# Patient Record
Sex: FEMALE | Race: WHITE | URBAN - METROPOLITAN AREA
[De-identification: names, ages, dates, MRNs, and addresses within clinical notes are randomized per-mention and may not be internally consistent; named-entity substitution may affect disease eponyms.]

---

## 2019-10-07 ENCOUNTER — EMERGENCY (EMERGENCY)
Facility: HOSPITAL | Age: 74
LOS: 0 days | Discharge: HOME | End: 2019-10-07
Attending: EMERGENCY MEDICINE | Admitting: EMERGENCY MEDICINE
Payer: MEDICARE

## 2019-10-07 VITALS
TEMPERATURE: 99 F | HEART RATE: 101 BPM | DIASTOLIC BLOOD PRESSURE: 61 MMHG | RESPIRATION RATE: 18 BRPM | SYSTOLIC BLOOD PRESSURE: 117 MMHG | OXYGEN SATURATION: 100 %

## 2019-10-07 DIAGNOSIS — R25.1 TREMOR, UNSPECIFIED: ICD-10-CM

## 2019-10-07 DIAGNOSIS — R00.2 PALPITATIONS: ICD-10-CM

## 2019-10-07 DIAGNOSIS — H53.8 OTHER VISUAL DISTURBANCES: ICD-10-CM

## 2019-10-07 PROCEDURE — 93010 ELECTROCARDIOGRAM REPORT: CPT

## 2019-10-07 PROCEDURE — 99284 EMERGENCY DEPT VISIT MOD MDM: CPT

## 2019-10-07 NOTE — ED PROVIDER NOTE - NS ED ROS FT
General: No fever, chills, or weakness.  Eyes:  Blurry vision. No diplopia, eye pain or discharge.  ENMT:  No hearing changes, pain, no sore throat or runny nose, no difficulty swallowing  Cardiac:  No chest pain, SOB or edema. No chest pain with exertion.  Respiratory:  No cough or respiratory distress. No hemoptysis. No history of asthma or RAD.  GI:  No nausea, vomiting, diarrhea or abdominal pain.  :  No dysuria, frequency or burning.  MS:  No myalgia, muscle weakness, joint pain or back pain.  Neuro:  No headache. No LOC. No change in ambulation. No dizziness.  Skin:  No skin rash.   Endocrine: No history of thyroid disease or diabetes.

## 2019-10-07 NOTE — ED PROVIDER NOTE - PATIENT PORTAL LINK FT
You can access the FollowMyHealth Patient Portal offered by [facility] by registering at the following website: [email or web address]. By joining [employer]’s FollowMyHealth portal, you will also be able to view your health information using other applications (apps) compatible with our system.

## 2019-10-07 NOTE — ED PROVIDER NOTE - NSFOLLOWUPINSTRUCTIONS_ED_ALL_ED_FT
Tremors    WHAT YOU NEED TO KNOW:    A tremor is a movement you cannot control that occurs in a rhythm. Tremors most commonly occur in the hands. Other common places include the head or face, trunk, or legs. Your voice can also have a tremor and sound shaky when you speak. A tremor may be caused by a nerve problem, too much thyroid hormone, or by certain medicines, caffeine, or alcohol. Tremors may be temporary or permanent. The tremor may go away and return, or worsen with stress. Tremors can happen at any age, but they are more common in later years.    DISCHARGE INSTRUCTIONS:    Contact your healthcare provider if:     You have a new or worsening tremor.      You have tremors that make it difficult to do your regular activities.      You have questions or concerns about your condition or care.    Medicines:     Medicines may be used to help control some kinds of tremors.      Take your medicine as directed. Contact your healthcare provider if you think your medicine is not helping or if you have side effects. Tell him or her if you are allergic to any medicine. Keep a list of the medicines, vitamins, and herbs you take. Include the amounts, and when and why you take them. Bring the list or the pill bottles to follow-up visits. Carry your medicine list with you in case of an emergency.    Manage your symptoms:     Do not have caffeine or other chemicals that affect your nerves. Limit or do not have caffeine. Caffeine can make tremors worse. Talk to your healthcare provider about herbal medicines, teas, and supplements. They may also increase tremors. Do not use illegal drugs.      Go to physical and occupational therapy as directed. A physical therapist can teach you exercises to help reduce the tremor and improve muscle control. You may be shown how to hold the body part during a tremor to help control the movement. The therapist can also help you build strength and balance. An occupational therapist can show you how to use adaptive devices to help you move more easily.       Use objects that will help you control movements. You may have more hand control if you add a watch or bracelet to your wrist. It may be easier for you to drink from a straw, or to fill your cup only half full. Cups with lids, such as travel mugs, can also help you drink with more control. Heavy eating utensils can help you eat more easily. A button fastener can help you button clothing if tremors in your hands make this difficult.      Set a regular sleep schedule. Lack of sleep can make tremors worse. Try to go to bed at the same time each night and wake up at the same time each morning.    Follow up with your healthcare provider as directed: Write down your questions so you remember to ask them during your visits.

## 2019-10-07 NOTE — ED ADULT NURSE NOTE - NS ED NURSE DISCH DISPOSITION
July 22, 2019    Marnaomykarlo DduleyGr  [address] [facility] [address]    Dear MsPrinceSimón,  This letter is regarding your recent Pap smear (cervical cancer screening) and Human Papillomavirus (HPV) test.  We are happy to inform you that your Pap smear result is normal. Cervical cancer is closely linked with certain types of HPV. Your results showed no evidence of high-risk HPV.  We recommend you have your next PAP smear and HPV test in 3 years.  You will still need to return to the clinic every year for an annual exam and other preventive tests.  If you have additional questions regarding this result, please call our registered nurse, Machelle at [phone].  Sincerely,    Warner Quiñones CNM/juni    Discharged

## 2019-10-07 NOTE — ED PROVIDER NOTE - NSFOLLOWUPCLINICS_GEN_ALL_ED_FT
[facility]  OP [facility]  [address] [place] [id number]  Phone: [phone]  Fax:   Follow Up Time: 1-3 Days

## 2019-10-07 NOTE — ED ADULT NURSE NOTE - OBJECTIVE STATEMENT
patient states she recently started taking Duloxetine and thinks she is having a bad reaction to it. patient states she has not been sleeping and reports "shaking" of her hands and arms. no tremors noted on exam. patient denies any si/hi.

## 2019-10-07 NOTE — ED PROVIDER NOTE - OBJECTIVE STATEMENT
74F h/o anxiety, CAD, CABG, HTN p/w palpitations, blurry vision, tremors. Pt reporting worsening insomnia over the past month and stated sxs for the past week. Pt started on bupropion ~1-2 weeks ago and duloxetine 1 month ago. pt never seen by psychiatrist. Denies CP, SOB, fever/chills, difficulty ambulating, slurred speech.

## 2019-10-07 NOTE — ED PROVIDER NOTE - PHYSICAL EXAMINATION
Constitutional: Well developed, well nourished. NAD. Good general hygiene  Head: Atraumatic.  Eyes: PERRLA. EOMI without discomfort.   ENT: No nasal discharge. Mucous membranes moist.  Neck: Supple. Painless ROM.  Cardiovascular: Regular rhythm. Regular rate. Normal S1 and S2. No murmurs. 2+ pulses in all extremities.   Pulmonary: Normal respiratory rate and effort. Lungs clear to auscultation bilaterally. No wheezing, rales, or rhonchi. Bilateral, equal lung expansion.   Abdominal: Soft. Nondistended. Nontender. No rebound or guarding.   Extremities. Pelvis stable. No lower extremity edema. Symmetric calves.  Skin: No rashes.   Neuro: AAOx3. CN 2-12, 5/5 strength in all extremities, sensation equal and intact in all extremities, no dysmetria, no pronator drift. Ambulating in ED w/o difficulty.  Psych: Normal mood. Normal affect.

## 2019-10-07 NOTE — ED PROVIDER NOTE - CARE PROVIDER_API CALL
Sadiq Hernández (MD)  Internal Medicine  [address] [place] [address]  Phone: [phone]  Fax: [phone]  Follow Up Time: 1-3 Days

## 2019-10-07 NOTE — ED ADULT TRIAGE NOTE - CHIEF COMPLAINT QUOTE
" I think I am having a reaction to my medication". patient recently prescribed Duloxetine 60mg once a day. patient reports having "shakes, decreased sleep". patient denies any SI/HI at this time.

## 2019-10-07 NOTE — ED PROVIDER NOTE - ATTENDING CONTRIBUTION TO CARE
75 y/o female h/o CAD, back pain, depression, anxiety, CABG, recently started by her PMD on duloxetine and buproprion, now presents with anxiety, insomnia, tremor, blurred vision for the past week. Sx are persistent, denies modifying factors, denies chest pain, fever, nausea, vomiting, diaphoresis, hemoptysis, orthopnea, PND, recent trauma, paresthesias, focal weakness, fevers, chills, neck stiffness, visual disturbances or pain, facial swelling, dental pain or other associated complaints at present.     No old chart available for review.  I have reviewed and agree with the nursing note, except as documented in my note.    VSS, awake, alert, non-toxic appearing, sitting comfortably, in NAD, no scalp tenderness or pulsatile vasculature, VA WNL, PERRL / EOMI, no conjunctival injection, ears clear, oropharynx clear and w/o signs dental space infection, no JVD or bruit, no skin rash or lesions, chest CTAB, no w/r/r, +S1/S2, RRR, no m/r/g, abdomen soft, NT, ND, +BS, no peripheral edema, AO x 3, clear speech.    IMP: Sx likely secondary to new medication use which pt is unable to tolerate, rec d/c buproprion, she was also referred to out mental health at her request, does not want to continue with her current PMD. The patient was given detailed return precautions and advised to return to the emergency department if any new symptoms developed, symptoms worsened or for any concerns. The patient was offered the opportunity to ask questions and verbalized that they understand the diagnosis and discharge instructions.

## 2019-10-07 NOTE — ED PROVIDER NOTE - PROGRESS NOTE DETAILS
F/u [facility] OP MH. Will establish care w/ new PMD. Dc bupropion. Return for worsening s/s, SI/HI, CP, SOB, syncope, lightheadedness.

## 2020-03-25 PROBLEM — Z00.00 ENCOUNTER FOR PREVENTIVE HEALTH EXAMINATION: Status: ACTIVE | Noted: 2020-03-25

## 2020-09-22 ENCOUNTER — APPOINTMENT (OUTPATIENT)
Dept: GASTROENTEROLOGY | Facility: CLINIC | Age: 75
End: 2020-09-22

## 2020-10-05 NOTE — ED ADULT NURSE NOTE - CHIEF COMPLAINT
Pt's paperwork was faxed back to [facility].   The patient is a 74y Female complaining of medical evaluation.

## 2021-10-24 ENCOUNTER — INPATIENT (INPATIENT)
Facility: HOSPITAL | Age: 76
LOS: 1 days | Discharge: HOME | End: 2021-10-26
Attending: INTERNAL MEDICINE | Admitting: INTERNAL MEDICINE
Payer: MEDICARE

## 2021-10-24 VITALS
HEART RATE: 141 BPM | DIASTOLIC BLOOD PRESSURE: 61 MMHG | WEIGHT: 150.13 LBS | RESPIRATION RATE: 18 BRPM | TEMPERATURE: 98 F | SYSTOLIC BLOOD PRESSURE: 114 MMHG | OXYGEN SATURATION: 98 %

## 2021-10-24 DIAGNOSIS — Z95.1 PRESENCE OF AORTOCORONARY BYPASS GRAFT: Chronic | ICD-10-CM

## 2021-10-24 LAB
ALBUMIN SERPL ELPH-MCNC: 4.9 G/DL — SIGNIFICANT CHANGE UP (ref 3.5–5.2)
ALP SERPL-CCNC: 96 U/L — SIGNIFICANT CHANGE UP (ref 30–115)
ALT FLD-CCNC: 26 U/L — SIGNIFICANT CHANGE UP (ref 0–41)
ANION GAP SERPL CALC-SCNC: 14 MMOL/L — SIGNIFICANT CHANGE UP (ref 7–14)
APPEARANCE UR: CLEAR — SIGNIFICANT CHANGE UP
APTT BLD: 31.1 SEC — SIGNIFICANT CHANGE UP (ref 27–39.2)
AST SERPL-CCNC: 37 U/L — SIGNIFICANT CHANGE UP (ref 0–41)
BACTERIA # UR AUTO: SIGNIFICANT CHANGE UP /HPF
BASOPHILS # BLD AUTO: 0.04 K/UL — SIGNIFICANT CHANGE UP (ref 0–0.2)
BASOPHILS NFR BLD AUTO: 0.2 % — SIGNIFICANT CHANGE UP (ref 0–1)
BILIRUB SERPL-MCNC: 0.3 MG/DL — SIGNIFICANT CHANGE UP (ref 0.2–1.2)
BILIRUB UR-MCNC: NEGATIVE — SIGNIFICANT CHANGE UP
BUN SERPL-MCNC: 35 MG/DL — HIGH (ref 10–20)
CALCIUM SERPL-MCNC: 10.5 MG/DL — HIGH (ref 8.5–10.1)
CHLORIDE SERPL-SCNC: 99 MMOL/L — SIGNIFICANT CHANGE UP (ref 98–110)
CO2 SERPL-SCNC: 23 MMOL/L — SIGNIFICANT CHANGE UP (ref 17–32)
COLOR SPEC: YELLOW — SIGNIFICANT CHANGE UP
CREAT SERPL-MCNC: 1.2 MG/DL — SIGNIFICANT CHANGE UP (ref 0.7–1.5)
D DIMER BLD IA.RAPID-MCNC: 670 NG/ML DDU — HIGH (ref 0–230)
DIFF PNL FLD: ABNORMAL
EOSINOPHIL # BLD AUTO: 0.19 K/UL — SIGNIFICANT CHANGE UP (ref 0–0.7)
EOSINOPHIL NFR BLD AUTO: 1 % — SIGNIFICANT CHANGE UP (ref 0–8)
EPI CELLS # UR: ABNORMAL /HPF
GLUCOSE SERPL-MCNC: 110 MG/DL — HIGH (ref 70–99)
GLUCOSE UR QL: NEGATIVE MG/DL — SIGNIFICANT CHANGE UP
HCT VFR BLD CALC: 39.8 % — SIGNIFICANT CHANGE UP (ref 37–47)
HGB BLD-MCNC: 13.2 G/DL — SIGNIFICANT CHANGE UP (ref 12–16)
IMM GRANULOCYTES NFR BLD AUTO: 0.5 % — HIGH (ref 0.1–0.3)
INR BLD: 0.98 RATIO — SIGNIFICANT CHANGE UP (ref 0.65–1.3)
KETONES UR-MCNC: NEGATIVE — SIGNIFICANT CHANGE UP
LEUKOCYTE ESTERASE UR-ACNC: NEGATIVE — SIGNIFICANT CHANGE UP
LYMPHOCYTES # BLD AUTO: 1.87 K/UL — SIGNIFICANT CHANGE UP (ref 1.2–3.4)
LYMPHOCYTES # BLD AUTO: 9.9 % — LOW (ref 20.5–51.1)
MCHC RBC-ENTMCNC: 32.1 PG — HIGH (ref 27–31)
MCHC RBC-ENTMCNC: 33.2 G/DL — SIGNIFICANT CHANGE UP (ref 32–37)
MCV RBC AUTO: 96.8 FL — SIGNIFICANT CHANGE UP (ref 81–99)
MONOCYTES # BLD AUTO: 0.85 K/UL — HIGH (ref 0.1–0.6)
MONOCYTES NFR BLD AUTO: 4.5 % — SIGNIFICANT CHANGE UP (ref 1.7–9.3)
NEUTROPHILS # BLD AUTO: 15.81 K/UL — HIGH (ref 1.4–6.5)
NEUTROPHILS NFR BLD AUTO: 83.9 % — HIGH (ref 42.2–75.2)
NITRITE UR-MCNC: NEGATIVE — SIGNIFICANT CHANGE UP
NRBC # BLD: 0 /100 WBCS — SIGNIFICANT CHANGE UP (ref 0–0)
NT-PROBNP SERPL-SCNC: 2960 PG/ML — HIGH (ref 0–300)
PH UR: 6 — SIGNIFICANT CHANGE UP (ref 5–8)
PLATELET # BLD AUTO: 236 K/UL — SIGNIFICANT CHANGE UP (ref 130–400)
POTASSIUM SERPL-MCNC: 4.3 MMOL/L — SIGNIFICANT CHANGE UP (ref 3.5–5)
POTASSIUM SERPL-SCNC: 4.3 MMOL/L — SIGNIFICANT CHANGE UP (ref 3.5–5)
PROT SERPL-MCNC: 8.3 G/DL — HIGH (ref 6–8)
PROT UR-MCNC: 30 MG/DL
PROTHROM AB SERPL-ACNC: 11.3 SEC — SIGNIFICANT CHANGE UP (ref 9.95–12.87)
RBC # BLD: 4.11 M/UL — LOW (ref 4.2–5.4)
RBC # FLD: 14.3 % — SIGNIFICANT CHANGE UP (ref 11.5–14.5)
RBC CASTS # UR COMP ASSIST: ABNORMAL /HPF
SARS-COV-2 RNA SPEC QL NAA+PROBE: SIGNIFICANT CHANGE UP
SODIUM SERPL-SCNC: 136 MMOL/L — SIGNIFICANT CHANGE UP (ref 135–146)
SP GR SPEC: 1.01 — SIGNIFICANT CHANGE UP (ref 1.01–1.03)
TROPONIN T SERPL-MCNC: <0.01 NG/ML — SIGNIFICANT CHANGE UP
UROBILINOGEN FLD QL: 0.2 MG/DL — SIGNIFICANT CHANGE UP
WBC # BLD: 18.86 K/UL — HIGH (ref 4.8–10.8)
WBC # FLD AUTO: 18.86 K/UL — HIGH (ref 4.8–10.8)

## 2021-10-24 PROCEDURE — 99285 EMERGENCY DEPT VISIT HI MDM: CPT

## 2021-10-24 PROCEDURE — 71275 CT ANGIOGRAPHY CHEST: CPT | Mod: 26,MA

## 2021-10-24 PROCEDURE — ZZZZZ: CPT

## 2021-10-24 PROCEDURE — 93010 ELECTROCARDIOGRAM REPORT: CPT

## 2021-10-24 PROCEDURE — 71045 X-RAY EXAM CHEST 1 VIEW: CPT | Mod: 26

## 2021-10-24 RX ORDER — DILTIAZEM HCL 120 MG
5 CAPSULE, EXT RELEASE 24 HR ORAL
Qty: 125 | Refills: 0 | Status: DISCONTINUED | OUTPATIENT
Start: 2021-10-24 | End: 2021-10-26

## 2021-10-24 RX ORDER — PANTOPRAZOLE SODIUM 20 MG/1
40 TABLET, DELAYED RELEASE ORAL
Refills: 0 | Status: DISCONTINUED | OUTPATIENT
Start: 2021-10-24 | End: 2021-10-26

## 2021-10-24 RX ORDER — SIMVASTATIN 20 MG/1
40 TABLET, FILM COATED ORAL AT BEDTIME
Refills: 0 | Status: DISCONTINUED | OUTPATIENT
Start: 2021-10-24 | End: 2021-10-26

## 2021-10-24 RX ORDER — SODIUM CHLORIDE 9 MG/ML
1000 INJECTION INTRAMUSCULAR; INTRAVENOUS; SUBCUTANEOUS ONCE
Refills: 0 | Status: COMPLETED | OUTPATIENT
Start: 2021-10-24 | End: 2021-10-24

## 2021-10-24 RX ORDER — DILTIAZEM HCL 120 MG
10 CAPSULE, EXT RELEASE 24 HR ORAL ONCE
Refills: 0 | Status: COMPLETED | OUTPATIENT
Start: 2021-10-24 | End: 2021-10-24

## 2021-10-24 RX ORDER — DULOXETINE HYDROCHLORIDE 30 MG/1
60 CAPSULE, DELAYED RELEASE ORAL DAILY
Refills: 0 | Status: DISCONTINUED | OUTPATIENT
Start: 2021-10-24 | End: 2021-10-26

## 2021-10-24 RX ORDER — ENOXAPARIN SODIUM 100 MG/ML
70 INJECTION SUBCUTANEOUS ONCE
Refills: 0 | Status: COMPLETED | OUTPATIENT
Start: 2021-10-24 | End: 2021-10-24

## 2021-10-24 RX ORDER — DILTIAZEM HCL 120 MG
10 CAPSULE, EXT RELEASE 24 HR ORAL ONCE
Refills: 0 | Status: DISCONTINUED | OUTPATIENT
Start: 2021-10-24 | End: 2021-10-24

## 2021-10-24 RX ORDER — NICOTINE POLACRILEX 2 MG
1 GUM BUCCAL DAILY
Refills: 0 | Status: DISCONTINUED | OUTPATIENT
Start: 2021-10-24 | End: 2021-10-26

## 2021-10-24 RX ORDER — CHLORHEXIDINE GLUCONATE 213 G/1000ML
1 SOLUTION TOPICAL DAILY
Refills: 0 | Status: DISCONTINUED | OUTPATIENT
Start: 2021-10-24 | End: 2021-10-26

## 2021-10-24 RX ADMIN — Medication 5 MG/HR: at 20:18

## 2021-10-24 RX ADMIN — Medication 10 MILLIGRAM(S): at 20:17

## 2021-10-24 RX ADMIN — SODIUM CHLORIDE 1000 MILLILITER(S): 9 INJECTION INTRAMUSCULAR; INTRAVENOUS; SUBCUTANEOUS at 18:56

## 2021-10-24 RX ADMIN — SODIUM CHLORIDE 1000 MILLILITER(S): 9 INJECTION INTRAMUSCULAR; INTRAVENOUS; SUBCUTANEOUS at 14:18

## 2021-10-24 RX ADMIN — ENOXAPARIN SODIUM 70 MILLIGRAM(S): 100 INJECTION SUBCUTANEOUS at 23:28

## 2021-10-24 RX ADMIN — Medication 10 MILLIGRAM(S): at 18:56

## 2021-10-24 NOTE — ED PROVIDER NOTE - IV ALTEPLASE EXCL ABS HIDDEN
Moira Morejon   MRN: [id number]    Department:  [facility] Emergency Department   Date of Visit:  12/19/2018           Disclosure     Insurance plans vary and the physician(s) referred by the ER may not be covered by your plan.  Please contact tell this physician (or your personal doctor if your instructions are to return to your personal doctor) about any new or lasting problems. The primary care or specialist physician will see patients referred from the [facility] Emergency Department.  [place] show

## 2021-10-24 NOTE — ED PROVIDER NOTE - PHYSICAL EXAMINATION
Vital Signs: I have reviewed the initial vital signs.  Constitutional: well-nourished, no acute distress, normocephalic  Eyes: PERRLA, EOMI, , clear conjunctiva  ENT: MMM,  Cardiovascular: tachycardic irregular rhythm no murmur appreciated  Respiratory: unlabored respiratory effort, clear to auscultation bilaterally  Gastrointestinal: soft, non-tender, non-distended  abdomen, no pulsatile mass  Musculoskeletal: supple neck, no lower extremity edema or calf tenderness, no bony tenderness  Integumentary: warm, dry, no rash  Neurologic: awake, alert, cranial nerves II-XII grossly intact, extremities’ motor and sensory functions grossly intact, no focal deficits  Psychiatric: appropriate mood, appropriate affect

## 2021-10-24 NOTE — ED PROVIDER NOTE - PROGRESS NOTE DETAILS
patient with persistant tachycardia despite IV hydration patient given iv push cardizem 10 mg x 2 without any improvement of sympotms. patient started on IV cardizem drip . patient with icu evaluation dr. cuevas approves for telemetry

## 2021-10-24 NOTE — ED PROVIDER NOTE - CLINICAL SUMMARY MEDICAL DECISION MAKING FREE TEXT BOX
77yo F history of HTN CAD CABG presenting with dizziness nausea, hot while shopping this AM. Per pt, thought [employer] was too hot, stepped outside, and felt improved. Currently improved, no acute complaints. no chest pain, shortness of breath, LE pain/swelling. Well appearing, NAD, non toxic. NCAT PERRLA EOMI neck supple non tender normal wob cta bl regular tachycardic abdomen s nt nd no rebound no guarding WWPx4 neuro non focal. labs ekg imaging reviewed. attempted fluids to bring down HR, no improvement. CTPE eng. +rapid aflutter, HR consistently 140s-150s. given cardizem IVP with improvement. started on gtt. dw icu. will admit

## 2021-10-24 NOTE — ED ADULT TRIAGE NOTE - CHIEF COMPLAINT QUOTE
BIBA via [facility], Patient was shopping at [employer] and began to feel hot and nauseous. Denies syncope, Denies chest pain or Denies fall. Went outside and felt a little better with air. Employee suggested patient to visit ER just in case. State symptom have resolved.

## 2021-10-24 NOTE — ED PROVIDER NOTE - OBJECTIVE STATEMENT
75 y/o female smoker with hx of CABG , on asa presents to the Ed with episode in mall with diaphoresis and nausea which resolved with exposure to outside air . patient denies any cp, sob, palpitations. no dizziness, visual changes or syncope. no neck or back pain. patient denies any headache or weakness to extremities. no abdominal pain. no recent change to any medications, change in diet or weight loss.

## 2021-10-24 NOTE — ED ADULT NURSE NOTE - CHIEF COMPLAINT QUOTE
BIBA via [facility], Patient was shopping at [employer] and began to feel hot and nauseous. Denies syncope, Denies chest pain or Denies fall. Went outside and felt a little better with air. Employee suggested patient to visit ER just in case. State symptom have resolved.

## 2021-10-25 LAB
ALBUMIN SERPL ELPH-MCNC: 4.3 G/DL — SIGNIFICANT CHANGE UP (ref 3.5–5.2)
ALP SERPL-CCNC: 86 U/L — SIGNIFICANT CHANGE UP (ref 30–115)
ALT FLD-CCNC: 19 U/L — SIGNIFICANT CHANGE UP (ref 0–41)
ANION GAP SERPL CALC-SCNC: 15 MMOL/L — HIGH (ref 7–14)
AST SERPL-CCNC: 24 U/L — SIGNIFICANT CHANGE UP (ref 0–41)
BASOPHILS # BLD AUTO: 0.05 K/UL — SIGNIFICANT CHANGE UP (ref 0–0.2)
BASOPHILS NFR BLD AUTO: 0.4 % — SIGNIFICANT CHANGE UP (ref 0–1)
BILIRUB SERPL-MCNC: 0.4 MG/DL — SIGNIFICANT CHANGE UP (ref 0.2–1.2)
BUN SERPL-MCNC: 22 MG/DL — HIGH (ref 10–20)
CALCIUM SERPL-MCNC: 9.7 MG/DL — SIGNIFICANT CHANGE UP (ref 8.5–10.1)
CHLORIDE SERPL-SCNC: 105 MMOL/L — SIGNIFICANT CHANGE UP (ref 98–110)
CO2 SERPL-SCNC: 20 MMOL/L — SIGNIFICANT CHANGE UP (ref 17–32)
COVID-19 SPIKE DOMAIN AB INTERP: POSITIVE
COVID-19 SPIKE DOMAIN ANTIBODY RESULT: >250 U/ML — HIGH
CREAT SERPL-MCNC: 1 MG/DL — SIGNIFICANT CHANGE UP (ref 0.7–1.5)
CULTURE RESULTS: SIGNIFICANT CHANGE UP
EOSINOPHIL # BLD AUTO: 0.22 K/UL — SIGNIFICANT CHANGE UP (ref 0–0.7)
EOSINOPHIL NFR BLD AUTO: 1.7 % — SIGNIFICANT CHANGE UP (ref 0–8)
GLUCOSE SERPL-MCNC: 103 MG/DL — HIGH (ref 70–99)
HCT VFR BLD CALC: 38 % — SIGNIFICANT CHANGE UP (ref 37–47)
HGB BLD-MCNC: 12.6 G/DL — SIGNIFICANT CHANGE UP (ref 12–16)
IMM GRANULOCYTES NFR BLD AUTO: 0.2 % — SIGNIFICANT CHANGE UP (ref 0.1–0.3)
LYMPHOCYTES # BLD AUTO: 32.1 % — SIGNIFICANT CHANGE UP (ref 20.5–51.1)
LYMPHOCYTES # BLD AUTO: 4.07 K/UL — HIGH (ref 1.2–3.4)
MAGNESIUM SERPL-MCNC: 1.9 MG/DL — SIGNIFICANT CHANGE UP (ref 1.8–2.4)
MCHC RBC-ENTMCNC: 31.6 PG — HIGH (ref 27–31)
MCHC RBC-ENTMCNC: 33.2 G/DL — SIGNIFICANT CHANGE UP (ref 32–37)
MCV RBC AUTO: 95.2 FL — SIGNIFICANT CHANGE UP (ref 81–99)
MONOCYTES # BLD AUTO: 0.82 K/UL — HIGH (ref 0.1–0.6)
MONOCYTES NFR BLD AUTO: 6.5 % — SIGNIFICANT CHANGE UP (ref 1.7–9.3)
NEUTROPHILS # BLD AUTO: 7.51 K/UL — HIGH (ref 1.4–6.5)
NEUTROPHILS NFR BLD AUTO: 59.1 % — SIGNIFICANT CHANGE UP (ref 42.2–75.2)
NRBC # BLD: 0 /100 WBCS — SIGNIFICANT CHANGE UP (ref 0–0)
PHOSPHATE SERPL-MCNC: 3 MG/DL — SIGNIFICANT CHANGE UP (ref 2.1–4.9)
PLATELET # BLD AUTO: 279 K/UL — SIGNIFICANT CHANGE UP (ref 130–400)
POTASSIUM SERPL-MCNC: 4 MMOL/L — SIGNIFICANT CHANGE UP (ref 3.5–5)
POTASSIUM SERPL-SCNC: 4 MMOL/L — SIGNIFICANT CHANGE UP (ref 3.5–5)
PROT SERPL-MCNC: 7.4 G/DL — SIGNIFICANT CHANGE UP (ref 6–8)
RBC # BLD: 3.99 M/UL — LOW (ref 4.2–5.4)
RBC # FLD: 14.4 % — SIGNIFICANT CHANGE UP (ref 11.5–14.5)
SARS-COV-2 IGG+IGM SERPL QL IA: >250 U/ML — HIGH
SARS-COV-2 IGG+IGM SERPL QL IA: POSITIVE
SODIUM SERPL-SCNC: 140 MMOL/L — SIGNIFICANT CHANGE UP (ref 135–146)
SPECIMEN SOURCE: SIGNIFICANT CHANGE UP
TROPONIN T SERPL-MCNC: 0.01 NG/ML — SIGNIFICANT CHANGE UP
TROPONIN T SERPL-MCNC: 0.01 NG/ML — SIGNIFICANT CHANGE UP
WBC # BLD: 12.69 K/UL — HIGH (ref 4.8–10.8)
WBC # FLD AUTO: 12.69 K/UL — HIGH (ref 4.8–10.8)

## 2021-10-25 PROCEDURE — 99222 1ST HOSP IP/OBS MODERATE 55: CPT

## 2021-10-25 PROCEDURE — 93306 TTE W/DOPPLER COMPLETE: CPT | Mod: 26

## 2021-10-25 PROCEDURE — 93010 ELECTROCARDIOGRAM REPORT: CPT

## 2021-10-25 PROCEDURE — ZZZZZ: CPT

## 2021-10-25 PROCEDURE — 99223 1ST HOSP IP/OBS HIGH 75: CPT

## 2021-10-25 PROCEDURE — 99233 SBSQ HOSP IP/OBS HIGH 50: CPT

## 2021-10-25 RX ORDER — AMIODARONE HYDROCHLORIDE 400 MG/1
150 TABLET ORAL ONCE
Refills: 0 | Status: COMPLETED | OUTPATIENT
Start: 2021-10-25 | End: 2021-10-25

## 2021-10-25 RX ORDER — AMIODARONE HYDROCHLORIDE 400 MG/1
0.5 TABLET ORAL
Qty: 900 | Refills: 0 | Status: DISCONTINUED | OUTPATIENT
Start: 2021-10-25 | End: 2021-10-26

## 2021-10-25 RX ORDER — ENOXAPARIN SODIUM 100 MG/ML
70 INJECTION SUBCUTANEOUS EVERY 12 HOURS
Refills: 0 | Status: DISCONTINUED | OUTPATIENT
Start: 2021-10-25 | End: 2021-10-25

## 2021-10-25 RX ORDER — OXYCODONE AND ACETAMINOPHEN 5; 325 MG/1; MG/1
1 TABLET ORAL ONCE
Refills: 0 | Status: DISCONTINUED | OUTPATIENT
Start: 2021-10-25 | End: 2021-10-25

## 2021-10-25 RX ORDER — APIXABAN 2.5 MG/1
5 TABLET, FILM COATED ORAL EVERY 12 HOURS
Refills: 0 | Status: DISCONTINUED | OUTPATIENT
Start: 2021-10-25 | End: 2021-10-26

## 2021-10-25 RX ORDER — AMIODARONE HYDROCHLORIDE 400 MG/1
1 TABLET ORAL
Qty: 900 | Refills: 0 | Status: COMPLETED | OUTPATIENT
Start: 2021-10-25 | End: 2021-10-25

## 2021-10-25 RX ADMIN — APIXABAN 5 MILLIGRAM(S): 2.5 TABLET, FILM COATED ORAL at 19:14

## 2021-10-25 RX ADMIN — Medication 5 MG/HR: at 20:00

## 2021-10-25 RX ADMIN — OXYCODONE AND ACETAMINOPHEN 1 TABLET(S): 5; 325 TABLET ORAL at 00:41

## 2021-10-25 RX ADMIN — AMIODARONE HYDROCHLORIDE 33.3 MG/MIN: 400 TABLET ORAL at 11:31

## 2021-10-25 RX ADMIN — AMIODARONE HYDROCHLORIDE 16.7 MG/MIN: 400 TABLET ORAL at 20:00

## 2021-10-25 RX ADMIN — AMIODARONE HYDROCHLORIDE 16.7 MG/MIN: 400 TABLET ORAL at 11:20

## 2021-10-25 RX ADMIN — OXYCODONE AND ACETAMINOPHEN 1 TABLET(S): 5; 325 TABLET ORAL at 01:11

## 2021-10-25 RX ADMIN — PANTOPRAZOLE SODIUM 40 MILLIGRAM(S): 20 TABLET, DELAYED RELEASE ORAL at 06:28

## 2021-10-25 RX ADMIN — AMIODARONE HYDROCHLORIDE 600 MILLIGRAM(S): 400 TABLET ORAL at 11:08

## 2021-10-25 RX ADMIN — CHLORHEXIDINE GLUCONATE 1 APPLICATION(S): 213 SOLUTION TOPICAL at 11:20

## 2021-10-25 RX ADMIN — Medication 5 MG/HR: at 00:20

## 2021-10-25 RX ADMIN — DULOXETINE HYDROCHLORIDE 60 MILLIGRAM(S): 30 CAPSULE, DELAYED RELEASE ORAL at 11:20

## 2021-10-25 RX ADMIN — ENOXAPARIN SODIUM 70 MILLIGRAM(S): 100 INJECTION SUBCUTANEOUS at 11:21

## 2021-10-25 RX ADMIN — SIMVASTATIN 40 MILLIGRAM(S): 20 TABLET, FILM COATED ORAL at 22:04

## 2021-10-25 NOTE — H&P ADULT - NSHPPHYSICALEXAM_GEN_ALL_CORE
GENERAL:  77y/o W Female NAD, resting comfortably.  HEAD:  Atraumatic, Normocephalic  EYES: EOMI, PERRLA, conjunctiva and sclera clear  NECK: Supple, No JVD, no cervical lymphadenopathy, non-tender  CHEST/LUNG: Clear to auscultation bilaterally; No wheeze, rhonchi, or rales  HEART: irregular rate and rhythm; S1&S2  ABDOMEN: Soft, Nontender, Nondistended x 4 quadrants; Bowel sounds present  EXTREMITIES:   Peripheral Pulses Present, No clubbing, no cyanosis, or no edema, no calf tenderness  PSYCH: AAOx3, cooperative, appropriate  NEUROLOGY: WNL  SKIN: WNL

## 2021-10-25 NOTE — CONSULT NOTE ADULT - ASSESSMENT
Impression:  Probable COPD  Air trapping on CT  Current smoker      Suggestion:  ABG/VBG for baseline PCO2  Outpatient FU for PFTs  Target POx > 92%  Aspiration precautions  HOB at 45 degrees  Advise smoking cessation  OOB to chair  GI and DVT prophylaxis     Impression:  COPD  Air trapping on CT  Current smoker      Suggestion:  ABG/VBG for baseline PCO2  Outpatient FU for PFTs  Target POx > 92%  Aspiration precautions  HOB at 45 degrees  Advise smoking cessation  OOB to chair  GI and DVT prophylaxis

## 2021-10-25 NOTE — CONSULT NOTE ADULT - ATTENDING COMMENTS
Attending Statement: I have personally performed a face to face diagnostic evaluation on this patient. The patient is suffering from:  COPD  Air trapping on CT  Current smoker  I have reviewed the above note and agree with the history, exam and suggestions for care, except as I have noted in the text. I have amended the treatment plans as necessary.

## 2021-10-25 NOTE — CONSULT NOTE ADULT - SUBJECTIVE AND OBJECTIVE BOX
CARDIOLOGY CONSULT NOTE     CHIEF COMPLAINT/REASON FOR CONSULT:    HPI:  77 yo W female w/ pmh as noted below.  Pt c/o dizziness yesterday afternoon while shopping.  Pt went to ER and was found to be in fib/flutter w/ RVR.  Pt was started on IV cardizem and is for adm to  ICU tele . CTangio negative for PE (25 Oct 2021 07:00)      PAST MEDICAL & SURGICAL HISTORY:  CAD (coronary artery disease)    Tobacco dependence    HTN (hypertension)    Anxiety    S/P CABG x 3        Cardiac Risks:   [x ]HTN, [ ] DM, [x ] Smoking, [ ] FH,  [ ] Lipids        MEDICATIONS:  MEDICATIONS  (STANDING):  chlorhexidine 4% Liquid 1 Application(s) Topical daily  diltiazem Infusion 5 mG/Hr (5 mL/Hr) IV Continuous <Continuous>  DULoxetine 60 milliGRAM(s) Oral daily  enoxaparin Injectable 70 milliGRAM(s) SubCutaneous every 12 hours  nicotine -   7 mG/24Hr(s) Patch 1 Patch Transdermal daily  pantoprazole    Tablet 40 milliGRAM(s) Oral before breakfast  simvastatin 40 milliGRAM(s) Oral at bedtime      FAMILY HISTORY:      SOCIAL HISTORY:      [ ] Marital status    Allergies    No Known Allergies      	    REVIEW OF SYSTEMS:  CONSTITUTIONAL: No fever, weight loss, or fatigue  EYES: No eye pain, visual disturbances, or discharge  ENMT:  No difficulty hearing, tinnitus, vertigo; No sinus or throat pain  NECK: No pain or stiffness  RESPIRATORY: No cough, wheezing, chills or hemoptysis; No Shortness of Breath  CARDIOVASCULAR: See above  GASTROINTESTINAL: No abdominal or epigastric pain. No nausea, vomiting, or hematemesis; No diarrhea or constipation. No melena or hematochezia.  GENITOURINARY: No dysuria, frequency, hematuria, or incontinence  NEUROLOGICAL: No headaches, memory loss, loss of strength, numbness, or tremors  SKIN: No itching, burning, rashes, or lesions   	      PHYSICAL EXAM:  T(C): 36.4 (10-25-21 @ 07:01), Max: 36.6 (10-24-21 @ 13:09)  HR: 95 (10-25-21 @ 05:30) (95 - 144)  BP: 121/70 (10-25-21 @ 05:30) (114/61 - 169/93)  RR: 18 (10-25-21 @ 07:01) (16 - 26)  SpO2: 95% (10-25-21 @ 05:30) (95% - 98%)  Wt(kg): --  I&O's Summary      Appearance: Normal	  Psychiatry: A & O x 3, Mood & affect appropriate  HEENT:   Normal oral mucosa, PERRL, EOMI	  Lymphatic: No lymphadenopathy  Cardiovascular: Normal S1 S2,irreg No JVD, No murmurs  Respiratory: Lungs clear to auscultation	Decreased bs  Gastrointestinal:  Soft, Non-tender, + BS	  Skin: No rashes, No ecchymoses, No cyanosis	  Neurologic: Non-focal  Extremities: Normal range of motion, No clubbing, cyanosis or edema  Vascular: Peripheral pulses palpable 2+ bilaterally      ECG:  	not avaiable    	  LABS:	 	    CARDIAC MARKERS:          Serum Pro-Brain Natriuretic Peptide: 2960 pg/mL (10-24 @ 13:45)                            12.6   12.69 )-----------( 279      ( 25 Oct 2021 05:41 )             38.0     10-25    140  |  105  |  22<H>  ----------------------------<  103<H>  4.0   |  20  |  1.0    Ca    9.7      25 Oct 2021 05:41  Phos  3.0     10-25  Mg     1.9     10-25    TPro  7.4  /  Alb  4.3  /  TBili  0.4  /  DBili  x   /  AST  24  /  ALT  19  /  AlkPhos  86  10-25    PT/INR - ( 24 Oct 2021 13:45 )   PT: 11.30 sec;   INR: 0.98 ratio         PTT - ( 24 Oct 2021 13:45 )  PTT:31.1 sec  proBNP: Serum Pro-Brain Natriuretic Peptide: 2960 pg/mL (10-24 @ 13:45)                
Patient is a 76y old  Female who presents with a chief complaint of new fib RVR (25 Oct 2021 07:46)    HPI:  77 yo W female w/ pmh as noted below.  Pt c/o dizziness yesterday afternoon while shopping.  Pt went to ER and was found to be in fib/flutter w/ RVR.  Pt was started on IV cardizem and is for adm to  ICU tele . CTangio negative for PE (25 Oct 2021 07:00)    PAST MEDICAL & SURGICAL HISTORY:  CAD (coronary artery disease)  Tobacco dependence  HTN (hypertension)  Anxiety  S/P CABG x 3    SOCIAL HX:   Active smoker    FAMILY HISTORY:  :  No known cardiovacular family hisotry     Review Of Systems:   All ROS are negative except per HPI     Allergies  No Known Allergies    Intolerances    PHYSICAL EXAM  ICU Vital Signs Last 24 Hrs  T(C): 36.4 (25 Oct 2021 07:01), Max: 36.6 (24 Oct 2021 13:09)  T(F): 97.6 (25 Oct 2021 07:01), Max: 97.8 (24 Oct 2021 13:09)  HR: 95 (25 Oct 2021 05:30) (95 - 144)  BP: 121/70 (25 Oct 2021 05:30) (114/61 - 169/93)  BP(mean): 90 (25 Oct 2021 05:30) (90 - 99)  RR: 18 (25 Oct 2021 07:01) (16 - 26)  SpO2: 95% (25 Oct 2021 05:30) (95% - 98%)      CONSTITUTIONAL:  Well nourished.  NAD    ENT:   Airway patent,   Mouth with normal mucosa.   No thrush    EYES:   pupils equal,   round and reactive to light.    CARDIAC:   Normal rate,   Irregular rhythm.    Heart sounds S1, S2.   No edema    RESPIRATORY:   No wheezing   Normal chest expansion  No use of accessory muscles    GASTROINTESTINAL:  Abdomen soft   Non-tender,   No guarding,   + BS    MUSCULOSKELETAL:   Range of motion is not limited,  Nno clubbing, cyanosis    NEUROLOGICAL:   Alert and oriented   No motor or sensory deficits.  Pertinent DTRs normal    SKIN:   Skin normal color for race,   Warm and dry  No evidence of rash.    PSYCHIATRIC:   Normal mood and affect.   No apparent risk to self or others.      10-25-21 @ 07:01  -  10-25-21 @ 09:27  --------------------------------------------------------  IN:    Oral Fluid: 210 mL  Total IN: 210 mL    OUT:  Total OUT: 0 mL    Total NET: 210 mL          LABS:                          12.6   12.69 )-----------( 279      ( 25 Oct 2021 05:41 )             38.0                                               10-25    140  |  105  |  22<H>  ----------------------------<  103<H>  4.0   |  20  |  1.0    Ca    9.7      25 Oct 2021 05:41  Phos  3.0     10-  Mg     1.9     1025    TPro  7.4  /  Alb  4.3  /  TBili  0.4  /  DBili  x   /  AST  24  /  ALT  19  /  AlkPhos  86  10-25      PT/INR - ( 24 Oct 2021 13:45 )   PT: 11.30 sec;   INR: 0.98 ratio         PTT - ( 24 Oct 2021 13:45 )  PTT:31.1 sec                                           Urinalysis Basic - ( 24 Oct 2021 15:12 )    Color: Yellow / Appearance: Clear / S.015 / pH: x  Gluc: x / Ketone: Negative  / Bili: Negative / Urobili: 0.2 mg/dL   Blood: x / Protein: 30 mg/dL / Nitrite: Negative   Leuk Esterase: Negative / RBC: 3-5 /HPF / WBC x   Sq Epi: x / Non Sq Epi: Few /HPF / Bacteria: occ /HPF        CARDIAC MARKERS ( 25 Oct 2021 05:41 )  x     / 0.01 ng/mL / x     / x     / x      CARDIAC MARKERS ( 25 Oct 2021 01:00 )  x     / 0.01 ng/mL / x     / x     / x      CARDIAC MARKERS ( 24 Oct 2021 13:45 )  x     / <0.01 ng/mL / x     / x     / x                                                LIVER FUNCTIONS - ( 25 Oct 2021 05:41 )  Alb: 4.3 g/dL / Pro: 7.4 g/dL / ALK PHOS: 86 U/L / ALT: 19 U/L / AST: 24 U/L / GGT: x                                                      MEDICATIONS  (STANDING):  aMIOdarone Infusion 1 mG/Min (33.3 mL/Hr) IV Continuous <Continuous>  aMIOdarone Infusion 0.5 mG/Min (16.7 mL/Hr) IV Continuous <Continuous>  aMIOdarone IVPB 150 milliGRAM(s) IV Intermittent once  chlorhexidine 4% Liquid 1 Application(s) Topical daily  diltiazem Infusion 5 mG/Hr (5 mL/Hr) IV Continuous <Continuous>  DULoxetine 60 milliGRAM(s) Oral daily  enoxaparin Injectable 70 milliGRAM(s) SubCutaneous every 12 hours  nicotine -   7 mG/24Hr(s) Patch 1 Patch Transdermal daily  pantoprazole    Tablet 40 milliGRAM(s) Oral before breakfast  simvastatin 40 milliGRAM(s) Oral at bedtime    MEDICATIONS  (PRN):

## 2021-10-25 NOTE — CONSULT NOTE ADULT - ASSESSMENT
Patient with hx CABG 12 years ago. Not followed by cardiology. She was dressed warm in mall. She felt hot . She was diaphoretic, No chest pain sob or palpitations. Now no complaints. Will give amiodarone to try covert. She needs a echo. No pe. Check cxr. Stop smoking . Anticoagulate . Will need out patient stress test. Stop smoking. Patient instructed

## 2021-10-25 NOTE — PROGRESS NOTE ADULT - ASSESSMENT
75 y/o female smoker with hx of CAD s/p CABG, HTN  presented  to the Ed with episode in mall with diaphoresis and nausea  77 y/o female smoker with hx of CAD s/p CABG, HTN  presented  to the Ed with episode in mall with diaphoresis and nausea and found in rapid a-flutter    paroxysmal afib / flutter     - on IV Amio and Cardizem   - check 2DECHO and TSH   - would anticoagulate with Eliquis   - resume home meds   - DC smoking   - tele

## 2021-10-25 NOTE — H&P ADULT - NSICDXPASTMEDICALHX_GEN_ALL_CORE_FT
PAST MEDICAL HISTORY:  Anxiety     CAD (coronary artery disease)     HTN (hypertension)     Tobacco dependence

## 2021-10-25 NOTE — H&P ADULT - ASSESSMENT
1.  new onset afib/flutter  2. Hx-tobacco dependence, CABG (12+ yrs ago), anxiety      discussed w/ night ICU mason ALBA  adm to ICU telemetry  continue IV cardizem  trend troponins  s/p full dose lovonox   cardio eval  echocardio.  smoking cessation

## 2021-10-25 NOTE — H&P ADULT - HISTORY OF PRESENT ILLNESS
77 yo W female w/ pmh as noted below.  Pt c/o dizziness yesterday afternoon while shopping.  Pt went to ER and was found to be in fib/flutter w/ RVR.  Pt was started on IV cardizem and is for adm to  ICU tele . 77 yo W female w/ pmh as noted below.  Pt c/o dizziness yesterday afternoon while shopping.  Pt went to ER and was found to be in fib/flutter w/ RVR.  Pt was started on IV cardizem and is for adm to  ICU tele . CTangio negative for PE

## 2021-10-25 NOTE — H&P ADULT - NSHPLABSRESULTS_GEN_ALL_CORE
12.6   12.69 )-----------( 279      ( 25 Oct 2021 05:41 )             38.0       10-24    136  |  99  |  35<H>  ----------------------------<  110<H>  4.3   |  23  |  1.2    Ca    10.5<H>      24 Oct 2021 13:45    TPro  8.3<H>  /  Alb  4.9  /  TBili  0.3  /  DBili  x   /  AST  37  /  ALT  26  /  AlkPhos  96  10-24              Urinalysis Basic - ( 24 Oct 2021 15:12 )    Color: Yellow / Appearance: Clear / S.015 / pH: x  Gluc: x / Ketone: Negative  / Bili: Negative / Urobili: 0.2 mg/dL   Blood: x / Protein: 30 mg/dL / Nitrite: Negative   Leuk Esterase: Negative / RBC: 3-5 /HPF / WBC x   Sq Epi: x / Non Sq Epi: Few /HPF / Bacteria: occ /HPF        PT/INR - ( 24 Oct 2021 13:45 )   PT: 11.30 sec;   INR: 0.98 ratio         PTT - ( 24 Oct 2021 13:45 )  PTT:31.1 sec    Lactate Trend      CARDIAC MARKERS ( 25 Oct 2021 01:00 )  x     / 0.01 ng/mL / x     / x     / x      CARDIAC MARKERS ( 24 Oct 2021 13:45 )  x     / <0.01 ng/mL / x     / x     / x            CAPILLARY BLOOD GLUCOSE

## 2021-10-25 NOTE — PATIENT PROFILE ADULT - LIVING ENVIRONMENT
Patient:   ANASTACIA PRESCOTT III            MRN: [id number]            FIN: [id number]              Age:   11 years     Sex:  MALE     :  11   Associated Diagnoses:   Laceration of head   Author:   MARIAN SILVA     Basic Information   Time seen: Date & time 20 02:00:00.   History source: Patient, mother.   Additional information: Chief Complaint from Nursing Triage Note : Chief Complaint ED  20 01:34 CDT       Chief Complaint ED        per mother, pt tripped and fell into a coffee table 30 mins prior to arrival; did not cry after hitting head; 2cm laceration noted to R forehead; bleeding controlled; denies medical problems; UTD on vaccines  .     History of Present Illness   Patient is a 11-year-old male with no past medical history presents today with a head laceration following falling into living room table.  Mom states he had fallen asleep when she was trying to get him to go to bed when he was half asleep, tripped and fell into the table.  No LOC.  He immediately was back to baseline and crying.  Denies any nausea/vomiting, vision changes, headache.  Small 1 cm laceration to the right forehead superior to the  eye.  He did not take anything for it yet..       Review of Systems   Constitutional symptoms:  No fever, no chills.    Skin symptoms:  No rash,    Eye symptoms:  No recent vision problems, no blurred vision.    ENMT symptoms:  No sore throat, no nasal congestion.    Respiratory symptoms:  No shortness of breath, no cough, no wheezing.   Cardiovascular symptoms:  No chest pain,    Gastrointestinal symptoms:  No abdominal pain, no nausea, no vomiting.   Musculoskeletal symptoms:  No Muscle pain, no Joint pain.    Neurologic symptoms:  No headache, no dizziness, no altered level of consciousness, no numbness, no tingling, no weakness.      Health Status   Allergies:    Allergic Reactions (All)  NKA, None..     Past Medical/ Family/ Social History   Medical history:    All  Problems  Bronchitis / [id number] / Confirmed  CPAP (continuous positive airway pressure) dependence / [id number] / Confirmed  Facial swelling / [id number] / Confirmed  Pneumonia / [id number] / Confirmed  RSV / [id number] / Confirmed.   Surgical history:    No active procedure history items have been selected or recorded..  Family history: Include Family History   ASD (atrial septal defect)  SISTER  VSD (ventricular septal defect and aortic arch hypoplasia  MOTHER  .   Social history:    Social & Psychosocial Habits  Home/Environment  09/01/2017  Alcohol Abuse in Household: No    Substance Abuse in Household: No    Smoker in Household: Yes    Primary Care Giver / Parent Name (if applicable): Hilary Butler (mom), Eduar Polk (dad)   Alcohol Abuse in Household: No    Patient Lives With: Brother, Father, Mother    Substance Abuse in Household: No    Patient Lives With: Sister    Living Situation - Household 1: Lives with Parent/Danuta    Smoker in Household: Yes    Household 1 Age(s) of Siblings: 2 sisters (10 yo & 15 yo) 2 brothers (10 yo & 15 yo)   Ambulation: Independent    Bathing: Required Assistance    Dressing: Independent    Driving: Not Performed    Eating: Independent    Elimination: Independent    Grooming: Required Assistance    Preparing Meal: Not Performed    Taking Meds: Not Performed    Toileting: Independent    Transfers: Independent  Tobacco  09/01/2017  Smoked/Smokeless Tobacco in Last 30 Days: No, No  Cultural/Bahai Practices  09/01/2017  Continue Cultural/Bahai Practices While in Hospital: No  .     Physical Examination              Vital Signs   ED Vital Sign   07/18/20 01:34 CDT Temperature - VS 36.6 deg_C  Normal    Temperature Source - VS Temporal    Heart/Pulse Rate 91  Normal    Pulse Source Monitor    Respiration Rate 20 breaths/min  Normal    SpO2 100 %  Normal    NIBP Systolic 120  Normal    NIBP Diastolic  85  HI    NIBP MAP 97  HI   .   Height and Weight   07/18/20 01:34 CDT CLINICALWEIGHT 36.6 kg    Weight Method Measured    MEDDOSEWT 36.6 kg   .   General:  Alert, no acute distress, In bed watching TV..    Skin:  Warm, dry, Small 1 cm laceration to right forehead..    Head:  Normocephalic, atraumatic.    Neck:  Supple, trachea midline.    Eye:  Pupils are equal, round and reactive to light, extraocular movements are intact, normal conjunctiva.   Ears, nose, mouth and throat:  Oral mucosa moist, no pharyngeal erythema or exudate.   Cardiovascular:  Regular rate and rhythm, No murmur, Normal peripheral perfusion, No edema.   Respiratory:  Lungs are clear to auscultation, respirations are non-labored, breath sounds are equal, Symmetrical chest wall expansion.   Gastrointestinal:  Soft, Nontender, Non distended.    Musculoskeletal:  Normal ROM, normal strength, no tenderness, no swelling, no deformity.   Neurological:  Alert and oriented to person, place, time, and situation, normal sensory observed, normal motor observed.   Psychiatric:  Cooperative, appropriate mood & affect, normal judgment.      Medical Decision Making   Differential Diagnosis:  Differential includes laceration, intra-cranial hemorrhage..  Rationale:  Patient is a 11-year-old male who presents 30 minutes after laceration to right forehead after hitting it on a table.  He is otherwise stable without any past medical history.  No signs or symptoms that would make us want to get a CT head.  We will proceed with lack repair with topical let..     Reexamination/ Reevaluation   Time: 07/18/20 02:46:00 .   Notes: Successful head laceration repair with 2 stitches.  No complications.  Patient is okay to discharge home..     Impression and Plan   Diagnosis   Laceration of head (GLA30-CR S01.91XA, Discharge, Medical)   Plan   Condition: Improved.    Disposition: Discharged: Time  07/18/20 02:46:00, to home.    Patient was given the following educational  materials: Facial Laceration, Easy-to-Read, Facial Laceration, Easy-to-Read.   Follow up with: Follow up with primary care provider Within 5 to 7 days May follow-up with primary care for removal of stitches or come back to ED.  This is free in the ED but often may have to wait..   Counseled: Patient, Family, Regarding diagnosis, Regarding diagnostic results, Regarding treatment plan, Patient indicated understanding of instructions.   no

## 2021-10-26 ENCOUNTER — TRANSCRIPTION ENCOUNTER (OUTPATIENT)
Age: 76
End: 2021-10-26

## 2021-10-26 VITALS — DIASTOLIC BLOOD PRESSURE: 77 MMHG | SYSTOLIC BLOOD PRESSURE: 135 MMHG | OXYGEN SATURATION: 98 % | HEART RATE: 99 BPM

## 2021-10-26 LAB
ALBUMIN SERPL ELPH-MCNC: 4.3 G/DL — SIGNIFICANT CHANGE UP (ref 3.5–5.2)
ALP SERPL-CCNC: 85 U/L — SIGNIFICANT CHANGE UP (ref 30–115)
ALT FLD-CCNC: 16 U/L — SIGNIFICANT CHANGE UP (ref 0–41)
ANION GAP SERPL CALC-SCNC: 10 MMOL/L — SIGNIFICANT CHANGE UP (ref 7–14)
AST SERPL-CCNC: 17 U/L — SIGNIFICANT CHANGE UP (ref 0–41)
BASOPHILS # BLD AUTO: 0.03 K/UL — SIGNIFICANT CHANGE UP (ref 0–0.2)
BASOPHILS NFR BLD AUTO: 0.3 % — SIGNIFICANT CHANGE UP (ref 0–1)
BILIRUB SERPL-MCNC: 0.5 MG/DL — SIGNIFICANT CHANGE UP (ref 0.2–1.2)
BUN SERPL-MCNC: 17 MG/DL — SIGNIFICANT CHANGE UP (ref 10–20)
CALCIUM SERPL-MCNC: 10.3 MG/DL — HIGH (ref 8.5–10.1)
CHLORIDE SERPL-SCNC: 101 MMOL/L — SIGNIFICANT CHANGE UP (ref 98–110)
CO2 SERPL-SCNC: 24 MMOL/L — SIGNIFICANT CHANGE UP (ref 17–32)
CREAT SERPL-MCNC: 1.1 MG/DL — SIGNIFICANT CHANGE UP (ref 0.7–1.5)
EOSINOPHIL # BLD AUTO: 0.25 K/UL — SIGNIFICANT CHANGE UP (ref 0–0.7)
EOSINOPHIL NFR BLD AUTO: 2.4 % — SIGNIFICANT CHANGE UP (ref 0–8)
GLUCOSE SERPL-MCNC: 113 MG/DL — HIGH (ref 70–99)
HCT VFR BLD CALC: 41.5 % — SIGNIFICANT CHANGE UP (ref 37–47)
HCV AB S/CO SERPL IA: 0.03 COI — SIGNIFICANT CHANGE UP
HCV AB SERPL-IMP: SIGNIFICANT CHANGE UP
HGB BLD-MCNC: 13.7 G/DL — SIGNIFICANT CHANGE UP (ref 12–16)
IMM GRANULOCYTES NFR BLD AUTO: 0.4 % — HIGH (ref 0.1–0.3)
LYMPHOCYTES # BLD AUTO: 2.65 K/UL — SIGNIFICANT CHANGE UP (ref 1.2–3.4)
LYMPHOCYTES # BLD AUTO: 25.4 % — SIGNIFICANT CHANGE UP (ref 20.5–51.1)
MCHC RBC-ENTMCNC: 31.5 PG — HIGH (ref 27–31)
MCHC RBC-ENTMCNC: 33 G/DL — SIGNIFICANT CHANGE UP (ref 32–37)
MCV RBC AUTO: 95.4 FL — SIGNIFICANT CHANGE UP (ref 81–99)
MONOCYTES # BLD AUTO: 0.82 K/UL — HIGH (ref 0.1–0.6)
MONOCYTES NFR BLD AUTO: 7.9 % — SIGNIFICANT CHANGE UP (ref 1.7–9.3)
NEUTROPHILS # BLD AUTO: 6.65 K/UL — HIGH (ref 1.4–6.5)
NEUTROPHILS NFR BLD AUTO: 63.6 % — SIGNIFICANT CHANGE UP (ref 42.2–75.2)
NRBC # BLD: 0 /100 WBCS — SIGNIFICANT CHANGE UP (ref 0–0)
PLATELET # BLD AUTO: 261 K/UL — SIGNIFICANT CHANGE UP (ref 130–400)
POTASSIUM SERPL-MCNC: 5.4 MMOL/L — HIGH (ref 3.5–5)
POTASSIUM SERPL-SCNC: 5.4 MMOL/L — HIGH (ref 3.5–5)
PROT SERPL-MCNC: 7.6 G/DL — SIGNIFICANT CHANGE UP (ref 6–8)
RBC # BLD: 4.35 M/UL — SIGNIFICANT CHANGE UP (ref 4.2–5.4)
RBC # FLD: 14.3 % — SIGNIFICANT CHANGE UP (ref 11.5–14.5)
SODIUM SERPL-SCNC: 135 MMOL/L — SIGNIFICANT CHANGE UP (ref 135–146)
TSH SERPL-MCNC: 2.1 UIU/ML — SIGNIFICANT CHANGE UP (ref 0.27–4.2)
WBC # BLD: 10.44 K/UL — SIGNIFICANT CHANGE UP (ref 4.8–10.8)
WBC # FLD AUTO: 10.44 K/UL — SIGNIFICANT CHANGE UP (ref 4.8–10.8)

## 2021-10-26 PROCEDURE — 93010 ELECTROCARDIOGRAM REPORT: CPT | Mod: 76

## 2021-10-26 PROCEDURE — 99233 SBSQ HOSP IP/OBS HIGH 50: CPT

## 2021-10-26 RX ORDER — APIXABAN 2.5 MG/1
1 TABLET, FILM COATED ORAL
Qty: 60 | Refills: 0
Start: 2021-10-26 | End: 2021-11-24

## 2021-10-26 RX ORDER — METOPROLOL TARTRATE 50 MG
1 TABLET ORAL
Qty: 30 | Refills: 0
Start: 2021-10-26 | End: 2021-11-24

## 2021-10-26 RX ORDER — METOPROLOL TARTRATE 50 MG
25 TABLET ORAL DAILY
Refills: 0 | Status: DISCONTINUED | OUTPATIENT
Start: 2021-10-26 | End: 2021-10-26

## 2021-10-26 RX ORDER — RIVAROXABAN 15 MG-20MG
1 KIT ORAL
Qty: 30 | Refills: 0
Start: 2021-10-26 | End: 2021-11-24

## 2021-10-26 RX ADMIN — DULOXETINE HYDROCHLORIDE 60 MILLIGRAM(S): 30 CAPSULE, DELAYED RELEASE ORAL at 11:06

## 2021-10-26 RX ADMIN — APIXABAN 5 MILLIGRAM(S): 2.5 TABLET, FILM COATED ORAL at 05:58

## 2021-10-26 RX ADMIN — Medication 25 MILLIGRAM(S): at 10:17

## 2021-10-26 RX ADMIN — Medication 5 MG/HR: at 06:33

## 2021-10-26 RX ADMIN — PANTOPRAZOLE SODIUM 40 MILLIGRAM(S): 20 TABLET, DELAYED RELEASE ORAL at 05:58

## 2021-10-26 RX ADMIN — CHLORHEXIDINE GLUCONATE 1 APPLICATION(S): 213 SOLUTION TOPICAL at 11:06

## 2021-10-26 NOTE — DISCHARGE NOTE PROVIDER - NSDCCPCAREPLAN_GEN_ALL_CORE_FT
PRINCIPAL DISCHARGE DIAGNOSIS  Diagnosis: New onset atrial flutter  Assessment and Plan of Treatment: You came to ED with a fast heart rate and were found to be in  atrial flutter. You were started on a medication amiodarone to convert your heart rate to normal rhythm. You were started on blood thinner Eliquis to reduce the risk of stroke associated with flutter. You will also be started on Metoprolol to slow the heart rate.  Please follow with Cardiologist in 2 weeks time for further cardiac work up  Atrial fibrillation is a type of irregular heartbeat (arrhythmia) where the heart quivers continuously in a chaotic pattern that makes the heart unable to pump blood normally. This can increase the risk for stroke, heart failure, and other heart-related conditions. Atrial fibrillation can be caused by a variety of conditions and may be temporary, intermittent, or permanent. Symptoms include feeling that your heart is beating rapidly or irregularly, chest discomfort, shortness of breath, or dizziness/lightheadedness that may be worse with exertion. Treatment is varied but may involve medication or electrical shock (cardioversion).  SEEK IMMEDIATE MEDICAL CARE IF YOU HAVE ANY OF THE FOLLOWING SYMPTOMS: chest pain, shortness of breath, abdominal pain, sweating, vomiting, blood in vomit/bowel movements/urine, dizziness/lightheadedness, weakness or numbness to face/arm/leg, trouble speaking or understanding, facial droop.         PRINCIPAL DISCHARGE DIAGNOSIS  Diagnosis: New onset atrial flutter  Assessment and Plan of Treatment: You came to ED with a fast heart rate and were found to be in  atrial flutter. You were started on a medication amiodarone to convert your heart rate to normal rhythm. You were started on blood thinner Xarelto to reduce the risk of stroke associated with flutter. You will also be started on Metoprolol to slow the heart rate.  Please follow with Cardiologist in 2 weeks time for further cardiac work up  Atrial fibrillation is a type of irregular heartbeat (arrhythmia) where the heart quivers continuously in a chaotic pattern that makes the heart unable to pump blood normally. This can increase the risk for stroke, heart failure, and other heart-related conditions. Atrial fibrillation can be caused by a variety of conditions and may be temporary, intermittent, or permanent. Symptoms include feeling that your heart is beating rapidly or irregularly, chest discomfort, shortness of breath, or dizziness/lightheadedness that may be worse with exertion. Treatment is varied but may involve medication or electrical shock (cardioversion).  SEEK IMMEDIATE MEDICAL CARE IF YOU HAVE ANY OF THE FOLLOWING SYMPTOMS: chest pain, shortness of breath, abdominal pain, sweating, vomiting, blood in vomit/bowel movements/urine, dizziness/lightheadedness, weakness or numbness to face/arm/leg, trouble speaking or understanding, facial droop.

## 2021-10-26 NOTE — DISCHARGE NOTE PROVIDER - CARE PROVIDER_API CALL
Silas Hernandez)  Cardiovascular Disease; Internal Medicine  [address] [place] [address]  Phone: [phone]  Fax: [phone]  Follow Up Time: 2 weeks

## 2021-10-26 NOTE — DISCHARGE NOTE PROVIDER - NSDCMRMEDTOKEN_GEN_ALL_CORE_FT
DULoxetine 60 mg oral delayed release capsule: 1 cap(s) orally once a day  losartan-hydrochlorothiazide 50 mg-12.5 mg oral tablet: 1 tab(s) orally once a day  simvastatin 40 mg oral tablet: 1 tab(s) orally once a day (at bedtime)   DULoxetine 60 mg oral delayed release capsule: 1 cap(s) orally once a day  losartan-hydrochlorothiazide 50 mg-12.5 mg oral tablet: 1 tab(s) orally once a day  metoprolol succinate 25 mg oral tablet, extended release: 1 tab(s) orally once a day  rivaroxaban 20 mg oral tablet: 1 tab(s) orally once a day   simvastatin 40 mg oral tablet: 1 tab(s) orally once a day (at bedtime)

## 2021-10-26 NOTE — PROGRESS NOTE ADULT - SUBJECTIVE AND OBJECTIVE BOX
Patient is comfortable in bed, no complaints, still on IV amio and Cardizem       T(F): 97.6 (10-26-21 @ 07:10), Max: 98.2 (10-25-21 @ 15:33)  HR: 99 (10-26-21 @ 07:22)  BP: 135/77 (10-26-21 @ 07:22)  RR: 18 (10-26-21 @ 07:10)  SpO2: 98% (10-26-21 @ 07:22) (96% - 98%)    PHYSICAL EXAM:  GENERAL: NAD  HEAD:  Atraumatic, Normocephalic  EYES: EOMI, PERRLA, conjunctiva and sclera clear  NERVOUS SYSTEM:  Alert & Oriented X3, no focal deficits   CHEST/LUNG: Clear to percussion bilaterally; No rales, rhonchi, wheezing, or rubs  HEART: irregular  ABDOMEN: Soft, Nontender, Nondistended; Bowel sounds present  EXTREMITIES:  2+ Peripheral Pulses, No clubbing, cyanosis, or edema    LABS  10-26    135  |  101  |  17  ----------------------------<  113<H>  5.4<H>   |  24  |  1.1    Ca    10.3<H>      26 Oct 2021 05:48  Phos  3.0     10-25  Mg     1.9     10-25    TPro  7.6  /  Alb  4.3  /  TBili  0.5  /  DBili  x   /  AST  17  /  ALT  16  /  AlkPhos  85  10-26                          13.7   10.44 )-----------( 261      ( 26 Oct 2021 05:48 )             41.5     PT/INR - ( 24 Oct 2021 13:45 )   PT: 11.30 sec;   INR: 0.98 ratio         PTT - ( 24 Oct 2021 13:45 )  PTT:31.1 sec    CARDIAC ENZYMES      Troponin T, Serum: 0.01 ng/mL (10-25-21 @ 05:41)  Troponin T, Serum: 0.01 ng/mL (10-25-21 @ 01:00)  Troponin T, Serum: <0.01 ng/mL (10-24-21 @ 13:45)    < from: 12 Lead ECG (10.25.21 @ 00:14) >  Diagnosis Line Atrial flutter with 3:1 A-V conduction    < end of copied text >  < from: TTE Echo Complete w/o Contrast w/ Doppler (10.25.21 @ 11:37) >    Summary:   1. Left ventricular ejection fraction, by visual estimation, is 55 to 60%.   2. Mildly enlarged left atrium.   3. Mild mitral annular calcification.   4. Moderate to severe mitral valve regurgitation.   5. Moderate tricuspid regurgitation.   6. Estimated pulmonary artery systolic pressure is 41.9 mmHg assuming a right atrial pressure of 3 mmHg, which is consistent with mild pulmonary hypertension.    < end of copied text >    Culture Results:   <10,000 CFU/mL Normal Urogenital Letty (10-24-21)    RADIOLOGY  < from: CT Angio Chest PE Protocol w/ IV Cont (10.24.21 @ 17:36) >  No evidence of acute intrathoracic pathology.    < end of copied text >    MEDICATIONS  (STANDING):  aMIOdarone Infusion 0.5 mG/Min (16.7 mL/Hr) IV Continuous <Continuous>  apixaban 5 milliGRAM(s) Oral every 12 hours  chlorhexidine 4% Liquid 1 Application(s) Topical daily  DULoxetine 60 milliGRAM(s) Oral daily  metoprolol succinate ER 25 milliGRAM(s) Oral daily  nicotine -   7 mG/24Hr(s) Patch 1 Patch Transdermal daily  pantoprazole    Tablet 40 milliGRAM(s) Oral before breakfast  simvastatin 40 milliGRAM(s) Oral at bedtime    MEDICATIONS  (PRN):    
 Patient is a 76y old  Female who presents with a chief complaint of new fib RVR (25 Oct 2021 12:32)      T(F): 98.1 (10-25-21 @ 23:35), Max: 98.2 (10-25-21 @ 15:33)  HR: 99 (10-26-21 @ 07:22)  BP: 135/77 (10-26-21 @ 07:22)  RR: 18 (10-26-21 @ 06:05)  SpO2: 98% (10-26-21 @ 07:22) (96% - 98%)    PHYSICAL EXAM:  GENERAL: NAD, well-groomed, well-developed  HEAD:  Atraumatic, Normocephalic  EYES: EOMI, PERRLA, conjunctiva and sclera clear  ENMT: No tonsillar erythema, exudates, or enlargement; Moist mucous membranes, Good dentition, No lesions  NECK: Supple, No JVD, Normal thyroid  NERVOUS SYSTEM:  Alert & Oriented X3,  Motor Strength 5/5 B/L upper and lower extremities  CHEST/LUNG: Clear to percussion bilaterally; No rales, rhonchi, wheezing, or rubs  HEART: Irreg No murmurs, rubs, or gallops  ABDOMEN: Soft, Nontender, Nondistended; Bowel sounds present  EXTREMITIES:   No clubbing, cyanosis, or edema  LYMPH: No lymphadenopathy noted  SKIN: No rashes or lesions    labs  10-26    135  |  101  |  17  ----------------------------<  113<H>  5.4<H>   |  24  |  1.1    Ca    10.3<H>      26 Oct 2021 05:48  Phos  3.0     10-25  Mg     1.9     10-25    TPro  7.6  /  Alb  4.3  /  TBili  0.5  /  DBili  x   /  AST  17  /  ALT  16  /  AlkPhos  85  10-26                          13.7   10.44 )-----------( 261      ( 26 Oct 2021 05:48 )             41.5       Culture - Urine (collected 24 Oct 2021 15:12)  Source: Clean Catch Clean Catch (Midstream)  Final Report (25 Oct 2021 15:50):    <10,000 CFU/mL Normal Urogenital Letty      PT/INR - ( 24 Oct 2021 13:45 )   PT: 11.30 sec;   INR: 0.98 ratio         PTT - ( 24 Oct 2021 13:45 )  PTT:31.1 sec        aMIOdarone Infusion 0.5 mG/Min IV Continuous <Continuous>  apixaban 5 milliGRAM(s) Oral every 12 hours  chlorhexidine 4% Liquid 1 Application(s) Topical daily  DULoxetine 60 milliGRAM(s) Oral daily  nicotine -   7 mG/24Hr(s) Patch 1 Patch Transdermal daily  pantoprazole    Tablet 40 milliGRAM(s) Oral before breakfast  simvastatin 40 milliGRAM(s) Oral at bedtime  
 Patient is comfortable in bed, no complaints       T(F): 97.6 (10-25-21 @ 07:01), Max: 97.8 (10-24-21 @ 13:09)  HR: 95 (10-25-21 @ 05:30)  BP: 121/70 (10-25-21 @ 05:30)  RR: 18 (10-25-21 @ 07:01)  SpO2: 95% (10-25-21 @ 05:30) (95% - 98%)    PHYSICAL EXAM:  GENERAL: NAD  HEAD:  Atraumatic, Normocephalic  EYES: EOMI, PERRLA, conjunctiva and sclera clear  NERVOUS SYSTEM:  Alert & Oriented X3, no focal deficits   CHEST/LUNG: Clear to percussion bilaterally; No rales, rhonchi, wheezing, or rubs  HEART: irregular   ABDOMEN: Soft, Nontender, Nondistended; Bowel sounds present  EXTREMITIES:  2+ Peripheral Pulses, No clubbing, cyanosis, or edema    LABS  10-25    140  |  105  |  22<H>  ----------------------------<  103<H>  4.0   |  20  |  1.0    Ca    9.7      25 Oct 2021 05:41  Phos  3.0     10-25  Mg     1.9     10-25    TPro  7.4  /  Alb  4.3  /  TBili  0.4  /  DBili  x   /  AST  24  /  ALT  19  /  AlkPhos  86  10-25                          12.6   12.69 )-----------( 279      ( 25 Oct 2021 05:41 )             38.0     PT/INR - ( 24 Oct 2021 13:45 )   PT: 11.30 sec;   INR: 0.98 ratio         PTT - ( 24 Oct 2021 13:45 )  PTT:31.1 sec    CARDIAC ENZYMES      Troponin T, Serum: 0.01 ng/mL (10-25-21 @ 05:41)  Troponin T, Serum: 0.01 ng/mL (10-25-21 @ 01:00)  Troponin T, Serum: <0.01 ng/mL (10-24-21 @ 13:45)    < from: 12 Lead ECG (10.25.21 @ 00:14) >  Diagnosis Line Atrial flutter with 3:1 A-V conduction    < end of copied text >      RADIOLOGY  < from: CT Angio Chest PE Protocol w/ IV Cont (10.24.21 @ 17:36) >  IMPRESSION:    No evidence of central or segmental pulmonary emboli.    No evidence of acute intrathoracic pathology.    Mosaic attenuation seen at the lung bases bilaterally, suggesting air trapping.    < end of copied text >    MEDICATIONS  (STANDING):  aMIOdarone Infusion 1 mG/Min (33.3 mL/Hr) IV Continuous <Continuous>  aMIOdarone Infusion 0.5 mG/Min (16.7 mL/Hr) IV Continuous <Continuous>  chlorhexidine 4% Liquid 1 Application(s) Topical daily  diltiazem Infusion 5 mG/Hr (5 mL/Hr) IV Continuous <Continuous>  DULoxetine 60 milliGRAM(s) Oral daily  enoxaparin Injectable 70 milliGRAM(s) SubCutaneous every 12 hours  nicotine -   7 mG/24Hr(s) Patch 1 Patch Transdermal daily  pantoprazole    Tablet 40 milliGRAM(s) Oral before breakfast  simvastatin 40 milliGRAM(s) Oral at bedtime    MEDICATIONS  (PRN):

## 2021-10-26 NOTE — DISCHARGE NOTE NURSING/CASE MANAGEMENT/SOCIAL WORK - PATIENT PORTAL LINK FT
You can access the FollowMyHealth Patient Portal offered by [facility] by registering at the following website: [email or web address]. By joining [facility]’s FollowMyHealth portal, you will also be able to view your health information using other applications (apps) compatible with our system.

## 2021-10-26 NOTE — PROGRESS NOTE ADULT - ASSESSMENT
Patient with no complaints. Still appears aflutter. Complete amiodarone. Continue Ac. Will begin beta. Will need outpatient stress echo. Echo mod severe mr. Will consider outpatient cardioversion or ablation

## 2021-10-26 NOTE — DISCHARGE NOTE PROVIDER - NSDCFUADDINST_GEN_ALL_CORE_FT
Please follow with Cardiologist in 2 weeks time    Please take Eliquis and Metoprolol twice daily as prescribed Please follow with Cardiologist in 2 weeks time    Please take Xarelto and Metoprolol twice daily as prescribed

## 2021-10-26 NOTE — DISCHARGE NOTE PROVIDER - HOSPITAL COURSE
HPI:  75 yo W female w/ pmh as noted below.  Pt c/o dizziness yesterday afternoon while shopping.  Pt went to ER and was found to be in fib/flutter w/ RVR.  Pt was started on IV cardizem and is for adm to  ICU tele . CTangio negative for PE (25 Oct 2021 07:00)    Hospital Course:  Patient was placed on amiodarone infusion for a day and will be discharged on Eliquis and Metoprolol.     HPI:  75 yo W female w/ pmh as noted below.  Pt c/o dizziness yesterday afternoon while shopping.  Pt went to ER and was found to be in fib/flutter w/ RVR.  Pt was started on IV cardizem and is for adm to  ICU tele . CTangio negative for PE (25 Oct 2021 07:00)    Hospital Course:  Patient was placed on amiodarone infusion for a day and will be discharged on Xarelto and Metoprolol.

## 2021-10-26 NOTE — PROGRESS NOTE ADULT - ASSESSMENT
77 y/o female smoker with hx of CAD s/p CABG, HTN  presented  to the Ed with episode in mall with diaphoresis and nausea and found in rapid a-flutter    paroxysmal afib / flutter     - complete  IV Amio    - DC IV Cardizem   - check TSH   - continue  with Eliquis   - started on Toprol X   - DC smoking   - ambulate    - DC planning

## 2021-10-27 PROBLEM — F41.9 ANXIETY DISORDER, UNSPECIFIED: Chronic | Status: ACTIVE | Noted: 2021-10-25

## 2021-10-27 PROBLEM — I25.10 ATHEROSCLEROTIC HEART DISEASE OF NATIVE CORONARY ARTERY WITHOUT ANGINA PECTORIS: Chronic | Status: ACTIVE | Noted: 2021-10-24

## 2021-10-27 PROBLEM — I10 ESSENTIAL (PRIMARY) HYPERTENSION: Chronic | Status: ACTIVE | Noted: 2021-10-25

## 2021-10-27 PROBLEM — F17.200 NICOTINE DEPENDENCE, UNSPECIFIED, UNCOMPLICATED: Chronic | Status: ACTIVE | Noted: 2021-10-25

## 2021-11-08 DIAGNOSIS — Z95.1 PRESENCE OF AORTOCORONARY BYPASS GRAFT: ICD-10-CM

## 2021-11-08 DIAGNOSIS — R11.0 NAUSEA: ICD-10-CM

## 2021-11-08 DIAGNOSIS — I48.0 PAROXYSMAL ATRIAL FIBRILLATION: ICD-10-CM

## 2021-11-08 DIAGNOSIS — I48.92 UNSPECIFIED ATRIAL FLUTTER: ICD-10-CM

## 2021-11-08 DIAGNOSIS — I25.10 ATHEROSCLEROTIC HEART DISEASE OF NATIVE CORONARY ARTERY WITHOUT ANGINA PECTORIS: ICD-10-CM

## 2021-11-08 DIAGNOSIS — F17.210 NICOTINE DEPENDENCE, CIGARETTES, UNCOMPLICATED: ICD-10-CM

## 2021-11-08 DIAGNOSIS — I10 ESSENTIAL (PRIMARY) HYPERTENSION: ICD-10-CM

## 2021-11-08 DIAGNOSIS — F41.9 ANXIETY DISORDER, UNSPECIFIED: ICD-10-CM

## 2021-11-08 DIAGNOSIS — J44.9 CHRONIC OBSTRUCTIVE PULMONARY DISEASE, UNSPECIFIED: ICD-10-CM

## 2021-11-17 ENCOUNTER — APPOINTMENT (OUTPATIENT)
Dept: CARDIOLOGY | Facility: CLINIC | Age: 76
End: 2021-11-17
Payer: MEDICARE

## 2021-11-17 VITALS
HEIGHT: 62 IN | BODY MASS INDEX: 26.87 KG/M2 | OXYGEN SATURATION: 99 % | TEMPERATURE: 98 F | SYSTOLIC BLOOD PRESSURE: 130 MMHG | WEIGHT: 146 LBS | HEART RATE: 123 BPM | DIASTOLIC BLOOD PRESSURE: 82 MMHG

## 2021-11-17 DIAGNOSIS — I48.19 OTHER PERSISTENT ATRIAL FIBRILLATION: ICD-10-CM

## 2021-11-17 PROCEDURE — 99215 OFFICE O/P EST HI 40 MIN: CPT

## 2021-11-17 PROCEDURE — 93000 ELECTROCARDIOGRAM COMPLETE: CPT

## 2021-11-18 RX ORDER — AMIODARONE HYDROCHLORIDE 400 MG/1
400 TABLET ORAL
Qty: 14 | Refills: 0 | Status: DISCONTINUED | COMMUNITY
Start: 2021-11-17 | End: 2021-11-18

## 2021-11-18 NOTE — ASSESSMENT
[FreeTextEntry1] : ## Atrial Flutter \par \par - with RVR today. + symptoms\par - On Xarelto., Compliant. No bleeding issues. Compliant\par - Discussed management options including DCCV, AAD and ablation. At this time, we decided to go for DCCV as she is planning to attend wedding of her grandson in 1st week of Dec and prefers not to undergo ablation at this time\par - Will start her on amiodarone load. Reduce zocor\par - MIKAEL/DCCV planned for Monday\par - - Discussed importance of risk factor management.\par - Sleep study/Management of RENÉ\par - Diet/exercise/weight loss\par - Management of GERD if present

## 2021-11-18 NOTE — PHYSICAL EXAM
[Well Developed] : well developed [Well Nourished] : well nourished [No Acute Distress] : no acute distress [Normal Conjunctiva] : normal conjunctiva [Normal Venous Pressure] : normal venous pressure [No Carotid Bruit] : no carotid bruit [No Murmur] : no murmur [No Rub] : no rub [No Gallop] : no gallop [Tachycardia] : tachycardic [Clear Lung Fields] : clear lung fields [Good Air Entry] : good air entry [No Respiratory Distress] : no respiratory distress  [Soft] : abdomen soft [Non Tender] : non-tender [No Masses/organomegaly] : no masses/organomegaly [Normal Bowel Sounds] : normal bowel sounds [Normal Gait] : normal gait [No Edema] : no edema [No Cyanosis] : no cyanosis [No Clubbing] : no clubbing [No Varicosities] : no varicosities [No Rash] : no rash [No Skin Lesions] : no skin lesions [Moves all extremities] : moves all extremities [No Focal Deficits] : no focal deficits [Normal Speech] : normal speech [Alert and Oriented] : alert and oriented [Normal memory] : normal memory

## 2021-11-18 NOTE — HISTORY OF PRESENT ILLNESS
[FreeTextEntry1] : I had a pleasure of seeing Ms. AGARWAL for initial consultation for Atrial Flutter. \par \par Ms. AGARWAL is a 76 year-year old female with history of CAD/CABG, HTN, Anxiety, atrial flutter is here for Atrial Flutter. Recent admit at [facility] for palpitations/dizziness- found to have AFL- treated with amiodarone and cardizem. \par + Palpitations at home- short lasting.\par \par Denies chest pain, shortness of breath, palpitation, dizziness or LOC except noted above.\par \par EKG (11/18/21): AFL @ 123, QRSd 86\par TTE (10/25/21): EF 55-60%, Mild LAE, Mod to severe MR, Mod TR\par

## 2021-11-22 ENCOUNTER — OUTPATIENT (OUTPATIENT)
Dept: OUTPATIENT SERVICES | Facility: HOSPITAL | Age: 76
LOS: 1 days | Discharge: HOME | End: 2021-11-22
Payer: MEDICARE

## 2021-11-22 DIAGNOSIS — Z95.1 PRESENCE OF AORTOCORONARY BYPASS GRAFT: Chronic | ICD-10-CM

## 2021-11-22 DIAGNOSIS — I48.0 PAROXYSMAL ATRIAL FIBRILLATION: ICD-10-CM

## 2021-11-22 PROCEDURE — 93325 DOPPLER ECHO COLOR FLOW MAPG: CPT | Mod: 26

## 2021-11-22 PROCEDURE — 93312 ECHO TRANSESOPHAGEAL: CPT | Mod: 26

## 2021-11-22 PROCEDURE — 93320 DOPPLER ECHO COMPLETE: CPT | Mod: 26

## 2021-11-22 PROCEDURE — 92960 CARDIOVERSION ELECTRIC EXT: CPT

## 2021-11-22 NOTE — H&P CARDIOLOGY - HISTORY OF PRESENT ILLNESS
76 female with hx of CAD s/p CABG, moderate to severe MR, new-onset AFL (oct. 2021) presenting for MIKAEL/DCCV.

## 2021-11-22 NOTE — CHART NOTE - NSCHARTNOTEFT_GEN_A_CORE
POST OPERATIVE PROCEDURAL DOCUMENTATION  PRE-OP DIAGNOSIS: AFL, CAD s/p CABG, mod-severe MR    POST-OP DIAGNOSIS: SR, moderate MR, moderate TR    PROCEDURE: Transesophageal echocardiogram and cardioversion  Primary Physician: Dr Rubi   Assistant: Dr Patel    ANESTHESIA TYPE  [  ] General Anesthesia  [ x ] Conscious Sedation  [  ] Local/Regional    CONDITION  [  ] Critical  [  ] Serious  [ x ] Fair  [  ] Good    SPECIMENS REMOVED (IF APPLICABLE): N/A    IMPLANTS (IF APPLICABLE): None    ESTIMATED BLOOD LOSS: None    COMPLICATIONS: None      FINDINGS:    After risks and benefits of procedures were explained, informed consent was obtained and placed in chart. Refer to Anesthesia note for sedation details.  The MIKAEL probe was passed into the esophagus without difficulty.  Transesophageal and transgastric images were obtained.  The MIKAEL probe was removed without difficulty and examined.  There was no evidence for bleeding.  The patient tolerated the procedure well without any immediate MIKAEL-related complications.      Preliminary Findings:  LA: mildly dilated  LEONID: Left atrial appendage was clear of clot and smoke. Low velocities.  LV: LVEF was estimated at 50-55%  MV: moderate MR, no evidence of MS.   AV: No evidence of AI, no evidence of AS.   RA: mildly dilated  TV: moderate TR.   PV: no PI.   IAS: no PFO. No R-> L shunt.   There was mild, non-mobile atheroma seen in the thoracic aorta.     Patient successfully converted to sinus rhythm with synchronized  200 J of direct current cardioversion.    DIAGNOSIS/IMPRESSION:  AFL successfully cardioverted to SR  Moderate MR and TR    PLAN OF CARE:  c/w Xarelto and amiodarone  D/C home when stable

## 2022-02-23 ENCOUNTER — APPOINTMENT (OUTPATIENT)
Dept: CARDIOLOGY | Facility: CLINIC | Age: 77
End: 2022-02-23
Payer: MEDICARE

## 2022-02-23 VITALS
DIASTOLIC BLOOD PRESSURE: 80 MMHG | SYSTOLIC BLOOD PRESSURE: 130 MMHG | WEIGHT: 156.01 LBS | HEART RATE: 84 BPM | HEIGHT: 62 IN | BODY MASS INDEX: 28.71 KG/M2 | TEMPERATURE: 98.2 F

## 2022-02-23 PROCEDURE — 93000 ELECTROCARDIOGRAM COMPLETE: CPT

## 2022-02-23 PROCEDURE — 99214 OFFICE O/P EST MOD 30 MIN: CPT

## 2022-03-01 RX ORDER — RIVAROXABAN 20 MG/1
20 TABLET, FILM COATED ORAL
Qty: 30 | Refills: 0 | Status: DISCONTINUED | COMMUNITY
Start: 2022-01-13 | End: 2022-03-01

## 2022-03-23 ENCOUNTER — APPOINTMENT (OUTPATIENT)
Dept: CARDIOLOGY | Facility: CLINIC | Age: 77
End: 2022-03-23
Payer: MEDICARE

## 2022-03-23 VITALS
TEMPERATURE: 97.2 F | WEIGHT: 155 LBS | BODY MASS INDEX: 28.52 KG/M2 | OXYGEN SATURATION: 96 % | DIASTOLIC BLOOD PRESSURE: 84 MMHG | SYSTOLIC BLOOD PRESSURE: 128 MMHG | HEART RATE: 86 BPM | HEIGHT: 62 IN

## 2022-03-23 PROCEDURE — 93000 ELECTROCARDIOGRAM COMPLETE: CPT

## 2022-03-23 PROCEDURE — 99215 OFFICE O/P EST HI 40 MIN: CPT

## 2022-03-23 NOTE — PHYSICAL EXAM
[Well Developed] : well developed [Well Nourished] : well nourished [No Acute Distress] : no acute distress [Normal Conjunctiva] : normal conjunctiva [Normal Venous Pressure] : normal venous pressure [No Carotid Bruit] : no carotid bruit [No Murmur] : no murmur [No Rub] : no rub [No Gallop] : no gallop [Tachycardia] : tachycardic [Clear Lung Fields] : clear lung fields [Good Air Entry] : good air entry [No Respiratory Distress] : no respiratory distress  [Soft] : abdomen soft [Non Tender] : non-tender [No Masses/organomegaly] : no masses/organomegaly [Normal Bowel Sounds] : normal bowel sounds [Normal Gait] : normal gait [No Edema] : no edema [No Cyanosis] : no cyanosis [No Clubbing] : no clubbing [No Varicosities] : no varicosities [No Rash] : no rash [No Skin Lesions] : no skin lesions [No Focal Deficits] : no focal deficits [Moves all extremities] : moves all extremities [Normal Speech] : normal speech [Alert and Oriented] : alert and oriented [Normal memory] : normal memory

## 2022-03-23 NOTE — HISTORY OF PRESENT ILLNESS
[FreeTextEntry1] : I had a pleasure of seeing Ms. AGARWAL for initial consultation for Atrial Flutter. \par \par Ms. AGARWAL is a 76 year-year old female with history of CAD/CABG, HTN, Anxiety, atrial flutter is here for Atrial Flutter. Recent admit at [facility] for palpitations/dizziness- found to have AFL- treated with amiodarone and cardizem. \par + Palpitations at home- short lasting.\par \par 2/23: Feels better.\par \par Denies chest pain, shortness of breath, palpitation, dizziness or LOC except noted above.\par \par EKG (2/23/22): SR@84, QRSd 86\par EKG (11/18/21): AFL @ 123, QRSd 86\par TTE (10/25/21): EF 55-60%, Mild LAE, Mod to severe MR, Mod TR\par MIKAEL (11/21): Nl EF, Mod central MR, Mod TR

## 2022-03-23 NOTE — ASSESSMENT
[FreeTextEntry1] : ## Atrial Flutter s/p DCCV\par \par - On Xarelto., Compliant. No bleeding issues. Compliant\par - Now in sinus.\par - Discussed management options including clinical observation, AAD and ablation. SHe wants to think about it and let us know. If she decides to continue AAD- we will consider change amiodarone to other AADs.\par - - Discussed importance of risk factor management.\par - Sleep study/Management of RENÉ\par - Diet/exercise/weight loss\par - Management of GERD if present. \par - Return in 1 month

## 2022-03-30 ENCOUNTER — OUTPATIENT (OUTPATIENT)
Dept: OUTPATIENT SERVICES | Facility: HOSPITAL | Age: 77
LOS: 1 days | Discharge: HOME | End: 2022-03-30
Payer: MEDICARE

## 2022-03-30 ENCOUNTER — RESULT REVIEW (OUTPATIENT)
Age: 77
End: 2022-03-30

## 2022-03-30 VITALS
HEART RATE: 92 BPM | HEIGHT: 62 IN | RESPIRATION RATE: 16 BRPM | WEIGHT: 153 LBS | DIASTOLIC BLOOD PRESSURE: 67 MMHG | TEMPERATURE: 97 F | SYSTOLIC BLOOD PRESSURE: 150 MMHG | OXYGEN SATURATION: 100 %

## 2022-03-30 DIAGNOSIS — Z95.1 PRESENCE OF AORTOCORONARY BYPASS GRAFT: Chronic | ICD-10-CM

## 2022-03-30 DIAGNOSIS — Z01.818 ENCOUNTER FOR OTHER PREPROCEDURAL EXAMINATION: ICD-10-CM

## 2022-03-30 DIAGNOSIS — I48.92 UNSPECIFIED ATRIAL FLUTTER: ICD-10-CM

## 2022-03-30 LAB
ALBUMIN SERPL ELPH-MCNC: 4.7 G/DL — SIGNIFICANT CHANGE UP (ref 3.5–5.2)
ALP SERPL-CCNC: 87 U/L — SIGNIFICANT CHANGE UP (ref 30–115)
ALT FLD-CCNC: 10 U/L — SIGNIFICANT CHANGE UP (ref 0–41)
ANION GAP SERPL CALC-SCNC: 17 MMOL/L — HIGH (ref 7–14)
APTT BLD: 38.3 SEC — SIGNIFICANT CHANGE UP (ref 27–39.2)
AST SERPL-CCNC: 16 U/L — SIGNIFICANT CHANGE UP (ref 0–41)
BASOPHILS # BLD AUTO: 0.05 K/UL — SIGNIFICANT CHANGE UP (ref 0–0.2)
BASOPHILS NFR BLD AUTO: 0.4 % — SIGNIFICANT CHANGE UP (ref 0–1)
BILIRUB SERPL-MCNC: 0.2 MG/DL — SIGNIFICANT CHANGE UP (ref 0.2–1.2)
BUN SERPL-MCNC: 37 MG/DL — HIGH (ref 10–20)
CALCIUM SERPL-MCNC: 10 MG/DL — SIGNIFICANT CHANGE UP (ref 8.5–10.1)
CHLORIDE SERPL-SCNC: 99 MMOL/L — SIGNIFICANT CHANGE UP (ref 98–110)
CO2 SERPL-SCNC: 22 MMOL/L — SIGNIFICANT CHANGE UP (ref 17–32)
CREAT SERPL-MCNC: 1.2 MG/DL — SIGNIFICANT CHANGE UP (ref 0.7–1.5)
EGFR: 48 ML/MIN/1.73M2 — LOW
EOSINOPHIL # BLD AUTO: 0.23 K/UL — SIGNIFICANT CHANGE UP (ref 0–0.7)
EOSINOPHIL NFR BLD AUTO: 2 % — SIGNIFICANT CHANGE UP (ref 0–8)
GLUCOSE SERPL-MCNC: 85 MG/DL — SIGNIFICANT CHANGE UP (ref 70–99)
HCT VFR BLD CALC: 39.9 % — SIGNIFICANT CHANGE UP (ref 37–47)
HGB BLD-MCNC: 12.9 G/DL — SIGNIFICANT CHANGE UP (ref 12–16)
IMM GRANULOCYTES NFR BLD AUTO: 0.5 % — HIGH (ref 0.1–0.3)
INR BLD: 1.38 RATIO — HIGH (ref 0.65–1.3)
LYMPHOCYTES # BLD AUTO: 2.79 K/UL — SIGNIFICANT CHANGE UP (ref 1.2–3.4)
LYMPHOCYTES # BLD AUTO: 23.7 % — SIGNIFICANT CHANGE UP (ref 20.5–51.1)
MCHC RBC-ENTMCNC: 31.6 PG — HIGH (ref 27–31)
MCHC RBC-ENTMCNC: 32.3 G/DL — SIGNIFICANT CHANGE UP (ref 32–37)
MCV RBC AUTO: 97.8 FL — SIGNIFICANT CHANGE UP (ref 81–99)
MONOCYTES # BLD AUTO: 0.86 K/UL — HIGH (ref 0.1–0.6)
MONOCYTES NFR BLD AUTO: 7.3 % — SIGNIFICANT CHANGE UP (ref 1.7–9.3)
NEUTROPHILS # BLD AUTO: 7.77 K/UL — HIGH (ref 1.4–6.5)
NEUTROPHILS NFR BLD AUTO: 66.1 % — SIGNIFICANT CHANGE UP (ref 42.2–75.2)
NRBC # BLD: 0 /100 WBCS — SIGNIFICANT CHANGE UP (ref 0–0)
PLATELET # BLD AUTO: 293 K/UL — SIGNIFICANT CHANGE UP (ref 130–400)
POTASSIUM SERPL-MCNC: 4.4 MMOL/L — SIGNIFICANT CHANGE UP (ref 3.5–5)
POTASSIUM SERPL-SCNC: 4.4 MMOL/L — SIGNIFICANT CHANGE UP (ref 3.5–5)
PROT SERPL-MCNC: 8.6 G/DL — HIGH (ref 6–8)
PROTHROM AB SERPL-ACNC: 15.8 SEC — HIGH (ref 9.95–12.87)
RBC # BLD: 4.08 M/UL — LOW (ref 4.2–5.4)
RBC # FLD: 14.9 % — HIGH (ref 11.5–14.5)
SODIUM SERPL-SCNC: 138 MMOL/L — SIGNIFICANT CHANGE UP (ref 135–146)
WBC # BLD: 11.76 K/UL — HIGH (ref 4.8–10.8)
WBC # FLD AUTO: 11.76 K/UL — HIGH (ref 4.8–10.8)

## 2022-03-30 PROCEDURE — 71046 X-RAY EXAM CHEST 2 VIEWS: CPT | Mod: 26

## 2022-03-30 PROCEDURE — 93010 ELECTROCARDIOGRAM REPORT: CPT

## 2022-03-30 RX ORDER — LOSARTAN/HYDROCHLOROTHIAZIDE 100MG-25MG
1 TABLET ORAL
Qty: 0 | Refills: 0 | DISCHARGE

## 2022-03-30 RX ORDER — SIMVASTATIN 20 MG/1
1 TABLET, FILM COATED ORAL
Qty: 0 | Refills: 0 | DISCHARGE

## 2022-03-30 NOTE — H&P PST ADULT - HISTORY OF PRESENT ILLNESS
Pt states in 10/2021 she began to feel chest pains and palpitations. Was rushed to the hospital where it was determined she had a-flutter. Denies any symptoms at this time. Now for listed procedure.    Denies any chest pain, difficulty breathing, SOB, palpitations, dysuria, URI, or any other infections in the last 2 weeks. Denies any recent travel, contact, or exposure to any persons with known or suspected COVID-19. Pt also denies COVID testing within the last 2 weeks. Pt admits to receiving all doses of Pfizer COVID vaccine. Denies any suicidal or homicidal ideations. Pt advised to self quarantine until day of procedure. Exercise tolerance of 1-2 flights of stairs without dyspnea. RENÉ reviewed with patient. Pt verbalized understanding of all pre-operative instructions.    Anesthesia Alert  YES--Difficult Airway: Class IV  NO--History of neck surgery or radiation  NO--Limited ROM of neck  NO--History of Malignant hyperthermia  NO--No personal or family history of Pseudocholinesterase deficiency.  NO--Prior Anesthesia Complication  NO--Latex Allergy  NO--Loose teeth  NO--History of Rheumatoid Arthritis  NO--RENÉ  YES--Bleeding Risk  NO--Other_____

## 2022-03-30 NOTE — H&P PST ADULT - NSICDXPASTMEDICALHX_GEN_ALL_CORE_FT
PAST MEDICAL HISTORY:  Anxiety     Atrial flutter     CAD (coronary artery disease)     HTN (hypertension)     Tobacco dependence

## 2022-03-30 NOTE — H&P PST ADULT - REASON FOR ADMISSION
77 yo female presents for PAST in preparation for a-flutter ablation & MIKAEL on 4/4/2022 under sedation by Dr. Rubi (EPS).

## 2022-03-30 NOTE — H&P PST ADULT - NSANTHTIREDRD_ENT_A_CORE
Advance activity as tolerated.  Continue all previously prescribed medications as directed unless otherwise instructed.  Take Pepcid 20 mg twice a day as needed for indigestion. Take Zofran 4 mg ODT every 8 hours as needed for nausea and vomiting. Take Probiotics as prescribed.  Take Maalox, which is available over the counter -- follow instructions on label. Follow up with your primary care physician in 48-72 hours- bring copies of your results.  Return to the ER for worsening or persistent symptoms, and/or ANY NEW OR CONCERNING SYMPTOMS. If you have issues obtaining follow up, please call: [phone] to obtain a doctor or specialist who takes your insurance in your area.  You may call [phone] to make an appointment with the internal medicine clinic. No

## 2022-03-30 NOTE — H&P PST ADULT - CIGARETTES, PACKS/DAY
0.5 Partial Purse String (Simple) Text: Given the location of the defect and the characteristics of the surrounding skin a simple purse string closure was deemed most appropriate.  Undermining was performed circumferentially around the surgical defect.  A purse string suture was then placed and tightened. Wound tension of the circular defect prevented complete closure of the wound.

## 2022-04-04 ENCOUNTER — INPATIENT (INPATIENT)
Facility: HOSPITAL | Age: 77
LOS: 0 days | Discharge: HOME | End: 2022-04-05
Attending: STUDENT IN AN ORGANIZED HEALTH CARE EDUCATION/TRAINING PROGRAM | Admitting: STUDENT IN AN ORGANIZED HEALTH CARE EDUCATION/TRAINING PROGRAM
Payer: MEDICARE

## 2022-04-04 ENCOUNTER — TRANSCRIPTION ENCOUNTER (OUTPATIENT)
Age: 77
End: 2022-04-04

## 2022-04-04 VITALS — HEIGHT: 62 IN | WEIGHT: 149.91 LBS

## 2022-04-04 DIAGNOSIS — Z95.1 PRESENCE OF AORTOCORONARY BYPASS GRAFT: Chronic | ICD-10-CM

## 2022-04-04 DIAGNOSIS — I48.92 UNSPECIFIED ATRIAL FLUTTER: ICD-10-CM

## 2022-04-04 DIAGNOSIS — F41.9 ANXIETY DISORDER, UNSPECIFIED: ICD-10-CM

## 2022-04-04 DIAGNOSIS — Z28.311 PARTIALLY VACCINATED FOR COVID-19: ICD-10-CM

## 2022-04-04 DIAGNOSIS — I25.10 ATHEROSCLEROTIC HEART DISEASE OF NATIVE CORONARY ARTERY WITHOUT ANGINA PECTORIS: ICD-10-CM

## 2022-04-04 DIAGNOSIS — I10 ESSENTIAL (PRIMARY) HYPERTENSION: ICD-10-CM

## 2022-04-04 PROCEDURE — 93653 COMPRE EP EVAL TX SVT: CPT

## 2022-04-04 PROCEDURE — 93320 DOPPLER ECHO COMPLETE: CPT | Mod: 26

## 2022-04-04 PROCEDURE — 76937 US GUIDE VASCULAR ACCESS: CPT | Mod: 26

## 2022-04-04 PROCEDURE — 93325 DOPPLER ECHO COLOR FLOW MAPG: CPT | Mod: 26

## 2022-04-04 PROCEDURE — 93312 ECHO TRANSESOPHAGEAL: CPT | Mod: 26

## 2022-04-04 RX ORDER — DULOXETINE HYDROCHLORIDE 30 MG/1
60 CAPSULE, DELAYED RELEASE ORAL DAILY
Refills: 0 | Status: DISCONTINUED | OUTPATIENT
Start: 2022-04-04 | End: 2022-04-05

## 2022-04-04 RX ORDER — LOSARTAN POTASSIUM 100 MG/1
50 TABLET, FILM COATED ORAL ONCE
Refills: 0 | Status: COMPLETED | OUTPATIENT
Start: 2022-04-04 | End: 2022-04-04

## 2022-04-04 RX ORDER — LOSARTAN POTASSIUM 100 MG/1
50 TABLET, FILM COATED ORAL DAILY
Refills: 0 | Status: DISCONTINUED | OUTPATIENT
Start: 2022-04-04 | End: 2022-04-05

## 2022-04-04 RX ORDER — ACETAMINOPHEN 500 MG
650 TABLET ORAL EVERY 6 HOURS
Refills: 0 | Status: DISCONTINUED | OUTPATIENT
Start: 2022-04-04 | End: 2022-04-05

## 2022-04-04 RX ORDER — ATORVASTATIN CALCIUM 80 MG/1
40 TABLET, FILM COATED ORAL AT BEDTIME
Refills: 0 | Status: DISCONTINUED | OUTPATIENT
Start: 2022-04-04 | End: 2022-04-05

## 2022-04-04 RX ORDER — AMLODIPINE BESYLATE 2.5 MG/1
5 TABLET ORAL DAILY
Refills: 0 | Status: DISCONTINUED | OUTPATIENT
Start: 2022-04-04 | End: 2022-04-05

## 2022-04-04 RX ORDER — APIXABAN 2.5 MG/1
5 TABLET, FILM COATED ORAL
Refills: 0 | Status: DISCONTINUED | OUTPATIENT
Start: 2022-04-04 | End: 2022-04-05

## 2022-04-04 RX ORDER — HYDROCHLOROTHIAZIDE 25 MG
12.5 TABLET ORAL DAILY
Refills: 0 | Status: DISCONTINUED | OUTPATIENT
Start: 2022-04-04 | End: 2022-04-05

## 2022-04-04 RX ADMIN — AMLODIPINE BESYLATE 5 MILLIGRAM(S): 2.5 TABLET ORAL at 17:21

## 2022-04-04 RX ADMIN — Medication 650 MILLIGRAM(S): at 20:45

## 2022-04-04 RX ADMIN — Medication 12.5 MILLIGRAM(S): at 16:16

## 2022-04-04 RX ADMIN — APIXABAN 5 MILLIGRAM(S): 2.5 TABLET, FILM COATED ORAL at 17:45

## 2022-04-04 RX ADMIN — LOSARTAN POTASSIUM 50 MILLIGRAM(S): 100 TABLET, FILM COATED ORAL at 16:55

## 2022-04-04 RX ADMIN — LOSARTAN POTASSIUM 50 MILLIGRAM(S): 100 TABLET, FILM COATED ORAL at 16:03

## 2022-04-04 RX ADMIN — Medication 650 MILLIGRAM(S): at 20:15

## 2022-04-04 RX ADMIN — ATORVASTATIN CALCIUM 40 MILLIGRAM(S): 80 TABLET, FILM COATED ORAL at 21:31

## 2022-04-04 NOTE — DISCHARGE NOTE PROVIDER - CARE PROVIDER_API CALL
Rani Rubi (MD)  Cardiac Electrophysiology; Cardiology; Internal Medicine  [address] [place] [address]  Phone: [phone]  Fax: [phone]  Follow Up Time: 1 month   Rani Rubi (MD)  Cardiac Electrophysiology; Cardiology; Internal Medicine  [address] [place] [address]  Phone: [phone]  Fax: [phone]  Scheduled Appointment: 04/24/2022 11:00 AM

## 2022-04-04 NOTE — PATIENT PROFILE ADULT - FALL HARM RISK - HARM RISK INTERVENTIONS

## 2022-04-04 NOTE — CHART NOTE - NSCHARTNOTEFT_GEN_A_CORE
PROCEDURES:   •	Cavo-tricuspid isthmus ablation (Typical Atrial Flutter Ablation)  •	EP study with CS catheter placement and pacing  •	Intra-atrial pacing and intraventricular pacing.   •             Transesophageal Echocardiogram (MIKAEL)  •	Three-dimensional intracardiac electro-anatomic mapping (Abbott)                                             BRIEF SUMMARY:  2 RFV accesses (8.5 Fr, 6 Fr). Successful CTI line ablation performed for atrial flutter. TI time 127 ms    IMPRESSION:   - Successful CTI-dependent Atrial Flutter ablation       PLAN:   - Continue anticoagulation with Eliquis  - Rate control: None  - Rhythm control medication: None  - Outpatient follow up in 1 months        : Rani Rubi M.D.  INDICATION FOR PROCEDURE: Typical Atrial Flutter  MIKAEL  was done prior to the procedure showed atrial enlargement with no left atrial thrombus.     CONSENT: The risks, benefits and alternatives to the procedure have been described to the patient in detail. All questions were answered.  The patient expressed understanding and has agreed to proceed.      PRESENTING RHYTHM: Sinus Rhythm    SETUP AND ACCESS: The patient was brought to the EP Lab in a fasting state after informed and written consent was obtained. Cardiac rhythm, blood pressure, heart rate, respirations, oxygen saturation and level of consciousness were monitored prior to, throughout and after the procedure. Moderate sedation was administered by anesthesia team. The patient was placed supine on the fluoroscopy table, prepped and draped in the usual sterile fashion.   MIKAEL as performed which showed normal EF, trivial pericardial effusion and no intracardiac thrombus.  Local anesthetic was provided. Venous access was obtained using a micropuncture needle in the right femoral vein under fluoroscopic/anatomic and ultrasound guidance using the modified Seldinger technique. Needle access was obtained under ultrasound guidance and a permanent recording obtained.   Guidewires were placed through each venous access and an 8.5 Guamanian SR-0 sheaths and an 6 Guamanian sheath were placed in right femoral vein.     CATHETER PLACEMENT: A Tacticath DF catheter was advanced through the SR-0. HD grid was used to map flutter line. A deflectable decapolar catheter was advanced via the right femoral vein into the CS.       EP STUDY AND ABLATION:   The ablation catheter was placed at the His position for baseline intervals and to slim the location on 3-D mapping.  A baseline EP study was obtained.  The ablation catheter was then placed at 6 o'clock on the tricuspid valve annulus. RF ablation was then applied in a linear fashion across the cavotricuspid isthmus using a spot and drag technique at 35 Wu at the distal isthmus and 30 W at the proximal isthmus and points were tagged on a 3D map. Bidirectional block was achieved.  Bidirectional block was achieved without complication.  The post-ablation trans-isthmus conduction time was over 127 ms.    A repeat EP study was performed, including pacing and recording from the left atrium.        DIAGNOSTIC EP STUDY:     Final Intervals  •	Surface QRS: Sinus  msec, AZ interval 127 msec, QRSd 84 msec,  msec.  •	AH interval 52 msec, HV interval 42 msec.   •	AV Wenkebach: 370 msec   •	Post-ablation CTI trans-isthmus conduction time: 127 ms    Hemostasis at the access site was achieved manual pressure without any complications.

## 2022-04-04 NOTE — DISCHARGE NOTE PROVIDER - NSDCCPTREATMENT_GEN_ALL_CORE_FT
PRINCIPAL PROCEDURE  Procedure: Ablation, arrhythmogenic focus, with MIKAEL, for atrial flutter  Findings and Treatment: You underwent an Aflutter ablation with Dr. Rubi on 4.4.22 without complications.

## 2022-04-04 NOTE — CHART NOTE - NSCHARTNOTEFT_GEN_A_CORE
PACU ANESTHESIA ADMISSION NOTE      Procedure:   Post op diagnosis:      ____  Intubated  TV:______       Rate: ______      FiO2: ______    _x___  Patent Airway    _x___  Full return of protective reflexes    _x___  Full recovery from anesthesia / back to baseline status    Vitals  SPO2:-97%  HR:-89  RR:-12  B.P:-175/77  TEMP:-97    Mental Status:  _x___ Awake   ___x_ Alert   _____ Drowsy   _____ Sedated    Nausea/Vomiting:  _x___  NO       ______Yes,   See Post - Op Orders         Pain Scale (0-10):  __0___    Treatment: _x___ None    __x__ See Post - Op/PCA Orders    Post - Operative Fluids:   ___ Oral   ____x See Post - Op Orders    Plan: Discharge:   ____Home       ___x__Floor     _____Critical Care    _____  Other:_________________    Comments:  Report endorsed to RN in pacu  Vitals stable  No anesthesia issues or complications noted.  Discharge to patient to floor / home when criteria met.

## 2022-04-04 NOTE — DISCHARGE NOTE PROVIDER - HOSPITAL COURSE
Rosy Lobato is a 76 year old female with PMH Aflutter, anxiety, CAD, HTN presents to [facility] on 4/4/22 for AFlutter Ablation. Patient underwent Aflutter ablation with Dr. Rubi on 4/4/22 without complications. No overnight events noted. Continue taking your Eliquis twice a day. Patient is stable for discharge home and will followup with Dr. Rubi in  1 month. Rosy Lobato is a 76 year old female with PMH Aflutter, anxiety, CAD, HTN presents to [facility] on 4/4/22 for AFlutter Ablation. Patient underwent Aflutter ablation with Dr. Rubi on 4/4/22 without complications. No overnight events noted. Continue taking your Eliquis twice a day. Norvasc 5mg daily and Hydrochlorothiazide added for better blood pressure control. Patient is stable for discharge home and will followup with Dr. Rubi on 4/24 @ 11am, [address] [place] [address], [address]. Rosy Lobato is a 76 year old female with PMH Aflutter, anxiety, CAD, HTN presents to [facility] on 4/4/22 for AFlutter Ablation. Patient underwent Aflutter ablation with Dr. Rubi on 4/4/22 without complications. No overnight events noted. Continue taking your Eliquis twice a day and Norvasc 5mg daily. Patient is stable for discharge home and will followup with Dr. Rubi on 4/24 @ 11am, [address] [place] [address], [address].

## 2022-04-04 NOTE — DISCHARGE NOTE PROVIDER - NSDCMRMEDTOKEN_GEN_ALL_CORE_FT
DULoxetine 60 mg oral delayed release capsule: 1 cap(s) orally once a day  ELIQUIS 5MG TABS: TAKE ONE TABLET BY MOUTH TWICE A DAY  GABAPENTIN 300MG CAPS: TAKE ONE CAPSULE BY MOUTH TWICE A DAY  OLMESARTAN MEDOXOMIL-H 20-12.5 TABS: TAKE ONE TABLET BY MOUTH EVERY DAY  ROSUVASTATIN CALCIUM 10MG TABS: TAKE ONE TABLET BY MOUTH EVERY DAY IN THE EVENING   amLODIPine 5 mg oral tablet: 1 tab(s) orally once a day  DULoxetine 60 mg oral delayed release capsule: 1 cap(s) orally once a day  ELIQUIS 5MG TABS: 1 tab(s) orally 2 times a day  GABAPENTIN 300MG CAPS: TAKE ONE CAPSULE BY MOUTH TWICE A DAY  hydroCHLOROthiazide 12.5 mg oral capsule: 1 cap(s) orally once a day  losartan 50 mg oral tablet: 1 tab(s) orally once a day  OLMESARTAN MEDOXOMIL-H 20-12.5 TABS: TAKE ONE TABLET BY MOUTH EVERY DAY  ROSUVASTATIN CALCIUM 10MG TABS: TAKE ONE TABLET BY MOUTH EVERY DAY IN THE EVENING   amLODIPine 5 mg oral tablet: 1 tab(s) orally once a day  DULoxetine 60 mg oral delayed release capsule: 1 cap(s) orally once a day  ELIQUIS 5MG TABS: 1 tab(s) orally 2 times a day  GABAPENTIN 300MG CAPS: TAKE ONE CAPSULE BY MOUTH TWICE A DAY  OLMESARTAN MEDOXOMIL-H 20-12.5 TABS: TAKE ONE TABLET BY MOUTH EVERY DAY  ROSUVASTATIN CALCIUM 10MG TABS: TAKE ONE TABLET BY MOUTH EVERY DAY IN THE EVENING

## 2022-04-04 NOTE — DISCHARGE NOTE PROVIDER - NSDCFUADDINST_GEN_ALL_CORE_FT
- No heavy lifting/straining, Showering allowed  - Please continue taking Eliquis twice a day  - Do not lift anything > 10 lbs for 10 days  - You may shower today  - Please do not drive for 3 days  - Do not submerge yourself in water (baths, swimming) for one week  - Please follow- up with Dr. Rubi in 1 month         - No heavy lifting/straining, Showering allowed  - Please continue taking Eliquis twice a day  - Please take Norvasc 5mg daily, Hydrochlorothiazide 12.5 daily  - Do not lift anything > 10 lbs for 10 days  - You may shower today  - Please do not drive for 3 days  - Do not submerge yourself in water (baths, swimming) for one week  - Please follow- up with Dr. Rubi on 4/24 @11 am @ [address] [place] [address], [address].          - No heavy lifting/straining, Showering allowed  - Please continue taking Eliquis twice a day  - Start Norvasc 5mg daily   - Do not lift anything > 10 lbs for 10 days  - You may shower today  - Please do not drive for 3 days  - Do not submerge yourself in water (baths, swimming) for one week  - Please follow- up with Dr. Rubi on 4/24 @11 am @ [address] [place] [address], [address].

## 2022-04-04 NOTE — DISCHARGE NOTE PROVIDER - PROVIDER TOKENS
PROVIDER:[TOKEN:[[id number]],FOLLOWUP:[1 month]] PROVIDER:[TOKEN:[[id number]],SCHEDULEDAPPT:[04/24/2022],SCHEDULEDAPPTTIME:[11:00 AM]]

## 2022-04-04 NOTE — DISCHARGE NOTE PROVIDER - ATTENDING DISCHARGE PHYSICAL EXAMINATION:
Access site is clean, dry, and intact with no hematoma. Patient's BP was not well-controlled, and so she was started on amlodipine 5 mg daily.

## 2022-04-04 NOTE — PATIENT PROFILE ADULT - BRAND OF COVID-19 VACCINATION
patient does not remember the exact date of the second shot but states it was in OCT2021/Pfizer dose 1 and 2

## 2022-04-05 ENCOUNTER — TRANSCRIPTION ENCOUNTER (OUTPATIENT)
Age: 77
End: 2022-04-05

## 2022-04-05 VITALS
DIASTOLIC BLOOD PRESSURE: 73 MMHG | HEART RATE: 81 BPM | SYSTOLIC BLOOD PRESSURE: 132 MMHG | TEMPERATURE: 96 F | RESPIRATION RATE: 18 BRPM

## 2022-04-05 PROCEDURE — 99223 1ST HOSP IP/OBS HIGH 75: CPT

## 2022-04-05 PROCEDURE — 99238 HOSP IP/OBS DSCHRG MGMT 30/<: CPT

## 2022-04-05 PROCEDURE — 93010 ELECTROCARDIOGRAM REPORT: CPT

## 2022-04-05 RX ORDER — AMLODIPINE BESYLATE 2.5 MG/1
1 TABLET ORAL
Qty: 30 | Refills: 0
Start: 2022-04-05 | End: 2022-05-04

## 2022-04-05 RX ORDER — APIXABAN 2.5 MG/1
0 TABLET, FILM COATED ORAL
Qty: 0 | Refills: 2 | DISCHARGE

## 2022-04-05 RX ORDER — LOSARTAN POTASSIUM 100 MG/1
1 TABLET, FILM COATED ORAL
Qty: 30 | Refills: 0
Start: 2022-04-05 | End: 2022-05-04

## 2022-04-05 RX ADMIN — AMLODIPINE BESYLATE 5 MILLIGRAM(S): 2.5 TABLET ORAL at 05:30

## 2022-04-05 RX ADMIN — LOSARTAN POTASSIUM 50 MILLIGRAM(S): 100 TABLET, FILM COATED ORAL at 05:30

## 2022-04-05 RX ADMIN — Medication 12.5 MILLIGRAM(S): at 05:31

## 2022-04-05 RX ADMIN — DULOXETINE HYDROCHLORIDE 60 MILLIGRAM(S): 30 CAPSULE, DELAYED RELEASE ORAL at 11:35

## 2022-04-05 RX ADMIN — APIXABAN 5 MILLIGRAM(S): 2.5 TABLET, FILM COATED ORAL at 05:31

## 2022-04-05 NOTE — DISCHARGE NOTE NURSING/CASE MANAGEMENT/SOCIAL WORK - PATIENT PORTAL LINK FT
You can access the FollowMyHealth Patient Portal offered by [facility] by registering at the following website: [email or web address]. By joining [facility]’s FollowMyHealth portal, you will also be able to view your health information using other applications (apps) compatible with our system.

## 2022-04-05 NOTE — PROGRESS NOTE ADULT - ASSESSMENT
A/P  Patient S/P AFL    Ablation  Patient is doing well  - continue Eliquis  - con't home antihypertensive regiment, add Norvasc 5mg daily for better control  - No heavy lifting or exertional activities for 5-7days  - Can take a shower, no bathtub for 5days, do not submerge yourself in water  - FU in EP office with Dr Rubi 4/24@11am  [address] [place] [address], [address]  [phone]

## 2022-04-05 NOTE — DISCHARGE NOTE NURSING/CASE MANAGEMENT/SOCIAL WORK - NSDCPEFALRISK_GEN_ALL_CORE
For information on Fall & Injury Prevention, visit: [email or web address] OR  [email or web address] OR  https://www.cdc.gov/steadi/patient.html

## 2022-04-05 NOTE — PROGRESS NOTE ADULT - SUBJECTIVE AND OBJECTIVE BOX
INTERVAL HPI/OVERNIGHT EVENTS:    Patient s/p             ablation.   No events over night  Patient in NSR    MEDICATIONS  (STANDING):  amLODIPine   Tablet 5 milliGRAM(s) Oral daily  apixaban 5 milliGRAM(s) Oral two times a day  atorvastatin 40 milliGRAM(s) Oral at bedtime  DULoxetine 60 milliGRAM(s) Oral daily  hydrochlorothiazide 12.5 milliGRAM(s) Oral daily  losartan 50 milliGRAM(s) Oral daily    MEDICATIONS  (PRN):  acetaminophen     Tablet .. 650 milliGRAM(s) Oral every 6 hours PRN Moderate Pain (4 - 6)      Allergies    No Known Allergies    Intolerances          Vital Signs Last 24 Hrs  T(C): 36.3 (2022 04:39), Max: 37.2 (2022 20:23)  T(F): 97.4 (2022 04:39), Max: 98.9 (2022 20:23)  HR: 78 (2022 04:39) (78 - 84)  BP: 138/65 (2022 04:39) (138/65 - 189/84)  BP(mean): --  RR: 17 (2022 04:39) (17 - 18)  SpO2: 96% (2022 17:58) (96% - 96%)    GENERAL: In no apparent distress, well nourished, and hydrated.  HEART: Regular rate and rhythm; No murmurs, rubs, or gallops.  PULMONARY: Clear to auscultation and perfusion.  No rales, wheezing, or rhonchi bilaterally.  ABDOMEN: Soft, Nontender, Nondistended; Bowel sounds present  EXTREMITIES:  2+ Peripheral Pulses, No clubbing, cyanosis, or edema  groins No hematoma, no bleeding; stiches removed  NEUROLOGICAL: Grossly nonfocal    LABS:                EK Lead ECG:   Ventricular Rate 75 BPM    Atrial Rate 75 BPM    P-R Interval 122 ms    QRS Duration 94 ms    Q-T Interval 412 ms    QTC Calculation(Bazett) 460 ms    P Axis 69 degrees    R Axis 6 degrees    T Axis 62 degrees    Diagnosis Line Normal sinus rhythm  Left ventricular hypertrophy with repolarization abnormality  Abnormal ECG    Confirmed by OCTAVIA BURKETT MD ([id number]) on 2022 8:34:48 AM (22 @ 07:35)

## 2022-04-07 DIAGNOSIS — I10 ESSENTIAL (PRIMARY) HYPERTENSION: ICD-10-CM

## 2022-04-07 DIAGNOSIS — Z79.01 LONG TERM (CURRENT) USE OF ANTICOAGULANTS: ICD-10-CM

## 2022-04-07 DIAGNOSIS — E66.9 OBESITY, UNSPECIFIED: ICD-10-CM

## 2022-04-07 DIAGNOSIS — F41.9 ANXIETY DISORDER, UNSPECIFIED: ICD-10-CM

## 2022-04-07 DIAGNOSIS — F17.210 NICOTINE DEPENDENCE, CIGARETTES, UNCOMPLICATED: ICD-10-CM

## 2022-04-07 DIAGNOSIS — Z95.1 PRESENCE OF AORTOCORONARY BYPASS GRAFT: ICD-10-CM

## 2022-04-07 DIAGNOSIS — I25.10 ATHEROSCLEROTIC HEART DISEASE OF NATIVE CORONARY ARTERY WITHOUT ANGINA PECTORIS: ICD-10-CM

## 2022-04-07 DIAGNOSIS — R01.1 CARDIAC MURMUR, UNSPECIFIED: ICD-10-CM

## 2022-04-10 NOTE — HISTORY OF PRESENT ILLNESS
[FreeTextEntry1] : I had a pleasure of seeing Ms. AGARWAL for initial consultation for Atrial Flutter. \par \par Ms. AGARWAL is a 76 year-year old female with history of CAD/CABG, HTN, Anxiety, atrial flutter is here for Atrial Flutter. Recent admit at [facility] for palpitations/dizziness- found to have AFL- treated with amiodarone and cardizem. \par + Palpitations at home- short lasting.\par \par 2/23: Feels better.\par 3/22: Feels ok. No episodes. Wants to stop all meds.\par \par Denies chest pain, shortness of breath, palpitation, dizziness or LOC except noted above.\par \par EKG (03/22): SR\par EKG (2/23/22): SR@84, QRSd 86\par EKG (11/18/21): AFL @ 123, QRSd 86\par TTE (10/25/21): EF 55-60%, Mild LAE, Mod to severe MR, Mod TR\par MIKAEL (11/21): Nl EF, Mod central MR, Mod TR

## 2022-04-10 NOTE — ASSESSMENT
[FreeTextEntry1] : ## Atrial Flutter s/p DCCV\par \par - On Xarelto., Compliant. No bleeding issues. Compliant\par - Now in sinus.\par - Discussed management options including clinical observation, AAD and ablation. AGreeable for ablation.\par \par I discussed the risks, benefits and alternatives for an EP study +- ablation of suspected right atrial flutter.  I conveyed an approximately 95% success rate for ablation of cavo-tricuspid isthmus dependent atrial flutter (if this is the confirmed arrhythmia).  I discussed that about half of patients with atrial flutter also have atrial fibrillation which would not be treated by this ablation. I reported a risk of major complications at 1% and minor complications at 4%. The details of the procedure and risks associated with undergoing the procedure were discussed in detail including but not limited to, death, myocardial ischemia, stroke, heart perforation, damage to the conduction system requiring pacemaker, diaphragmatic paralysis via phrenic nerve injury, catheter entrapment in a valve or other location, bleeding, infection, deep vein thrombosis, vascular injury, and worsening arrhythmias. All questions were answered to satisfaction and the patient expressed verbal understanding of the procedure, the benefits, and risks, and agreed to proceed. \par  \par - - Discussed importance of risk factor management.\par - Sleep study/Management of RENÉ\par - Diet/exercise/weight loss\par - Management of GERD if present. \par - Return in 1 month. \par \par

## 2022-04-27 ENCOUNTER — APPOINTMENT (OUTPATIENT)
Dept: CARDIOLOGY | Facility: CLINIC | Age: 77
End: 2022-04-27
Payer: MEDICARE

## 2022-04-27 VITALS
WEIGHT: 154 LBS | HEART RATE: 88 BPM | HEIGHT: 62 IN | BODY MASS INDEX: 28.34 KG/M2 | SYSTOLIC BLOOD PRESSURE: 130 MMHG | OXYGEN SATURATION: 94 % | TEMPERATURE: 98.3 F | DIASTOLIC BLOOD PRESSURE: 80 MMHG

## 2022-04-27 PROBLEM — I48.92 UNSPECIFIED ATRIAL FLUTTER: Chronic | Status: ACTIVE | Noted: 2022-03-30

## 2022-04-27 PROCEDURE — 93000 ELECTROCARDIOGRAM COMPLETE: CPT

## 2022-04-27 PROCEDURE — 99214 OFFICE O/P EST MOD 30 MIN: CPT

## 2022-04-27 RX ORDER — APIXABAN 5 MG/1
5 TABLET, FILM COATED ORAL
Qty: 3 | Refills: 3 | Status: DISCONTINUED | COMMUNITY
Start: 2022-03-01 | End: 2022-04-27

## 2022-04-27 RX ORDER — AMIODARONE HYDROCHLORIDE 200 MG/1
200 TABLET ORAL
Qty: 28 | Refills: 0 | Status: DISCONTINUED | COMMUNITY
Start: 2021-11-18 | End: 2022-04-27

## 2022-04-27 RX ORDER — AMIODARONE HYDROCHLORIDE 200 MG/1
200 TABLET ORAL
Qty: 30 | Refills: 0 | Status: DISCONTINUED | COMMUNITY
Start: 2021-11-17 | End: 2022-04-27

## 2022-04-27 NOTE — ASSESSMENT
[FreeTextEntry1] : ## Typical Atrial Flutter s/p AFL ablation (CTI, 22)\par ## HTN\par \par - In sinus today. Off amiodarone\par - Will DC eliquis 3 days prior to cataract surgery\par - Patient is at low risk of jodee-op CV events for low risk procedure.\par - BP much better. On Valsartan 320 and Amlodipine. BP diary at home\par - Return in 3 months

## 2022-06-30 DIAGNOSIS — Z86.79 PERSONAL HISTORY OF OTHER DISEASES OF THE CIRCULATORY SYSTEM: ICD-10-CM

## 2022-07-01 ENCOUNTER — APPOINTMENT (OUTPATIENT)
Dept: ORTHOPEDIC SURGERY | Facility: CLINIC | Age: 77
End: 2022-07-01

## 2022-07-01 PROCEDURE — 20610 DRAIN/INJ JOINT/BURSA W/O US: CPT | Mod: RT

## 2022-07-01 PROCEDURE — 99213 OFFICE O/P EST LOW 20 MIN: CPT | Mod: 25

## 2022-07-01 NOTE — PROCEDURE
[Large Joint Injection] : Large joint injection [Right] : of the right [Knee] : knee [Pain] : pain [Alcohol] : alcohol [Betadine] : betadine [Ethyl Chloride sprayed topically] : ethyl chloride sprayed topically [Sterile technique used] : sterile technique used [___ cc    1%] : Lidocaine ~Vcc of 1%  [___ cc    4mg] : Dexamethasone (Decadron) ~Vcc of 4 mg

## 2022-07-01 NOTE — DISCUSSION/SUMMARY
[de-identified] :   At this time impression is right knee pain due to osteoarthritis to the knee and possible lumbar radiculopathy.  \par We discussed treatment plan, she would like to have a cortisone injection to her knee even though  she only had mild improvement of symptoms.  \par Patient would like to get a gel injection, I agree.  \par We are going to start the authorization process for Synvisc-One injection.  \par Patient was advised to follow up in 6 weeks for this injection.\par \par  We discussed medication, patient is only taking gabapentin for pain, she is not taking any anti-inflammatories.  \par Patient has an upcoming appointment with her Cardiologist and she will discuss the possibility of use of meloxicam or any NSAIDs.\par \par  We also discussed the alternative of all the treatment plan for her right knee since she complains of severe pain, such as possible genicular nerve block to the knee  Which she was discussed with her pain management.

## 2022-07-01 NOTE — IMAGING
[de-identified] :   On examination of the right knee, patient has no joint effusion, no ecchymosis, no erythema, mild generalized swelling and synovial thickening.\par Patient has 10 to palpation over the medial joint line.\par Patient has some stiffness to flexion-extension of the knee.\par Patient has some discomfort when palpating over the lateral joint line.\par Neurovascular intact.\par Mild antalgic gait.\par Ambulates without the use of assistance.

## 2022-07-01 NOTE — IMAGING
[de-identified] :   On examination of the right knee, patient has no joint effusion, no ecchymosis, no erythema, mild generalized swelling and synovial thickening.\par Patient has 10 to palpation over the medial joint line.\par Patient has some stiffness to flexion-extension of the knee.\par Patient has some discomfort when palpating over the lateral joint line.\par Neurovascular intact.\par Mild antalgic gait.\par Ambulates without the use of assistance.

## 2022-07-01 NOTE — DISCUSSION/SUMMARY
[de-identified] :   At this time impression is right knee pain due to osteoarthritis to the knee and possible lumbar radiculopathy.  \par We discussed treatment plan, she would like to have a cortisone injection to her knee even though  she only had mild improvement of symptoms.  \par Patient would like to get a gel injection, I agree.  \par We are going to start the authorization process for Synvisc-One injection.  \par Patient was advised to follow up in 6 weeks for this injection.\par \par  We discussed medication, patient is only taking gabapentin for pain, she is not taking any anti-inflammatories.  \par Patient has an upcoming appointment with her Cardiologist and she will discuss the possibility of use of meloxicam or any NSAIDs.\par \par  We also discussed the alternative of all the treatment plan for her right knee since she complains of severe pain, such as possible genicular nerve block to the knee  Which she was discussed with her pain management.

## 2022-07-13 ENCOUNTER — APPOINTMENT (OUTPATIENT)
Dept: PAIN MANAGEMENT | Facility: CLINIC | Age: 77
End: 2022-07-13

## 2022-07-13 ENCOUNTER — APPOINTMENT (OUTPATIENT)
Dept: PAIN MANAGEMENT | Facility: CLINIC | Age: 77
End: 2022-07-13
Payer: MEDICARE

## 2022-07-13 PROCEDURE — 72100 X-RAY EXAM L-S SPINE 2/3 VWS: CPT

## 2022-07-13 PROCEDURE — 00630 ANES PX LUMBAR REGION NOS: CPT | Mod: QZ,P3

## 2022-07-13 PROCEDURE — 77003 FLUOROGUIDE FOR SPINE INJECT: CPT | Mod: 59

## 2022-07-13 PROCEDURE — 94761 N-INVAS EAR/PLS OXIMETRY MLT: CPT | Mod: 59

## 2022-07-13 PROCEDURE — 93040 RHYTHM ECG WITH REPORT: CPT | Mod: 59

## 2022-07-13 PROCEDURE — 93770 DETERMINATION VENOUS PRESS: CPT

## 2022-07-13 PROCEDURE — 62323 NJX INTERLAMINAR LMBR/SAC: CPT

## 2022-07-13 NOTE — PROCEDURE
[FreeTextEntry1] : CAUDAL EPIDURAL STEROID INJECTION\par \par  [FreeTextEntry3] : Preoperative Diagnosis: Lumbar Radiculopathy\par Postoperative Diagnosis: Lumbar Radiculopathy\par \par \par Procedure:\par 1. Caudal epidural injection under fluoroscopic guidance\par 2. Epidurography\par 3. Fluoroscopic guidance and localization of needle\par \par \par Physician: Austin Linares M.D.\par Anesthesiologist/CRNA: Ms. Guaman\par Anesthesia: MAC\par Medical Necessity:  Failure of conservative management.\par Consent:  Though unusual, the possible complications including infection, bleeding, nerve damage, hospital admission, stroke, pneumothorax, death or failure of the procedure are theoretically possible. The patient was educated about the of the procedure and alternative therapies. All questions were answered and the patient freely gave consent to proceed.\par Indication for Fluoroscopy:  This procedure requires the precise placement of the spinal needle into the epidural space.  It is the only way to accurately and safely perform the injection.\par \par \par \par PROCEDURE NOTE:\par After obtaining written consent, the patient was placed in the prone position with a pillow under the pelvis. Multiple fluoroscopic views of the sacrum-AP & Lateral- were obtained. The lower back and upper gluteal region were prepped and draped in the sterile fashion. A time out was performed.  A a 3.5inch 22 gauge spinal needle was inserted into sacral hiatus. The angle of the needle was lowered until it was felt to penetrate the sacrococcygeal ligament at which time the needle was advanced without resistance. Fluoroscopy confirmed the position of the needle within the caudal space. Omnipaque 240, 3 cc was injected to confirm an appropriate epidural spread. A total of 9 ml of preservative-free sterile saline with 1 ml of dexamethasone (10mg/cc) was injected via the needle into the caudal space.  The needle was cleared with three ml preservative-free normal saline.  There was no evidence of CSF or heme.\par \par \par \par Epidurogram Report: A spinal needle is in place in the caudal epidural space.  Central epidural spread of dye is noted from the lower sacral segments to L4-5.    Dye is seen outlining the sacral nerve roots bilaterally as they emerge from their respective foramen without obstruction.\par \par \par \par Findings: Lumbar Spine AP and oblique views with x-ray degenerative changes noted.\par \par \par \par Complications: none. \par \par \par \par Disposition: I have examined the patient and there are no new physical findings since original presentation. The patient was discharged home with a . The discharge instruction sheet was given to the patient. Motor function was intact.\par \par \par \par Comment: 1st caudal LISS today, depending on effectiveness would schedule 2nd caudal in 1-2 weeks vs interlaminar epidural steroid vs follow up in office. Call if any problems\par \par  \par \par This document was signed by:\par \par Austin Linares MD \par Board Certified, Anesthesiology \par Board Certified, Pain Medicine \par \par

## 2022-07-28 ENCOUNTER — APPOINTMENT (OUTPATIENT)
Dept: PAIN MANAGEMENT | Facility: CLINIC | Age: 77
End: 2022-07-28

## 2022-07-28 ENCOUNTER — APPOINTMENT (OUTPATIENT)
Dept: PAIN MANAGEMENT | Facility: CLINIC | Age: 77
End: 2022-07-28
Payer: MEDICARE

## 2022-07-28 PROCEDURE — 93040 RHYTHM ECG WITH REPORT: CPT | Mod: 59

## 2022-07-28 PROCEDURE — 62323 NJX INTERLAMINAR LMBR/SAC: CPT

## 2022-07-28 PROCEDURE — 93770 DETERMINATION VENOUS PRESS: CPT | Mod: 59

## 2022-07-28 PROCEDURE — 94761 N-INVAS EAR/PLS OXIMETRY MLT: CPT | Mod: 59

## 2022-07-28 PROCEDURE — 72100 X-RAY EXAM L-S SPINE 2/3 VWS: CPT

## 2022-07-28 PROCEDURE — 00630 ANES PX LUMBAR REGION NOS: CPT | Mod: QZ,P3

## 2022-07-28 NOTE — PROCEDURE
[FreeTextEntry1] : CAUDAL EPIDURAL STEROID INJECTION [FreeTextEntry3] : Preoperative Diagnosis: Lumbar Radiculopathy\par Postoperative Diagnosis: Lumbar Radiculopathy\par \par \par Procedure:\par 1. Caudal epidural injection under fluoroscopic guidance\par 2. Epidurography\par 3. Fluoroscopic guidance and localization of needle\par \par \par Physician: Austin Linares M.D.\par Anesthesiologist/CRNA: Ms. Guaman\par Anesthesia: MAC\par Medical Necessity:  Failure of conservative management.\par Consent:  Though unusual, the possible complications including infection, bleeding, nerve damage, hospital admission, stroke, pneumothorax, death or failure of the procedure are theoretically possible. The patient was educated about the of the procedure and alternative therapies. All questions were answered and the patient freely gave consent to proceed.\par Indication for Fluoroscopy:  This procedure requires the precise placement of the spinal needle into the epidural space.  It is the only way to accurately and safely perform the injection.\par \par \par \par PROCEDURE NOTE:\par After obtaining written consent, the patient was placed in the prone position with a pillow under the pelvis. Multiple fluoroscopic views of the sacrum-AP & Lateral- were obtained. The lower back and upper gluteal region were prepped and draped in the sterile fashion. A time out was performed.  A a 3.5inch 22 gauge spinal needle was inserted into sacral hiatus. The angle of the needle was lowered until it was felt to penetrate the sacrococcygeal ligament at which time the needle was advanced without resistance. Fluoroscopy confirmed the position of the needle within the caudal space. Omnipaque 240, 3 cc was injected to confirm an appropriate epidural spread. A total of 9 ml of preservative-free sterile saline with 1 ml of dexamethasone (10mg/cc) was injected via the needle into the caudal space.  The needle was cleared with three ml preservative-free normal saline.  There was no evidence of CSF or heme.\par \par \par \par Epidurogram Report: A spinal needle is in place in the caudal epidural space.  Central epidural spread of dye is noted from the lower sacral segments to L4-5.    Dye is seen outlining the sacral nerve roots bilaterally as they emerge from their respective foramen without obstruction.\par \par \par \par Findings: Lumbar Spine AP and oblique views with x-ray degenerative changes noted.\par \par \par \par Complications: none. \par \par \par \par Disposition: I have examined the patient and there are no new physical findings since original presentation. The patient was discharged home with a . The discharge instruction sheet was given to the patient. Motor function was intact.\par \par \par \par Comment: 2nd  caudal LISS today, depending on effectiveness would schedule 3rd caudal in 1-2 weeks vs interlaminar epidural steroid vs follow up in office. Call if any problems\par \par  \par \par This document was signed by:\par \par Austin Linares MD \par Board Certified, Anesthesiology \par Board Certified, Pain Medicine \par \par

## 2022-08-02 ENCOUNTER — APPOINTMENT (OUTPATIENT)
Dept: PAIN MANAGEMENT | Facility: CLINIC | Age: 77
End: 2022-08-02

## 2022-08-03 ENCOUNTER — APPOINTMENT (OUTPATIENT)
Dept: CARDIOLOGY | Facility: CLINIC | Age: 77
End: 2022-08-03

## 2022-08-03 VITALS
HEIGHT: 62 IN | WEIGHT: 150 LBS | TEMPERATURE: 97.9 F | DIASTOLIC BLOOD PRESSURE: 80 MMHG | BODY MASS INDEX: 27.6 KG/M2 | SYSTOLIC BLOOD PRESSURE: 130 MMHG | OXYGEN SATURATION: 93 % | HEART RATE: 89 BPM

## 2022-08-03 PROCEDURE — 93000 ELECTROCARDIOGRAM COMPLETE: CPT

## 2022-08-03 PROCEDURE — 99214 OFFICE O/P EST MOD 30 MIN: CPT | Mod: 25

## 2022-08-06 NOTE — ASSESSMENT
[FreeTextEntry1] : ## Typical Atrial Flutter s/p AFL ablation (CTI, 22)\par ## HTN\par ## Moderate to severe MR\par \par - In sinus today. Off amiodarone\par - Off Eliquis. \par - BP much better. On Valsartan 320 and Amlodipine. BP diary at home\par - Cardio referral- MR and primary prevention\par - Return as needed

## 2022-08-06 NOTE — HISTORY OF PRESENT ILLNESS
[FreeTextEntry1] : I had a pleasure of seeing Ms. AGARWAL for initial consultation for Atrial Flutter. \par \par Ms. AGARWAL is a 76 year-year old female with history of CAD/CABG, HTN, Anxiety, atrial flutter is here for Atrial Flutter. Recent admit at [facility] for palpitations/dizziness- found to have AFL- treated with amiodarone and cardizem. \par + Palpitations at home- short lasting.\par \par 2/23: Feels better.\par 3/22: Feels ok. No episodes. Wants to stop all meds.\par 4/27: s/p AFL ablation. Feels great. Plan to go for cataract surgery in 2 weeks.\par 08/03/22: Feels fine. No recurrences.\par \par Denies chest pain, shortness of breath, palpitation, dizziness or LOC except noted above.\par \par EKG (08/03/22): SR\par EKG (03/22): SR\par EKG (2/23/22): SR@84, QRSd 86\par EKG (11/18/21): AFL @ 123, QRSd 86\par TTE (10/25/21): EF 55-60%, Mild LAE, Mod to severe MR, Mod TR\par MIKAEL (11/21): Nl EF, Mod central MR, Mod TR

## 2022-08-12 ENCOUNTER — APPOINTMENT (OUTPATIENT)
Dept: ORTHOPEDIC SURGERY | Facility: CLINIC | Age: 77
End: 2022-08-12

## 2022-08-12 VITALS — HEIGHT: 62 IN | WEIGHT: 150 LBS | BODY MASS INDEX: 27.6 KG/M2

## 2022-08-12 PROCEDURE — 99212 OFFICE O/P EST SF 10 MIN: CPT

## 2022-08-12 NOTE — DISCUSSION/SUMMARY
[de-identified] :   A prescription for meloxicam was sent to the pharmacy since her cardiologist approve this medication.  \par  A message for are authorization apartment was sent for the authorization for Synvisc-One injection to her right knee.\par   Patient was advised to give me a call next week to look at the status of her medication.\par \par Supervising physician Dr. Conley.\par

## 2022-08-12 NOTE — HISTORY OF PRESENT ILLNESS
[de-identified] : Patient states that she has been doing well with the cortisone injection but she still has pain.  \par Patient  spoke to her cardiologist and she was okay to have NSAIDs, so she would like to have meloxicam for her knee pain.  \par \par Patient was expecting a gel injection today,  no  viscosupplementation injection available for her.

## 2022-08-12 NOTE — IMAGING
[de-identified] :   On examination of the right knee, patient has some tenderness to palpation over the medial joint line.  Next time some synovial thickening.  \par Mild diffuse swelling.  \par Good motor strength to knee flexion extension.  \par Neurovascular intact.  \par No signs of instability.

## 2022-08-16 NOTE — ED ADULT NURSE NOTE - ED CARDIAC RATE
Reviewed discharge instructions with daughter and patient. Verbalized understanding. Waiting for transport.   Electronically signed by Seven Mcleod RN on 8/16/2022 at 3:04 PM tachycardic

## 2022-08-31 DIAGNOSIS — M54.16 RADICULOPATHY, LUMBAR REGION: ICD-10-CM

## 2022-08-31 DIAGNOSIS — M47.818 SPONDYLOSIS W/OUT MYELOPATHY OR RADICULOPATHY, SACRAL AND SACROCOCCYGEAL REGION: ICD-10-CM

## 2022-09-23 ENCOUNTER — APPOINTMENT (OUTPATIENT)
Dept: ORTHOPEDIC SURGERY | Facility: CLINIC | Age: 77
End: 2022-09-23

## 2022-09-23 PROCEDURE — 20610 DRAIN/INJ JOINT/BURSA W/O US: CPT | Mod: RT

## 2022-09-23 PROCEDURE — 99212 OFFICE O/P EST SF 10 MIN: CPT | Mod: 25

## 2022-09-23 NOTE — IMAGING
[de-identified] :   On examination of the right knee, patient has some tenderness to palpation over the medial joint line.  Next time some synovial thickening.  \par Mild diffuse swelling.  \par Good motor strength to knee flexion extension.  \par Neurovascular intact.  \par No signs of instability.\par \par   X-rays from previous visit, mild osteoarthritis to the right knee.

## 2022-09-23 NOTE — DISCUSSION/SUMMARY
[de-identified] :   Impression osteoarthritis of the right knee.  \par Plan follow-up in 1 week for injection 2.

## 2022-09-23 NOTE — PROCEDURE
[Large Joint Injection] : Large joint injection [Right] : of the right [Knee] : knee [Euflexxa] : Euflexxa [#1] : series #1 [] : Patient tolerated procedure well

## 2022-09-30 ENCOUNTER — APPOINTMENT (OUTPATIENT)
Dept: ORTHOPEDIC SURGERY | Facility: CLINIC | Age: 77
End: 2022-09-30

## 2022-09-30 PROCEDURE — 99212 OFFICE O/P EST SF 10 MIN: CPT | Mod: 25

## 2022-09-30 PROCEDURE — 20610 DRAIN/INJ JOINT/BURSA W/O US: CPT | Mod: LT

## 2022-10-04 NOTE — PROCEDURE
[Large Joint Injection] : Large joint injection [Right] : of the right [Knee] : knee [Euflexxa] : Euflexxa [#2] : series #2 [] : Patient tolerated procedure well

## 2022-10-04 NOTE — DISCUSSION/SUMMARY
[de-identified] :   Impression osteoarthritis of the right knee.  \par Plan follow-up in 1 week for injection 3.\par \par \par Supervising physician Dr. Conley.\par .

## 2022-10-04 NOTE — IMAGING
[de-identified] :   On examination of the right knee, patient has some tenderness to palpation over the medial joint line.  Next time some synovial thickening.  \par Mild diffuse swelling.  \par Good motor strength to knee flexion extension.  \par Neurovascular intact.  \par No signs of instability.\par \par   X-rays from previous visit, mild osteoarthritis to the right knee.

## 2022-10-07 ENCOUNTER — APPOINTMENT (OUTPATIENT)
Dept: ORTHOPEDIC SURGERY | Facility: CLINIC | Age: 77
End: 2022-10-07

## 2022-10-07 VITALS — HEIGHT: 62 IN | WEIGHT: 150 LBS | BODY MASS INDEX: 27.6 KG/M2

## 2022-10-07 PROCEDURE — 20610 DRAIN/INJ JOINT/BURSA W/O US: CPT | Mod: RT

## 2022-10-07 PROCEDURE — 99212 OFFICE O/P EST SF 10 MIN: CPT | Mod: 25

## 2022-10-07 NOTE — DISCUSSION/SUMMARY
[de-identified] :   Impression osteoarthritis of the right knee.  \par \par Plan follow-up in 6 months to a year.\par Rx for meloxicam was refilled \par \par Supervising physician Dr. Conley.\par .

## 2022-10-07 NOTE — PROCEDURE
[Large Joint Injection] : Large joint injection [Right] : of the right [Knee] : knee [Euflexxa] : Euflexxa [#3] : series #3 [] : Patient tolerated procedure well

## 2022-10-07 NOTE — IMAGING
[de-identified] :   On examination of the right knee, patient has some tenderness to palpation over the medial joint line.  Next time some synovial thickening.  \par Mild diffuse swelling.  \par Good motor strength to knee flexion extension.  \par Neurovascular intact.  \par No signs of instability.\par \par   X-rays from previous visit, mild osteoarthritis to the right knee.

## 2022-11-22 ENCOUNTER — APPOINTMENT (OUTPATIENT)
Dept: CARDIOLOGY | Facility: CLINIC | Age: 77
End: 2022-11-22

## 2022-11-22 VITALS
TEMPERATURE: 97.8 F | SYSTOLIC BLOOD PRESSURE: 180 MMHG | DIASTOLIC BLOOD PRESSURE: 100 MMHG | WEIGHT: 160 LBS | HEART RATE: 92 BPM | BODY MASS INDEX: 29.44 KG/M2 | HEIGHT: 62 IN

## 2022-11-22 DIAGNOSIS — F17.200 NICOTINE DEPENDENCE, UNSPECIFIED, UNCOMPLICATED: ICD-10-CM

## 2022-11-22 DIAGNOSIS — Z78.9 OTHER SPECIFIED HEALTH STATUS: ICD-10-CM

## 2022-11-22 PROCEDURE — 99214 OFFICE O/P EST MOD 30 MIN: CPT | Mod: 25

## 2022-11-22 PROCEDURE — 93000 ELECTROCARDIOGRAM COMPLETE: CPT

## 2022-11-22 RX ORDER — SIMVASTATIN 20 MG/1
20 TABLET, FILM COATED ORAL DAILY
Qty: 90 | Refills: 3 | Status: DISCONTINUED | COMMUNITY
Start: 2021-11-18 | End: 2022-11-22

## 2022-11-22 RX ORDER — ASPIRIN ENTERIC COATED TABLETS 81 MG 81 MG/1
81 TABLET, DELAYED RELEASE ORAL DAILY
Qty: 90 | Refills: 3 | Status: ACTIVE | COMMUNITY
Start: 2022-11-22 | End: 1900-01-01

## 2022-11-22 NOTE — ASSESSMENT
[FreeTextEntry1] : Ms. Lobato is a 77-year-old woman, active smoker, with history of CAD s/p CABG, moderate mitral regurgitation, AFL s/p ablation 4/2022 on AC, HTN, pre-diabetes, and anxiety presenting to establish care with a primary cardiologist.\par \par Impression:\par (1) CAD s/p CABG ~2009, asymptomatic prior to surgery, no anginal complaints today, suboptimal risk factor control\par (2) AFL s/p ablation 4/2022, in NSR, not on AC after EP discussion\par (3) Moderate mitral regurgitation seen in 2021\par (4) Mild carotid artery disease seen in 2013, bilateral bruits on exam today\par (5) Severe hypertension , asymptomatic\par (6) Pre-DM2\par (7) HLD, , goal <70\par \par Plan:\par - Should be on lifelong aspirin, will send an Rx\par - Double BP meds, olmesartan-HCTZ to 40-25mg daily\par - Increase crestor to 20mg (high intensity). Repeat lipids prior to next visit. If LDL remains >70 would start Ezetimibe.\par - Check TTE to evaluate MR post ablation\par - Check carotid duplex\par - Smoking cessation discussion every visit\par \par F/u 6 months, sooner as needed\par

## 2022-11-22 NOTE — HISTORY OF PRESENT ILLNESS
[FreeTextEntry1] : Ms. Lobato is a 77-year-old woman, active smoker, with history of CAD s/p CABG, moderate mitral regurgitation, AFL s/p ablation 4/2022 on AC, HTN, pre-diabetes, and anxiety presenting to establish care with a primary cardiologist.\par \par Patient currently follows with EP and was last seen by Dr. Rubi 8/2022.\par Today, she reports generally feeling well. She has limited exertional capacity due to chronic knee and back pain. She denies chest pain, dyspnea, palpitations, pre-syncope, syncope, LE swelling, PND, or orthopnea.\par \par MIKAEL 11/2021\par - DCCV, mod MR, mod TR\par \par Carotid Doppler 7/2013\par - Mild bilateral ICA stenoses\par \par LHC 2009\par - 3VD\par \par Labs:\par - Cr 1.3, BUN 31, K 5.2\par - , , HDL 35, \par - A1c 6.0, TSH 2.8

## 2022-11-22 NOTE — DISCUSSION/SUMMARY
[EKG obtained to assist in diagnosis and management of assessed problem(s)] : EKG obtained to assist in diagnosis and management of assessed problem(s)
Statement Selected

## 2022-11-22 NOTE — PHYSICAL EXAM
[Well Developed] : well developed [Well Nourished] : well nourished [No Acute Distress] : no acute distress [Normal Conjunctiva] : normal conjunctiva [Normal Venous Pressure] : normal venous pressure [Normal S1, S2] : normal S1, S2 [No Rub] : no rub [No Gallop] : no gallop [Clear Lung Fields] : clear lung fields [Good Air Entry] : good air entry [No Respiratory Distress] : no respiratory distress  [Soft] : abdomen soft [Non Tender] : non-tender [No Masses/organomegaly] : no masses/organomegaly [Normal Bowel Sounds] : normal bowel sounds [Normal Gait] : normal gait [No Edema] : no edema [No Cyanosis] : no cyanosis [No Clubbing] : no clubbing [No Varicosities] : no varicosities [No Rash] : no rash [No Skin Lesions] : no skin lesions [Moves all extremities] : moves all extremities [No Focal Deficits] : no focal deficits [Normal Speech] : normal speech [Alert and Oriented] : alert and oriented [Normal memory] : normal memory [de-identified] : bilateral carotid bruits [de-identified] : 4/6 systolic murmur at the USBs, 2/6 systolic murmur at the apex

## 2023-01-01 ENCOUNTER — APPOINTMENT (OUTPATIENT)
Dept: CARDIOLOGY | Facility: CLINIC | Age: 78
End: 2023-01-01
Payer: MEDICARE

## 2023-01-01 ENCOUNTER — APPOINTMENT (OUTPATIENT)
Dept: CARDIOLOGY | Facility: CLINIC | Age: 78
End: 2023-01-01

## 2023-01-01 ENCOUNTER — RX RENEWAL (OUTPATIENT)
Age: 78
End: 2023-01-01

## 2023-01-01 ENCOUNTER — OUTPATIENT (OUTPATIENT)
Dept: OUTPATIENT SERVICES | Facility: HOSPITAL | Age: 78
LOS: 1 days | End: 2023-01-01
Payer: MEDICARE

## 2023-01-01 ENCOUNTER — RESULT REVIEW (OUTPATIENT)
Age: 78
End: 2023-01-01

## 2023-01-01 VITALS
BODY MASS INDEX: 27.6 KG/M2 | HEART RATE: 95 BPM | WEIGHT: 150 LBS | DIASTOLIC BLOOD PRESSURE: 90 MMHG | SYSTOLIC BLOOD PRESSURE: 146 MMHG | HEIGHT: 62 IN

## 2023-01-01 VITALS
SYSTOLIC BLOOD PRESSURE: 150 MMHG | BODY MASS INDEX: 26.68 KG/M2 | DIASTOLIC BLOOD PRESSURE: 88 MMHG | HEIGHT: 62 IN | HEART RATE: 76 BPM | WEIGHT: 145 LBS

## 2023-01-01 DIAGNOSIS — I25.10 ATHEROSCLEROTIC HEART DISEASE OF NATIVE CORONARY ARTERY W/OUT ANGINA PECTORIS: ICD-10-CM

## 2023-01-01 DIAGNOSIS — Z95.1 PRESENCE OF AORTOCORONARY BYPASS GRAFT: Chronic | ICD-10-CM

## 2023-01-01 DIAGNOSIS — G45.8 OTHER TRANSIENT CEREBRAL ISCHEMIC ATTACKS AND RELATED SYNDROMES: ICD-10-CM

## 2023-01-01 DIAGNOSIS — I77.9 DISORDER OF ARTERIES AND ARTERIOLES, UNSPECIFIED: ICD-10-CM

## 2023-01-01 DIAGNOSIS — I48.92 UNSPECIFIED ATRIAL FLUTTER: ICD-10-CM

## 2023-01-01 DIAGNOSIS — E78.5 HYPERLIPIDEMIA, UNSPECIFIED: ICD-10-CM

## 2023-01-01 DIAGNOSIS — I10 ESSENTIAL (PRIMARY) HYPERTENSION: ICD-10-CM

## 2023-01-01 DIAGNOSIS — I49.3 VENTRICULAR PREMATURE DEPOLARIZATION: ICD-10-CM

## 2023-01-01 PROCEDURE — 99214 OFFICE O/P EST MOD 30 MIN: CPT | Mod: 25

## 2023-01-01 PROCEDURE — 71275 CT ANGIOGRAPHY CHEST: CPT

## 2023-01-01 PROCEDURE — 93000 ELECTROCARDIOGRAM COMPLETE: CPT

## 2023-01-01 PROCEDURE — 93000 ELECTROCARDIOGRAM COMPLETE: CPT | Mod: 59

## 2023-01-01 PROCEDURE — 93242 EXT ECG>48HR<7D RECORDING: CPT

## 2023-01-01 PROCEDURE — 71275 CT ANGIOGRAPHY CHEST: CPT | Mod: 26

## 2023-01-01 PROCEDURE — 93880 EXTRACRANIAL BILAT STUDY: CPT

## 2023-01-01 RX ORDER — AMLODIPINE BESYLATE 5 MG/1
5 TABLET ORAL DAILY
Qty: 90 | Refills: 3 | Status: ACTIVE | COMMUNITY
Start: 2023-01-01 | End: 1900-01-01

## 2023-01-01 RX ORDER — METOPROLOL SUCCINATE 25 MG/1
25 TABLET, EXTENDED RELEASE ORAL DAILY
Qty: 90 | Refills: 3 | Status: DISCONTINUED | COMMUNITY
Start: 2023-01-01 | End: 2023-01-01

## 2023-01-01 RX ORDER — ROSUVASTATIN CALCIUM 20 MG/1
20 TABLET, FILM COATED ORAL
Qty: 90 | Refills: 3 | Status: ACTIVE | COMMUNITY
Start: 2022-11-22 | End: 1900-01-01

## 2023-01-01 NOTE — ED PROVIDER NOTE - ATTESTATION, MLM
I have reviewed and confirmed nurses' notes for patient's medications, allergies, medical history, and surgical history. Parent

## 2023-02-28 RX ORDER — MELOXICAM 15 MG/1
15 TABLET ORAL
Qty: 30 | Refills: 3 | Status: ACTIVE | COMMUNITY
Start: 2022-08-12 | End: 1900-01-01

## 2023-03-09 ENCOUNTER — APPOINTMENT (OUTPATIENT)
Dept: ORTHOPEDIC SURGERY | Facility: CLINIC | Age: 78
End: 2023-03-09
Payer: MEDICARE

## 2023-03-09 DIAGNOSIS — M17.11 UNILATERAL PRIMARY OSTEOARTHRITIS, RIGHT KNEE: ICD-10-CM

## 2023-03-09 PROCEDURE — 73562 X-RAY EXAM OF KNEE 3: CPT | Mod: 50

## 2023-03-09 PROCEDURE — 99213 OFFICE O/P EST LOW 20 MIN: CPT

## 2023-03-09 NOTE — HISTORY OF PRESENT ILLNESS
[de-identified] : Patient here for evaluation right knee pain.\par Denies instability\par Pain with ambulation and stairs\par \par NAD\par Right knee\par No skin breakdown\par Tricompartmental TTP\par Positive patella grind\par PF crepitus\par Negative lachman\par Negative varus/valgus instability\par ROM 0-110\par Pain with forced extension and flexion\par NVI\par Compartments soft and NT\par \par Xray reviewed and significant for right knee arthritis, moderate\par \par Plan\par went over findings\par explained the arthritis\par tx options discussed\par spoke about injections and visco\par will send auth for right knee visco\par fu pending auth

## 2023-03-27 NOTE — ED ADULT NURSE NOTE - HOW OFTEN DO YOU HAVE SIX OR MORE DRINKS ON ONE OCCASION?
noted Less than Monthly Suturegard Intro: Intraoperative tissue expansion was performed, utilizing the SUTUREGARD device, in order to reduce wound tension.

## 2023-04-05 ENCOUNTER — APPOINTMENT (OUTPATIENT)
Dept: CARDIOLOGY | Facility: CLINIC | Age: 78
End: 2023-04-05
Payer: MEDICARE

## 2023-04-05 PROCEDURE — 93306 TTE W/DOPPLER COMPLETE: CPT

## 2023-04-05 PROCEDURE — 93880 EXTRACRANIAL BILAT STUDY: CPT

## 2023-04-12 ENCOUNTER — APPOINTMENT (OUTPATIENT)
Dept: CARDIOLOGY | Facility: CLINIC | Age: 78
End: 2023-04-12
Payer: MEDICARE

## 2023-04-12 VITALS
SYSTOLIC BLOOD PRESSURE: 150 MMHG | BODY MASS INDEX: 30.18 KG/M2 | WEIGHT: 164 LBS | HEIGHT: 62 IN | HEART RATE: 92 BPM | DIASTOLIC BLOOD PRESSURE: 88 MMHG | OXYGEN SATURATION: 94 %

## 2023-04-12 DIAGNOSIS — I34.0 NONRHEUMATIC MITRAL (VALVE) INSUFFICIENCY: ICD-10-CM

## 2023-04-12 DIAGNOSIS — G45.8 OTHER TRANSIENT CEREBRAL ISCHEMIC ATTACKS AND RELATED SYNDROMES: ICD-10-CM

## 2023-04-12 PROCEDURE — 99214 OFFICE O/P EST MOD 30 MIN: CPT | Mod: 25

## 2023-04-12 PROCEDURE — 93000 ELECTROCARDIOGRAM COMPLETE: CPT

## 2023-04-12 NOTE — REASON FOR VISIT
[Structural Heart and Valve Disease] : structural heart and valve disease [Coronary Artery Disease] : coronary artery disease [Symptom and Test Evaluation] : symptom and test evaluation

## 2023-04-12 NOTE — PHYSICAL EXAM
[Well Developed] : well developed [Well Nourished] : well nourished [No Acute Distress] : no acute distress [Normal Conjunctiva] : normal conjunctiva [Normal Venous Pressure] : normal venous pressure [Normal S1, S2] : normal S1, S2 [No Rub] : no rub [No Gallop] : no gallop [Clear Lung Fields] : clear lung fields [Good Air Entry] : good air entry [No Respiratory Distress] : no respiratory distress  [Soft] : abdomen soft [Non Tender] : non-tender [No Masses/organomegaly] : no masses/organomegaly [Normal Bowel Sounds] : normal bowel sounds [Normal Gait] : normal gait [No Edema] : no edema [No Cyanosis] : no cyanosis [No Clubbing] : no clubbing [No Varicosities] : no varicosities [No Rash] : no rash [No Skin Lesions] : no skin lesions [Moves all extremities] : moves all extremities [No Focal Deficits] : no focal deficits [Normal Speech] : normal speech [Alert and Oriented] : alert and oriented [Normal memory] : normal memory [de-identified] : bilateral carotid bruits [de-identified] : 4/6 systolic murmur at the USBs, 2/6 systolic murmur at the apex

## 2023-04-12 NOTE — ASSESSMENT
[FreeTextEntry1] : Ms. Lobato is a 77-year-old woman, active smoker, with history of CAD s/p CABG, moderate mitral regurgitation, AFL s/p ablation 4/2022 on AC, HTN, pre-diabetes, and anxiety presenting to establish care with a primary cardiologist.\par \par Impression:\par (1) CAD s/p CABG ~2009, asymptomatic prior to surgery, no anginal complaints today, suboptimal risk factor control\par (2) AFL s/p ablation 4/2022, in NSR, not on AC after EP discussion\par (3) Moderate mitral regurgitation seen in 2021, improved after ablation\par (4) Mild carotid artery disease seen in 2013, bilateral bruits on exam today\par (5) Severe hypertension , asymptomatic\par (6) Pre-DM2\par (7) HLD, , goal <70\par \par Plan:\par - CTA Chest to evaluate for subclavian stenosis\par - Referral to vascular cardiology - Dr. Mccarty\par - Should be on lifelong aspirin\par - Continue olmesartan-HCTZ to 40-25mg daily; if BP remains elevated on future visit can add on amlodipine 5mg or carvedilol 12.5mg BID in light of history of CAD.\par - Continue rosuvastatin 20mg; If LDL remains >70 would start Ezetimibe.\par - Smoking cessation\par \par F/u 6 months, sooner as needed\par

## 2023-04-20 ENCOUNTER — APPOINTMENT (OUTPATIENT)
Dept: ORTHOPEDIC SURGERY | Facility: CLINIC | Age: 78
End: 2023-04-20

## 2023-05-08 ENCOUNTER — APPOINTMENT (OUTPATIENT)
Dept: CARDIOLOGY | Facility: CLINIC | Age: 78
End: 2023-05-08
Payer: MEDICARE

## 2023-05-08 VITALS — SYSTOLIC BLOOD PRESSURE: 142 MMHG | DIASTOLIC BLOOD PRESSURE: 90 MMHG

## 2023-05-08 VITALS
OXYGEN SATURATION: 94 % | BODY MASS INDEX: 30.18 KG/M2 | HEIGHT: 62 IN | HEART RATE: 95 BPM | DIASTOLIC BLOOD PRESSURE: 86 MMHG | WEIGHT: 164 LBS | SYSTOLIC BLOOD PRESSURE: 146 MMHG

## 2023-05-08 PROCEDURE — 99214 OFFICE O/P EST MOD 30 MIN: CPT

## 2023-05-08 RX ORDER — GABAPENTIN 600 MG/1
600 TABLET, COATED ORAL TWICE DAILY
Qty: 60 | Refills: 2 | Status: DISCONTINUED | COMMUNITY
Start: 2022-08-01 | End: 2023-05-08

## 2023-05-08 NOTE — REVIEW OF SYSTEMS
[Feeling Fatigued] : not feeling fatigued [Cough] : no cough [Abdominal Pain] : no abdominal pain [Joint Pain] : joint pain [Rash] : no rash [Anxiety] : anxiety [Negative] : Cardiovascular

## 2023-05-08 NOTE — HISTORY OF PRESENT ILLNESS
[FreeTextEntry1] : 77F with active tobacco use, CAD s/p CABG 2009, moderate MR, AFlutter s/p ablation 2022, HTN, pre-DM here for abnormal carotid US study\par \par Referring: Dr. Brunson\par \par 5/8/23\par \par US duplex carotid b/l 4/2023:\par R prox ICA <50% stenosis, L prox ICA 50-69% stenosis, L vertebral artery retrograde flow suggestive of subclavian steal syndrome\par \par BP w/o discrepancy in RUE and LUE\par B/l knee and prox shin pain worse on the right, not at rest but with exertion\par No UE pain at rest or with movement\par No other pertinent ROS findings

## 2023-05-08 NOTE — PHYSICAL EXAM
[Well Developed] : well developed [Well Nourished] : well nourished [No Acute Distress] : no acute distress [Normal Conjunctiva] : normal conjunctiva [Normal S1, S2] : normal S1, S2 [No Murmur] : no murmur [No Rub] : no rub [No Gallop] : no gallop [Clear Lung Fields] : clear lung fields [Good Air Entry] : good air entry [No Respiratory Distress] : no respiratory distress  [Soft] : abdomen soft [Non Tender] : non-tender [No Masses/organomegaly] : no masses/organomegaly [Normal Bowel Sounds] : normal bowel sounds [Moves all extremities] : moves all extremities [No Focal Deficits] : no focal deficits [Normal Speech] : normal speech [No ulcers] : no ulcers [No edema] : no edema [No varicosities] : no varicosities [No chronic venous stasis changes] : no chronic venous stasis changes [No cyanosis] : no cyanosis [No rashes] : no rashes [No Carotid Bruit] : no carotid bruit [None] : Left: none [Cold] : Left Lower Extremity: cold [Present] : Left Lower Extremity: present

## 2023-05-08 NOTE — ASSESSMENT
[FreeTextEntry1] : Assessment:\par #Left vertebral artery retrograde flow\par - Also with abnormal subclavian artery waveform on US\par - Denies dizziness/syncope, chest pain, claudication \par #Asymptomatic L ICA 50-69% stenosis\par - L  cm/s, EDV 48 cm/s, ICA/CCA ration 2.73\par #R>L leg pain with exertion\par #CAD s/p CABG\par #Tobacco use\par \par Plan:\par - CTA chest aorta\par - BMP prior to CTA\par - LE arterial duplex w/ PETE and PVR given symptoms and risk factors for PAD\par - Surveillance imaging of carotids 10/2023\par - Smoking cessation \par - RTC after testing \par \par I, Dr. Mccarty, personally performed the evaluation and management (E/M) services for this new patient. That E/M includes conducting the clinically appropriate initial history &/or exam, assessing all conditions, and establishing the plan of care. Today, Dr. Yordy Rosado was here to observe &/or participate in the visit & follow plan of care established by me. \par \par \par \par \par

## 2023-09-06 NOTE — H&P PST ADULT - WILL THE PATIENT ACCEPT THE PFIZER COVID-19 VACCINE IF ELIGIBLE AND IT IS AVAILABLE?
Not applicable Orbicularis Oris Muscle Flap Text: The defect edges were debeveled with a #15 scalpel blade.  Given that the defect affected the competency of the oral sphincter an obicularis oris muscle flap was deemed most appropriate to restore this competency and normal muscle function.  Using a sterile surgical marker, an appropriate flap was drawn incorporating the defect. The area thus outlined was incised with a #15 scalpel blade.

## 2023-11-10 PROBLEM — I25.10 CORONARY ARTERY DISEASE: Status: ACTIVE | Noted: 2022-11-22

## 2023-11-10 PROBLEM — I10 HTN (HYPERTENSION), BENIGN: Status: ACTIVE | Noted: 2022-06-30

## 2023-11-10 PROBLEM — I49.3 FREQUENT PVCS: Status: ACTIVE | Noted: 2023-01-01

## 2023-11-10 PROBLEM — I48.92 ATRIAL FLUTTER: Status: ACTIVE | Noted: 2022-03-01

## 2023-11-10 PROBLEM — I77.9 CAROTID ARTERY DISEASE: Status: ACTIVE | Noted: 2022-11-22

## 2023-11-10 PROBLEM — E78.5 HYPERLIPIDEMIA: Status: ACTIVE | Noted: 2022-11-22

## 2023-11-16 NOTE — DISCHARGE NOTE PROVIDER - REASON FOR ADMISSION
Atrial Flutter Bcc Superficial Pigmented Histology Text: There were aggregates of basaloid cells with pigment budding from the epidermis.

## 2024-01-01 ENCOUNTER — TRANSCRIPTION ENCOUNTER (OUTPATIENT)
Age: 79
End: 2024-01-01

## 2024-01-01 ENCOUNTER — RESULT REVIEW (OUTPATIENT)
Age: 79
End: 2024-01-01

## 2024-01-01 ENCOUNTER — INPATIENT (INPATIENT)
Facility: HOSPITAL | Age: 79
LOS: 1 days | Discharge: HOME | DRG: 392 | End: 2024-04-28
Attending: INTERNAL MEDICINE | Admitting: STUDENT IN AN ORGANIZED HEALTH CARE EDUCATION/TRAINING PROGRAM
Payer: MEDICARE

## 2024-01-01 ENCOUNTER — INPATIENT (INPATIENT)
Facility: HOSPITAL | Age: 79
LOS: 15 days | Discharge: NURSING, REHAB OR LONG-TERM CARE | DRG: 963 | End: 2024-06-11
Attending: SURGERY | Admitting: SURGERY
Payer: MEDICARE

## 2024-01-01 ENCOUNTER — RX RENEWAL (OUTPATIENT)
Age: 79
End: 2024-01-01

## 2024-01-01 ENCOUNTER — EMERGENCY (EMERGENCY)
Facility: HOSPITAL | Age: 79
LOS: 0 days | Discharge: HOME | End: 2024-05-23
Attending: EMERGENCY MEDICINE
Payer: MEDICARE

## 2024-01-01 ENCOUNTER — APPOINTMENT (OUTPATIENT)
Dept: CARDIOLOGY | Facility: CLINIC | Age: 79
End: 2024-01-01

## 2024-01-01 ENCOUNTER — INPATIENT (INPATIENT)
Facility: HOSPITAL | Age: 79
LOS: 1 days | DRG: 871 | End: 2024-06-14
Attending: INTERNAL MEDICINE | Admitting: INTERNAL MEDICINE
Payer: MEDICARE

## 2024-01-01 VITALS
WEIGHT: 130.07 LBS | RESPIRATION RATE: 18 BRPM | OXYGEN SATURATION: 99 % | SYSTOLIC BLOOD PRESSURE: 118 MMHG | DIASTOLIC BLOOD PRESSURE: 84 MMHG | HEART RATE: 58 BPM | TEMPERATURE: 99 F

## 2024-01-01 VITALS
RESPIRATION RATE: 19 BRPM | OXYGEN SATURATION: 95 % | WEIGHT: 130.07 LBS | HEART RATE: 100 BPM | DIASTOLIC BLOOD PRESSURE: 75 MMHG | TEMPERATURE: 98 F | SYSTOLIC BLOOD PRESSURE: 138 MMHG

## 2024-01-01 VITALS
OXYGEN SATURATION: 96 % | SYSTOLIC BLOOD PRESSURE: 109 MMHG | RESPIRATION RATE: 18 BRPM | TEMPERATURE: 98 F | HEART RATE: 92 BPM | DIASTOLIC BLOOD PRESSURE: 79 MMHG

## 2024-01-01 VITALS — SYSTOLIC BLOOD PRESSURE: 162 MMHG | HEART RATE: 101 BPM | DIASTOLIC BLOOD PRESSURE: 71 MMHG

## 2024-01-01 VITALS
OXYGEN SATURATION: 98 % | SYSTOLIC BLOOD PRESSURE: 131 MMHG | DIASTOLIC BLOOD PRESSURE: 68 MMHG | TEMPERATURE: 97 F | HEART RATE: 84 BPM | RESPIRATION RATE: 18 BRPM

## 2024-01-01 VITALS
TEMPERATURE: 98 F | SYSTOLIC BLOOD PRESSURE: 133 MMHG | OXYGEN SATURATION: 99 % | RESPIRATION RATE: 18 BRPM | DIASTOLIC BLOOD PRESSURE: 68 MMHG | HEART RATE: 100 BPM

## 2024-01-01 VITALS — HEART RATE: 85 BPM | OXYGEN SATURATION: 100 % | RESPIRATION RATE: 14 BRPM | TEMPERATURE: 98 F

## 2024-01-01 VITALS — RESPIRATION RATE: 10 BRPM

## 2024-01-01 DIAGNOSIS — M54.50 LOW BACK PAIN, UNSPECIFIED: ICD-10-CM

## 2024-01-01 DIAGNOSIS — M79.89 OTHER SPECIFIED SOFT TISSUE DISORDERS: ICD-10-CM

## 2024-01-01 DIAGNOSIS — I12.9 HYPERTENSIVE CHRONIC KIDNEY DISEASE WITH STAGE 1 THROUGH STAGE 4 CHRONIC KIDNEY DISEASE, OR UNSPECIFIED CHRONIC KIDNEY DISEASE: ICD-10-CM

## 2024-01-01 DIAGNOSIS — S22.42XA MULTIPLE FRACTURES OF RIBS, LEFT SIDE, INITIAL ENCOUNTER FOR CLOSED FRACTURE: ICD-10-CM

## 2024-01-01 DIAGNOSIS — I10 ESSENTIAL (PRIMARY) HYPERTENSION: ICD-10-CM

## 2024-01-01 DIAGNOSIS — E86.0 DEHYDRATION: ICD-10-CM

## 2024-01-01 DIAGNOSIS — A41.9 SEPSIS, UNSPECIFIED ORGANISM: ICD-10-CM

## 2024-01-01 DIAGNOSIS — A09 INFECTIOUS GASTROENTERITIS AND COLITIS, UNSPECIFIED: ICD-10-CM

## 2024-01-01 DIAGNOSIS — I77.1 STRICTURE OF ARTERY: ICD-10-CM

## 2024-01-01 DIAGNOSIS — J18.9 PNEUMONIA, UNSPECIFIED ORGANISM: ICD-10-CM

## 2024-01-01 DIAGNOSIS — Z95.1 PRESENCE OF AORTOCORONARY BYPASS GRAFT: Chronic | ICD-10-CM

## 2024-01-01 DIAGNOSIS — I70.1 ATHEROSCLEROSIS OF RENAL ARTERY: ICD-10-CM

## 2024-01-01 DIAGNOSIS — Z95.1 PRESENCE OF AORTOCORONARY BYPASS GRAFT: ICD-10-CM

## 2024-01-01 DIAGNOSIS — M15.9 POLYOSTEOARTHRITIS, UNSPECIFIED: ICD-10-CM

## 2024-01-01 DIAGNOSIS — I34.0 NONRHEUMATIC MITRAL (VALVE) INSUFFICIENCY: ICD-10-CM

## 2024-01-01 DIAGNOSIS — K52.9 NONINFECTIVE GASTROENTERITIS AND COLITIS, UNSPECIFIED: ICD-10-CM

## 2024-01-01 DIAGNOSIS — N18.30 CHRONIC KIDNEY DISEASE, STAGE 3 UNSPECIFIED: ICD-10-CM

## 2024-01-01 DIAGNOSIS — I71.43 INFRARENAL ABDOMINAL AORTIC ANEURYSM, WITHOUT RUPTURE: ICD-10-CM

## 2024-01-01 DIAGNOSIS — R53.1 WEAKNESS: ICD-10-CM

## 2024-01-01 DIAGNOSIS — Z51.5 ENCOUNTER FOR PALLIATIVE CARE: ICD-10-CM

## 2024-01-01 DIAGNOSIS — I25.10 ATHEROSCLEROTIC HEART DISEASE OF NATIVE CORONARY ARTERY WITHOUT ANGINA PECTORIS: ICD-10-CM

## 2024-01-01 DIAGNOSIS — I65.22 OCCLUSION AND STENOSIS OF LEFT CAROTID ARTERY: ICD-10-CM

## 2024-01-01 DIAGNOSIS — E87.6 HYPOKALEMIA: ICD-10-CM

## 2024-01-01 DIAGNOSIS — J96.01 ACUTE RESPIRATORY FAILURE WITH HYPOXIA: ICD-10-CM

## 2024-01-01 DIAGNOSIS — F17.210 NICOTINE DEPENDENCE, CIGARETTES, UNCOMPLICATED: ICD-10-CM

## 2024-01-01 DIAGNOSIS — N17.9 ACUTE KIDNEY FAILURE, UNSPECIFIED: ICD-10-CM

## 2024-01-01 DIAGNOSIS — Z87.11 PERSONAL HISTORY OF PEPTIC ULCER DISEASE: ICD-10-CM

## 2024-01-01 DIAGNOSIS — M79.671 PAIN IN RIGHT FOOT: ICD-10-CM

## 2024-01-01 DIAGNOSIS — F41.9 ANXIETY DISORDER, UNSPECIFIED: ICD-10-CM

## 2024-01-01 DIAGNOSIS — R73.03 PREDIABETES: ICD-10-CM

## 2024-01-01 LAB
ALBUMIN SERPL ELPH-MCNC: 1.6 G/DL — LOW (ref 3.5–5.2)
ALBUMIN SERPL ELPH-MCNC: 1.8 G/DL — LOW (ref 3.5–5.2)
ALBUMIN SERPL ELPH-MCNC: 2.5 G/DL — LOW (ref 3.5–5.2)
ALBUMIN SERPL ELPH-MCNC: 2.7 G/DL — LOW (ref 3.5–5.2)
ALBUMIN SERPL ELPH-MCNC: 3.2 G/DL — LOW (ref 3.5–5.2)
ALBUMIN SERPL ELPH-MCNC: 3.3 G/DL — LOW (ref 3.5–5.2)
ALBUMIN SERPL ELPH-MCNC: 3.3 G/DL — LOW (ref 3.5–5.2)
ALBUMIN SERPL ELPH-MCNC: 3.7 G/DL — SIGNIFICANT CHANGE UP (ref 3.5–5.2)
ALBUMIN SERPL ELPH-MCNC: 3.8 G/DL — SIGNIFICANT CHANGE UP (ref 3.5–5.2)
ALBUMIN SERPL ELPH-MCNC: 4.2 G/DL — SIGNIFICANT CHANGE UP (ref 3.5–5.2)
ALBUMIN SERPL ELPH-MCNC: 4.4 G/DL — SIGNIFICANT CHANGE UP (ref 3.5–5.2)
ALP SERPL-CCNC: 114 U/L — SIGNIFICANT CHANGE UP (ref 30–115)
ALP SERPL-CCNC: 119 U/L — HIGH (ref 30–115)
ALP SERPL-CCNC: 129 U/L — HIGH (ref 30–115)
ALP SERPL-CCNC: 129 U/L — HIGH (ref 30–115)
ALP SERPL-CCNC: 130 U/L — HIGH (ref 30–115)
ALP SERPL-CCNC: 134 U/L — HIGH (ref 30–115)
ALP SERPL-CCNC: 232 U/L — HIGH (ref 30–115)
ALP SERPL-CCNC: 76 U/L — SIGNIFICANT CHANGE UP (ref 30–115)
ALP SERPL-CCNC: 87 U/L — SIGNIFICANT CHANGE UP (ref 30–115)
ALP SERPL-CCNC: 89 U/L — SIGNIFICANT CHANGE UP (ref 30–115)
ALP SERPL-CCNC: 93 U/L — SIGNIFICANT CHANGE UP (ref 30–115)
ALT FLD-CCNC: 10 U/L — SIGNIFICANT CHANGE UP (ref 0–41)
ALT FLD-CCNC: 14 U/L — SIGNIFICANT CHANGE UP (ref 0–41)
ALT FLD-CCNC: 14 U/L — SIGNIFICANT CHANGE UP (ref 0–41)
ALT FLD-CCNC: 16 U/L — SIGNIFICANT CHANGE UP (ref 0–41)
ALT FLD-CCNC: 16 U/L — SIGNIFICANT CHANGE UP (ref 0–41)
ALT FLD-CCNC: 2089 U/L — HIGH (ref 0–41)
ALT FLD-CCNC: 38 U/L — SIGNIFICANT CHANGE UP (ref 0–41)
ALT FLD-CCNC: 7 U/L — SIGNIFICANT CHANGE UP (ref 0–41)
ALT FLD-CCNC: 8 U/L — SIGNIFICANT CHANGE UP (ref 0–41)
ALT FLD-CCNC: 9 U/L — SIGNIFICANT CHANGE UP (ref 0–41)
ALT FLD-CCNC: >7000 U/L — HIGH (ref 0–41)
AMMONIA BLD-MCNC: 20 UMOL/L — SIGNIFICANT CHANGE UP (ref 11–55)
AMPHET UR-MCNC: NEGATIVE — SIGNIFICANT CHANGE UP
ANION GAP SERPL CALC-SCNC: 10 MMOL/L — SIGNIFICANT CHANGE UP (ref 7–14)
ANION GAP SERPL CALC-SCNC: 11 MMOL/L — SIGNIFICANT CHANGE UP (ref 7–14)
ANION GAP SERPL CALC-SCNC: 12 MMOL/L — SIGNIFICANT CHANGE UP (ref 7–14)
ANION GAP SERPL CALC-SCNC: 13 MMOL/L — SIGNIFICANT CHANGE UP (ref 7–14)
ANION GAP SERPL CALC-SCNC: 17 MMOL/L — HIGH (ref 7–14)
ANION GAP SERPL CALC-SCNC: 18 MMOL/L — HIGH (ref 7–14)
ANION GAP SERPL CALC-SCNC: 21 MMOL/L — HIGH (ref 7–14)
ANION GAP SERPL CALC-SCNC: 21 MMOL/L — HIGH (ref 7–14)
ANION GAP SERPL CALC-SCNC: 7 MMOL/L — SIGNIFICANT CHANGE UP (ref 7–14)
ANION GAP SERPL CALC-SCNC: 7 MMOL/L — SIGNIFICANT CHANGE UP (ref 7–14)
ANION GAP SERPL CALC-SCNC: 8 MMOL/L — SIGNIFICANT CHANGE UP (ref 7–14)
ANION GAP SERPL CALC-SCNC: 9 MMOL/L — SIGNIFICANT CHANGE UP (ref 7–14)
ANION GAP SERPL CALC-SCNC: 9 MMOL/L — SIGNIFICANT CHANGE UP (ref 7–14)
APPEARANCE UR: CLEAR — SIGNIFICANT CHANGE UP
APTT BLD: 24.9 SEC — LOW (ref 27–39.2)
APTT BLD: 26.9 SEC — LOW (ref 27–39.2)
APTT BLD: 26.9 SEC — LOW (ref 27–39.2)
APTT BLD: 35.5 SEC — SIGNIFICANT CHANGE UP (ref 27–39.2)
AST SERPL-CCNC: 18 U/L — SIGNIFICANT CHANGE UP (ref 0–41)
AST SERPL-CCNC: 18 U/L — SIGNIFICANT CHANGE UP (ref 0–41)
AST SERPL-CCNC: 19 U/L — SIGNIFICANT CHANGE UP (ref 0–41)
AST SERPL-CCNC: 2123 U/L — HIGH (ref 0–41)
AST SERPL-CCNC: 22 U/L — SIGNIFICANT CHANGE UP (ref 0–41)
AST SERPL-CCNC: 22 U/L — SIGNIFICANT CHANGE UP (ref 0–41)
AST SERPL-CCNC: 26 U/L — SIGNIFICANT CHANGE UP (ref 0–41)
AST SERPL-CCNC: 27 U/L — SIGNIFICANT CHANGE UP (ref 0–41)
AST SERPL-CCNC: 28 U/L — SIGNIFICANT CHANGE UP (ref 0–41)
AST SERPL-CCNC: 56 U/L — HIGH (ref 0–41)
AST SERPL-CCNC: >7000 U/L — HIGH (ref 0–41)
BACTERIA # UR AUTO: ABNORMAL /HPF
BACTERIA # UR AUTO: NEGATIVE /HPF — SIGNIFICANT CHANGE UP
BARBITURATES UR SCN-MCNC: NEGATIVE — SIGNIFICANT CHANGE UP
BASE EXCESS BLDV CALC-SCNC: -19.5 MMOL/L — LOW (ref -2–3)
BASOPHILS # BLD AUTO: 0.01 K/UL — SIGNIFICANT CHANGE UP (ref 0–0.2)
BASOPHILS # BLD AUTO: 0.01 K/UL — SIGNIFICANT CHANGE UP (ref 0–0.2)
BASOPHILS # BLD AUTO: 0.02 K/UL — SIGNIFICANT CHANGE UP (ref 0–0.2)
BASOPHILS # BLD AUTO: 0.03 K/UL — SIGNIFICANT CHANGE UP (ref 0–0.2)
BASOPHILS # BLD AUTO: 0.04 K/UL — SIGNIFICANT CHANGE UP (ref 0–0.2)
BASOPHILS # BLD AUTO: 0.04 K/UL — SIGNIFICANT CHANGE UP (ref 0–0.2)
BASOPHILS # BLD AUTO: 0.05 K/UL — SIGNIFICANT CHANGE UP (ref 0–0.2)
BASOPHILS NFR BLD AUTO: 0.1 % — SIGNIFICANT CHANGE UP (ref 0–1)
BASOPHILS NFR BLD AUTO: 0.2 % — SIGNIFICANT CHANGE UP (ref 0–1)
BASOPHILS NFR BLD AUTO: 0.3 % — SIGNIFICANT CHANGE UP (ref 0–1)
BENZODIAZ UR-MCNC: NEGATIVE — SIGNIFICANT CHANGE UP
BILIRUB DIRECT SERPL-MCNC: 0.2 MG/DL — SIGNIFICANT CHANGE UP (ref 0–0.3)
BILIRUB DIRECT SERPL-MCNC: 0.5 MG/DL — HIGH (ref 0–0.3)
BILIRUB DIRECT SERPL-MCNC: <0.2 MG/DL — SIGNIFICANT CHANGE UP (ref 0–0.3)
BILIRUB INDIRECT FLD-MCNC: 0.3 MG/DL — SIGNIFICANT CHANGE UP (ref 0.2–1.2)
BILIRUB INDIRECT FLD-MCNC: 0.4 MG/DL — SIGNIFICANT CHANGE UP (ref 0.2–1.2)
BILIRUB INDIRECT FLD-MCNC: >0.1 MG/DL — LOW (ref 0.2–1.2)
BILIRUB SERPL-MCNC: 0.3 MG/DL — SIGNIFICANT CHANGE UP (ref 0.2–1.2)
BILIRUB SERPL-MCNC: 0.4 MG/DL — SIGNIFICANT CHANGE UP (ref 0.2–1.2)
BILIRUB SERPL-MCNC: 0.5 MG/DL — SIGNIFICANT CHANGE UP (ref 0.2–1.2)
BILIRUB SERPL-MCNC: 0.6 MG/DL — SIGNIFICANT CHANGE UP (ref 0.2–1.2)
BILIRUB SERPL-MCNC: 0.7 MG/DL — SIGNIFICANT CHANGE UP (ref 0.2–1.2)
BILIRUB SERPL-MCNC: 0.8 MG/DL — SIGNIFICANT CHANGE UP (ref 0.2–1.2)
BILIRUB UR-MCNC: NEGATIVE — SIGNIFICANT CHANGE UP
BLD GP AB SCN SERPL QL: SIGNIFICANT CHANGE UP
BUN SERPL-MCNC: 10 MG/DL — SIGNIFICANT CHANGE UP (ref 10–20)
BUN SERPL-MCNC: 11 MG/DL — SIGNIFICANT CHANGE UP (ref 10–20)
BUN SERPL-MCNC: 14 MG/DL — SIGNIFICANT CHANGE UP (ref 10–20)
BUN SERPL-MCNC: 15 MG/DL — SIGNIFICANT CHANGE UP (ref 10–20)
BUN SERPL-MCNC: 15 MG/DL — SIGNIFICANT CHANGE UP (ref 10–20)
BUN SERPL-MCNC: 16 MG/DL — SIGNIFICANT CHANGE UP (ref 10–20)
BUN SERPL-MCNC: 17 MG/DL — SIGNIFICANT CHANGE UP (ref 10–20)
BUN SERPL-MCNC: 18 MG/DL — SIGNIFICANT CHANGE UP (ref 10–20)
BUN SERPL-MCNC: 18 MG/DL — SIGNIFICANT CHANGE UP (ref 10–20)
BUN SERPL-MCNC: 20 MG/DL — SIGNIFICANT CHANGE UP (ref 10–20)
BUN SERPL-MCNC: 22 MG/DL — HIGH (ref 10–20)
BUN SERPL-MCNC: 33 MG/DL — HIGH (ref 10–20)
BUN SERPL-MCNC: 36 MG/DL — HIGH (ref 10–20)
BUN SERPL-MCNC: 49 MG/DL — HIGH (ref 10–20)
BUN SERPL-MCNC: 8 MG/DL — LOW (ref 10–20)
BUN SERPL-MCNC: 8 MG/DL — LOW (ref 10–20)
BUN SERPL-MCNC: 9 MG/DL — LOW (ref 10–20)
CA-I SERPL-SCNC: 0.47 MMOL/L — CRITICAL LOW (ref 1.15–1.33)
CALCIUM SERPL-MCNC: 10.5 MG/DL — SIGNIFICANT CHANGE UP (ref 8.4–10.5)
CALCIUM SERPL-MCNC: 6.5 MG/DL — LOW (ref 8.4–10.5)
CALCIUM SERPL-MCNC: 7.6 MG/DL — LOW (ref 8.4–10.5)
CALCIUM SERPL-MCNC: 7.7 MG/DL — LOW (ref 8.4–10.5)
CALCIUM SERPL-MCNC: 8 MG/DL — LOW (ref 8.4–10.5)
CALCIUM SERPL-MCNC: 8.2 MG/DL — LOW (ref 8.4–10.4)
CALCIUM SERPL-MCNC: 8.3 MG/DL — LOW (ref 8.4–10.4)
CALCIUM SERPL-MCNC: 8.3 MG/DL — LOW (ref 8.4–10.4)
CALCIUM SERPL-MCNC: 8.3 MG/DL — LOW (ref 8.4–10.5)
CALCIUM SERPL-MCNC: 8.4 MG/DL — SIGNIFICANT CHANGE UP (ref 8.4–10.5)
CALCIUM SERPL-MCNC: 8.6 MG/DL — SIGNIFICANT CHANGE UP (ref 8.4–10.4)
CALCIUM SERPL-MCNC: 8.6 MG/DL — SIGNIFICANT CHANGE UP (ref 8.4–10.5)
CALCIUM SERPL-MCNC: 8.7 MG/DL — SIGNIFICANT CHANGE UP (ref 8.4–10.5)
CALCIUM SERPL-MCNC: 8.8 MG/DL — SIGNIFICANT CHANGE UP (ref 8.4–10.5)
CALCIUM SERPL-MCNC: 8.9 MG/DL — SIGNIFICANT CHANGE UP (ref 8.4–10.5)
CALCIUM SERPL-MCNC: 9.3 MG/DL — SIGNIFICANT CHANGE UP (ref 8.4–10.5)
CALCIUM SERPL-MCNC: 9.8 MG/DL — SIGNIFICANT CHANGE UP (ref 8.4–10.5)
CAST: 0 /LPF — SIGNIFICANT CHANGE UP (ref 0–4)
CAST: 2 /LPF — SIGNIFICANT CHANGE UP (ref 0–4)
CHLORIDE SERPL-SCNC: 100 MMOL/L — SIGNIFICANT CHANGE UP (ref 98–110)
CHLORIDE SERPL-SCNC: 101 MMOL/L — SIGNIFICANT CHANGE UP (ref 98–110)
CHLORIDE SERPL-SCNC: 101 MMOL/L — SIGNIFICANT CHANGE UP (ref 98–110)
CHLORIDE SERPL-SCNC: 102 MMOL/L — SIGNIFICANT CHANGE UP (ref 98–110)
CHLORIDE SERPL-SCNC: 102 MMOL/L — SIGNIFICANT CHANGE UP (ref 98–110)
CHLORIDE SERPL-SCNC: 103 MMOL/L — SIGNIFICANT CHANGE UP (ref 98–110)
CHLORIDE SERPL-SCNC: 87 MMOL/L — LOW (ref 98–110)
CHLORIDE SERPL-SCNC: 90 MMOL/L — LOW (ref 98–110)
CHLORIDE SERPL-SCNC: 91 MMOL/L — LOW (ref 98–110)
CHLORIDE SERPL-SCNC: 92 MMOL/L — LOW (ref 98–110)
CHLORIDE SERPL-SCNC: 95 MMOL/L — LOW (ref 98–110)
CHLORIDE SERPL-SCNC: 96 MMOL/L — LOW (ref 98–110)
CHLORIDE SERPL-SCNC: 97 MMOL/L — LOW (ref 98–110)
CHLORIDE SERPL-SCNC: 97 MMOL/L — LOW (ref 98–110)
CHLORIDE SERPL-SCNC: 98 MMOL/L — SIGNIFICANT CHANGE UP (ref 98–110)
CO2 SERPL-SCNC: 11 MMOL/L — LOW (ref 17–32)
CO2 SERPL-SCNC: 15 MMOL/L — LOW (ref 17–32)
CO2 SERPL-SCNC: 15 MMOL/L — LOW (ref 17–32)
CO2 SERPL-SCNC: 19 MMOL/L — SIGNIFICANT CHANGE UP (ref 17–32)
CO2 SERPL-SCNC: 20 MMOL/L — SIGNIFICANT CHANGE UP (ref 17–32)
CO2 SERPL-SCNC: 21 MMOL/L — SIGNIFICANT CHANGE UP (ref 17–32)
CO2 SERPL-SCNC: 21 MMOL/L — SIGNIFICANT CHANGE UP (ref 17–32)
CO2 SERPL-SCNC: 22 MMOL/L — SIGNIFICANT CHANGE UP (ref 17–32)
CO2 SERPL-SCNC: 23 MMOL/L — SIGNIFICANT CHANGE UP (ref 17–32)
CO2 SERPL-SCNC: 24 MMOL/L — SIGNIFICANT CHANGE UP (ref 17–32)
CO2 SERPL-SCNC: 24 MMOL/L — SIGNIFICANT CHANGE UP (ref 17–32)
CO2 SERPL-SCNC: 25 MMOL/L — SIGNIFICANT CHANGE UP (ref 17–32)
CO2 SERPL-SCNC: 26 MMOL/L — SIGNIFICANT CHANGE UP (ref 17–32)
CO2 SERPL-SCNC: 27 MMOL/L — SIGNIFICANT CHANGE UP (ref 17–32)
CO2 SERPL-SCNC: 28 MMOL/L — SIGNIFICANT CHANGE UP (ref 17–32)
COARSE GRAN CASTS #/AREA URNS AUTO: PRESENT
COCAINE METAB.OTHER UR-MCNC: NEGATIVE — SIGNIFICANT CHANGE UP
COLOR SPEC: YELLOW — SIGNIFICANT CHANGE UP
CREAT ?TM UR-MCNC: 30 MG/DL — SIGNIFICANT CHANGE UP
CREAT ?TM UR-MCNC: 44 MG/DL — SIGNIFICANT CHANGE UP
CREAT ?TM UR-MCNC: 79 MG/DL — SIGNIFICANT CHANGE UP
CREAT SERPL-MCNC: 0.6 MG/DL — LOW (ref 0.7–1.5)
CREAT SERPL-MCNC: 0.7 MG/DL — SIGNIFICANT CHANGE UP (ref 0.7–1.5)
CREAT SERPL-MCNC: 0.8 MG/DL — SIGNIFICANT CHANGE UP (ref 0.7–1.5)
CREAT SERPL-MCNC: 0.9 MG/DL — SIGNIFICANT CHANGE UP (ref 0.7–1.5)
CREAT SERPL-MCNC: 1 MG/DL — SIGNIFICANT CHANGE UP (ref 0.7–1.5)
CREAT SERPL-MCNC: 1 MG/DL — SIGNIFICANT CHANGE UP (ref 0.7–1.5)
CREAT SERPL-MCNC: 1.1 MG/DL — SIGNIFICANT CHANGE UP (ref 0.7–1.5)
CREAT SERPL-MCNC: 1.2 MG/DL — SIGNIFICANT CHANGE UP (ref 0.7–1.5)
CREAT SERPL-MCNC: 1.2 MG/DL — SIGNIFICANT CHANGE UP (ref 0.7–1.5)
CREAT SERPL-MCNC: 1.3 MG/DL — SIGNIFICANT CHANGE UP (ref 0.7–1.5)
CREAT SERPL-MCNC: 1.5 MG/DL — SIGNIFICANT CHANGE UP (ref 0.7–1.5)
CREAT SERPL-MCNC: 2.2 MG/DL — HIGH (ref 0.7–1.5)
CULTURE RESULTS: SIGNIFICANT CHANGE UP
DIFF PNL FLD: ABNORMAL
DIFF PNL FLD: ABNORMAL
DIFF PNL FLD: NEGATIVE — SIGNIFICANT CHANGE UP
DIFF PNL FLD: NEGATIVE — SIGNIFICANT CHANGE UP
EGFR: 23 ML/MIN/1.73M2 — LOW
EGFR: 36 ML/MIN/1.73M2 — LOW
EGFR: 43 ML/MIN/1.73M2 — LOW
EGFR: 47 ML/MIN/1.73M2 — LOW
EGFR: 47 ML/MIN/1.73M2 — LOW
EGFR: 52 ML/MIN/1.73M2 — LOW
EGFR: 58 ML/MIN/1.73M2 — LOW
EGFR: 58 ML/MIN/1.73M2 — LOW
EGFR: 66 ML/MIN/1.73M2 — SIGNIFICANT CHANGE UP
EGFR: 76 ML/MIN/1.73M2 — SIGNIFICANT CHANGE UP
EGFR: 90 ML/MIN/1.73M2 — SIGNIFICANT CHANGE UP
EGFR: 93 ML/MIN/1.73M2 — SIGNIFICANT CHANGE UP
EOSINOPHIL # BLD AUTO: 0.01 K/UL — SIGNIFICANT CHANGE UP (ref 0–0.7)
EOSINOPHIL # BLD AUTO: 0.04 K/UL — SIGNIFICANT CHANGE UP (ref 0–0.7)
EOSINOPHIL # BLD AUTO: 0.04 K/UL — SIGNIFICANT CHANGE UP (ref 0–0.7)
EOSINOPHIL # BLD AUTO: 0.06 K/UL — SIGNIFICANT CHANGE UP (ref 0–0.7)
EOSINOPHIL # BLD AUTO: 0.07 K/UL — SIGNIFICANT CHANGE UP (ref 0–0.7)
EOSINOPHIL # BLD AUTO: 0.08 K/UL — SIGNIFICANT CHANGE UP (ref 0–0.7)
EOSINOPHIL # BLD AUTO: 0.09 K/UL — SIGNIFICANT CHANGE UP (ref 0–0.7)
EOSINOPHIL # BLD AUTO: 0.1 K/UL — SIGNIFICANT CHANGE UP (ref 0–0.7)
EOSINOPHIL # BLD AUTO: 0.11 K/UL — SIGNIFICANT CHANGE UP (ref 0–0.7)
EOSINOPHIL # BLD AUTO: 0.13 K/UL — SIGNIFICANT CHANGE UP (ref 0–0.7)
EOSINOPHIL # BLD AUTO: 0.13 K/UL — SIGNIFICANT CHANGE UP (ref 0–0.7)
EOSINOPHIL NFR BLD AUTO: 0 % — SIGNIFICANT CHANGE UP (ref 0–8)
EOSINOPHIL NFR BLD AUTO: 0.2 % — SIGNIFICANT CHANGE UP (ref 0–8)
EOSINOPHIL NFR BLD AUTO: 0.3 % — SIGNIFICANT CHANGE UP (ref 0–8)
EOSINOPHIL NFR BLD AUTO: 0.5 % — SIGNIFICANT CHANGE UP (ref 0–8)
EOSINOPHIL NFR BLD AUTO: 0.5 % — SIGNIFICANT CHANGE UP (ref 0–8)
EOSINOPHIL NFR BLD AUTO: 0.6 % — SIGNIFICANT CHANGE UP (ref 0–8)
EOSINOPHIL NFR BLD AUTO: 0.6 % — SIGNIFICANT CHANGE UP (ref 0–8)
EOSINOPHIL NFR BLD AUTO: 0.8 % — SIGNIFICANT CHANGE UP (ref 0–8)
EOSINOPHIL NFR BLD AUTO: 1.1 % — SIGNIFICANT CHANGE UP (ref 0–8)
EOSINOPHIL NFR BLD AUTO: 1.2 % — SIGNIFICANT CHANGE UP (ref 0–8)
EOSINOPHIL NFR BLD AUTO: 1.3 % — SIGNIFICANT CHANGE UP (ref 0–8)
FENTANYL UR QL: POSITIVE
GAS PNL BLDA: SIGNIFICANT CHANGE UP
GAS PNL BLDV: 143 MMOL/L — SIGNIFICANT CHANGE UP (ref 136–145)
GAS PNL BLDV: SIGNIFICANT CHANGE UP
GAS PNL BLDV: SIGNIFICANT CHANGE UP
GLUCOSE BLDC GLUCOMTR-MCNC: 101 MG/DL — HIGH (ref 70–99)
GLUCOSE BLDC GLUCOMTR-MCNC: 110 MG/DL — HIGH (ref 70–99)
GLUCOSE BLDC GLUCOMTR-MCNC: 111 MG/DL — HIGH (ref 70–99)
GLUCOSE BLDC GLUCOMTR-MCNC: 114 MG/DL — HIGH (ref 70–99)
GLUCOSE BLDC GLUCOMTR-MCNC: 115 MG/DL — HIGH (ref 70–99)
GLUCOSE BLDC GLUCOMTR-MCNC: 115 MG/DL — HIGH (ref 70–99)
GLUCOSE BLDC GLUCOMTR-MCNC: 116 MG/DL — HIGH (ref 70–99)
GLUCOSE BLDC GLUCOMTR-MCNC: 117 MG/DL — HIGH (ref 70–99)
GLUCOSE BLDC GLUCOMTR-MCNC: 120 MG/DL — HIGH (ref 70–99)
GLUCOSE BLDC GLUCOMTR-MCNC: 130 MG/DL — HIGH (ref 70–99)
GLUCOSE BLDC GLUCOMTR-MCNC: 151 MG/DL — HIGH (ref 70–99)
GLUCOSE BLDC GLUCOMTR-MCNC: 73 MG/DL — SIGNIFICANT CHANGE UP (ref 70–99)
GLUCOSE BLDC GLUCOMTR-MCNC: 83 MG/DL — SIGNIFICANT CHANGE UP (ref 70–99)
GLUCOSE SERPL-MCNC: 100 MG/DL — HIGH (ref 70–99)
GLUCOSE SERPL-MCNC: 101 MG/DL — HIGH (ref 70–99)
GLUCOSE SERPL-MCNC: 101 MG/DL — HIGH (ref 70–99)
GLUCOSE SERPL-MCNC: 102 MG/DL — HIGH (ref 70–99)
GLUCOSE SERPL-MCNC: 102 MG/DL — HIGH (ref 70–99)
GLUCOSE SERPL-MCNC: 103 MG/DL — HIGH (ref 70–99)
GLUCOSE SERPL-MCNC: 105 MG/DL — HIGH (ref 70–99)
GLUCOSE SERPL-MCNC: 106 MG/DL — HIGH (ref 70–99)
GLUCOSE SERPL-MCNC: 108 MG/DL — HIGH (ref 70–99)
GLUCOSE SERPL-MCNC: 110 MG/DL — HIGH (ref 70–99)
GLUCOSE SERPL-MCNC: 112 MG/DL — HIGH (ref 70–99)
GLUCOSE SERPL-MCNC: 114 MG/DL — HIGH (ref 70–99)
GLUCOSE SERPL-MCNC: 115 MG/DL — HIGH (ref 70–99)
GLUCOSE SERPL-MCNC: 117 MG/DL — HIGH (ref 70–99)
GLUCOSE SERPL-MCNC: 118 MG/DL — HIGH (ref 70–99)
GLUCOSE SERPL-MCNC: 161 MG/DL — HIGH (ref 70–99)
GLUCOSE SERPL-MCNC: 184 MG/DL — HIGH (ref 70–99)
GLUCOSE SERPL-MCNC: 67 MG/DL — LOW (ref 70–99)
GLUCOSE SERPL-MCNC: 72 MG/DL — SIGNIFICANT CHANGE UP (ref 70–99)
GLUCOSE SERPL-MCNC: 74 MG/DL — SIGNIFICANT CHANGE UP (ref 70–99)
GLUCOSE SERPL-MCNC: 86 MG/DL — SIGNIFICANT CHANGE UP (ref 70–99)
GLUCOSE SERPL-MCNC: 88 MG/DL — SIGNIFICANT CHANGE UP (ref 70–99)
GLUCOSE SERPL-MCNC: 89 MG/DL — SIGNIFICANT CHANGE UP (ref 70–99)
GLUCOSE SERPL-MCNC: 91 MG/DL — SIGNIFICANT CHANGE UP (ref 70–99)
GLUCOSE SERPL-MCNC: 94 MG/DL — SIGNIFICANT CHANGE UP (ref 70–99)
GLUCOSE SERPL-MCNC: 95 MG/DL — SIGNIFICANT CHANGE UP (ref 70–99)
GLUCOSE SERPL-MCNC: 95 MG/DL — SIGNIFICANT CHANGE UP (ref 70–99)
GLUCOSE SERPL-MCNC: 97 MG/DL — SIGNIFICANT CHANGE UP (ref 70–99)
GLUCOSE UR QL: NEGATIVE MG/DL — SIGNIFICANT CHANGE UP
HCO3 BLDV-SCNC: 8 MMOL/L — CRITICAL LOW (ref 22–29)
HCT VFR BLD CALC: 24.7 % — LOW (ref 37–47)
HCT VFR BLD CALC: 25.5 % — LOW (ref 37–47)
HCT VFR BLD CALC: 26.3 % — LOW (ref 37–47)
HCT VFR BLD CALC: 26.8 % — LOW (ref 37–47)
HCT VFR BLD CALC: 27.6 % — LOW (ref 37–47)
HCT VFR BLD CALC: 27.6 % — LOW (ref 37–47)
HCT VFR BLD CALC: 28.1 % — LOW (ref 37–47)
HCT VFR BLD CALC: 28.3 % — LOW (ref 37–47)
HCT VFR BLD CALC: 28.3 % — LOW (ref 37–47)
HCT VFR BLD CALC: 28.7 % — LOW (ref 37–47)
HCT VFR BLD CALC: 29 % — LOW (ref 37–47)
HCT VFR BLD CALC: 30 % — LOW (ref 37–47)
HCT VFR BLD CALC: 30.2 % — LOW (ref 37–47)
HCT VFR BLD CALC: 30.9 % — LOW (ref 37–47)
HCT VFR BLD CALC: 32.3 % — LOW (ref 37–47)
HCT VFR BLD CALC: 33.4 % — LOW (ref 37–47)
HCT VFR BLD CALC: 37.5 % — SIGNIFICANT CHANGE UP (ref 37–47)
HCT VFR BLD CALC: 42.8 % — SIGNIFICANT CHANGE UP (ref 37–47)
HCT VFR BLD CALC: 45.9 % — SIGNIFICANT CHANGE UP (ref 37–47)
HCT VFR BLDA CALC: 11 % — CRITICAL LOW (ref 34.5–46.5)
HGB BLD CALC-MCNC: <4 G/DL — CRITICAL LOW (ref 11.7–16.1)
HGB BLD-MCNC: 10.2 G/DL — LOW (ref 12–16)
HGB BLD-MCNC: 10.2 G/DL — LOW (ref 12–16)
HGB BLD-MCNC: 10.3 G/DL — LOW (ref 12–16)
HGB BLD-MCNC: 11.4 G/DL — LOW (ref 12–16)
HGB BLD-MCNC: 11.6 G/DL — LOW (ref 12–16)
HGB BLD-MCNC: 12.8 G/DL — SIGNIFICANT CHANGE UP (ref 12–16)
HGB BLD-MCNC: 14.4 G/DL — SIGNIFICANT CHANGE UP (ref 12–16)
HGB BLD-MCNC: 14.8 G/DL — SIGNIFICANT CHANGE UP (ref 12–16)
HGB BLD-MCNC: 8.1 G/DL — LOW (ref 12–16)
HGB BLD-MCNC: 8.8 G/DL — LOW (ref 12–16)
HGB BLD-MCNC: 8.9 G/DL — LOW (ref 12–16)
HGB BLD-MCNC: 9 G/DL — LOW (ref 12–16)
HGB BLD-MCNC: 9.1 G/DL — LOW (ref 12–16)
HGB BLD-MCNC: 9.1 G/DL — LOW (ref 12–16)
HGB BLD-MCNC: 9.7 G/DL — LOW (ref 12–16)
HGB BLD-MCNC: 9.8 G/DL — LOW (ref 12–16)
IMM GRANULOCYTES NFR BLD AUTO: 0.4 % — HIGH (ref 0.1–0.3)
IMM GRANULOCYTES NFR BLD AUTO: 0.5 % — HIGH (ref 0.1–0.3)
IMM GRANULOCYTES NFR BLD AUTO: 0.6 % — HIGH (ref 0.1–0.3)
IMM GRANULOCYTES NFR BLD AUTO: 0.7 % — HIGH (ref 0.1–0.3)
IMM GRANULOCYTES NFR BLD AUTO: 0.7 % — HIGH (ref 0.1–0.3)
IMM GRANULOCYTES NFR BLD AUTO: 0.8 % — HIGH (ref 0.1–0.3)
IMM GRANULOCYTES NFR BLD AUTO: 0.8 % — HIGH (ref 0.1–0.3)
IMM GRANULOCYTES NFR BLD AUTO: 0.9 % — HIGH (ref 0.1–0.3)
IMM GRANULOCYTES NFR BLD AUTO: 1 % — HIGH (ref 0.1–0.3)
INR BLD: 0.94 RATIO — SIGNIFICANT CHANGE UP (ref 0.65–1.3)
INR BLD: 0.97 RATIO — SIGNIFICANT CHANGE UP (ref 0.65–1.3)
INR BLD: 1.01 RATIO — SIGNIFICANT CHANGE UP (ref 0.65–1.3)
INR BLD: 1.11 RATIO — SIGNIFICANT CHANGE UP (ref 0.65–1.3)
KETONES UR-MCNC: NEGATIVE MG/DL — SIGNIFICANT CHANGE UP
LACTATE SERPL-SCNC: 2 MMOL/L — SIGNIFICANT CHANGE UP (ref 0.7–2)
LACTATE SERPL-SCNC: 5.3 MMOL/L — CRITICAL HIGH (ref 0.7–2)
LACTATE SERPL-SCNC: 9.3 MMOL/L — CRITICAL HIGH (ref 0.7–2)
LACTATE SERPL-SCNC: SIGNIFICANT CHANGE UP MMOL/L (ref 0.7–2)
LEGIONELLA AG UR QL: NEGATIVE — SIGNIFICANT CHANGE UP
LEUKOCYTE ESTERASE UR-ACNC: ABNORMAL
LEUKOCYTE ESTERASE UR-ACNC: ABNORMAL
LEUKOCYTE ESTERASE UR-ACNC: NEGATIVE — SIGNIFICANT CHANGE UP
LEUKOCYTE ESTERASE UR-ACNC: NEGATIVE — SIGNIFICANT CHANGE UP
LIDOCAIN IGE QN: 23 U/L — SIGNIFICANT CHANGE UP (ref 7–60)
LYMPHOCYTES # BLD AUTO: 0.68 K/UL — LOW (ref 1.2–3.4)
LYMPHOCYTES # BLD AUTO: 1.04 K/UL — LOW (ref 1.2–3.4)
LYMPHOCYTES # BLD AUTO: 1.05 K/UL — LOW (ref 1.2–3.4)
LYMPHOCYTES # BLD AUTO: 1.09 K/UL — LOW (ref 1.2–3.4)
LYMPHOCYTES # BLD AUTO: 1.2 K/UL — SIGNIFICANT CHANGE UP (ref 1.2–3.4)
LYMPHOCYTES # BLD AUTO: 1.38 K/UL — SIGNIFICANT CHANGE UP (ref 1.2–3.4)
LYMPHOCYTES # BLD AUTO: 1.41 K/UL — SIGNIFICANT CHANGE UP (ref 1.2–3.4)
LYMPHOCYTES # BLD AUTO: 1.45 K/UL — SIGNIFICANT CHANGE UP (ref 1.2–3.4)
LYMPHOCYTES # BLD AUTO: 1.84 K/UL — SIGNIFICANT CHANGE UP (ref 1.2–3.4)
LYMPHOCYTES # BLD AUTO: 10.8 % — LOW (ref 20.5–51.1)
LYMPHOCYTES # BLD AUTO: 10.9 % — LOW (ref 20.5–51.1)
LYMPHOCYTES # BLD AUTO: 11.1 % — LOW (ref 20.5–51.1)
LYMPHOCYTES # BLD AUTO: 14.6 % — LOW (ref 20.5–51.1)
LYMPHOCYTES # BLD AUTO: 14.7 % — LOW (ref 20.5–51.1)
LYMPHOCYTES # BLD AUTO: 18.3 % — LOW (ref 20.5–51.1)
LYMPHOCYTES # BLD AUTO: 2.7 K/UL — SIGNIFICANT CHANGE UP (ref 1.2–3.4)
LYMPHOCYTES # BLD AUTO: 22.6 % — SIGNIFICANT CHANGE UP (ref 20.5–51.1)
LYMPHOCYTES # BLD AUTO: 3.75 K/UL — HIGH (ref 1.2–3.4)
LYMPHOCYTES # BLD AUTO: 4.8 % — LOW (ref 20.5–51.1)
LYMPHOCYTES # BLD AUTO: 5 % — LOW (ref 20.5–51.1)
LYMPHOCYTES # BLD AUTO: 7.6 % — LOW (ref 20.5–51.1)
LYMPHOCYTES # BLD AUTO: 9.9 % — LOW (ref 20.5–51.1)
MAGNESIUM SERPL-MCNC: 1.7 MG/DL — LOW (ref 1.8–2.4)
MAGNESIUM SERPL-MCNC: 1.8 MG/DL — SIGNIFICANT CHANGE UP (ref 1.8–2.4)
MAGNESIUM SERPL-MCNC: 1.9 MG/DL — SIGNIFICANT CHANGE UP (ref 1.8–2.4)
MAGNESIUM SERPL-MCNC: 2 MG/DL — SIGNIFICANT CHANGE UP (ref 1.8–2.4)
MAGNESIUM SERPL-MCNC: 2 MG/DL — SIGNIFICANT CHANGE UP (ref 1.8–2.4)
MAGNESIUM SERPL-MCNC: 2.1 MG/DL — SIGNIFICANT CHANGE UP (ref 1.8–2.4)
MAGNESIUM SERPL-MCNC: 2.5 MG/DL — HIGH (ref 1.8–2.4)
MAGNESIUM SERPL-MCNC: 2.6 MG/DL — HIGH (ref 1.8–2.4)
MCHC RBC-ENTMCNC: 31.3 G/DL — LOW (ref 32–37)
MCHC RBC-ENTMCNC: 31.3 PG — HIGH (ref 27–31)
MCHC RBC-ENTMCNC: 31.4 PG — HIGH (ref 27–31)
MCHC RBC-ENTMCNC: 31.5 PG — HIGH (ref 27–31)
MCHC RBC-ENTMCNC: 31.8 G/DL — LOW (ref 32–37)
MCHC RBC-ENTMCNC: 31.8 PG — HIGH (ref 27–31)
MCHC RBC-ENTMCNC: 31.8 PG — HIGH (ref 27–31)
MCHC RBC-ENTMCNC: 31.9 PG — HIGH (ref 27–31)
MCHC RBC-ENTMCNC: 31.9 PG — HIGH (ref 27–31)
MCHC RBC-ENTMCNC: 32 PG — HIGH (ref 27–31)
MCHC RBC-ENTMCNC: 32.1 PG — HIGH (ref 27–31)
MCHC RBC-ENTMCNC: 32.2 G/DL — SIGNIFICANT CHANGE UP (ref 32–37)
MCHC RBC-ENTMCNC: 32.2 PG — HIGH (ref 27–31)
MCHC RBC-ENTMCNC: 32.2 PG — HIGH (ref 27–31)
MCHC RBC-ENTMCNC: 32.4 PG — HIGH (ref 27–31)
MCHC RBC-ENTMCNC: 32.5 PG — HIGH (ref 27–31)
MCHC RBC-ENTMCNC: 32.5 PG — HIGH (ref 27–31)
MCHC RBC-ENTMCNC: 32.6 PG — HIGH (ref 27–31)
MCHC RBC-ENTMCNC: 32.8 G/DL — SIGNIFICANT CHANGE UP (ref 32–37)
MCHC RBC-ENTMCNC: 33 G/DL — SIGNIFICANT CHANGE UP (ref 32–37)
MCHC RBC-ENTMCNC: 33.4 G/DL — SIGNIFICANT CHANGE UP (ref 32–37)
MCHC RBC-ENTMCNC: 33.6 G/DL — SIGNIFICANT CHANGE UP (ref 32–37)
MCHC RBC-ENTMCNC: 33.8 G/DL — SIGNIFICANT CHANGE UP (ref 32–37)
MCHC RBC-ENTMCNC: 33.8 G/DL — SIGNIFICANT CHANGE UP (ref 32–37)
MCHC RBC-ENTMCNC: 34 G/DL — SIGNIFICANT CHANGE UP (ref 32–37)
MCHC RBC-ENTMCNC: 34.1 G/DL — SIGNIFICANT CHANGE UP (ref 32–37)
MCHC RBC-ENTMCNC: 34.3 G/DL — SIGNIFICANT CHANGE UP (ref 32–37)
MCHC RBC-ENTMCNC: 34.3 G/DL — SIGNIFICANT CHANGE UP (ref 32–37)
MCHC RBC-ENTMCNC: 34.6 G/DL — SIGNIFICANT CHANGE UP (ref 32–37)
MCHC RBC-ENTMCNC: 34.7 G/DL — SIGNIFICANT CHANGE UP (ref 32–37)
MCHC RBC-ENTMCNC: 34.9 G/DL — SIGNIFICANT CHANGE UP (ref 32–37)
MCHC RBC-ENTMCNC: 35.1 G/DL — SIGNIFICANT CHANGE UP (ref 32–37)
MCHC RBC-ENTMCNC: 35.3 G/DL — SIGNIFICANT CHANGE UP (ref 32–37)
MCHC RBC-ENTMCNC: 35.5 G/DL — SIGNIFICANT CHANGE UP (ref 32–37)
MCV RBC AUTO: 100.4 FL — HIGH (ref 81–99)
MCV RBC AUTO: 100.7 FL — HIGH (ref 81–99)
MCV RBC AUTO: 90.2 FL — SIGNIFICANT CHANGE UP (ref 81–99)
MCV RBC AUTO: 90.8 FL — SIGNIFICANT CHANGE UP (ref 81–99)
MCV RBC AUTO: 91.8 FL — SIGNIFICANT CHANGE UP (ref 81–99)
MCV RBC AUTO: 92.6 FL — SIGNIFICANT CHANGE UP (ref 81–99)
MCV RBC AUTO: 92.6 FL — SIGNIFICANT CHANGE UP (ref 81–99)
MCV RBC AUTO: 92.7 FL — SIGNIFICANT CHANGE UP (ref 81–99)
MCV RBC AUTO: 92.8 FL — SIGNIFICANT CHANGE UP (ref 81–99)
MCV RBC AUTO: 93.2 FL — SIGNIFICANT CHANGE UP (ref 81–99)
MCV RBC AUTO: 93.3 FL — SIGNIFICANT CHANGE UP (ref 81–99)
MCV RBC AUTO: 93.9 FL — SIGNIFICANT CHANGE UP (ref 81–99)
MCV RBC AUTO: 94 FL — SIGNIFICANT CHANGE UP (ref 81–99)
MCV RBC AUTO: 94 FL — SIGNIFICANT CHANGE UP (ref 81–99)
MCV RBC AUTO: 95.3 FL — SIGNIFICANT CHANGE UP (ref 81–99)
MCV RBC AUTO: 96.1 FL — SIGNIFICANT CHANGE UP (ref 81–99)
MCV RBC AUTO: 96.3 FL — SIGNIFICANT CHANGE UP (ref 81–99)
MCV RBC AUTO: 98.6 FL — SIGNIFICANT CHANGE UP (ref 81–99)
MCV RBC AUTO: 98.7 FL — SIGNIFICANT CHANGE UP (ref 81–99)
METHADONE UR-MCNC: NEGATIVE — SIGNIFICANT CHANGE UP
MONOCYTES # BLD AUTO: 0.44 K/UL — SIGNIFICANT CHANGE UP (ref 0.1–0.6)
MONOCYTES # BLD AUTO: 0.46 K/UL — SIGNIFICANT CHANGE UP (ref 0.1–0.6)
MONOCYTES # BLD AUTO: 0.46 K/UL — SIGNIFICANT CHANGE UP (ref 0.1–0.6)
MONOCYTES # BLD AUTO: 0.62 K/UL — HIGH (ref 0.1–0.6)
MONOCYTES # BLD AUTO: 0.62 K/UL — HIGH (ref 0.1–0.6)
MONOCYTES # BLD AUTO: 0.67 K/UL — HIGH (ref 0.1–0.6)
MONOCYTES # BLD AUTO: 0.79 K/UL — HIGH (ref 0.1–0.6)
MONOCYTES # BLD AUTO: 0.82 K/UL — HIGH (ref 0.1–0.6)
MONOCYTES # BLD AUTO: 0.86 K/UL — HIGH (ref 0.1–0.6)
MONOCYTES # BLD AUTO: 0.92 K/UL — HIGH (ref 0.1–0.6)
MONOCYTES # BLD AUTO: 1.23 K/UL — HIGH (ref 0.1–0.6)
MONOCYTES NFR BLD AUTO: 3.2 % — SIGNIFICANT CHANGE UP (ref 1.7–9.3)
MONOCYTES NFR BLD AUTO: 3.6 % — SIGNIFICANT CHANGE UP (ref 1.7–9.3)
MONOCYTES NFR BLD AUTO: 3.6 % — SIGNIFICANT CHANGE UP (ref 1.7–9.3)
MONOCYTES NFR BLD AUTO: 4.8 % — SIGNIFICANT CHANGE UP (ref 1.7–9.3)
MONOCYTES NFR BLD AUTO: 4.8 % — SIGNIFICANT CHANGE UP (ref 1.7–9.3)
MONOCYTES NFR BLD AUTO: 5.1 % — SIGNIFICANT CHANGE UP (ref 1.7–9.3)
MONOCYTES NFR BLD AUTO: 6 % — SIGNIFICANT CHANGE UP (ref 1.7–9.3)
MONOCYTES NFR BLD AUTO: 6.4 % — SIGNIFICANT CHANGE UP (ref 1.7–9.3)
MONOCYTES NFR BLD AUTO: 6.6 % — SIGNIFICANT CHANGE UP (ref 1.7–9.3)
MONOCYTES NFR BLD AUTO: 6.9 % — SIGNIFICANT CHANGE UP (ref 1.7–9.3)
MONOCYTES NFR BLD AUTO: 7.3 % — SIGNIFICANT CHANGE UP (ref 1.7–9.3)
MRSA PCR RESULT.: NEGATIVE — SIGNIFICANT CHANGE UP
NEUTROPHILS # BLD AUTO: 10.27 K/UL — HIGH (ref 1.4–6.5)
NEUTROPHILS # BLD AUTO: 10.7 K/UL — HIGH (ref 1.4–6.5)
NEUTROPHILS # BLD AUTO: 10.85 K/UL — HIGH (ref 1.4–6.5)
NEUTROPHILS # BLD AUTO: 11.88 K/UL — HIGH (ref 1.4–6.5)
NEUTROPHILS # BLD AUTO: 12.6 K/UL — HIGH (ref 1.4–6.5)
NEUTROPHILS # BLD AUTO: 15.37 K/UL — HIGH (ref 1.4–6.5)
NEUTROPHILS # BLD AUTO: 19.7 K/UL — HIGH (ref 1.4–6.5)
NEUTROPHILS # BLD AUTO: 7.19 K/UL — HIGH (ref 1.4–6.5)
NEUTROPHILS # BLD AUTO: 7.92 K/UL — HIGH (ref 1.4–6.5)
NEUTROPHILS # BLD AUTO: 8.18 K/UL — HIGH (ref 1.4–6.5)
NEUTROPHILS # BLD AUTO: 9.6 K/UL — HIGH (ref 1.4–6.5)
NEUTROPHILS NFR BLD AUTO: 68.6 % — SIGNIFICANT CHANGE UP (ref 42.2–75.2)
NEUTROPHILS NFR BLD AUTO: 74.8 % — SIGNIFICANT CHANGE UP (ref 42.2–75.2)
NEUTROPHILS NFR BLD AUTO: 76.5 % — HIGH (ref 42.2–75.2)
NEUTROPHILS NFR BLD AUTO: 76.5 % — HIGH (ref 42.2–75.2)
NEUTROPHILS NFR BLD AUTO: 82.1 % — HIGH (ref 42.2–75.2)
NEUTROPHILS NFR BLD AUTO: 82.3 % — HIGH (ref 42.2–75.2)
NEUTROPHILS NFR BLD AUTO: 83.2 % — HIGH (ref 42.2–75.2)
NEUTROPHILS NFR BLD AUTO: 85.1 % — HIGH (ref 42.2–75.2)
NEUTROPHILS NFR BLD AUTO: 87.7 % — HIGH (ref 42.2–75.2)
NEUTROPHILS NFR BLD AUTO: 87.8 % — HIGH (ref 42.2–75.2)
NEUTROPHILS NFR BLD AUTO: 90.4 % — HIGH (ref 42.2–75.2)
NITRITE UR-MCNC: NEGATIVE — SIGNIFICANT CHANGE UP
NRBC # BLD: 0 /100 WBCS — SIGNIFICANT CHANGE UP (ref 0–0)
OPIATES UR-MCNC: NEGATIVE — SIGNIFICANT CHANGE UP
OSMOLALITY SERPL: 260 MOS/KG — LOW (ref 280–301)
OSMOLALITY SERPL: 288 MOS/KG — SIGNIFICANT CHANGE UP (ref 280–301)
OSMOLALITY UR: 428 MOS/KG — SIGNIFICANT CHANGE UP (ref 50–1200)
OSMOLALITY UR: 485 MOS/KG — SIGNIFICANT CHANGE UP (ref 50–1200)
PCO2 BLDV: 23 MMHG — LOW (ref 39–42)
PCP SPEC-MCNC: SIGNIFICANT CHANGE UP
PH BLDV: 7.13 — CRITICAL LOW (ref 7.32–7.43)
PH UR: 6 — SIGNIFICANT CHANGE UP (ref 5–8)
PH UR: 6 — SIGNIFICANT CHANGE UP (ref 5–8)
PH UR: 6.5 — SIGNIFICANT CHANGE UP (ref 5–8)
PH UR: 7 — SIGNIFICANT CHANGE UP (ref 5–8)
PHOSPHATE SERPL-MCNC: 2.7 MG/DL — SIGNIFICANT CHANGE UP (ref 2.1–4.9)
PHOSPHATE SERPL-MCNC: 2.8 MG/DL — SIGNIFICANT CHANGE UP (ref 2.1–4.9)
PHOSPHATE SERPL-MCNC: 2.9 MG/DL — SIGNIFICANT CHANGE UP (ref 2.1–4.9)
PHOSPHATE SERPL-MCNC: 3.1 MG/DL — SIGNIFICANT CHANGE UP (ref 2.1–4.9)
PHOSPHATE SERPL-MCNC: 3.1 MG/DL — SIGNIFICANT CHANGE UP (ref 2.1–4.9)
PHOSPHATE SERPL-MCNC: 3.2 MG/DL — SIGNIFICANT CHANGE UP (ref 2.1–4.9)
PHOSPHATE SERPL-MCNC: 3.3 MG/DL — SIGNIFICANT CHANGE UP (ref 2.1–4.9)
PHOSPHATE SERPL-MCNC: 3.4 MG/DL — SIGNIFICANT CHANGE UP (ref 2.1–4.9)
PHOSPHATE SERPL-MCNC: 3.5 MG/DL — SIGNIFICANT CHANGE UP (ref 2.1–4.9)
PHOSPHATE SERPL-MCNC: 4.6 MG/DL — SIGNIFICANT CHANGE UP (ref 2.1–4.9)
PLATELET # BLD AUTO: 240 K/UL — SIGNIFICANT CHANGE UP (ref 130–400)
PLATELET # BLD AUTO: 244 K/UL — SIGNIFICANT CHANGE UP (ref 130–400)
PLATELET # BLD AUTO: 263 K/UL — SIGNIFICANT CHANGE UP (ref 130–400)
PLATELET # BLD AUTO: 267 K/UL — SIGNIFICANT CHANGE UP (ref 130–400)
PLATELET # BLD AUTO: 282 K/UL — SIGNIFICANT CHANGE UP (ref 130–400)
PLATELET # BLD AUTO: 298 K/UL — SIGNIFICANT CHANGE UP (ref 130–400)
PLATELET # BLD AUTO: 300 K/UL — SIGNIFICANT CHANGE UP (ref 130–400)
PLATELET # BLD AUTO: 330 K/UL — SIGNIFICANT CHANGE UP (ref 130–400)
PLATELET # BLD AUTO: 335 K/UL — SIGNIFICANT CHANGE UP (ref 130–400)
PLATELET # BLD AUTO: 337 K/UL — SIGNIFICANT CHANGE UP (ref 130–400)
PLATELET # BLD AUTO: 345 K/UL — SIGNIFICANT CHANGE UP (ref 130–400)
PLATELET # BLD AUTO: 346 K/UL — SIGNIFICANT CHANGE UP (ref 130–400)
PLATELET # BLD AUTO: 351 K/UL — SIGNIFICANT CHANGE UP (ref 130–400)
PLATELET # BLD AUTO: 352 K/UL — SIGNIFICANT CHANGE UP (ref 130–400)
PLATELET # BLD AUTO: 362 K/UL — SIGNIFICANT CHANGE UP (ref 130–400)
PLATELET # BLD AUTO: 375 K/UL — SIGNIFICANT CHANGE UP (ref 130–400)
PLATELET # BLD AUTO: 385 K/UL — SIGNIFICANT CHANGE UP (ref 130–400)
PLATELET # BLD AUTO: 391 K/UL — SIGNIFICANT CHANGE UP (ref 130–400)
PLATELET # BLD AUTO: 397 K/UL — SIGNIFICANT CHANGE UP (ref 130–400)
PMV BLD: 10.3 FL — SIGNIFICANT CHANGE UP (ref 7.4–10.4)
PMV BLD: 10.4 FL — SIGNIFICANT CHANGE UP (ref 7.4–10.4)
PMV BLD: 10.5 FL — HIGH (ref 7.4–10.4)
PMV BLD: 9 FL — SIGNIFICANT CHANGE UP (ref 7.4–10.4)
PMV BLD: 9.1 FL — SIGNIFICANT CHANGE UP (ref 7.4–10.4)
PMV BLD: 9.2 FL — SIGNIFICANT CHANGE UP (ref 7.4–10.4)
PMV BLD: 9.2 FL — SIGNIFICANT CHANGE UP (ref 7.4–10.4)
PMV BLD: 9.3 FL — SIGNIFICANT CHANGE UP (ref 7.4–10.4)
PMV BLD: 9.4 FL — SIGNIFICANT CHANGE UP (ref 7.4–10.4)
PMV BLD: 9.5 FL — SIGNIFICANT CHANGE UP (ref 7.4–10.4)
PMV BLD: 9.6 FL — SIGNIFICANT CHANGE UP (ref 7.4–10.4)
PMV BLD: 9.8 FL — SIGNIFICANT CHANGE UP (ref 7.4–10.4)
PMV BLD: 9.9 FL — SIGNIFICANT CHANGE UP (ref 7.4–10.4)
PMV BLD: 9.9 FL — SIGNIFICANT CHANGE UP (ref 7.4–10.4)
PO2 BLDV: 101 MMHG — HIGH (ref 25–45)
POTASSIUM SERPL-MCNC: 3 MMOL/L — LOW (ref 3.5–5)
POTASSIUM SERPL-MCNC: 3.4 MMOL/L — LOW (ref 3.5–5)
POTASSIUM SERPL-MCNC: 3.6 MMOL/L — SIGNIFICANT CHANGE UP (ref 3.5–5)
POTASSIUM SERPL-MCNC: 3.7 MMOL/L — SIGNIFICANT CHANGE UP (ref 3.5–5)
POTASSIUM SERPL-MCNC: 3.8 MMOL/L — SIGNIFICANT CHANGE UP (ref 3.5–5)
POTASSIUM SERPL-MCNC: 3.9 MMOL/L — SIGNIFICANT CHANGE UP (ref 3.5–5)
POTASSIUM SERPL-MCNC: 4 MMOL/L — SIGNIFICANT CHANGE UP (ref 3.5–5)
POTASSIUM SERPL-MCNC: 4 MMOL/L — SIGNIFICANT CHANGE UP (ref 3.5–5)
POTASSIUM SERPL-MCNC: 4.1 MMOL/L — SIGNIFICANT CHANGE UP (ref 3.5–5)
POTASSIUM SERPL-MCNC: 4.2 MMOL/L — SIGNIFICANT CHANGE UP (ref 3.5–5)
POTASSIUM SERPL-MCNC: 4.2 MMOL/L — SIGNIFICANT CHANGE UP (ref 3.5–5)
POTASSIUM SERPL-MCNC: 4.3 MMOL/L — SIGNIFICANT CHANGE UP (ref 3.5–5)
POTASSIUM SERPL-MCNC: 4.4 MMOL/L — SIGNIFICANT CHANGE UP (ref 3.5–5)
POTASSIUM SERPL-MCNC: 4.6 MMOL/L — SIGNIFICANT CHANGE UP (ref 3.5–5)
POTASSIUM SERPL-MCNC: 4.7 MMOL/L — SIGNIFICANT CHANGE UP (ref 3.5–5)
POTASSIUM SERPL-MCNC: 6.6 MMOL/L — CRITICAL HIGH (ref 3.5–5)
POTASSIUM SERPL-SCNC: 3 MMOL/L — LOW (ref 3.5–5)
POTASSIUM SERPL-SCNC: 3.4 MMOL/L — LOW (ref 3.5–5)
POTASSIUM SERPL-SCNC: 3.6 MMOL/L — SIGNIFICANT CHANGE UP (ref 3.5–5)
POTASSIUM SERPL-SCNC: 3.7 MMOL/L — SIGNIFICANT CHANGE UP (ref 3.5–5)
POTASSIUM SERPL-SCNC: 3.8 MMOL/L — SIGNIFICANT CHANGE UP (ref 3.5–5)
POTASSIUM SERPL-SCNC: 3.9 MMOL/L — SIGNIFICANT CHANGE UP (ref 3.5–5)
POTASSIUM SERPL-SCNC: 4 MMOL/L — SIGNIFICANT CHANGE UP (ref 3.5–5)
POTASSIUM SERPL-SCNC: 4 MMOL/L — SIGNIFICANT CHANGE UP (ref 3.5–5)
POTASSIUM SERPL-SCNC: 4.1 MMOL/L — SIGNIFICANT CHANGE UP (ref 3.5–5)
POTASSIUM SERPL-SCNC: 4.2 MMOL/L — SIGNIFICANT CHANGE UP (ref 3.5–5)
POTASSIUM SERPL-SCNC: 4.2 MMOL/L — SIGNIFICANT CHANGE UP (ref 3.5–5)
POTASSIUM SERPL-SCNC: 4.3 MMOL/L — SIGNIFICANT CHANGE UP (ref 3.5–5)
POTASSIUM SERPL-SCNC: 4.4 MMOL/L — SIGNIFICANT CHANGE UP (ref 3.5–5)
POTASSIUM SERPL-SCNC: 4.6 MMOL/L — SIGNIFICANT CHANGE UP (ref 3.5–5)
POTASSIUM SERPL-SCNC: 4.7 MMOL/L — SIGNIFICANT CHANGE UP (ref 3.5–5)
POTASSIUM SERPL-SCNC: 6.6 MMOL/L — CRITICAL HIGH (ref 3.5–5)
POTASSIUM UR-SCNC: 16 MMOL/L — SIGNIFICANT CHANGE UP
PROCALCITONIN SERPL-MCNC: 0.72 NG/ML — HIGH (ref 0.02–0.1)
PROPOXYPHENE QUALITATIVE URINE RESULT: NEGATIVE — SIGNIFICANT CHANGE UP
PROT ?TM UR-MCNC: 34 MG/DLG/24H — SIGNIFICANT CHANGE UP
PROT ?TM UR-MCNC: 50 MG/DLG/24H — SIGNIFICANT CHANGE UP
PROT SERPL-MCNC: 3.2 G/DL — LOW (ref 6–8)
PROT SERPL-MCNC: 3.6 G/DL — LOW (ref 6–8)
PROT SERPL-MCNC: 4.8 G/DL — LOW (ref 6–8)
PROT SERPL-MCNC: 5.1 G/DL — LOW (ref 6–8)
PROT SERPL-MCNC: 5.7 G/DL — LOW (ref 6–8)
PROT SERPL-MCNC: 5.8 G/DL — LOW (ref 6–8)
PROT SERPL-MCNC: 5.9 G/DL — LOW (ref 6–8)
PROT SERPL-MCNC: 6.7 G/DL — SIGNIFICANT CHANGE UP (ref 6–8)
PROT SERPL-MCNC: 6.7 G/DL — SIGNIFICANT CHANGE UP (ref 6–8)
PROT SERPL-MCNC: 7.6 G/DL — SIGNIFICANT CHANGE UP (ref 6–8)
PROT SERPL-MCNC: 8 G/DL — SIGNIFICANT CHANGE UP (ref 6–8)
PROT UR-MCNC: 100 MG/DL
PROT UR-MCNC: 30 MG/DL
PROT UR-MCNC: 30 MG/DL
PROT UR-MCNC: NEGATIVE MG/DL — SIGNIFICANT CHANGE UP
PROT/CREAT UR-RTO: 1.1 RATIO — HIGH (ref 0–0.2)
PROT/CREAT UR-RTO: 1.1 RATIO — HIGH (ref 0–0.2)
PROTHROM AB SERPL-ACNC: 10.7 SEC — SIGNIFICANT CHANGE UP (ref 9.95–12.87)
PROTHROM AB SERPL-ACNC: 11 SEC — SIGNIFICANT CHANGE UP (ref 9.95–12.87)
PROTHROM AB SERPL-ACNC: 11.5 SEC — SIGNIFICANT CHANGE UP (ref 9.95–12.87)
PROTHROM AB SERPL-ACNC: 12.7 SEC — SIGNIFICANT CHANGE UP (ref 9.95–12.87)
RBC # BLD: 2.57 M/UL — LOW (ref 4.2–5.4)
RBC # BLD: 2.75 M/UL — LOW (ref 4.2–5.4)
RBC # BLD: 2.8 M/UL — LOW (ref 4.2–5.4)
RBC # BLD: 2.8 M/UL — LOW (ref 4.2–5.4)
RBC # BLD: 2.85 M/UL — LOW (ref 4.2–5.4)
RBC # BLD: 2.87 M/UL — LOW (ref 4.2–5.4)
RBC # BLD: 3.01 M/UL — LOW (ref 4.2–5.4)
RBC # BLD: 3.04 M/UL — LOW (ref 4.2–5.4)
RBC # BLD: 3.05 M/UL — LOW (ref 4.2–5.4)
RBC # BLD: 3.06 M/UL — LOW (ref 4.2–5.4)
RBC # BLD: 3.1 M/UL — LOW (ref 4.2–5.4)
RBC # BLD: 3.13 M/UL — LOW (ref 4.2–5.4)
RBC # BLD: 3.17 M/UL — LOW (ref 4.2–5.4)
RBC # BLD: 3.24 M/UL — LOW (ref 4.2–5.4)
RBC # BLD: 3.52 M/UL — LOW (ref 4.2–5.4)
RBC # BLD: 3.58 M/UL — LOW (ref 4.2–5.4)
RBC # BLD: 3.99 M/UL — LOW (ref 4.2–5.4)
RBC # BLD: 4.56 M/UL — SIGNIFICANT CHANGE UP (ref 4.2–5.4)
RBC # BLD: 4.59 M/UL — SIGNIFICANT CHANGE UP (ref 4.2–5.4)
RBC # FLD: 13.7 % — SIGNIFICANT CHANGE UP (ref 11.5–14.5)
RBC # FLD: 13.8 % — SIGNIFICANT CHANGE UP (ref 11.5–14.5)
RBC # FLD: 13.8 % — SIGNIFICANT CHANGE UP (ref 11.5–14.5)
RBC # FLD: 14.6 % — HIGH (ref 11.5–14.5)
RBC # FLD: 14.7 % — HIGH (ref 11.5–14.5)
RBC # FLD: 14.7 % — HIGH (ref 11.5–14.5)
RBC # FLD: 15 % — HIGH (ref 11.5–14.5)
RBC # FLD: 15.3 % — HIGH (ref 11.5–14.5)
RBC # FLD: 15.6 % — HIGH (ref 11.5–14.5)
RBC # FLD: 15.8 % — HIGH (ref 11.5–14.5)
RBC # FLD: 15.8 % — HIGH (ref 11.5–14.5)
RBC # FLD: 16 % — HIGH (ref 11.5–14.5)
RBC # FLD: 16 % — HIGH (ref 11.5–14.5)
RBC # FLD: 16.1 % — HIGH (ref 11.5–14.5)
RBC # FLD: 16.3 % — HIGH (ref 11.5–14.5)
RBC # FLD: 16.8 % — HIGH (ref 11.5–14.5)
RBC # FLD: 16.8 % — HIGH (ref 11.5–14.5)
RBC CASTS # UR COMP ASSIST: 29 /HPF — HIGH (ref 0–4)
RBC CASTS # UR COMP ASSIST: 5 /HPF — HIGH (ref 0–4)
S PNEUM AG UR QL: NEGATIVE — SIGNIFICANT CHANGE UP
SAO2 % BLDV: 98.7 % — HIGH (ref 67–88)
SODIUM SERPL-SCNC: 123 MMOL/L — LOW (ref 135–146)
SODIUM SERPL-SCNC: 124 MMOL/L — LOW (ref 135–146)
SODIUM SERPL-SCNC: 127 MMOL/L — LOW (ref 135–146)
SODIUM SERPL-SCNC: 129 MMOL/L — LOW (ref 135–146)
SODIUM SERPL-SCNC: 129 MMOL/L — LOW (ref 135–146)
SODIUM SERPL-SCNC: 130 MMOL/L — LOW (ref 135–146)
SODIUM SERPL-SCNC: 131 MMOL/L — LOW (ref 135–146)
SODIUM SERPL-SCNC: 132 MMOL/L — LOW (ref 135–146)
SODIUM SERPL-SCNC: 133 MMOL/L — LOW (ref 135–146)
SODIUM SERPL-SCNC: 133 MMOL/L — LOW (ref 135–146)
SODIUM SERPL-SCNC: 134 MMOL/L — LOW (ref 135–146)
SODIUM SERPL-SCNC: 134 MMOL/L — LOW (ref 135–146)
SODIUM SERPL-SCNC: 137 MMOL/L — SIGNIFICANT CHANGE UP (ref 135–146)
SODIUM SERPL-SCNC: 138 MMOL/L — SIGNIFICANT CHANGE UP (ref 135–146)
SODIUM SERPL-SCNC: 140 MMOL/L — SIGNIFICANT CHANGE UP (ref 135–146)
SODIUM SERPL-SCNC: 142 MMOL/L — SIGNIFICANT CHANGE UP (ref 135–146)
SODIUM UR-SCNC: 42 MMOL/L — SIGNIFICANT CHANGE UP
SODIUM UR-SCNC: 98 MMOL/L — SIGNIFICANT CHANGE UP
SODIUM UR-SCNC: <20 MMOL/L — SIGNIFICANT CHANGE UP
SP GR SPEC: 1.02 — SIGNIFICANT CHANGE UP (ref 1–1.03)
SP GR SPEC: <1.005 — LOW (ref 1–1.03)
SP GR SPEC: >1.03 — HIGH (ref 1–1.03)
SP GR SPEC: >1.03 — HIGH (ref 1–1.03)
SPECIMEN SOURCE: SIGNIFICANT CHANGE UP
SQUAMOUS # UR AUTO: 0 /HPF — SIGNIFICANT CHANGE UP (ref 0–5)
SQUAMOUS # UR AUTO: 8 /HPF — HIGH (ref 0–5)
TROPONIN T, HIGH SENSITIVITY RESULT: 226 NG/L — CRITICAL HIGH (ref 6–13)
TROPONIN T, HIGH SENSITIVITY RESULT: 26 NG/L — HIGH (ref 6–13)
TROPONIN T, HIGH SENSITIVITY RESULT: 358 NG/L — CRITICAL HIGH (ref 6–13)
UROBILINOGEN FLD QL: 0.2 MG/DL — SIGNIFICANT CHANGE UP (ref 0.2–1)
UUN UR-MCNC: 358 MG/DL — SIGNIFICANT CHANGE UP
WBC # BLD: 10.37 K/UL — SIGNIFICANT CHANGE UP (ref 4.8–10.8)
WBC # BLD: 10.62 K/UL — SIGNIFICANT CHANGE UP (ref 4.8–10.8)
WBC # BLD: 10.82 K/UL — HIGH (ref 4.8–10.8)
WBC # BLD: 11.75 K/UL — HIGH (ref 4.8–10.8)
WBC # BLD: 11.93 K/UL — HIGH (ref 4.8–10.8)
WBC # BLD: 12.07 K/UL — HIGH (ref 4.8–10.8)
WBC # BLD: 12.46 K/UL — HIGH (ref 4.8–10.8)
WBC # BLD: 12.56 K/UL — HIGH (ref 4.8–10.8)
WBC # BLD: 12.56 K/UL — HIGH (ref 4.8–10.8)
WBC # BLD: 13.02 K/UL — HIGH (ref 4.8–10.8)
WBC # BLD: 13.04 K/UL — HIGH (ref 4.8–10.8)
WBC # BLD: 13.54 K/UL — HIGH (ref 4.8–10.8)
WBC # BLD: 13.61 K/UL — HIGH (ref 4.8–10.8)
WBC # BLD: 14.37 K/UL — HIGH (ref 4.8–10.8)
WBC # BLD: 14.57 K/UL — HIGH (ref 4.8–10.8)
WBC # BLD: 20.54 K/UL — HIGH (ref 4.8–10.8)
WBC # BLD: 21.79 K/UL — HIGH (ref 4.8–10.8)
WBC # BLD: 9.39 K/UL — SIGNIFICANT CHANGE UP (ref 4.8–10.8)
WBC # BLD: 9.65 K/UL — SIGNIFICANT CHANGE UP (ref 4.8–10.8)
WBC # FLD AUTO: 10.37 K/UL — SIGNIFICANT CHANGE UP (ref 4.8–10.8)
WBC # FLD AUTO: 10.62 K/UL — SIGNIFICANT CHANGE UP (ref 4.8–10.8)
WBC # FLD AUTO: 10.82 K/UL — HIGH (ref 4.8–10.8)
WBC # FLD AUTO: 11.75 K/UL — HIGH (ref 4.8–10.8)
WBC # FLD AUTO: 11.93 K/UL — HIGH (ref 4.8–10.8)
WBC # FLD AUTO: 12.07 K/UL — HIGH (ref 4.8–10.8)
WBC # FLD AUTO: 12.46 K/UL — HIGH (ref 4.8–10.8)
WBC # FLD AUTO: 12.56 K/UL — HIGH (ref 4.8–10.8)
WBC # FLD AUTO: 12.56 K/UL — HIGH (ref 4.8–10.8)
WBC # FLD AUTO: 13.02 K/UL — HIGH (ref 4.8–10.8)
WBC # FLD AUTO: 13.04 K/UL — HIGH (ref 4.8–10.8)
WBC # FLD AUTO: 13.54 K/UL — HIGH (ref 4.8–10.8)
WBC # FLD AUTO: 13.61 K/UL — HIGH (ref 4.8–10.8)
WBC # FLD AUTO: 14.37 K/UL — HIGH (ref 4.8–10.8)
WBC # FLD AUTO: 14.57 K/UL — HIGH (ref 4.8–10.8)
WBC # FLD AUTO: 20.54 K/UL — HIGH (ref 4.8–10.8)
WBC # FLD AUTO: 21.79 K/UL — HIGH (ref 4.8–10.8)
WBC # FLD AUTO: 9.39 K/UL — SIGNIFICANT CHANGE UP (ref 4.8–10.8)
WBC # FLD AUTO: 9.65 K/UL — SIGNIFICANT CHANGE UP (ref 4.8–10.8)
WBC UR QL: 1 /HPF — SIGNIFICANT CHANGE UP (ref 0–5)
WBC UR QL: 194 /HPF — HIGH (ref 0–5)
YEAST-LIKE CELLS: PRESENT

## 2024-01-01 PROCEDURE — 99291 CRITICAL CARE FIRST HOUR: CPT | Mod: 24,25

## 2024-01-01 PROCEDURE — 70496 CT ANGIOGRAPHY HEAD: CPT | Mod: 26,MC

## 2024-01-01 PROCEDURE — 85025 COMPLETE CBC W/AUTO DIFF WBC: CPT

## 2024-01-01 PROCEDURE — 73030 X-RAY EXAM OF SHOULDER: CPT | Mod: RT

## 2024-01-01 PROCEDURE — 80053 COMPREHEN METABOLIC PANEL: CPT

## 2024-01-01 PROCEDURE — 82330 ASSAY OF CALCIUM: CPT

## 2024-01-01 PROCEDURE — 85027 COMPLETE CBC AUTOMATED: CPT

## 2024-01-01 PROCEDURE — 97112 NEUROMUSCULAR REEDUCATION: CPT | Mod: GO

## 2024-01-01 PROCEDURE — 80048 BASIC METABOLIC PNL TOTAL CA: CPT

## 2024-01-01 PROCEDURE — 71045 X-RAY EXAM CHEST 1 VIEW: CPT | Mod: 26

## 2024-01-01 PROCEDURE — 83930 ASSAY OF BLOOD OSMOLALITY: CPT

## 2024-01-01 PROCEDURE — 85018 HEMOGLOBIN: CPT

## 2024-01-01 PROCEDURE — 94660 CPAP INITIATION&MGMT: CPT

## 2024-01-01 PROCEDURE — 94002 VENT MGMT INPAT INIT DAY: CPT

## 2024-01-01 PROCEDURE — 82803 BLOOD GASES ANY COMBINATION: CPT

## 2024-01-01 PROCEDURE — 97161 PT EVAL LOW COMPLEX 20 MIN: CPT | Mod: GP

## 2024-01-01 PROCEDURE — 71260 CT THORAX DX C+: CPT | Mod: 26,MC

## 2024-01-01 PROCEDURE — 73562 X-RAY EXAM OF KNEE 3: CPT | Mod: 26,50

## 2024-01-01 PROCEDURE — 93306 TTE W/DOPPLER COMPLETE: CPT

## 2024-01-01 PROCEDURE — 84100 ASSAY OF PHOSPHORUS: CPT

## 2024-01-01 PROCEDURE — 97530 THERAPEUTIC ACTIVITIES: CPT | Mod: GP

## 2024-01-01 PROCEDURE — 74176 CT ABD & PELVIS W/O CONTRAST: CPT | Mod: MC

## 2024-01-01 PROCEDURE — 84300 ASSAY OF URINE SODIUM: CPT

## 2024-01-01 PROCEDURE — 99233 SBSQ HOSP IP/OBS HIGH 50: CPT

## 2024-01-01 PROCEDURE — 97167 OT EVAL HIGH COMPLEX 60 MIN: CPT | Mod: GO

## 2024-01-01 PROCEDURE — 83735 ASSAY OF MAGNESIUM: CPT

## 2024-01-01 PROCEDURE — 99285 EMERGENCY DEPT VISIT HI MDM: CPT

## 2024-01-01 PROCEDURE — 99284 EMERGENCY DEPT VISIT MOD MDM: CPT

## 2024-01-01 PROCEDURE — 99231 SBSQ HOSP IP/OBS SF/LOW 25: CPT

## 2024-01-01 PROCEDURE — 96374 THER/PROPH/DIAG INJ IV PUSH: CPT

## 2024-01-01 PROCEDURE — 70450 CT HEAD/BRAIN W/O DYE: CPT | Mod: 26,59,MC

## 2024-01-01 PROCEDURE — 81001 URINALYSIS AUTO W/SCOPE: CPT

## 2024-01-01 PROCEDURE — 87449 NOS EACH ORGANISM AG IA: CPT

## 2024-01-01 PROCEDURE — 94010 BREATHING CAPACITY TEST: CPT

## 2024-01-01 PROCEDURE — 74176 CT ABD & PELVIS W/O CONTRAST: CPT | Mod: 26

## 2024-01-01 PROCEDURE — 86900 BLOOD TYPING SEROLOGIC ABO: CPT

## 2024-01-01 PROCEDURE — 84145 PROCALCITONIN (PCT): CPT

## 2024-01-01 PROCEDURE — 93970 EXTREMITY STUDY: CPT | Mod: 26

## 2024-01-01 PROCEDURE — 71045 X-RAY EXAM CHEST 1 VIEW: CPT | Mod: 26,77

## 2024-01-01 PROCEDURE — 73030 X-RAY EXAM OF SHOULDER: CPT | Mod: 26,RT

## 2024-01-01 PROCEDURE — 73630 X-RAY EXAM OF FOOT: CPT | Mod: 26,RT

## 2024-01-01 PROCEDURE — 74177 CT ABD & PELVIS W/CONTRAST: CPT | Mod: 26,MC

## 2024-01-01 PROCEDURE — 73610 X-RAY EXAM OF ANKLE: CPT | Mod: RT

## 2024-01-01 PROCEDURE — 93010 ELECTROCARDIOGRAM REPORT: CPT

## 2024-01-01 PROCEDURE — 93970 EXTREMITY STUDY: CPT

## 2024-01-01 PROCEDURE — 85014 HEMATOCRIT: CPT

## 2024-01-01 PROCEDURE — 83605 ASSAY OF LACTIC ACID: CPT

## 2024-01-01 PROCEDURE — 99232 SBSQ HOSP IP/OBS MODERATE 35: CPT

## 2024-01-01 PROCEDURE — 99232 SBSQ HOSP IP/OBS MODERATE 35: CPT | Mod: 24,25

## 2024-01-01 PROCEDURE — 82570 ASSAY OF URINE CREATININE: CPT

## 2024-01-01 PROCEDURE — 99221 1ST HOSP IP/OBS SF/LOW 40: CPT

## 2024-01-01 PROCEDURE — 83935 ASSAY OF URINE OSMOLALITY: CPT

## 2024-01-01 PROCEDURE — 93306 TTE W/DOPPLER COMPLETE: CPT | Mod: 26

## 2024-01-01 PROCEDURE — 43753 TX GASTRO INTUB W/ASP: CPT | Mod: 59

## 2024-01-01 PROCEDURE — 86850 RBC ANTIBODY SCREEN: CPT

## 2024-01-01 PROCEDURE — 99291 CRITICAL CARE FIRST HOUR: CPT

## 2024-01-01 PROCEDURE — 80354 DRUG SCREENING FENTANYL: CPT

## 2024-01-01 PROCEDURE — 36415 COLL VENOUS BLD VENIPUNCTURE: CPT

## 2024-01-01 PROCEDURE — 93010 ELECTROCARDIOGRAM REPORT: CPT | Mod: 77

## 2024-01-01 PROCEDURE — 0042T: CPT

## 2024-01-01 PROCEDURE — 70498 CT ANGIOGRAPHY NECK: CPT | Mod: 26,MC

## 2024-01-01 PROCEDURE — 73502 X-RAY EXAM HIP UNI 2-3 VIEWS: CPT | Mod: RT

## 2024-01-01 PROCEDURE — 84540 ASSAY OF URINE/UREA-N: CPT

## 2024-01-01 PROCEDURE — 99239 HOSP IP/OBS DSCHRG MGMT >30: CPT

## 2024-01-01 PROCEDURE — 82962 GLUCOSE BLOOD TEST: CPT

## 2024-01-01 PROCEDURE — 73562 X-RAY EXAM OF KNEE 3: CPT | Mod: 50

## 2024-01-01 PROCEDURE — 99223 1ST HOSP IP/OBS HIGH 75: CPT

## 2024-01-01 PROCEDURE — 73610 X-RAY EXAM OF ANKLE: CPT | Mod: 26,RT

## 2024-01-01 PROCEDURE — 71045 X-RAY EXAM CHEST 1 VIEW: CPT

## 2024-01-01 PROCEDURE — 73502 X-RAY EXAM HIP UNI 2-3 VIEWS: CPT | Mod: 26,RT

## 2024-01-01 PROCEDURE — 84132 ASSAY OF SERUM POTASSIUM: CPT

## 2024-01-01 PROCEDURE — 99223 1ST HOSP IP/OBS HIGH 75: CPT | Mod: 25

## 2024-01-01 PROCEDURE — 97110 THERAPEUTIC EXERCISES: CPT | Mod: GP

## 2024-01-01 PROCEDURE — 97163 PT EVAL HIGH COMPLEX 45 MIN: CPT | Mod: GP

## 2024-01-01 PROCEDURE — 84156 ASSAY OF PROTEIN URINE: CPT

## 2024-01-01 PROCEDURE — 84295 ASSAY OF SERUM SODIUM: CPT

## 2024-01-01 PROCEDURE — 97166 OT EVAL MOD COMPLEX 45 MIN: CPT | Mod: GO

## 2024-01-01 PROCEDURE — 99497 ADVNCD CARE PLAN 30 MIN: CPT | Mod: 25

## 2024-01-01 PROCEDURE — 70450 CT HEAD/BRAIN W/O DYE: CPT | Mod: 26,MC

## 2024-01-01 PROCEDURE — 31500 INSERT EMERGENCY AIRWAY: CPT

## 2024-01-01 PROCEDURE — 87641 MR-STAPH DNA AMP PROBE: CPT

## 2024-01-01 PROCEDURE — 97535 SELF CARE MNGMENT TRAINING: CPT | Mod: GO

## 2024-01-01 PROCEDURE — 87040 BLOOD CULTURE FOR BACTERIA: CPT

## 2024-01-01 PROCEDURE — 73552 X-RAY EXAM OF FEMUR 2/>: CPT | Mod: 26,RT

## 2024-01-01 PROCEDURE — 87899 AGENT NOS ASSAY W/OPTIC: CPT

## 2024-01-01 PROCEDURE — 86901 BLOOD TYPING SEROLOGIC RH(D): CPT

## 2024-01-01 PROCEDURE — 99239 HOSP IP/OBS DSCHRG MGMT >30: CPT | Mod: 24,25

## 2024-01-01 PROCEDURE — 93005 ELECTROCARDIOGRAM TRACING: CPT

## 2024-01-01 PROCEDURE — 80307 DRUG TEST PRSMV CHEM ANLYZR: CPT

## 2024-01-01 PROCEDURE — 73060 X-RAY EXAM OF HUMERUS: CPT | Mod: 26,RT

## 2024-01-01 PROCEDURE — 71045 X-RAY EXAM CHEST 1 VIEW: CPT | Mod: 26,76

## 2024-01-01 PROCEDURE — 84133 ASSAY OF URINE POTASSIUM: CPT

## 2024-01-01 PROCEDURE — 94640 AIRWAY INHALATION TREATMENT: CPT

## 2024-01-01 PROCEDURE — 80076 HEPATIC FUNCTION PANEL: CPT

## 2024-01-01 PROCEDURE — 73060 X-RAY EXAM OF HUMERUS: CPT | Mod: RT

## 2024-01-01 PROCEDURE — 99284 EMERGENCY DEPT VISIT MOD MDM: CPT | Mod: 25

## 2024-01-01 PROCEDURE — 87640 STAPH A DNA AMP PROBE: CPT

## 2024-01-01 PROCEDURE — 73630 X-RAY EXAM OF FOOT: CPT | Mod: RT

## 2024-01-01 PROCEDURE — 93971 EXTREMITY STUDY: CPT | Mod: RT

## 2024-01-01 PROCEDURE — 93971 EXTREMITY STUDY: CPT | Mod: 26,RT

## 2024-01-01 PROCEDURE — 99238 HOSP IP/OBS DSCHRG MGMT 30/<: CPT

## 2024-01-01 PROCEDURE — 85730 THROMBOPLASTIN TIME PARTIAL: CPT

## 2024-01-01 PROCEDURE — 82140 ASSAY OF AMMONIA: CPT

## 2024-01-01 PROCEDURE — 73552 X-RAY EXAM OF FEMUR 2/>: CPT | Mod: RT

## 2024-01-01 PROCEDURE — 85610 PROTHROMBIN TIME: CPT

## 2024-01-01 PROCEDURE — 99291 CRITICAL CARE FIRST HOUR: CPT | Mod: 25

## 2024-01-01 PROCEDURE — 72125 CT NECK SPINE W/O DYE: CPT | Mod: 26,MC

## 2024-01-01 PROCEDURE — 84484 ASSAY OF TROPONIN QUANT: CPT

## 2024-01-01 RX ORDER — VANCOMYCIN HCL 1 G
1000 VIAL (EA) INTRAVENOUS ONCE
Refills: 0 | Status: COMPLETED | OUTPATIENT
Start: 2024-01-01 | End: 2024-01-01

## 2024-01-01 RX ORDER — OLANZAPINE 15 MG/1
5 TABLET, FILM COATED ORAL ONCE
Refills: 0 | Status: DISCONTINUED | OUTPATIENT
Start: 2024-01-01 | End: 2024-01-01

## 2024-01-01 RX ORDER — ENOXAPARIN SODIUM 100 MG/ML
40 INJECTION SUBCUTANEOUS EVERY 24 HOURS
Refills: 0 | Status: DISCONTINUED | OUTPATIENT
Start: 2024-01-01 | End: 2024-01-01

## 2024-01-01 RX ORDER — NYSTATIN 500MM UNIT
500000 POWDER (EA) MISCELLANEOUS EVERY 6 HOURS
Refills: 0 | Status: DISCONTINUED | OUTPATIENT
Start: 2024-01-01 | End: 2024-01-01

## 2024-01-01 RX ORDER — ROSUVASTATIN CALCIUM 5 MG/1
0 TABLET ORAL
Qty: 0 | Refills: 1 | DISCHARGE

## 2024-01-01 RX ORDER — METRONIDAZOLE 500 MG
500 TABLET ORAL EVERY 8 HOURS
Refills: 0 | Status: DISCONTINUED | OUTPATIENT
Start: 2024-01-01 | End: 2024-01-01

## 2024-01-01 RX ORDER — APIXABAN 2.5 MG/1
1 TABLET, FILM COATED ORAL
Qty: 0 | Refills: 2 | DISCHARGE

## 2024-01-01 RX ORDER — HEPARIN SODIUM 5000 [USP'U]/ML
5000 INJECTION INTRAVENOUS; SUBCUTANEOUS EVERY 8 HOURS
Refills: 0 | Status: DISCONTINUED | OUTPATIENT
Start: 2024-01-01 | End: 2024-01-01

## 2024-01-01 RX ORDER — ONDANSETRON 8 MG/1
4 TABLET, FILM COATED ORAL EVERY 8 HOURS
Refills: 0 | Status: DISCONTINUED | OUTPATIENT
Start: 2024-01-01 | End: 2024-01-01

## 2024-01-01 RX ORDER — CEFEPIME 1 G/1
2000 INJECTION, POWDER, FOR SOLUTION INTRAMUSCULAR; INTRAVENOUS EVERY 12 HOURS
Refills: 0 | Status: ACTIVE | OUTPATIENT
Start: 2024-01-01

## 2024-01-01 RX ORDER — METHOCARBAMOL 500 MG/1
1000 TABLET, FILM COATED ORAL ONCE
Refills: 0 | Status: COMPLETED | OUTPATIENT
Start: 2024-01-01 | End: 2024-01-01

## 2024-01-01 RX ORDER — MAGNESIUM SULFATE 500 MG/ML
2 VIAL (ML) INJECTION ONCE
Refills: 0 | Status: COMPLETED | OUTPATIENT
Start: 2024-01-01 | End: 2024-01-01

## 2024-01-01 RX ORDER — FOLIC ACID 0.8 MG
1 TABLET ORAL DAILY
Refills: 0 | Status: ACTIVE | OUTPATIENT
Start: 2024-01-01 | End: 2025-05-12

## 2024-01-01 RX ORDER — PIPERACILLIN AND TAZOBACTAM 4; .5 G/20ML; G/20ML
3.38 INJECTION, POWDER, LYOPHILIZED, FOR SOLUTION INTRAVENOUS EVERY 8 HOURS
Refills: 0 | Status: DISCONTINUED | OUTPATIENT
Start: 2024-01-01 | End: 2024-01-01

## 2024-01-01 RX ORDER — KETOROLAC TROMETHAMINE 30 MG/ML
15 SYRINGE (ML) INJECTION ONCE
Refills: 0 | Status: DISCONTINUED | OUTPATIENT
Start: 2024-01-01 | End: 2024-01-01

## 2024-01-01 RX ORDER — PROPRANOLOL HCL 160 MG
1 CAPSULE, EXTENDED RELEASE 24HR ORAL
Qty: 0 | Refills: 0 | DISCHARGE
Start: 2024-01-01

## 2024-01-01 RX ORDER — SENNA PLUS 8.6 MG/1
2 TABLET ORAL AT BEDTIME
Refills: 0 | Status: DISCONTINUED | OUTPATIENT
Start: 2024-01-01 | End: 2024-01-01

## 2024-01-01 RX ORDER — METHOCARBAMOL 500 MG/1
500 TABLET, FILM COATED ORAL EVERY 6 HOURS
Refills: 0 | Status: DISCONTINUED | OUTPATIENT
Start: 2024-01-01 | End: 2024-01-01

## 2024-01-01 RX ORDER — CHLORHEXIDINE GLUCONATE 213 G/1000ML
15 SOLUTION TOPICAL
Refills: 0 | Status: ACTIVE | OUTPATIENT
Start: 2024-01-01 | End: 2025-05-12

## 2024-01-01 RX ORDER — OLMESARTAN MEDOXOMIL-HYDROCHLOROTHIAZIDE 25; 40 MG/1; MG/1
0 TABLET, FILM COATED ORAL
Qty: 0 | Refills: 0 | DISCHARGE

## 2024-01-01 RX ORDER — OLMESARTAN MEDOXOMIL AND HYDROCHLOROTHIAZIDE 40; 25 MG/1; MG/1
40-25 TABLET ORAL DAILY
Qty: 90 | Refills: 3 | Status: ACTIVE | COMMUNITY
Start: 2022-11-22 | End: 1900-01-01

## 2024-01-01 RX ORDER — PIPERACILLIN AND TAZOBACTAM 4; .5 G/20ML; G/20ML
3.38 INJECTION, POWDER, LYOPHILIZED, FOR SOLUTION INTRAVENOUS EVERY 8 HOURS
Refills: 0 | Status: COMPLETED | OUTPATIENT
Start: 2024-01-01 | End: 2024-01-01

## 2024-01-01 RX ORDER — MAGNESIUM SULFATE 500 MG/ML
1 VIAL (ML) INJECTION ONCE
Refills: 0 | Status: COMPLETED | OUTPATIENT
Start: 2024-01-01 | End: 2024-01-01

## 2024-01-01 RX ORDER — SODIUM CHLORIDE 9 MG/ML
1000 INJECTION INTRAMUSCULAR; INTRAVENOUS; SUBCUTANEOUS
Refills: 0 | Status: DISCONTINUED | OUTPATIENT
Start: 2024-01-01 | End: 2024-01-01

## 2024-01-01 RX ORDER — ROCURONIUM BROMIDE 10 MG/ML
100 VIAL (ML) INTRAVENOUS ONCE
Refills: 0 | Status: COMPLETED | OUTPATIENT
Start: 2024-01-01 | End: 2024-01-01

## 2024-01-01 RX ORDER — INSULIN HUMAN 100 [IU]/ML
10 INJECTION, SOLUTION SUBCUTANEOUS ONCE
Refills: 0 | Status: COMPLETED | OUTPATIENT
Start: 2024-01-01 | End: 2024-01-01

## 2024-01-01 RX ORDER — THIAMINE MONONITRATE (VIT B1) 100 MG
100 TABLET ORAL DAILY
Refills: 0 | Status: ACTIVE | OUTPATIENT
Start: 2024-01-01 | End: 2025-05-12

## 2024-01-01 RX ORDER — TAMSULOSIN HYDROCHLORIDE 0.4 MG/1
0.4 CAPSULE ORAL AT BEDTIME
Refills: 0 | Status: DISCONTINUED | OUTPATIENT
Start: 2024-01-01 | End: 2024-01-01

## 2024-01-01 RX ORDER — SODIUM CHLORIDE 9 MG/ML
4 INJECTION INTRAMUSCULAR; INTRAVENOUS; SUBCUTANEOUS EVERY 12 HOURS
Refills: 0 | Status: DISCONTINUED | OUTPATIENT
Start: 2024-01-01 | End: 2024-01-01

## 2024-01-01 RX ORDER — SODIUM CHLORIDE 9 MG/ML
1000 INJECTION, SOLUTION INTRAVENOUS ONCE
Refills: 0 | Status: COMPLETED | OUTPATIENT
Start: 2024-01-01 | End: 2024-01-01

## 2024-01-01 RX ORDER — CEFEPIME 1 G/1
INJECTION, POWDER, FOR SOLUTION INTRAMUSCULAR; INTRAVENOUS
Refills: 0 | Status: DISCONTINUED | OUTPATIENT
Start: 2024-01-01 | End: 2024-01-01

## 2024-01-01 RX ORDER — OXYCODONE HYDROCHLORIDE 5 MG/1
5 TABLET ORAL EVERY 6 HOURS
Refills: 0 | Status: DISCONTINUED | OUTPATIENT
Start: 2024-01-01 | End: 2024-01-01

## 2024-01-01 RX ORDER — ENOXAPARIN SODIUM 100 MG/ML
53 INJECTION SUBCUTANEOUS EVERY 12 HOURS
Refills: 0 | Status: DISCONTINUED | OUTPATIENT
Start: 2024-01-01 | End: 2024-01-01

## 2024-01-01 RX ORDER — DEXTROSE 50 % IN WATER 50 %
50 SYRINGE (ML) INTRAVENOUS ONCE
Refills: 0 | Status: COMPLETED | OUTPATIENT
Start: 2024-01-01 | End: 2024-01-01

## 2024-01-01 RX ORDER — PIPERACILLIN AND TAZOBACTAM 4; .5 G/20ML; G/20ML
3.38 INJECTION, POWDER, LYOPHILIZED, FOR SOLUTION INTRAVENOUS ONCE
Refills: 0 | Status: COMPLETED | OUTPATIENT
Start: 2024-01-01 | End: 2024-01-01

## 2024-01-01 RX ORDER — SODIUM CHLORIDE 9 MG/ML
500 INJECTION INTRAMUSCULAR; INTRAVENOUS; SUBCUTANEOUS ONCE
Refills: 0 | Status: COMPLETED | OUTPATIENT
Start: 2024-01-01 | End: 2024-01-01

## 2024-01-01 RX ORDER — BACITRACIN ZINC 500 UNIT/G
1 OINTMENT IN PACKET (EA) TOPICAL
Refills: 0 | Status: DISCONTINUED | OUTPATIENT
Start: 2024-01-01 | End: 2024-01-01

## 2024-01-01 RX ORDER — SODIUM CHLORIDE 9 MG/ML
1000 INJECTION, SOLUTION INTRAVENOUS
Refills: 0 | Status: DISCONTINUED | OUTPATIENT
Start: 2024-01-01 | End: 2024-01-01

## 2024-01-01 RX ORDER — BACITRACIN ZINC 500 UNIT/G
1 OINTMENT IN PACKET (EA) TOPICAL
Qty: 0 | Refills: 0 | DISCHARGE
Start: 2024-01-01

## 2024-01-01 RX ORDER — PHENYLEPHRINE HYDROCHLORIDE 10 MG/ML
0.5 INJECTION INTRAVENOUS
Qty: 160 | Refills: 0 | Status: ACTIVE | OUTPATIENT
Start: 2024-01-01 | End: 2025-05-12

## 2024-01-01 RX ORDER — POTASSIUM CHLORIDE 20 MEQ
40 PACKET (EA) ORAL ONCE
Refills: 0 | Status: COMPLETED | OUTPATIENT
Start: 2024-01-01 | End: 2024-01-01

## 2024-01-01 RX ORDER — HYDROMORPHONE HYDROCHLORIDE 2 MG/ML
0.25 INJECTION INTRAMUSCULAR; INTRAVENOUS; SUBCUTANEOUS ONCE
Refills: 0 | Status: DISCONTINUED | OUTPATIENT
Start: 2024-01-01 | End: 2024-01-01

## 2024-01-01 RX ORDER — SENNA PLUS 8.6 MG/1
2 TABLET ORAL AT BEDTIME
Refills: 0 | Status: ACTIVE | OUTPATIENT
Start: 2024-01-01 | End: 2025-05-12

## 2024-01-01 RX ORDER — POTASSIUM CHLORIDE 20 MEQ
20 PACKET (EA) ORAL ONCE
Refills: 0 | Status: COMPLETED | OUTPATIENT
Start: 2024-01-01 | End: 2024-01-01

## 2024-01-01 RX ORDER — SODIUM CHLORIDE 9 MG/ML
500 INJECTION, SOLUTION INTRAVENOUS
Refills: 0 | Status: DISCONTINUED | OUTPATIENT
Start: 2024-01-01 | End: 2024-01-01

## 2024-01-01 RX ORDER — ATORVASTATIN CALCIUM 80 MG/1
80 TABLET, FILM COATED ORAL AT BEDTIME
Refills: 0 | Status: DISCONTINUED | OUTPATIENT
Start: 2024-01-01 | End: 2024-01-01

## 2024-01-01 RX ORDER — ONDANSETRON 8 MG/1
4 TABLET, FILM COATED ORAL ONCE
Refills: 0 | Status: COMPLETED | OUTPATIENT
Start: 2024-01-01 | End: 2024-01-01

## 2024-01-01 RX ORDER — POTASSIUM CHLORIDE 20 MEQ
20 PACKET (EA) ORAL
Refills: 0 | Status: COMPLETED | OUTPATIENT
Start: 2024-01-01 | End: 2024-01-01

## 2024-01-01 RX ORDER — ATORVASTATIN CALCIUM 80 MG/1
80 TABLET, FILM COATED ORAL AT BEDTIME
Refills: 0 | Status: ACTIVE | OUTPATIENT
Start: 2024-01-01 | End: 2025-05-12

## 2024-01-01 RX ORDER — PANTOPRAZOLE SODIUM 20 MG/1
40 TABLET, DELAYED RELEASE ORAL
Refills: 0 | Status: DISCONTINUED | OUTPATIENT
Start: 2024-01-01 | End: 2024-01-01

## 2024-01-01 RX ORDER — ENOXAPARIN SODIUM 100 MG/ML
30 INJECTION SUBCUTANEOUS EVERY 12 HOURS
Refills: 0 | Status: DISCONTINUED | OUTPATIENT
Start: 2024-01-01 | End: 2024-01-01

## 2024-01-01 RX ORDER — ROSUVASTATIN CALCIUM 5 MG/1
1 TABLET ORAL
Refills: 0 | DISCHARGE

## 2024-01-01 RX ORDER — MECLIZINE HCL 12.5 MG
12.5 TABLET ORAL ONCE
Refills: 0 | Status: DISCONTINUED | OUTPATIENT
Start: 2024-01-01 | End: 2024-01-01

## 2024-01-01 RX ORDER — NICOTINE POLACRILEX 2 MG
1 GUM BUCCAL ONCE
Refills: 0 | Status: COMPLETED | OUTPATIENT
Start: 2024-01-01 | End: 2024-01-01

## 2024-01-01 RX ORDER — DULOXETINE HYDROCHLORIDE 30 MG/1
60 CAPSULE, DELAYED RELEASE ORAL DAILY
Refills: 0 | Status: DISCONTINUED | OUTPATIENT
Start: 2024-01-01 | End: 2024-01-01

## 2024-01-01 RX ORDER — IPRATROPIUM/ALBUTEROL SULFATE 18-103MCG
3 AEROSOL WITH ADAPTER (GRAM) INHALATION
Qty: 0 | Refills: 0 | DISCHARGE
Start: 2024-01-01

## 2024-01-01 RX ORDER — METRONIDAZOLE 500 MG
500 TABLET ORAL ONCE
Refills: 0 | Status: COMPLETED | OUTPATIENT
Start: 2024-01-01 | End: 2024-01-01

## 2024-01-01 RX ORDER — ACETAMINOPHEN 500 MG
2 TABLET ORAL
Qty: 0 | Refills: 0 | DISCHARGE
Start: 2024-01-01

## 2024-01-01 RX ORDER — OXYCODONE HYDROCHLORIDE 5 MG/1
2.5 TABLET ORAL EVERY 6 HOURS
Refills: 0 | Status: DISCONTINUED | OUTPATIENT
Start: 2024-01-01 | End: 2024-01-01

## 2024-01-01 RX ORDER — PHENYLEPHRINE HYDROCHLORIDE 10 MG/ML
0.1 INJECTION INTRAVENOUS
Qty: 40 | Refills: 0 | Status: DISCONTINUED | OUTPATIENT
Start: 2024-01-01 | End: 2024-01-01

## 2024-01-01 RX ORDER — SODIUM BICARBONATE 1 MEQ/ML
100 SYRINGE (ML) INTRAVENOUS ONCE
Refills: 0 | Status: COMPLETED | OUTPATIENT
Start: 2024-01-01 | End: 2024-01-01

## 2024-01-01 RX ORDER — NICOTINE POLACRILEX 2 MG
1 GUM BUCCAL DAILY
Refills: 0 | Status: DISCONTINUED | OUTPATIENT
Start: 2024-01-01 | End: 2024-01-01

## 2024-01-01 RX ORDER — FUROSEMIDE 40 MG
20 TABLET ORAL ONCE
Refills: 0 | Status: DISCONTINUED | OUTPATIENT
Start: 2024-01-01 | End: 2024-01-01

## 2024-01-01 RX ORDER — MAGNESIUM OXIDE 400 MG ORAL TABLET 241.3 MG
400 TABLET ORAL ONCE
Refills: 0 | Status: COMPLETED | OUTPATIENT
Start: 2024-01-01 | End: 2024-01-01

## 2024-01-01 RX ORDER — SODIUM CHLORIDE 9 MG/ML
1000 INJECTION INTRAMUSCULAR; INTRAVENOUS; SUBCUTANEOUS ONCE
Refills: 0 | Status: COMPLETED | OUTPATIENT
Start: 2024-01-01 | End: 2024-01-01

## 2024-01-01 RX ORDER — PANTOPRAZOLE SODIUM 20 MG/1
40 TABLET, DELAYED RELEASE ORAL DAILY
Refills: 0 | Status: DISCONTINUED | OUTPATIENT
Start: 2024-01-01 | End: 2024-01-01

## 2024-01-01 RX ORDER — HEPARIN SODIUM 5000 [USP'U]/ML
INJECTION INTRAVENOUS; SUBCUTANEOUS
Qty: 25000 | Refills: 0 | Status: DISCONTINUED | OUTPATIENT
Start: 2024-01-01 | End: 2024-01-01

## 2024-01-01 RX ORDER — FAMOTIDINE 10 MG/ML
20 INJECTION INTRAVENOUS ONCE
Refills: 0 | Status: COMPLETED | OUTPATIENT
Start: 2024-01-01 | End: 2024-01-01

## 2024-01-01 RX ORDER — POLYETHYLENE GLYCOL 3350 17 G/17G
17 POWDER, FOR SOLUTION ORAL EVERY 24 HOURS
Refills: 0 | Status: DISCONTINUED | OUTPATIENT
Start: 2024-01-01 | End: 2024-01-01

## 2024-01-01 RX ORDER — CEFEPIME 1 G/1
2000 INJECTION, POWDER, FOR SOLUTION INTRAMUSCULAR; INTRAVENOUS ONCE
Refills: 0 | Status: COMPLETED | OUTPATIENT
Start: 2024-01-01 | End: 2024-01-01

## 2024-01-01 RX ORDER — LIDOCAINE 4 G/100G
1 CREAM TOPICAL DAILY
Refills: 0 | Status: DISCONTINUED | OUTPATIENT
Start: 2024-01-01 | End: 2024-01-01

## 2024-01-01 RX ORDER — FOLIC ACID 0.8 MG
1 TABLET ORAL DAILY
Refills: 0 | Status: DISCONTINUED | OUTPATIENT
Start: 2024-01-01 | End: 2024-01-01

## 2024-01-01 RX ORDER — VASOPRESSIN 20 [USP'U]/ML
0.04 INJECTION INTRAVENOUS
Qty: 40 | Refills: 0 | Status: ACTIVE | OUTPATIENT
Start: 2024-01-01 | End: 2025-05-12

## 2024-01-01 RX ORDER — VANCOMYCIN HCL 1 G
1000 VIAL (EA) INTRAVENOUS EVERY 24 HOURS
Refills: 0 | Status: DISCONTINUED | OUTPATIENT
Start: 2024-01-01 | End: 2024-01-01

## 2024-01-01 RX ORDER — LANOLIN ALCOHOL/MO/W.PET/CERES
1 CREAM (GRAM) TOPICAL
Qty: 0 | Refills: 0 | DISCHARGE
Start: 2024-01-01

## 2024-01-01 RX ORDER — SODIUM BICARBONATE 1 MEQ/ML
50 SYRINGE (ML) INTRAVENOUS ONCE
Refills: 0 | Status: COMPLETED | OUTPATIENT
Start: 2024-01-01 | End: 2024-01-01

## 2024-01-01 RX ORDER — TIZANIDINE 4 MG/1
2 TABLET ORAL
Qty: 30 | Refills: 0
Start: 2024-01-01 | End: 2024-01-01

## 2024-01-01 RX ORDER — PANTOPRAZOLE SODIUM 20 MG/1
1 TABLET, DELAYED RELEASE ORAL
Refills: 0 | DISCHARGE

## 2024-01-01 RX ORDER — FOLIC ACID 0.8 MG
1 TABLET ORAL
Qty: 0 | Refills: 0 | DISCHARGE
Start: 2024-01-01

## 2024-01-01 RX ORDER — POLYETHYLENE GLYCOL 3350 17 G/17G
17 POWDER, FOR SOLUTION ORAL EVERY 12 HOURS
Refills: 0 | Status: DISCONTINUED | OUTPATIENT
Start: 2024-01-01 | End: 2024-01-01

## 2024-01-01 RX ORDER — NOREPINEPHRINE BITARTRATE/D5W 8 MG/250ML
0.05 PLASTIC BAG, INJECTION (ML) INTRAVENOUS
Qty: 8 | Refills: 0 | Status: DISCONTINUED | OUTPATIENT
Start: 2024-01-01 | End: 2024-01-01

## 2024-01-01 RX ORDER — NOREPINEPHRINE BITARTRATE/D5W 8 MG/250ML
0.05 PLASTIC BAG, INJECTION (ML) INTRAVENOUS
Qty: 32 | Refills: 0 | Status: DISCONTINUED | OUTPATIENT
Start: 2024-01-01 | End: 2024-01-01

## 2024-01-01 RX ORDER — LIDOCAINE 4 G/100G
1 CREAM TOPICAL
Qty: 0 | Refills: 0 | DISCHARGE
Start: 2024-01-01

## 2024-01-01 RX ORDER — POLYETHYLENE GLYCOL 3350 17 G/17G
17 POWDER, FOR SOLUTION ORAL DAILY
Refills: 0 | Status: ACTIVE | OUTPATIENT
Start: 2024-01-01 | End: 2025-05-12

## 2024-01-01 RX ORDER — SODIUM CHLORIDE 5 G/100ML
1000 INJECTION, SOLUTION INTRAVENOUS
Refills: 0 | Status: DISCONTINUED | OUTPATIENT
Start: 2024-01-01 | End: 2024-01-01

## 2024-01-01 RX ORDER — CIPROFLOXACIN LACTATE 400MG/40ML
400 VIAL (ML) INTRAVENOUS EVERY 12 HOURS
Refills: 0 | Status: DISCONTINUED | OUTPATIENT
Start: 2024-01-01 | End: 2024-01-01

## 2024-01-01 RX ORDER — VANCOMYCIN HCL 1 G
1250 VIAL (EA) INTRAVENOUS ONCE
Refills: 0 | Status: COMPLETED | OUTPATIENT
Start: 2024-01-01 | End: 2024-01-01

## 2024-01-01 RX ORDER — DULOXETINE HYDROCHLORIDE 30 MG/1
1 CAPSULE, DELAYED RELEASE ORAL
Qty: 0 | Refills: 0 | DISCHARGE

## 2024-01-01 RX ORDER — TRAMADOL HYDROCHLORIDE 50 MG/1
25 TABLET ORAL EVERY 6 HOURS
Refills: 0 | Status: DISCONTINUED | OUTPATIENT
Start: 2024-01-01 | End: 2024-01-01

## 2024-01-01 RX ORDER — CHLORHEXIDINE GLUCONATE 213 G/1000ML
1 SOLUTION TOPICAL
Refills: 0 | Status: ACTIVE | OUTPATIENT
Start: 2024-01-01 | End: 2025-05-12

## 2024-01-01 RX ORDER — METRONIDAZOLE 500 MG
1 TABLET ORAL
Qty: 12 | Refills: 0
Start: 2024-01-01 | End: 2024-01-01

## 2024-01-01 RX ORDER — ACETAMINOPHEN 500 MG
1000 TABLET ORAL ONCE
Refills: 0 | Status: COMPLETED | OUTPATIENT
Start: 2024-01-01 | End: 2024-01-01

## 2024-01-01 RX ORDER — ETOMIDATE 2 MG/ML
20 INJECTION INTRAVENOUS ONCE
Refills: 0 | Status: COMPLETED | OUTPATIENT
Start: 2024-01-01 | End: 2024-01-01

## 2024-01-01 RX ORDER — POTASSIUM CHLORIDE 20 MEQ
20 PACKET (EA) ORAL
Refills: 0 | Status: DISCONTINUED | OUTPATIENT
Start: 2024-01-01 | End: 2024-01-01

## 2024-01-01 RX ORDER — METRONIDAZOLE 500 MG
TABLET ORAL
Refills: 0 | Status: DISCONTINUED | OUTPATIENT
Start: 2024-01-01 | End: 2024-01-01

## 2024-01-01 RX ORDER — METOPROLOL TARTRATE 50 MG
5 TABLET ORAL ONCE
Refills: 0 | Status: COMPLETED | OUTPATIENT
Start: 2024-01-01 | End: 2024-01-01

## 2024-01-01 RX ORDER — ONDANSETRON 8 MG/1
4 TABLET, FILM COATED ORAL EVERY 8 HOURS
Refills: 0 | Status: ACTIVE | OUTPATIENT
Start: 2024-01-01 | End: 2025-05-12

## 2024-01-01 RX ORDER — SODIUM CHLORIDE 9 MG/ML
2000 INJECTION, SOLUTION INTRAVENOUS ONCE
Refills: 0 | Status: COMPLETED | OUTPATIENT
Start: 2024-01-01 | End: 2024-01-01

## 2024-01-01 RX ORDER — SODIUM CHLORIDE 9 MG/ML
1 INJECTION INTRAMUSCULAR; INTRAVENOUS; SUBCUTANEOUS EVERY 8 HOURS
Refills: 0 | Status: DISCONTINUED | OUTPATIENT
Start: 2024-01-01 | End: 2024-01-01

## 2024-01-01 RX ORDER — CEFEPIME 1 G/1
INJECTION, POWDER, FOR SOLUTION INTRAMUSCULAR; INTRAVENOUS
Refills: 0 | Status: ACTIVE | OUTPATIENT
Start: 2024-01-01

## 2024-01-01 RX ORDER — POTASSIUM PHOSPHATE, MONOBASIC POTASSIUM PHOSPHATE, DIBASIC 236; 224 MG/ML; MG/ML
15 INJECTION, SOLUTION INTRAVENOUS ONCE
Refills: 0 | Status: COMPLETED | OUTPATIENT
Start: 2024-01-01 | End: 2024-01-01

## 2024-01-01 RX ORDER — OLANZAPINE 15 MG/1
2.5 TABLET, FILM COATED ORAL ONCE
Refills: 0 | Status: COMPLETED | OUTPATIENT
Start: 2024-01-01 | End: 2024-01-01

## 2024-01-01 RX ORDER — ACETAMINOPHEN 500 MG
650 TABLET ORAL EVERY 6 HOURS
Refills: 0 | Status: DISCONTINUED | OUTPATIENT
Start: 2024-01-01 | End: 2024-01-01

## 2024-01-01 RX ORDER — METRONIDAZOLE 500 MG
500 TABLET ORAL EVERY 8 HOURS
Refills: 0 | Status: ACTIVE | OUTPATIENT
Start: 2024-01-01 | End: 2025-05-12

## 2024-01-01 RX ORDER — FUROSEMIDE 40 MG
20 TABLET ORAL ONCE
Refills: 0 | Status: COMPLETED | OUTPATIENT
Start: 2024-01-01 | End: 2024-01-01

## 2024-01-01 RX ORDER — GABAPENTIN 400 MG/1
0 CAPSULE ORAL
Qty: 0 | Refills: 0 | DISCHARGE

## 2024-01-01 RX ORDER — THIAMINE MONONITRATE (VIT B1) 100 MG
1 TABLET ORAL
Qty: 0 | Refills: 0 | DISCHARGE
Start: 2024-01-01

## 2024-01-01 RX ORDER — MORPHINE SULFATE 50 MG/1
2 CAPSULE, EXTENDED RELEASE ORAL ONCE
Refills: 0 | Status: DISCONTINUED | OUTPATIENT
Start: 2024-01-01 | End: 2024-01-01

## 2024-01-01 RX ORDER — SODIUM BICARBONATE 1 MEQ/ML
0.42 SYRINGE (ML) INTRAVENOUS
Qty: 150 | Refills: 0 | Status: ACTIVE | OUTPATIENT
Start: 2024-01-01 | End: 2025-05-12

## 2024-01-01 RX ORDER — CIPROFLOXACIN LACTATE 400MG/40ML
400 VIAL (ML) INTRAVENOUS ONCE
Refills: 0 | Status: COMPLETED | OUTPATIENT
Start: 2024-01-01 | End: 2024-01-01

## 2024-01-01 RX ORDER — FENTANYL CITRATE 50 UG/ML
0.5 INJECTION INTRAVENOUS
Qty: 2500 | Refills: 0 | Status: DISCONTINUED | OUTPATIENT
Start: 2024-01-01 | End: 2024-01-01

## 2024-01-01 RX ORDER — CHLORHEXIDINE GLUCONATE 213 G/1000ML
1 SOLUTION TOPICAL
Refills: 0 | Status: DISCONTINUED | OUTPATIENT
Start: 2024-01-01 | End: 2024-01-01

## 2024-01-01 RX ORDER — OLMESARTAN MEDOXOMIL-HYDROCHLOROTHIAZIDE 25; 40 MG/1; MG/1
1 TABLET, FILM COATED ORAL
Refills: 0 | DISCHARGE

## 2024-01-01 RX ORDER — HYDROCORTISONE 20 MG
50 TABLET ORAL EVERY 6 HOURS
Refills: 0 | Status: ACTIVE | OUTPATIENT
Start: 2024-01-01 | End: 2025-05-12

## 2024-01-01 RX ORDER — SODIUM CHLORIDE 9 MG/ML
250 INJECTION INTRAMUSCULAR; INTRAVENOUS; SUBCUTANEOUS ONCE
Refills: 0 | Status: COMPLETED | OUTPATIENT
Start: 2024-01-01 | End: 2024-01-01

## 2024-01-01 RX ORDER — CEFTRIAXONE 500 MG/1
1000 INJECTION, POWDER, FOR SOLUTION INTRAMUSCULAR; INTRAVENOUS EVERY 24 HOURS
Refills: 0 | Status: COMPLETED | OUTPATIENT
Start: 2024-01-01 | End: 2024-01-01

## 2024-01-01 RX ORDER — IBUPROFEN 200 MG
1 TABLET ORAL
Qty: 40 | Refills: 0
Start: 2024-01-01 | End: 2024-01-01

## 2024-01-01 RX ORDER — PANTOPRAZOLE SODIUM 20 MG/1
40 TABLET, DELAYED RELEASE ORAL DAILY
Refills: 0 | Status: ACTIVE | OUTPATIENT
Start: 2024-01-01 | End: 2025-05-12

## 2024-01-01 RX ORDER — AMLODIPINE BESYLATE 2.5 MG/1
5 TABLET ORAL DAILY
Refills: 0 | Status: DISCONTINUED | OUTPATIENT
Start: 2024-01-01 | End: 2024-01-01

## 2024-01-01 RX ORDER — VASOPRESSIN 20 [USP'U]/ML
0.02 INJECTION INTRAVENOUS
Qty: 40 | Refills: 0 | Status: DISCONTINUED | OUTPATIENT
Start: 2024-01-01 | End: 2024-01-01

## 2024-01-01 RX ORDER — IPRATROPIUM/ALBUTEROL SULFATE 18-103MCG
3 AEROSOL WITH ADAPTER (GRAM) INHALATION EVERY 6 HOURS
Refills: 0 | Status: DISCONTINUED | OUTPATIENT
Start: 2024-01-01 | End: 2024-01-01

## 2024-01-01 RX ORDER — SODIUM CHLORIDE 9 MG/ML
500 INJECTION, SOLUTION INTRAVENOUS ONCE
Refills: 0 | Status: COMPLETED | OUTPATIENT
Start: 2024-01-01 | End: 2024-01-01

## 2024-01-01 RX ORDER — LANOLIN ALCOHOL/MO/W.PET/CERES
3 CREAM (GRAM) TOPICAL AT BEDTIME
Refills: 0 | Status: DISCONTINUED | OUTPATIENT
Start: 2024-01-01 | End: 2024-01-01

## 2024-01-01 RX ORDER — POTASSIUM CHLORIDE 20 MEQ
40 PACKET (EA) ORAL EVERY 4 HOURS
Refills: 0 | Status: COMPLETED | OUTPATIENT
Start: 2024-01-01 | End: 2024-01-01

## 2024-01-01 RX ORDER — NOREPINEPHRINE BITARTRATE/D5W 8 MG/250ML
0.05 PLASTIC BAG, INJECTION (ML) INTRAVENOUS
Qty: 16 | Refills: 0 | Status: DISCONTINUED | OUTPATIENT
Start: 2024-01-01 | End: 2024-01-01

## 2024-01-01 RX ORDER — THIAMINE MONONITRATE (VIT B1) 100 MG
100 TABLET ORAL DAILY
Refills: 0 | Status: DISCONTINUED | OUTPATIENT
Start: 2024-01-01 | End: 2024-01-01

## 2024-01-01 RX ORDER — TAMSULOSIN HYDROCHLORIDE 0.4 MG/1
1 CAPSULE ORAL
Qty: 0 | Refills: 0 | DISCHARGE
Start: 2024-01-01

## 2024-01-01 RX ORDER — ENOXAPARIN SODIUM 100 MG/ML
30 INJECTION SUBCUTANEOUS
Qty: 0 | Refills: 0 | DISCHARGE
Start: 2024-01-01

## 2024-01-01 RX ORDER — FENTANYL CITRATE 50 UG/ML
0.5 INJECTION INTRAVENOUS
Qty: 2500 | Refills: 0 | Status: ACTIVE | OUTPATIENT
Start: 2024-01-01 | End: 2024-06-20

## 2024-01-01 RX ORDER — VANCOMYCIN HCL 1 G
2000 VIAL (EA) INTRAVENOUS EVERY 12 HOURS
Refills: 0 | Status: DISCONTINUED | OUTPATIENT
Start: 2024-01-01 | End: 2024-01-01

## 2024-01-01 RX ORDER — GABAPENTIN 400 MG/1
100 CAPSULE ORAL EVERY 8 HOURS
Refills: 0 | Status: DISCONTINUED | OUTPATIENT
Start: 2024-01-01 | End: 2024-01-01

## 2024-01-01 RX ORDER — CIPROFLOXACIN LACTATE 400MG/40ML
1 VIAL (ML) INTRAVENOUS
Qty: 8 | Refills: 0
Start: 2024-01-01 | End: 2024-01-01

## 2024-01-01 RX ADMIN — ENOXAPARIN SODIUM 30 MILLIGRAM(S): 100 INJECTION SUBCUTANEOUS at 06:08

## 2024-01-01 RX ADMIN — Medication 20 MILLIGRAM(S): at 22:28

## 2024-01-01 RX ADMIN — Medication 650 MILLIGRAM(S): at 18:35

## 2024-01-01 RX ADMIN — CHLORHEXIDINE GLUCONATE 1 APPLICATION(S): 213 SOLUTION TOPICAL at 05:18

## 2024-01-01 RX ADMIN — Medication 20 MILLIGRAM(S): at 11:31

## 2024-01-01 RX ADMIN — Medication 25 GRAM(S): at 01:09

## 2024-01-01 RX ADMIN — CEFEPIME 100 MILLIGRAM(S): 1 INJECTION, POWDER, FOR SOLUTION INTRAMUSCULAR; INTRAVENOUS at 18:16

## 2024-01-01 RX ADMIN — Medication 3 MILLILITER(S): at 07:50

## 2024-01-01 RX ADMIN — PANTOPRAZOLE SODIUM 40 MILLIGRAM(S): 20 TABLET, DELAYED RELEASE ORAL at 05:48

## 2024-01-01 RX ADMIN — AMLODIPINE BESYLATE 5 MILLIGRAM(S): 2.5 TABLET ORAL at 05:36

## 2024-01-01 RX ADMIN — PHENYLEPHRINE HYDROCHLORIDE 2.01 MICROGRAM(S)/KG/MIN: 10 INJECTION INTRAVENOUS at 10:38

## 2024-01-01 RX ADMIN — Medication 15 MILLIGRAM(S): at 12:18

## 2024-01-01 RX ADMIN — Medication 1 TABLET(S): at 11:12

## 2024-01-01 RX ADMIN — Medication 650 MILLIGRAM(S): at 11:25

## 2024-01-01 RX ADMIN — Medication 20 MILLIGRAM(S): at 06:14

## 2024-01-01 RX ADMIN — Medication 650 MILLIGRAM(S): at 12:51

## 2024-01-01 RX ADMIN — ENOXAPARIN SODIUM 30 MILLIGRAM(S): 100 INJECTION SUBCUTANEOUS at 08:54

## 2024-01-01 RX ADMIN — SODIUM CHLORIDE 4 MILLILITER(S): 9 INJECTION INTRAMUSCULAR; INTRAVENOUS; SUBCUTANEOUS at 20:25

## 2024-01-01 RX ADMIN — PIPERACILLIN AND TAZOBACTAM 25 GRAM(S): 4; .5 INJECTION, POWDER, LYOPHILIZED, FOR SOLUTION INTRAVENOUS at 21:11

## 2024-01-01 RX ADMIN — Medication 20 MILLIGRAM(S): at 11:56

## 2024-01-01 RX ADMIN — Medication 20 MILLIGRAM(S): at 06:15

## 2024-01-01 RX ADMIN — POLYETHYLENE GLYCOL 3350 17 GRAM(S): 17 POWDER, FOR SOLUTION ORAL at 11:45

## 2024-01-01 RX ADMIN — Medication 500000 UNIT(S): at 05:35

## 2024-01-01 RX ADMIN — Medication 650 MILLIGRAM(S): at 11:22

## 2024-01-01 RX ADMIN — Medication 500000 UNIT(S): at 01:35

## 2024-01-01 RX ADMIN — DULOXETINE HYDROCHLORIDE 60 MILLIGRAM(S): 30 CAPSULE, DELAYED RELEASE ORAL at 11:12

## 2024-01-01 RX ADMIN — Medication 1 PATCH: at 19:30

## 2024-01-01 RX ADMIN — ENOXAPARIN SODIUM 30 MILLIGRAM(S): 100 INJECTION SUBCUTANEOUS at 06:26

## 2024-01-01 RX ADMIN — AMLODIPINE BESYLATE 5 MILLIGRAM(S): 2.5 TABLET ORAL at 05:41

## 2024-01-01 RX ADMIN — Medication 1 PATCH: at 12:34

## 2024-01-01 RX ADMIN — Medication 1 APPLICATION(S): at 18:21

## 2024-01-01 RX ADMIN — Medication 1 TABLET(S): at 11:57

## 2024-01-01 RX ADMIN — CHLORHEXIDINE GLUCONATE 1 APPLICATION(S): 213 SOLUTION TOPICAL at 05:15

## 2024-01-01 RX ADMIN — Medication 1 TABLET(S): at 11:09

## 2024-01-01 RX ADMIN — GABAPENTIN 100 MILLIGRAM(S): 400 CAPSULE ORAL at 06:09

## 2024-01-01 RX ADMIN — Medication 650 MILLIGRAM(S): at 05:15

## 2024-01-01 RX ADMIN — Medication 650 MILLIGRAM(S): at 23:05

## 2024-01-01 RX ADMIN — Medication 650 MILLIGRAM(S): at 05:43

## 2024-01-01 RX ADMIN — ENOXAPARIN SODIUM 30 MILLIGRAM(S): 100 INJECTION SUBCUTANEOUS at 18:08

## 2024-01-01 RX ADMIN — Medication 650 MILLIGRAM(S): at 23:08

## 2024-01-01 RX ADMIN — ENOXAPARIN SODIUM 30 MILLIGRAM(S): 100 INJECTION SUBCUTANEOUS at 06:45

## 2024-01-01 RX ADMIN — DULOXETINE HYDROCHLORIDE 60 MILLIGRAM(S): 30 CAPSULE, DELAYED RELEASE ORAL at 11:36

## 2024-01-01 RX ADMIN — Medication 500000 UNIT(S): at 05:51

## 2024-01-01 RX ADMIN — Medication 3 MILLILITER(S): at 08:31

## 2024-01-01 RX ADMIN — ENOXAPARIN SODIUM 30 MILLIGRAM(S): 100 INJECTION SUBCUTANEOUS at 17:18

## 2024-01-01 RX ADMIN — SODIUM CHLORIDE 30 MILLILITER(S): 5 INJECTION, SOLUTION INTRAVENOUS at 23:30

## 2024-01-01 RX ADMIN — Medication 1 PATCH: at 05:25

## 2024-01-01 RX ADMIN — Medication 500000 UNIT(S): at 01:46

## 2024-01-01 RX ADMIN — PIPERACILLIN AND TAZOBACTAM 25 GRAM(S): 4; .5 INJECTION, POWDER, LYOPHILIZED, FOR SOLUTION INTRAVENOUS at 05:17

## 2024-01-01 RX ADMIN — CHLORHEXIDINE GLUCONATE 15 MILLILITER(S): 213 SOLUTION TOPICAL at 17:43

## 2024-01-01 RX ADMIN — ATORVASTATIN CALCIUM 80 MILLIGRAM(S): 80 TABLET, FILM COATED ORAL at 21:58

## 2024-01-01 RX ADMIN — Medication 650 MILLIGRAM(S): at 13:00

## 2024-01-01 RX ADMIN — SENNA PLUS 2 TABLET(S): 8.6 TABLET ORAL at 22:41

## 2024-01-01 RX ADMIN — LIDOCAINE 1 PATCH: 4 CREAM TOPICAL at 20:04

## 2024-01-01 RX ADMIN — Medication 6.56 MICROGRAM(S)/KG/MIN: at 17:55

## 2024-01-01 RX ADMIN — Medication 20 MILLIGRAM(S): at 21:54

## 2024-01-01 RX ADMIN — Medication 500000 UNIT(S): at 11:15

## 2024-01-01 RX ADMIN — Medication 650 MILLIGRAM(S): at 05:14

## 2024-01-01 RX ADMIN — METHOCARBAMOL 500 MILLIGRAM(S): 500 TABLET, FILM COATED ORAL at 23:43

## 2024-01-01 RX ADMIN — Medication 3 MILLIGRAM(S): at 22:28

## 2024-01-01 RX ADMIN — PIPERACILLIN AND TAZOBACTAM 25 GRAM(S): 4; .5 INJECTION, POWDER, LYOPHILIZED, FOR SOLUTION INTRAVENOUS at 13:46

## 2024-01-01 RX ADMIN — ENOXAPARIN SODIUM 30 MILLIGRAM(S): 100 INJECTION SUBCUTANEOUS at 05:26

## 2024-01-01 RX ADMIN — Medication 3 MILLILITER(S): at 21:19

## 2024-01-01 RX ADMIN — Medication 650 MILLIGRAM(S): at 11:52

## 2024-01-01 RX ADMIN — Medication 650 MILLIGRAM(S): at 11:46

## 2024-01-01 RX ADMIN — HEPARIN SODIUM 5000 UNIT(S): 5000 INJECTION INTRAVENOUS; SUBCUTANEOUS at 06:02

## 2024-01-01 RX ADMIN — HEPARIN SODIUM 5000 UNIT(S): 5000 INJECTION INTRAVENOUS; SUBCUTANEOUS at 05:14

## 2024-01-01 RX ADMIN — Medication 20 MILLIGRAM(S): at 21:24

## 2024-01-01 RX ADMIN — SENNA PLUS 2 TABLET(S): 8.6 TABLET ORAL at 21:54

## 2024-01-01 RX ADMIN — GABAPENTIN 100 MILLIGRAM(S): 400 CAPSULE ORAL at 15:18

## 2024-01-01 RX ADMIN — Medication 1 TABLET(S): at 12:23

## 2024-01-01 RX ADMIN — TAMSULOSIN HYDROCHLORIDE 0.4 MILLIGRAM(S): 0.4 CAPSULE ORAL at 22:06

## 2024-01-01 RX ADMIN — PANTOPRAZOLE SODIUM 40 MILLIGRAM(S): 20 TABLET, DELAYED RELEASE ORAL at 06:26

## 2024-01-01 RX ADMIN — SODIUM CHLORIDE 50 MILLILITER(S): 9 INJECTION, SOLUTION INTRAVENOUS at 11:45

## 2024-01-01 RX ADMIN — Medication 650 MILLIGRAM(S): at 11:10

## 2024-01-01 RX ADMIN — CHLORHEXIDINE GLUCONATE 1 APPLICATION(S): 213 SOLUTION TOPICAL at 05:36

## 2024-01-01 RX ADMIN — PIPERACILLIN AND TAZOBACTAM 25 GRAM(S): 4; .5 INJECTION, POWDER, LYOPHILIZED, FOR SOLUTION INTRAVENOUS at 14:33

## 2024-01-01 RX ADMIN — SODIUM CHLORIDE 50 MILLILITER(S): 9 INJECTION, SOLUTION INTRAVENOUS at 16:58

## 2024-01-01 RX ADMIN — SODIUM CHLORIDE 70 MILLILITER(S): 9 INJECTION INTRAMUSCULAR; INTRAVENOUS; SUBCUTANEOUS at 02:23

## 2024-01-01 RX ADMIN — Medication 1 PATCH: at 12:09

## 2024-01-01 RX ADMIN — PIPERACILLIN AND TAZOBACTAM 25 GRAM(S): 4; .5 INJECTION, POWDER, LYOPHILIZED, FOR SOLUTION INTRAVENOUS at 05:56

## 2024-01-01 RX ADMIN — LIDOCAINE 1 PATCH: 4 CREAM TOPICAL at 19:30

## 2024-01-01 RX ADMIN — CEFTRIAXONE 100 MILLIGRAM(S): 500 INJECTION, POWDER, FOR SOLUTION INTRAMUSCULAR; INTRAVENOUS at 10:26

## 2024-01-01 RX ADMIN — Medication 1 TABLET(S): at 12:32

## 2024-01-01 RX ADMIN — ENOXAPARIN SODIUM 30 MILLIGRAM(S): 100 INJECTION SUBCUTANEOUS at 05:49

## 2024-01-01 RX ADMIN — Medication 25 GRAM(S): at 03:44

## 2024-01-01 RX ADMIN — ENOXAPARIN SODIUM 30 MILLIGRAM(S): 100 INJECTION SUBCUTANEOUS at 05:42

## 2024-01-01 RX ADMIN — CHLORHEXIDINE GLUCONATE 1 APPLICATION(S): 213 SOLUTION TOPICAL at 05:28

## 2024-01-01 RX ADMIN — AMLODIPINE BESYLATE 5 MILLIGRAM(S): 2.5 TABLET ORAL at 06:27

## 2024-01-01 RX ADMIN — SODIUM CHLORIDE 4 MILLILITER(S): 9 INJECTION INTRAMUSCULAR; INTRAVENOUS; SUBCUTANEOUS at 08:21

## 2024-01-01 RX ADMIN — Medication 650 MILLIGRAM(S): at 00:47

## 2024-01-01 RX ADMIN — SENNA PLUS 2 TABLET(S): 8.6 TABLET ORAL at 21:25

## 2024-01-01 RX ADMIN — Medication 650 MILLIGRAM(S): at 05:33

## 2024-01-01 RX ADMIN — Medication 20 MILLIGRAM(S): at 11:46

## 2024-01-01 RX ADMIN — Medication 10 MILLIGRAM(S): at 17:23

## 2024-01-01 RX ADMIN — Medication 3 MILLILITER(S): at 13:47

## 2024-01-01 RX ADMIN — Medication 650 MILLIGRAM(S): at 13:29

## 2024-01-01 RX ADMIN — Medication 3 MILLIGRAM(S): at 21:24

## 2024-01-01 RX ADMIN — Medication 3 MILLILITER(S): at 20:25

## 2024-01-01 RX ADMIN — Medication 500000 UNIT(S): at 11:39

## 2024-01-01 RX ADMIN — Medication 650 MILLIGRAM(S): at 17:11

## 2024-01-01 RX ADMIN — Medication 1 PATCH: at 11:34

## 2024-01-01 RX ADMIN — OXYCODONE HYDROCHLORIDE 5 MILLIGRAM(S): 5 TABLET ORAL at 17:26

## 2024-01-01 RX ADMIN — Medication 3 MILLILITER(S): at 14:33

## 2024-01-01 RX ADMIN — Medication 50 MILLIGRAM(S): at 12:23

## 2024-01-01 RX ADMIN — LIDOCAINE 1 PATCH: 4 CREAM TOPICAL at 00:47

## 2024-01-01 RX ADMIN — CHLORHEXIDINE GLUCONATE 1 APPLICATION(S): 213 SOLUTION TOPICAL at 05:14

## 2024-01-01 RX ADMIN — Medication 650 MILLIGRAM(S): at 12:00

## 2024-01-01 RX ADMIN — Medication 10 MILLIGRAM(S): at 13:44

## 2024-01-01 RX ADMIN — Medication 650 MILLIGRAM(S): at 18:11

## 2024-01-01 RX ADMIN — Medication 200 MILLIGRAM(S): at 06:02

## 2024-01-01 RX ADMIN — Medication 100 MILLIGRAM(S): at 13:43

## 2024-01-01 RX ADMIN — AMLODIPINE BESYLATE 5 MILLIGRAM(S): 2.5 TABLET ORAL at 05:26

## 2024-01-01 RX ADMIN — Medication 100 MILLIEQUIVALENT(S): at 23:34

## 2024-01-01 RX ADMIN — Medication 650 MILLIGRAM(S): at 07:00

## 2024-01-01 RX ADMIN — ENOXAPARIN SODIUM 30 MILLIGRAM(S): 100 INJECTION SUBCUTANEOUS at 17:10

## 2024-01-01 RX ADMIN — CEFEPIME 100 MILLIGRAM(S): 1 INJECTION, POWDER, FOR SOLUTION INTRAMUSCULAR; INTRAVENOUS at 17:43

## 2024-01-01 RX ADMIN — LIDOCAINE 1 PATCH: 4 CREAM TOPICAL at 11:56

## 2024-01-01 RX ADMIN — ATORVASTATIN CALCIUM 80 MILLIGRAM(S): 80 TABLET, FILM COATED ORAL at 21:12

## 2024-01-01 RX ADMIN — Medication 3 MILLIGRAM(S): at 21:58

## 2024-01-01 RX ADMIN — Medication 100 MILLIGRAM(S): at 17:36

## 2024-01-01 RX ADMIN — Medication 1 PATCH: at 11:27

## 2024-01-01 RX ADMIN — Medication 63.75 MILLIMOLE(S): at 03:51

## 2024-01-01 RX ADMIN — Medication 100 MILLIGRAM(S): at 11:09

## 2024-01-01 RX ADMIN — Medication 20 MILLIGRAM(S): at 05:36

## 2024-01-01 RX ADMIN — Medication 650 MILLIGRAM(S): at 05:39

## 2024-01-01 RX ADMIN — Medication 20 MILLIGRAM(S): at 06:48

## 2024-01-01 RX ADMIN — Medication 1 MILLIGRAM(S): at 11:55

## 2024-01-01 RX ADMIN — Medication 500000 UNIT(S): at 23:31

## 2024-01-01 RX ADMIN — PHENYLEPHRINE HYDROCHLORIDE 2.01 MICROGRAM(S)/KG/MIN: 10 INJECTION INTRAVENOUS at 05:27

## 2024-01-01 RX ADMIN — Medication 50 MILLIGRAM(S): at 13:39

## 2024-01-01 RX ADMIN — Medication 650 MILLIGRAM(S): at 18:09

## 2024-01-01 RX ADMIN — Medication 3 MILLIGRAM(S): at 22:06

## 2024-01-01 RX ADMIN — Medication 3 MILLILITER(S): at 19:18

## 2024-01-01 RX ADMIN — Medication 500000 UNIT(S): at 11:14

## 2024-01-01 RX ADMIN — Medication 20 MILLIGRAM(S): at 12:36

## 2024-01-01 RX ADMIN — Medication 20 MILLIGRAM(S): at 06:16

## 2024-01-01 RX ADMIN — POLYETHYLENE GLYCOL 3350 17 GRAM(S): 17 POWDER, FOR SOLUTION ORAL at 11:24

## 2024-01-01 RX ADMIN — Medication 3 MILLILITER(S): at 02:00

## 2024-01-01 RX ADMIN — Medication 20 MILLIGRAM(S): at 17:18

## 2024-01-01 RX ADMIN — Medication 500000 UNIT(S): at 05:48

## 2024-01-01 RX ADMIN — Medication 1 PATCH: at 11:45

## 2024-01-01 RX ADMIN — Medication 650 MILLIGRAM(S): at 17:26

## 2024-01-01 RX ADMIN — SODIUM CHLORIDE 1000 MILLILITER(S): 9 INJECTION INTRAMUSCULAR; INTRAVENOUS; SUBCUTANEOUS at 15:46

## 2024-01-01 RX ADMIN — SODIUM CHLORIDE 4 MILLILITER(S): 9 INJECTION INTRAMUSCULAR; INTRAVENOUS; SUBCUTANEOUS at 08:00

## 2024-01-01 RX ADMIN — Medication 200 MILLIGRAM(S): at 17:23

## 2024-01-01 RX ADMIN — GABAPENTIN 100 MILLIGRAM(S): 400 CAPSULE ORAL at 21:09

## 2024-01-01 RX ADMIN — Medication 1 APPLICATION(S): at 17:43

## 2024-01-01 RX ADMIN — TAMSULOSIN HYDROCHLORIDE 0.4 MILLIGRAM(S): 0.4 CAPSULE ORAL at 23:44

## 2024-01-01 RX ADMIN — Medication 100 MILLIEQUIVALENT(S): at 10:00

## 2024-01-01 RX ADMIN — LIDOCAINE 1 PATCH: 4 CREAM TOPICAL at 11:35

## 2024-01-01 RX ADMIN — Medication 650 MILLIGRAM(S): at 06:08

## 2024-01-01 RX ADMIN — DULOXETINE HYDROCHLORIDE 60 MILLIGRAM(S): 30 CAPSULE, DELAYED RELEASE ORAL at 11:46

## 2024-01-01 RX ADMIN — Medication 650 MILLIGRAM(S): at 12:16

## 2024-01-01 RX ADMIN — ATORVASTATIN CALCIUM 80 MILLIGRAM(S): 80 TABLET, FILM COATED ORAL at 21:33

## 2024-01-01 RX ADMIN — Medication 1 MILLIGRAM(S): at 12:24

## 2024-01-01 RX ADMIN — CEFEPIME 100 MILLIGRAM(S): 1 INJECTION, POWDER, FOR SOLUTION INTRAMUSCULAR; INTRAVENOUS at 13:46

## 2024-01-01 RX ADMIN — Medication 1 PATCH: at 18:27

## 2024-01-01 RX ADMIN — Medication 3 MILLILITER(S): at 20:13

## 2024-01-01 RX ADMIN — SODIUM CHLORIDE 4 MILLILITER(S): 9 INJECTION INTRAMUSCULAR; INTRAVENOUS; SUBCUTANEOUS at 19:50

## 2024-01-01 RX ADMIN — Medication 20 MILLIGRAM(S): at 06:00

## 2024-01-01 RX ADMIN — SODIUM CHLORIDE 50 MILLILITER(S): 9 INJECTION, SOLUTION INTRAVENOUS at 22:58

## 2024-01-01 RX ADMIN — Medication 1 PATCH: at 07:32

## 2024-01-01 RX ADMIN — AMLODIPINE BESYLATE 5 MILLIGRAM(S): 2.5 TABLET ORAL at 06:09

## 2024-01-01 RX ADMIN — LIDOCAINE 1 PATCH: 4 CREAM TOPICAL at 01:00

## 2024-01-01 RX ADMIN — Medication 20 MILLIGRAM(S): at 18:26

## 2024-01-01 RX ADMIN — SODIUM CHLORIDE 1000 MILLILITER(S): 9 INJECTION, SOLUTION INTRAVENOUS at 20:33

## 2024-01-01 RX ADMIN — Medication 650 MILLIGRAM(S): at 23:00

## 2024-01-01 RX ADMIN — Medication 650 MILLIGRAM(S): at 12:33

## 2024-01-01 RX ADMIN — Medication 3 MILLIGRAM(S): at 21:56

## 2024-01-01 RX ADMIN — Medication 20 MILLIGRAM(S): at 11:13

## 2024-01-01 RX ADMIN — Medication 1 PATCH: at 21:03

## 2024-01-01 RX ADMIN — Medication 150 MEQ/KG/HR: at 10:39

## 2024-01-01 RX ADMIN — LIDOCAINE 1 PATCH: 4 CREAM TOPICAL at 22:56

## 2024-01-01 RX ADMIN — LIDOCAINE 1 PATCH: 4 CREAM TOPICAL at 08:03

## 2024-01-01 RX ADMIN — SODIUM CHLORIDE 1000 MILLILITER(S): 9 INJECTION, SOLUTION INTRAVENOUS at 13:39

## 2024-01-01 RX ADMIN — LIDOCAINE 1 PATCH: 4 CREAM TOPICAL at 23:49

## 2024-01-01 RX ADMIN — PIPERACILLIN AND TAZOBACTAM 25 GRAM(S): 4; .5 INJECTION, POWDER, LYOPHILIZED, FOR SOLUTION INTRAVENOUS at 15:22

## 2024-01-01 RX ADMIN — SODIUM CHLORIDE 50 MILLILITER(S): 9 INJECTION, SOLUTION INTRAVENOUS at 06:10

## 2024-01-01 RX ADMIN — Medication 650 MILLIGRAM(S): at 05:35

## 2024-01-01 RX ADMIN — Medication 500000 UNIT(S): at 17:21

## 2024-01-01 RX ADMIN — Medication 1 MILLIGRAM(S): at 11:12

## 2024-01-01 RX ADMIN — Medication 500000 UNIT(S): at 06:16

## 2024-01-01 RX ADMIN — AMLODIPINE BESYLATE 5 MILLIGRAM(S): 2.5 TABLET ORAL at 06:13

## 2024-01-01 RX ADMIN — ENOXAPARIN SODIUM 53 MILLIGRAM(S): 100 INJECTION SUBCUTANEOUS at 05:52

## 2024-01-01 RX ADMIN — Medication 20 MILLIGRAM(S): at 18:08

## 2024-01-01 RX ADMIN — DULOXETINE HYDROCHLORIDE 60 MILLIGRAM(S): 30 CAPSULE, DELAYED RELEASE ORAL at 11:31

## 2024-01-01 RX ADMIN — SENNA PLUS 2 TABLET(S): 8.6 TABLET ORAL at 21:26

## 2024-01-01 RX ADMIN — Medication 3 MILLILITER(S): at 08:21

## 2024-01-01 RX ADMIN — LIDOCAINE 1 PATCH: 4 CREAM TOPICAL at 21:02

## 2024-01-01 RX ADMIN — Medication 1 APPLICATION(S): at 17:34

## 2024-01-01 RX ADMIN — PANTOPRAZOLE SODIUM 40 MILLIGRAM(S): 20 TABLET, DELAYED RELEASE ORAL at 12:23

## 2024-01-01 RX ADMIN — Medication 1 PATCH: at 13:46

## 2024-01-01 RX ADMIN — Medication 1 PATCH: at 11:37

## 2024-01-01 RX ADMIN — Medication 20 MILLIGRAM(S): at 14:31

## 2024-01-01 RX ADMIN — ONDANSETRON 4 MILLIGRAM(S): 8 TABLET, FILM COATED ORAL at 16:30

## 2024-01-01 RX ADMIN — Medication 650 MILLIGRAM(S): at 23:31

## 2024-01-01 RX ADMIN — METHOCARBAMOL 500 MILLIGRAM(S): 500 TABLET, FILM COATED ORAL at 12:12

## 2024-01-01 RX ADMIN — Medication 50 MILLIEQUIVALENT(S): at 02:52

## 2024-01-01 RX ADMIN — Medication 1 MILLIGRAM(S): at 12:23

## 2024-01-01 RX ADMIN — AMLODIPINE BESYLATE 5 MILLIGRAM(S): 2.5 TABLET ORAL at 05:17

## 2024-01-01 RX ADMIN — HEPARIN SODIUM 5000 UNIT(S): 5000 INJECTION INTRAVENOUS; SUBCUTANEOUS at 05:46

## 2024-01-01 RX ADMIN — ATORVASTATIN CALCIUM 80 MILLIGRAM(S): 80 TABLET, FILM COATED ORAL at 21:26

## 2024-01-01 RX ADMIN — Medication 650 MILLIGRAM(S): at 05:25

## 2024-01-01 RX ADMIN — Medication 500000 UNIT(S): at 14:35

## 2024-01-01 RX ADMIN — Medication 20 MILLIGRAM(S): at 21:12

## 2024-01-01 RX ADMIN — Medication 650 MILLIGRAM(S): at 05:17

## 2024-01-01 RX ADMIN — Medication 500000 UNIT(S): at 11:56

## 2024-01-01 RX ADMIN — SODIUM CHLORIDE 4 MILLILITER(S): 9 INJECTION INTRAMUSCULAR; INTRAVENOUS; SUBCUTANEOUS at 07:17

## 2024-01-01 RX ADMIN — Medication 3 MILLILITER(S): at 13:09

## 2024-01-01 RX ADMIN — Medication 1 APPLICATION(S): at 05:15

## 2024-01-01 RX ADMIN — DULOXETINE HYDROCHLORIDE 60 MILLIGRAM(S): 30 CAPSULE, DELAYED RELEASE ORAL at 11:26

## 2024-01-01 RX ADMIN — SODIUM CHLORIDE 4 MILLILITER(S): 9 INJECTION INTRAMUSCULAR; INTRAVENOUS; SUBCUTANEOUS at 07:39

## 2024-01-01 RX ADMIN — GABAPENTIN 100 MILLIGRAM(S): 400 CAPSULE ORAL at 13:29

## 2024-01-01 RX ADMIN — ENOXAPARIN SODIUM 30 MILLIGRAM(S): 100 INJECTION SUBCUTANEOUS at 05:57

## 2024-01-01 RX ADMIN — Medication 500000 UNIT(S): at 12:35

## 2024-01-01 RX ADMIN — METHOCARBAMOL 500 MILLIGRAM(S): 500 TABLET, FILM COATED ORAL at 17:16

## 2024-01-01 RX ADMIN — CHLORHEXIDINE GLUCONATE 1 APPLICATION(S): 213 SOLUTION TOPICAL at 06:27

## 2024-01-01 RX ADMIN — ENOXAPARIN SODIUM 30 MILLIGRAM(S): 100 INJECTION SUBCUTANEOUS at 17:22

## 2024-01-01 RX ADMIN — Medication 1 PATCH: at 09:08

## 2024-01-01 RX ADMIN — SODIUM CHLORIDE 4 MILLILITER(S): 9 INJECTION INTRAMUSCULAR; INTRAVENOUS; SUBCUTANEOUS at 19:23

## 2024-01-01 RX ADMIN — Medication 650 MILLIGRAM(S): at 17:43

## 2024-01-01 RX ADMIN — PIPERACILLIN AND TAZOBACTAM 200 GRAM(S): 4; .5 INJECTION, POWDER, LYOPHILIZED, FOR SOLUTION INTRAVENOUS at 01:15

## 2024-01-01 RX ADMIN — Medication 1 TABLET(S): at 11:38

## 2024-01-01 RX ADMIN — Medication 650 MILLIGRAM(S): at 18:41

## 2024-01-01 RX ADMIN — Medication 3 MILLIGRAM(S): at 21:23

## 2024-01-01 RX ADMIN — Medication 500000 UNIT(S): at 12:44

## 2024-01-01 RX ADMIN — LIDOCAINE 1 PATCH: 4 CREAM TOPICAL at 22:34

## 2024-01-01 RX ADMIN — Medication 1 PATCH: at 12:06

## 2024-01-01 RX ADMIN — PANTOPRAZOLE SODIUM 40 MILLIGRAM(S): 20 TABLET, DELAYED RELEASE ORAL at 05:17

## 2024-01-01 RX ADMIN — Medication 50 GRAM(S): at 23:43

## 2024-01-01 RX ADMIN — CHLORHEXIDINE GLUCONATE 15 MILLILITER(S): 213 SOLUTION TOPICAL at 05:52

## 2024-01-01 RX ADMIN — Medication 1 MILLIGRAM(S): at 11:37

## 2024-01-01 RX ADMIN — Medication 50 MILLIEQUIVALENT(S): at 20:38

## 2024-01-01 RX ADMIN — Medication 100 MILLIEQUIVALENT(S): at 21:20

## 2024-01-01 RX ADMIN — PIPERACILLIN AND TAZOBACTAM 25 GRAM(S): 4; .5 INJECTION, POWDER, LYOPHILIZED, FOR SOLUTION INTRAVENOUS at 21:53

## 2024-01-01 RX ADMIN — PANTOPRAZOLE SODIUM 40 MILLIGRAM(S): 20 TABLET, DELAYED RELEASE ORAL at 06:37

## 2024-01-01 RX ADMIN — LIDOCAINE 1 PATCH: 4 CREAM TOPICAL at 23:00

## 2024-01-01 RX ADMIN — ENOXAPARIN SODIUM 30 MILLIGRAM(S): 100 INJECTION SUBCUTANEOUS at 05:44

## 2024-01-01 RX ADMIN — SODIUM CHLORIDE 3000 MILLILITER(S): 9 INJECTION INTRAMUSCULAR; INTRAVENOUS; SUBCUTANEOUS at 02:04

## 2024-01-01 RX ADMIN — Medication 1 PATCH: at 05:33

## 2024-01-01 RX ADMIN — ENOXAPARIN SODIUM 30 MILLIGRAM(S): 100 INJECTION SUBCUTANEOUS at 17:46

## 2024-01-01 RX ADMIN — ENOXAPARIN SODIUM 30 MILLIGRAM(S): 100 INJECTION SUBCUTANEOUS at 05:48

## 2024-01-01 RX ADMIN — SODIUM CHLORIDE 50 MILLILITER(S): 9 INJECTION, SOLUTION INTRAVENOUS at 22:03

## 2024-01-01 RX ADMIN — Medication 650 MILLIGRAM(S): at 18:20

## 2024-01-01 RX ADMIN — CHLORHEXIDINE GLUCONATE 15 MILLILITER(S): 213 SOLUTION TOPICAL at 05:26

## 2024-01-01 RX ADMIN — Medication 20 MILLIGRAM(S): at 17:43

## 2024-01-01 RX ADMIN — PANTOPRAZOLE SODIUM 40 MILLIGRAM(S): 20 TABLET, DELAYED RELEASE ORAL at 06:09

## 2024-01-01 RX ADMIN — SODIUM CHLORIDE 50 MILLILITER(S): 9 INJECTION INTRAMUSCULAR; INTRAVENOUS; SUBCUTANEOUS at 17:27

## 2024-01-01 RX ADMIN — Medication 1 TABLET(S): at 11:26

## 2024-01-01 RX ADMIN — Medication 500000 UNIT(S): at 17:43

## 2024-01-01 RX ADMIN — GABAPENTIN 100 MILLIGRAM(S): 400 CAPSULE ORAL at 14:19

## 2024-01-01 RX ADMIN — Medication 650 MILLIGRAM(S): at 11:42

## 2024-01-01 RX ADMIN — Medication 650 MILLIGRAM(S): at 15:02

## 2024-01-01 RX ADMIN — Medication 500000 UNIT(S): at 00:47

## 2024-01-01 RX ADMIN — CHLORHEXIDINE GLUCONATE 1 APPLICATION(S): 213 SOLUTION TOPICAL at 05:49

## 2024-01-01 RX ADMIN — Medication 500000 UNIT(S): at 05:46

## 2024-01-01 RX ADMIN — POLYETHYLENE GLYCOL 3350 17 GRAM(S): 17 POWDER, FOR SOLUTION ORAL at 12:33

## 2024-01-01 RX ADMIN — HEPARIN SODIUM 5000 UNIT(S): 5000 INJECTION INTRAVENOUS; SUBCUTANEOUS at 21:32

## 2024-01-01 RX ADMIN — LIDOCAINE 1 PATCH: 4 CREAM TOPICAL at 12:23

## 2024-01-01 RX ADMIN — Medication 3 MILLILITER(S): at 14:07

## 2024-01-01 RX ADMIN — Medication 650 MILLIGRAM(S): at 06:16

## 2024-01-01 RX ADMIN — Medication 650 MILLIGRAM(S): at 12:10

## 2024-01-01 RX ADMIN — LIDOCAINE 1 PATCH: 4 CREAM TOPICAL at 11:25

## 2024-01-01 RX ADMIN — Medication 3 MILLILITER(S): at 19:23

## 2024-01-01 RX ADMIN — DULOXETINE HYDROCHLORIDE 60 MILLIGRAM(S): 30 CAPSULE, DELAYED RELEASE ORAL at 14:33

## 2024-01-01 RX ADMIN — Medication 500000 UNIT(S): at 11:37

## 2024-01-01 RX ADMIN — Medication 650 MILLIGRAM(S): at 17:21

## 2024-01-01 RX ADMIN — Medication 25 GRAM(S): at 20:29

## 2024-01-01 RX ADMIN — Medication 20 MILLIGRAM(S): at 16:30

## 2024-01-01 RX ADMIN — ATORVASTATIN CALCIUM 80 MILLIGRAM(S): 80 TABLET, FILM COATED ORAL at 22:06

## 2024-01-01 RX ADMIN — Medication 500000 UNIT(S): at 18:03

## 2024-01-01 RX ADMIN — CHLORHEXIDINE GLUCONATE 1 APPLICATION(S): 213 SOLUTION TOPICAL at 08:55

## 2024-01-01 RX ADMIN — Medication 1 TABLET(S): at 11:46

## 2024-01-01 RX ADMIN — SODIUM CHLORIDE 1000 MILLILITER(S): 9 INJECTION INTRAMUSCULAR; INTRAVENOUS; SUBCUTANEOUS at 12:12

## 2024-01-01 RX ADMIN — Medication 20 MILLIGRAM(S): at 21:03

## 2024-01-01 RX ADMIN — PIPERACILLIN AND TAZOBACTAM 25 GRAM(S): 4; .5 INJECTION, POWDER, LYOPHILIZED, FOR SOLUTION INTRAVENOUS at 05:29

## 2024-01-01 RX ADMIN — Medication 650 MILLIGRAM(S): at 18:25

## 2024-01-01 RX ADMIN — CHLORHEXIDINE GLUCONATE 1 APPLICATION(S): 213 SOLUTION TOPICAL at 05:42

## 2024-01-01 RX ADMIN — SODIUM CHLORIDE 4 MILLILITER(S): 9 INJECTION INTRAMUSCULAR; INTRAVENOUS; SUBCUTANEOUS at 21:20

## 2024-01-01 RX ADMIN — PIPERACILLIN AND TAZOBACTAM 25 GRAM(S): 4; .5 INJECTION, POWDER, LYOPHILIZED, FOR SOLUTION INTRAVENOUS at 22:28

## 2024-01-01 RX ADMIN — AMLODIPINE BESYLATE 5 MILLIGRAM(S): 2.5 TABLET ORAL at 05:13

## 2024-01-01 RX ADMIN — Medication 50 MILLIEQUIVALENT(S): at 20:16

## 2024-01-01 RX ADMIN — Medication 40 MILLIEQUIVALENT(S): at 09:18

## 2024-01-01 RX ADMIN — Medication 650 MILLIGRAM(S): at 06:28

## 2024-01-01 RX ADMIN — Medication 650 MILLIGRAM(S): at 00:13

## 2024-01-01 RX ADMIN — Medication 20 MILLIGRAM(S): at 12:45

## 2024-01-01 RX ADMIN — Medication 25 GRAM(S): at 18:17

## 2024-01-01 RX ADMIN — TAMSULOSIN HYDROCHLORIDE 0.4 MILLIGRAM(S): 0.4 CAPSULE ORAL at 21:12

## 2024-01-01 RX ADMIN — AMLODIPINE BESYLATE 5 MILLIGRAM(S): 2.5 TABLET ORAL at 06:02

## 2024-01-01 RX ADMIN — ATORVASTATIN CALCIUM 80 MILLIGRAM(S): 80 TABLET, FILM COATED ORAL at 22:41

## 2024-01-01 RX ADMIN — Medication 1 TABLET(S): at 11:22

## 2024-01-01 RX ADMIN — Medication 3 MILLILITER(S): at 07:17

## 2024-01-01 RX ADMIN — Medication 1 APPLICATION(S): at 18:11

## 2024-01-01 RX ADMIN — Medication 1000 MILLIGRAM(S): at 19:35

## 2024-01-01 RX ADMIN — Medication 650 MILLIGRAM(S): at 18:23

## 2024-01-01 RX ADMIN — Medication 650 MILLIGRAM(S): at 12:06

## 2024-01-01 RX ADMIN — DULOXETINE HYDROCHLORIDE 60 MILLIGRAM(S): 30 CAPSULE, DELAYED RELEASE ORAL at 11:11

## 2024-01-01 RX ADMIN — Medication 500000 UNIT(S): at 17:34

## 2024-01-01 RX ADMIN — Medication 1 MILLIGRAM(S): at 12:18

## 2024-01-01 RX ADMIN — Medication 50 MILLIEQUIVALENT(S): at 01:46

## 2024-01-01 RX ADMIN — AMLODIPINE BESYLATE 5 MILLIGRAM(S): 2.5 TABLET ORAL at 05:49

## 2024-01-01 RX ADMIN — Medication 3 MILLILITER(S): at 02:36

## 2024-01-01 RX ADMIN — Medication 3 MILLIGRAM(S): at 21:12

## 2024-01-01 RX ADMIN — Medication 3 MILLIGRAM(S): at 22:41

## 2024-01-01 RX ADMIN — METHOCARBAMOL 500 MILLIGRAM(S): 500 TABLET, FILM COATED ORAL at 05:14

## 2024-01-01 RX ADMIN — Medication 650 MILLIGRAM(S): at 17:19

## 2024-01-01 RX ADMIN — Medication 100 MILLIGRAM(S): at 12:34

## 2024-01-01 RX ADMIN — Medication 3 MILLILITER(S): at 02:58

## 2024-01-01 RX ADMIN — Medication 25 GRAM(S): at 22:16

## 2024-01-01 RX ADMIN — Medication 1 TABLET(S): at 12:17

## 2024-01-01 RX ADMIN — Medication 3 MILLILITER(S): at 12:59

## 2024-01-01 RX ADMIN — Medication 650 MILLIGRAM(S): at 00:59

## 2024-01-01 RX ADMIN — Medication 25 GRAM(S): at 01:29

## 2024-01-01 RX ADMIN — ENOXAPARIN SODIUM 30 MILLIGRAM(S): 100 INJECTION SUBCUTANEOUS at 18:03

## 2024-01-01 RX ADMIN — Medication 500 MILLIGRAM(S): at 05:53

## 2024-01-01 RX ADMIN — Medication 20 MILLIGRAM(S): at 11:27

## 2024-01-01 RX ADMIN — LIDOCAINE 1 PATCH: 4 CREAM TOPICAL at 22:55

## 2024-01-01 RX ADMIN — LIDOCAINE 1 PATCH: 4 CREAM TOPICAL at 18:27

## 2024-01-01 RX ADMIN — PIPERACILLIN AND TAZOBACTAM 25 GRAM(S): 4; .5 INJECTION, POWDER, LYOPHILIZED, FOR SOLUTION INTRAVENOUS at 22:41

## 2024-01-01 RX ADMIN — Medication 20 MILLIGRAM(S): at 06:01

## 2024-01-01 RX ADMIN — Medication 50 MILLIEQUIVALENT(S): at 23:51

## 2024-01-01 RX ADMIN — Medication 650 MILLIGRAM(S): at 00:30

## 2024-01-01 RX ADMIN — Medication 1 APPLICATION(S): at 18:22

## 2024-01-01 RX ADMIN — CEFTRIAXONE 100 MILLIGRAM(S): 500 INJECTION, POWDER, FOR SOLUTION INTRAMUSCULAR; INTRAVENOUS at 11:31

## 2024-01-01 RX ADMIN — Medication 650 MILLIGRAM(S): at 17:16

## 2024-01-01 RX ADMIN — AMLODIPINE BESYLATE 5 MILLIGRAM(S): 2.5 TABLET ORAL at 05:43

## 2024-01-01 RX ADMIN — Medication 63.75 MILLIMOLE(S): at 01:29

## 2024-01-01 RX ADMIN — Medication 40 MILLIEQUIVALENT(S): at 13:45

## 2024-01-01 RX ADMIN — Medication 3 MILLILITER(S): at 07:39

## 2024-01-01 RX ADMIN — PIPERACILLIN AND TAZOBACTAM 25 GRAM(S): 4; .5 INJECTION, POWDER, LYOPHILIZED, FOR SOLUTION INTRAVENOUS at 17:46

## 2024-01-01 RX ADMIN — Medication 20 MILLIGRAM(S): at 16:37

## 2024-01-01 RX ADMIN — ENOXAPARIN SODIUM 30 MILLIGRAM(S): 100 INJECTION SUBCUTANEOUS at 17:36

## 2024-01-01 RX ADMIN — SENNA PLUS 2 TABLET(S): 8.6 TABLET ORAL at 21:03

## 2024-01-01 RX ADMIN — Medication 3 MILLILITER(S): at 20:19

## 2024-01-01 RX ADMIN — Medication 10 MILLIGRAM(S): at 06:02

## 2024-01-01 RX ADMIN — Medication 650 MILLIGRAM(S): at 17:49

## 2024-01-01 RX ADMIN — Medication 500000 UNIT(S): at 23:01

## 2024-01-01 RX ADMIN — SODIUM CHLORIDE 1000 MILLILITER(S): 9 INJECTION, SOLUTION INTRAVENOUS at 22:00

## 2024-01-01 RX ADMIN — Medication 650 MILLIGRAM(S): at 23:41

## 2024-01-01 RX ADMIN — Medication 650 MILLIGRAM(S): at 12:17

## 2024-01-01 RX ADMIN — PIPERACILLIN AND TAZOBACTAM 25 GRAM(S): 4; .5 INJECTION, POWDER, LYOPHILIZED, FOR SOLUTION INTRAVENOUS at 16:11

## 2024-01-01 RX ADMIN — ENOXAPARIN SODIUM 30 MILLIGRAM(S): 100 INJECTION SUBCUTANEOUS at 17:45

## 2024-01-01 RX ADMIN — ENOXAPARIN SODIUM 30 MILLIGRAM(S): 100 INJECTION SUBCUTANEOUS at 05:15

## 2024-01-01 RX ADMIN — PIPERACILLIN AND TAZOBACTAM 25 GRAM(S): 4; .5 INJECTION, POWDER, LYOPHILIZED, FOR SOLUTION INTRAVENOUS at 14:12

## 2024-01-01 RX ADMIN — Medication 650 MILLIGRAM(S): at 18:08

## 2024-01-01 RX ADMIN — Medication 50 MILLIEQUIVALENT(S): at 22:29

## 2024-01-01 RX ADMIN — GABAPENTIN 100 MILLIGRAM(S): 400 CAPSULE ORAL at 21:03

## 2024-01-01 RX ADMIN — Medication 100 MILLIGRAM(S): at 11:12

## 2024-01-01 RX ADMIN — Medication 1 PATCH: at 08:35

## 2024-01-01 RX ADMIN — Medication 20 MILLIGRAM(S): at 17:21

## 2024-01-01 RX ADMIN — INSULIN HUMAN 10 UNIT(S): 100 INJECTION, SOLUTION SUBCUTANEOUS at 09:47

## 2024-01-01 RX ADMIN — PIPERACILLIN AND TAZOBACTAM 200 GRAM(S): 4; .5 INJECTION, POWDER, LYOPHILIZED, FOR SOLUTION INTRAVENOUS at 17:17

## 2024-01-01 RX ADMIN — Medication 650 MILLIGRAM(S): at 11:55

## 2024-01-01 RX ADMIN — Medication 63.75 MILLIMOLE(S): at 22:29

## 2024-01-01 RX ADMIN — Medication 20 MILLIGRAM(S): at 06:57

## 2024-01-01 RX ADMIN — AMLODIPINE BESYLATE 5 MILLIGRAM(S): 2.5 TABLET ORAL at 05:35

## 2024-01-01 RX ADMIN — Medication 650 MILLIGRAM(S): at 12:44

## 2024-01-01 RX ADMIN — Medication 1 PATCH: at 19:17

## 2024-01-01 RX ADMIN — SODIUM CHLORIDE 4 MILLILITER(S): 9 INJECTION INTRAMUSCULAR; INTRAVENOUS; SUBCUTANEOUS at 20:58

## 2024-01-01 RX ADMIN — LIDOCAINE 1 PATCH: 4 CREAM TOPICAL at 11:13

## 2024-01-01 RX ADMIN — Medication 650 MILLIGRAM(S): at 17:51

## 2024-01-01 RX ADMIN — PIPERACILLIN AND TAZOBACTAM 25 GRAM(S): 4; .5 INJECTION, POWDER, LYOPHILIZED, FOR SOLUTION INTRAVENOUS at 05:26

## 2024-01-01 RX ADMIN — PANTOPRAZOLE SODIUM 40 MILLIGRAM(S): 20 TABLET, DELAYED RELEASE ORAL at 06:57

## 2024-01-01 RX ADMIN — Medication 650 MILLIGRAM(S): at 11:09

## 2024-01-01 RX ADMIN — Medication 20 MILLIGRAM(S): at 18:11

## 2024-01-01 RX ADMIN — TAMSULOSIN HYDROCHLORIDE 0.4 MILLIGRAM(S): 0.4 CAPSULE ORAL at 21:17

## 2024-01-01 RX ADMIN — PIPERACILLIN AND TAZOBACTAM 25 GRAM(S): 4; .5 INJECTION, POWDER, LYOPHILIZED, FOR SOLUTION INTRAVENOUS at 06:07

## 2024-01-01 RX ADMIN — PANTOPRAZOLE SODIUM 40 MILLIGRAM(S): 20 TABLET, DELAYED RELEASE ORAL at 06:45

## 2024-01-01 RX ADMIN — Medication 20 MILLIGRAM(S): at 06:37

## 2024-01-01 RX ADMIN — Medication 20 MILLIGRAM(S): at 11:36

## 2024-01-01 RX ADMIN — Medication 650 MILLIGRAM(S): at 11:38

## 2024-01-01 RX ADMIN — FENTANYL CITRATE 3.5 MICROGRAM(S)/KG/HR: 50 INJECTION INTRAVENOUS at 18:36

## 2024-01-01 RX ADMIN — Medication 3 MILLILITER(S): at 08:49

## 2024-01-01 RX ADMIN — Medication 20 MILLIGRAM(S): at 10:07

## 2024-01-01 RX ADMIN — Medication 3 MILLIGRAM(S): at 21:04

## 2024-01-01 RX ADMIN — Medication 650 MILLIGRAM(S): at 05:36

## 2024-01-01 RX ADMIN — Medication 1 PATCH: at 08:03

## 2024-01-01 RX ADMIN — Medication 500000 UNIT(S): at 18:19

## 2024-01-01 RX ADMIN — Medication 20 MILLIGRAM(S): at 08:53

## 2024-01-01 RX ADMIN — CEFEPIME 100 MILLIGRAM(S): 1 INJECTION, POWDER, FOR SOLUTION INTRAMUSCULAR; INTRAVENOUS at 05:52

## 2024-01-01 RX ADMIN — ATORVASTATIN CALCIUM 80 MILLIGRAM(S): 80 TABLET, FILM COATED ORAL at 21:10

## 2024-01-01 RX ADMIN — Medication 1 PATCH: at 14:34

## 2024-01-01 RX ADMIN — Medication 1 MILLIGRAM(S): at 12:33

## 2024-01-01 RX ADMIN — METHOCARBAMOL 500 MILLIGRAM(S): 500 TABLET, FILM COATED ORAL at 11:31

## 2024-01-01 RX ADMIN — Medication 1 MILLIGRAM(S): at 11:39

## 2024-01-01 RX ADMIN — PANTOPRAZOLE SODIUM 40 MILLIGRAM(S): 20 TABLET, DELAYED RELEASE ORAL at 05:36

## 2024-01-01 RX ADMIN — DULOXETINE HYDROCHLORIDE 60 MILLIGRAM(S): 30 CAPSULE, DELAYED RELEASE ORAL at 11:23

## 2024-01-01 RX ADMIN — SODIUM CHLORIDE 4 MILLILITER(S): 9 INJECTION INTRAMUSCULAR; INTRAVENOUS; SUBCUTANEOUS at 19:19

## 2024-01-01 RX ADMIN — Medication 1 APPLICATION(S): at 05:36

## 2024-01-01 RX ADMIN — PIPERACILLIN AND TAZOBACTAM 25 GRAM(S): 4; .5 INJECTION, POWDER, LYOPHILIZED, FOR SOLUTION INTRAVENOUS at 21:24

## 2024-01-01 RX ADMIN — Medication 650 MILLIGRAM(S): at 11:36

## 2024-01-01 RX ADMIN — Medication 50 MILLIGRAM(S): at 05:52

## 2024-01-01 RX ADMIN — Medication 650 MILLIGRAM(S): at 05:46

## 2024-01-01 RX ADMIN — Medication 1 PATCH: at 12:17

## 2024-01-01 RX ADMIN — Medication 500000 UNIT(S): at 11:27

## 2024-01-01 RX ADMIN — AMLODIPINE BESYLATE 5 MILLIGRAM(S): 2.5 TABLET ORAL at 06:48

## 2024-01-01 RX ADMIN — Medication 15 MILLIGRAM(S): at 12:29

## 2024-01-01 RX ADMIN — ATORVASTATIN CALCIUM 80 MILLIGRAM(S): 80 TABLET, FILM COATED ORAL at 21:31

## 2024-01-01 RX ADMIN — Medication 100 MILLIGRAM(S): at 12:23

## 2024-01-01 RX ADMIN — Medication 100 MILLIGRAM(S): at 22:28

## 2024-01-01 RX ADMIN — PHENYLEPHRINE HYDROCHLORIDE 5.03 MICROGRAM(S)/KG/MIN: 10 INJECTION INTRAVENOUS at 14:07

## 2024-01-01 RX ADMIN — Medication 1 APPLICATION(S): at 05:49

## 2024-01-01 RX ADMIN — ATORVASTATIN CALCIUM 80 MILLIGRAM(S): 80 TABLET, FILM COATED ORAL at 23:43

## 2024-01-01 RX ADMIN — ENOXAPARIN SODIUM 30 MILLIGRAM(S): 100 INJECTION SUBCUTANEOUS at 18:10

## 2024-01-01 RX ADMIN — Medication 100 MILLIGRAM(S): at 12:51

## 2024-01-01 RX ADMIN — TAMSULOSIN HYDROCHLORIDE 0.4 MILLIGRAM(S): 0.4 CAPSULE ORAL at 21:59

## 2024-01-01 RX ADMIN — SODIUM CHLORIDE 1000 MILLILITER(S): 9 INJECTION, SOLUTION INTRAVENOUS at 09:48

## 2024-01-01 RX ADMIN — Medication 20 MILLIGRAM(S): at 22:06

## 2024-01-01 RX ADMIN — Medication 20 MILLIGRAM(S): at 16:11

## 2024-01-01 RX ADMIN — PIPERACILLIN AND TAZOBACTAM 25 GRAM(S): 4; .5 INJECTION, POWDER, LYOPHILIZED, FOR SOLUTION INTRAVENOUS at 21:29

## 2024-01-01 RX ADMIN — Medication 50 MILLIGRAM(S): at 05:27

## 2024-01-01 RX ADMIN — TAMSULOSIN HYDROCHLORIDE 0.4 MILLIGRAM(S): 0.4 CAPSULE ORAL at 21:29

## 2024-01-01 RX ADMIN — Medication 20 MILLIGRAM(S): at 22:41

## 2024-01-01 RX ADMIN — Medication 650 MILLIGRAM(S): at 02:00

## 2024-01-01 RX ADMIN — Medication 1 PATCH: at 12:44

## 2024-01-01 RX ADMIN — SODIUM CHLORIDE 4 MILLILITER(S): 9 INJECTION INTRAMUSCULAR; INTRAVENOUS; SUBCUTANEOUS at 20:19

## 2024-01-01 RX ADMIN — LIDOCAINE 1 PATCH: 4 CREAM TOPICAL at 19:19

## 2024-01-01 RX ADMIN — POTASSIUM PHOSPHATE, MONOBASIC POTASSIUM PHOSPHATE, DIBASIC 63.75 MILLIMOLE(S): 236; 224 INJECTION, SOLUTION INTRAVENOUS at 19:33

## 2024-01-01 RX ADMIN — LIDOCAINE 1 PATCH: 4 CREAM TOPICAL at 02:00

## 2024-01-01 RX ADMIN — ETOMIDATE 20 MILLIGRAM(S): 2 INJECTION INTRAVENOUS at 17:35

## 2024-01-01 RX ADMIN — FENTANYL CITRATE 2.69 MICROGRAM(S)/KG/HR: 50 INJECTION INTRAVENOUS at 05:27

## 2024-01-01 RX ADMIN — Medication 650 MILLIGRAM(S): at 14:12

## 2024-01-01 RX ADMIN — ENOXAPARIN SODIUM 30 MILLIGRAM(S): 100 INJECTION SUBCUTANEOUS at 18:06

## 2024-01-01 RX ADMIN — Medication 650 MILLIGRAM(S): at 23:43

## 2024-01-01 RX ADMIN — Medication 3 MILLILITER(S): at 19:34

## 2024-01-01 RX ADMIN — Medication 650 MILLIGRAM(S): at 23:16

## 2024-01-01 RX ADMIN — Medication 1 PATCH: at 14:58

## 2024-01-01 RX ADMIN — POLYETHYLENE GLYCOL 3350 17 GRAM(S): 17 POWDER, FOR SOLUTION ORAL at 11:56

## 2024-01-01 RX ADMIN — CHLORHEXIDINE GLUCONATE 1 APPLICATION(S): 213 SOLUTION TOPICAL at 05:54

## 2024-01-01 RX ADMIN — DULOXETINE HYDROCHLORIDE 60 MILLIGRAM(S): 30 CAPSULE, DELAYED RELEASE ORAL at 12:33

## 2024-01-01 RX ADMIN — Medication 1 PATCH: at 12:31

## 2024-01-01 RX ADMIN — Medication 20 MILLIGRAM(S): at 11:55

## 2024-01-01 RX ADMIN — Medication 1 PATCH: at 14:12

## 2024-01-01 RX ADMIN — Medication 20 MILLIGRAM(S): at 21:58

## 2024-01-01 RX ADMIN — Medication 1 TABLET(S): at 11:36

## 2024-01-01 RX ADMIN — Medication 1 MILLIGRAM(S): at 14:31

## 2024-01-01 RX ADMIN — SODIUM CHLORIDE 2000 MILLILITER(S): 9 INJECTION, SOLUTION INTRAVENOUS at 18:30

## 2024-01-01 RX ADMIN — Medication 100 MILLIGRAM(S): at 06:02

## 2024-01-01 RX ADMIN — Medication 500000 UNIT(S): at 05:47

## 2024-01-01 RX ADMIN — Medication 100 MILLIGRAM(S): at 11:22

## 2024-01-01 RX ADMIN — Medication 1 APPLICATION(S): at 06:45

## 2024-01-01 RX ADMIN — CHLORHEXIDINE GLUCONATE 1 APPLICATION(S): 213 SOLUTION TOPICAL at 05:44

## 2024-01-01 RX ADMIN — Medication 10 MILLIGRAM(S): at 06:13

## 2024-01-01 RX ADMIN — PIPERACILLIN AND TAZOBACTAM 25 GRAM(S): 4; .5 INJECTION, POWDER, LYOPHILIZED, FOR SOLUTION INTRAVENOUS at 05:44

## 2024-01-01 RX ADMIN — HEPARIN SODIUM 5000 UNIT(S): 5000 INJECTION INTRAVENOUS; SUBCUTANEOUS at 13:43

## 2024-01-01 RX ADMIN — ATORVASTATIN CALCIUM 80 MILLIGRAM(S): 80 TABLET, FILM COATED ORAL at 22:28

## 2024-01-01 RX ADMIN — LIDOCAINE 1 PATCH: 4 CREAM TOPICAL at 12:35

## 2024-01-01 RX ADMIN — Medication 100 MILLIGRAM(S): at 11:55

## 2024-01-01 RX ADMIN — Medication 3 MILLILITER(S): at 19:41

## 2024-01-01 RX ADMIN — Medication 650 MILLIGRAM(S): at 12:31

## 2024-01-01 RX ADMIN — Medication 1 PATCH: at 07:25

## 2024-01-01 RX ADMIN — SODIUM CHLORIDE 1000 MILLILITER(S): 9 INJECTION, SOLUTION INTRAVENOUS at 02:31

## 2024-01-01 RX ADMIN — Medication 3 MILLIGRAM(S): at 23:43

## 2024-01-01 RX ADMIN — HEPARIN SODIUM 5000 UNIT(S): 5000 INJECTION INTRAVENOUS; SUBCUTANEOUS at 21:46

## 2024-01-01 RX ADMIN — Medication 650 MILLIGRAM(S): at 18:02

## 2024-01-01 RX ADMIN — Medication 650 MILLIGRAM(S): at 01:29

## 2024-01-01 RX ADMIN — Medication 50 MILLIGRAM(S): at 17:43

## 2024-01-01 RX ADMIN — Medication 166.67 MILLIGRAM(S): at 17:43

## 2024-01-01 RX ADMIN — LIDOCAINE 1 PATCH: 4 CREAM TOPICAL at 12:19

## 2024-01-01 RX ADMIN — Medication 20 MILLIGRAM(S): at 06:08

## 2024-01-01 RX ADMIN — ENOXAPARIN SODIUM 30 MILLIGRAM(S): 100 INJECTION SUBCUTANEOUS at 18:09

## 2024-01-01 RX ADMIN — SODIUM CHLORIDE 1000 MILLILITER(S): 9 INJECTION, SOLUTION INTRAVENOUS at 18:42

## 2024-01-01 RX ADMIN — ENOXAPARIN SODIUM 30 MILLIGRAM(S): 100 INJECTION SUBCUTANEOUS at 05:17

## 2024-01-01 RX ADMIN — Medication 63.75 MILLIMOLE(S): at 03:56

## 2024-01-01 RX ADMIN — PIPERACILLIN AND TAZOBACTAM 25 GRAM(S): 4; .5 INJECTION, POWDER, LYOPHILIZED, FOR SOLUTION INTRAVENOUS at 05:14

## 2024-01-01 RX ADMIN — Medication 100 MILLIGRAM(S): at 21:33

## 2024-01-01 RX ADMIN — Medication 1 PATCH: at 19:19

## 2024-01-01 RX ADMIN — Medication 3 MILLIGRAM(S): at 21:29

## 2024-01-01 RX ADMIN — DULOXETINE HYDROCHLORIDE 60 MILLIGRAM(S): 30 CAPSULE, DELAYED RELEASE ORAL at 12:51

## 2024-01-01 RX ADMIN — Medication 650 MILLIGRAM(S): at 06:09

## 2024-01-01 RX ADMIN — Medication 3 MILLILITER(S): at 07:30

## 2024-01-01 RX ADMIN — AMLODIPINE BESYLATE 5 MILLIGRAM(S): 2.5 TABLET ORAL at 05:14

## 2024-01-01 RX ADMIN — Medication 20 MILLIGRAM(S): at 17:10

## 2024-01-01 RX ADMIN — Medication 20 MILLIGRAM(S): at 06:27

## 2024-01-01 RX ADMIN — Medication 20 MILLIGRAM(S): at 11:37

## 2024-01-01 RX ADMIN — Medication 500000 UNIT(S): at 23:05

## 2024-01-01 RX ADMIN — PANTOPRAZOLE SODIUM 40 MILLIGRAM(S): 20 TABLET, DELAYED RELEASE ORAL at 06:15

## 2024-01-01 RX ADMIN — Medication 200 MILLIGRAM(S): at 22:28

## 2024-01-01 RX ADMIN — Medication 650 MILLIGRAM(S): at 14:32

## 2024-01-01 RX ADMIN — SODIUM CHLORIDE 4 MILLILITER(S): 9 INJECTION INTRAMUSCULAR; INTRAVENOUS; SUBCUTANEOUS at 07:50

## 2024-01-01 RX ADMIN — Medication 20 MILLIGRAM(S): at 05:17

## 2024-01-01 RX ADMIN — PIPERACILLIN AND TAZOBACTAM 25 GRAM(S): 4; .5 INJECTION, POWDER, LYOPHILIZED, FOR SOLUTION INTRAVENOUS at 21:04

## 2024-01-01 RX ADMIN — Medication 50 MILLIEQUIVALENT(S): at 17:26

## 2024-01-01 RX ADMIN — Medication 20 MILLIEQUIVALENT(S): at 20:47

## 2024-01-01 RX ADMIN — MAGNESIUM OXIDE 400 MG ORAL TABLET 400 MILLIGRAM(S): 241.3 TABLET ORAL at 10:36

## 2024-01-01 RX ADMIN — CHLORHEXIDINE GLUCONATE 1 APPLICATION(S): 213 SOLUTION TOPICAL at 05:30

## 2024-01-01 RX ADMIN — Medication 650 MILLIGRAM(S): at 18:05

## 2024-01-01 RX ADMIN — Medication 500000 UNIT(S): at 23:53

## 2024-01-01 RX ADMIN — LIDOCAINE 1 PATCH: 4 CREAM TOPICAL at 22:20

## 2024-01-01 RX ADMIN — Medication 500000 UNIT(S): at 05:26

## 2024-01-01 RX ADMIN — DULOXETINE HYDROCHLORIDE 60 MILLIGRAM(S): 30 CAPSULE, DELAYED RELEASE ORAL at 11:09

## 2024-01-01 RX ADMIN — Medication 40 MILLIEQUIVALENT(S): at 06:37

## 2024-01-01 RX ADMIN — Medication 650 MILLIGRAM(S): at 06:45

## 2024-01-01 RX ADMIN — GABAPENTIN 100 MILLIGRAM(S): 400 CAPSULE ORAL at 06:27

## 2024-01-01 RX ADMIN — Medication 20 MILLIGRAM(S): at 12:06

## 2024-01-01 RX ADMIN — PIPERACILLIN AND TAZOBACTAM 25 GRAM(S): 4; .5 INJECTION, POWDER, LYOPHILIZED, FOR SOLUTION INTRAVENOUS at 21:23

## 2024-01-01 RX ADMIN — Medication 3 MILLILITER(S): at 20:57

## 2024-01-01 RX ADMIN — Medication 50 MILLILITER(S): at 09:47

## 2024-01-01 RX ADMIN — CHLORHEXIDINE GLUCONATE 1 APPLICATION(S): 213 SOLUTION TOPICAL at 05:17

## 2024-01-01 RX ADMIN — Medication 100 MILLIGRAM(S): at 14:31

## 2024-01-01 RX ADMIN — Medication 20 MILLIGRAM(S): at 23:42

## 2024-01-01 RX ADMIN — Medication 1 TABLET(S): at 12:50

## 2024-01-01 RX ADMIN — Medication 650 MILLIGRAM(S): at 17:34

## 2024-01-01 RX ADMIN — ENOXAPARIN SODIUM 30 MILLIGRAM(S): 100 INJECTION SUBCUTANEOUS at 18:13

## 2024-01-01 RX ADMIN — ENOXAPARIN SODIUM 40 MILLIGRAM(S): 100 INJECTION SUBCUTANEOUS at 01:15

## 2024-01-01 RX ADMIN — Medication 50 MILLIGRAM(S): at 23:04

## 2024-01-01 RX ADMIN — SODIUM CHLORIDE 1000 MILLILITER(S): 9 INJECTION INTRAMUSCULAR; INTRAVENOUS; SUBCUTANEOUS at 17:13

## 2024-01-01 RX ADMIN — Medication 3 MILLILITER(S): at 13:17

## 2024-01-01 RX ADMIN — CHLORHEXIDINE GLUCONATE 1 APPLICATION(S): 213 SOLUTION TOPICAL at 05:48

## 2024-01-01 RX ADMIN — Medication 50 MILLIEQUIVALENT(S): at 09:45

## 2024-01-01 RX ADMIN — SODIUM CHLORIDE 4 MILLILITER(S): 9 INJECTION INTRAMUSCULAR; INTRAVENOUS; SUBCUTANEOUS at 19:41

## 2024-01-01 RX ADMIN — Medication 3 MILLILITER(S): at 20:17

## 2024-01-01 RX ADMIN — METHOCARBAMOL 500 MILLIGRAM(S): 500 TABLET, FILM COATED ORAL at 05:15

## 2024-01-01 RX ADMIN — LIDOCAINE 1 PATCH: 4 CREAM TOPICAL at 11:12

## 2024-01-01 RX ADMIN — ATORVASTATIN CALCIUM 80 MILLIGRAM(S): 80 TABLET, FILM COATED ORAL at 21:47

## 2024-01-01 RX ADMIN — PIPERACILLIN AND TAZOBACTAM 25 GRAM(S): 4; .5 INJECTION, POWDER, LYOPHILIZED, FOR SOLUTION INTRAVENOUS at 05:42

## 2024-01-01 RX ADMIN — Medication 20 MILLIGRAM(S): at 14:30

## 2024-01-01 RX ADMIN — SODIUM CHLORIDE 2000 MILLILITER(S): 9 INJECTION, SOLUTION INTRAVENOUS at 17:25

## 2024-01-01 RX ADMIN — SODIUM CHLORIDE 1000 MILLILITER(S): 9 INJECTION INTRAMUSCULAR; INTRAVENOUS; SUBCUTANEOUS at 16:29

## 2024-01-01 RX ADMIN — Medication 3 MILLILITER(S): at 01:58

## 2024-01-01 RX ADMIN — ATORVASTATIN CALCIUM 80 MILLIGRAM(S): 80 TABLET, FILM COATED ORAL at 21:54

## 2024-01-01 RX ADMIN — Medication 5 MILLIGRAM(S): at 00:36

## 2024-01-01 RX ADMIN — Medication 1 MILLIGRAM(S): at 11:09

## 2024-01-01 RX ADMIN — Medication 3 MILLILITER(S): at 19:43

## 2024-01-01 RX ADMIN — Medication 1 APPLICATION(S): at 05:29

## 2024-01-01 RX ADMIN — ENOXAPARIN SODIUM 30 MILLIGRAM(S): 100 INJECTION SUBCUTANEOUS at 18:20

## 2024-01-01 RX ADMIN — Medication 100 MILLIGRAM(S): at 11:26

## 2024-01-01 RX ADMIN — Medication 650 MILLIGRAM(S): at 05:26

## 2024-01-01 RX ADMIN — MORPHINE SULFATE 2 MILLIGRAM(S): 50 CAPSULE, EXTENDED RELEASE ORAL at 12:30

## 2024-01-01 RX ADMIN — Medication 1 PATCH: at 12:45

## 2024-01-01 RX ADMIN — Medication 650 MILLIGRAM(S): at 11:31

## 2024-01-01 RX ADMIN — Medication 100 MILLIGRAM(S): at 05:40

## 2024-01-01 RX ADMIN — SODIUM CHLORIDE 50 MILLILITER(S): 9 INJECTION, SOLUTION INTRAVENOUS at 05:14

## 2024-01-01 RX ADMIN — ENOXAPARIN SODIUM 30 MILLIGRAM(S): 100 INJECTION SUBCUTANEOUS at 05:23

## 2024-01-01 RX ADMIN — VASOPRESSIN 3 UNIT(S)/MIN: 20 INJECTION INTRAVENOUS at 05:27

## 2024-01-01 RX ADMIN — SODIUM CHLORIDE 4 MILLILITER(S): 9 INJECTION INTRAMUSCULAR; INTRAVENOUS; SUBCUTANEOUS at 21:12

## 2024-01-01 RX ADMIN — Medication 400 MILLIGRAM(S): at 02:32

## 2024-01-01 RX ADMIN — Medication 100 MILLIGRAM(S): at 12:44

## 2024-01-01 RX ADMIN — Medication 650 MILLIGRAM(S): at 06:10

## 2024-01-01 RX ADMIN — Medication 20 MILLIGRAM(S): at 22:00

## 2024-01-01 RX ADMIN — Medication 3 MILLILITER(S): at 14:29

## 2024-01-01 RX ADMIN — AMLODIPINE BESYLATE 5 MILLIGRAM(S): 2.5 TABLET ORAL at 05:16

## 2024-01-01 RX ADMIN — PANTOPRAZOLE SODIUM 40 MILLIGRAM(S): 20 TABLET, DELAYED RELEASE ORAL at 06:14

## 2024-01-01 RX ADMIN — Medication 250 MILLIGRAM(S): at 18:35

## 2024-01-01 RX ADMIN — Medication 1 MILLIGRAM(S): at 12:51

## 2024-01-01 RX ADMIN — Medication 3 MILLILITER(S): at 13:05

## 2024-01-01 RX ADMIN — CEFTRIAXONE 100 MILLIGRAM(S): 500 INJECTION, POWDER, FOR SOLUTION INTRAMUSCULAR; INTRAVENOUS at 09:17

## 2024-01-01 RX ADMIN — SODIUM CHLORIDE 50 MILLILITER(S): 9 INJECTION, SOLUTION INTRAVENOUS at 00:47

## 2024-01-01 RX ADMIN — Medication 20 MILLIGRAM(S): at 21:47

## 2024-01-01 RX ADMIN — Medication 500000 UNIT(S): at 01:00

## 2024-01-01 RX ADMIN — DULOXETINE HYDROCHLORIDE 60 MILLIGRAM(S): 30 CAPSULE, DELAYED RELEASE ORAL at 12:18

## 2024-01-01 RX ADMIN — Medication 3 MILLILITER(S): at 13:23

## 2024-01-01 RX ADMIN — LIDOCAINE 1 PATCH: 4 CREAM TOPICAL at 05:57

## 2024-01-01 RX ADMIN — Medication 100 MILLIGRAM(S): at 11:36

## 2024-01-01 RX ADMIN — Medication 3 MILLIGRAM(S): at 21:17

## 2024-01-01 RX ADMIN — GABAPENTIN 100 MILLIGRAM(S): 400 CAPSULE ORAL at 05:14

## 2024-01-01 RX ADMIN — Medication 1 APPLICATION(S): at 18:03

## 2024-01-01 RX ADMIN — Medication 10 MILLIGRAM(S): at 10:36

## 2024-01-01 RX ADMIN — Medication 650 MILLIGRAM(S): at 18:36

## 2024-01-01 RX ADMIN — CHLORHEXIDINE GLUCONATE 1 APPLICATION(S): 213 SOLUTION TOPICAL at 06:53

## 2024-01-01 RX ADMIN — Medication 3 MILLILITER(S): at 08:37

## 2024-01-01 RX ADMIN — Medication 3 MILLILITER(S): at 07:54

## 2024-01-01 RX ADMIN — CEFEPIME 2000 MILLIGRAM(S): 1 INJECTION, POWDER, FOR SOLUTION INTRAMUSCULAR; INTRAVENOUS at 18:45

## 2024-01-01 RX ADMIN — Medication 1 MILLIGRAM(S): at 11:23

## 2024-01-01 RX ADMIN — TAMSULOSIN HYDROCHLORIDE 0.4 MILLIGRAM(S): 0.4 CAPSULE ORAL at 21:23

## 2024-01-01 RX ADMIN — Medication 500 MILLIGRAM(S): at 21:26

## 2024-01-01 RX ADMIN — Medication 100 MILLIGRAM(S): at 12:17

## 2024-01-01 RX ADMIN — Medication 200 MILLIGRAM(S): at 07:20

## 2024-01-01 RX ADMIN — Medication 50 MILLIEQUIVALENT(S): at 21:48

## 2024-01-01 RX ADMIN — Medication 500000 UNIT(S): at 23:16

## 2024-01-01 RX ADMIN — Medication 100 MILLIGRAM(S): at 11:38

## 2024-01-01 RX ADMIN — SODIUM CHLORIDE 4 MILLILITER(S): 9 INJECTION INTRAMUSCULAR; INTRAVENOUS; SUBCUTANEOUS at 12:34

## 2024-01-01 RX ADMIN — Medication 500000 UNIT(S): at 18:20

## 2024-01-01 RX ADMIN — Medication 20 MILLIGRAM(S): at 11:24

## 2024-01-01 RX ADMIN — Medication 20 MILLIGRAM(S): at 21:23

## 2024-01-01 RX ADMIN — Medication 25 GRAM(S): at 17:26

## 2024-01-01 RX ADMIN — Medication 20 MILLIGRAM(S): at 11:12

## 2024-01-01 RX ADMIN — Medication 20 MILLIGRAM(S): at 16:58

## 2024-01-01 RX ADMIN — ENOXAPARIN SODIUM 30 MILLIGRAM(S): 100 INJECTION SUBCUTANEOUS at 17:17

## 2024-01-01 RX ADMIN — Medication 650 MILLIGRAM(S): at 05:48

## 2024-01-01 RX ADMIN — Medication 1 APPLICATION(S): at 06:22

## 2024-01-01 RX ADMIN — Medication 650 MILLIGRAM(S): at 17:44

## 2024-01-01 RX ADMIN — METHOCARBAMOL 500 MILLIGRAM(S): 500 TABLET, FILM COATED ORAL at 23:31

## 2024-01-01 RX ADMIN — ENOXAPARIN SODIUM 30 MILLIGRAM(S): 100 INJECTION SUBCUTANEOUS at 05:47

## 2024-01-01 RX ADMIN — ATORVASTATIN CALCIUM 80 MILLIGRAM(S): 80 TABLET, FILM COATED ORAL at 22:00

## 2024-01-01 RX ADMIN — Medication 20 MILLIGRAM(S): at 21:48

## 2024-01-01 RX ADMIN — LIDOCAINE 1 PATCH: 4 CREAM TOPICAL at 05:33

## 2024-01-01 RX ADMIN — Medication 1 PATCH: at 12:49

## 2024-01-01 RX ADMIN — Medication 20 MILLIGRAM(S): at 17:34

## 2024-01-01 RX ADMIN — Medication 20 MILLIGRAM(S): at 14:37

## 2024-01-01 RX ADMIN — LIDOCAINE 1 PATCH: 4 CREAM TOPICAL at 14:33

## 2024-01-01 RX ADMIN — ATORVASTATIN CALCIUM 80 MILLIGRAM(S): 80 TABLET, FILM COATED ORAL at 21:24

## 2024-01-01 RX ADMIN — OLANZAPINE 2.5 MILLIGRAM(S): 15 TABLET, FILM COATED ORAL at 03:41

## 2024-01-01 RX ADMIN — ATORVASTATIN CALCIUM 80 MILLIGRAM(S): 80 TABLET, FILM COATED ORAL at 21:17

## 2024-01-01 RX ADMIN — Medication 500000 UNIT(S): at 18:12

## 2024-01-01 RX ADMIN — Medication 1 APPLICATION(S): at 18:09

## 2024-01-01 RX ADMIN — POLYETHYLENE GLYCOL 3350 17 GRAM(S): 17 POWDER, FOR SOLUTION ORAL at 22:37

## 2024-01-01 RX ADMIN — Medication 500000 UNIT(S): at 11:24

## 2024-01-01 RX ADMIN — FAMOTIDINE 20 MILLIGRAM(S): 10 INJECTION INTRAVENOUS at 16:29

## 2024-01-01 RX ADMIN — Medication 500000 UNIT(S): at 05:43

## 2024-01-01 RX ADMIN — Medication 1 APPLICATION(S): at 05:47

## 2024-01-01 RX ADMIN — Medication 1 APPLICATION(S): at 12:22

## 2024-01-01 RX ADMIN — Medication 500000 UNIT(S): at 12:51

## 2024-01-01 RX ADMIN — SODIUM CHLORIDE 4 MILLILITER(S): 9 INJECTION INTRAMUSCULAR; INTRAVENOUS; SUBCUTANEOUS at 19:34

## 2024-01-01 RX ADMIN — AMLODIPINE BESYLATE 5 MILLIGRAM(S): 2.5 TABLET ORAL at 05:48

## 2024-01-01 RX ADMIN — Medication 1 PATCH: at 12:18

## 2024-01-01 RX ADMIN — LIDOCAINE 1 PATCH: 4 CREAM TOPICAL at 13:23

## 2024-01-01 RX ADMIN — SODIUM CHLORIDE 4 MILLILITER(S): 9 INJECTION INTRAMUSCULAR; INTRAVENOUS; SUBCUTANEOUS at 07:30

## 2024-01-01 RX ADMIN — Medication 500000 UNIT(S): at 05:18

## 2024-01-01 RX ADMIN — SODIUM CHLORIDE 4 MILLILITER(S): 9 INJECTION INTRAMUSCULAR; INTRAVENOUS; SUBCUTANEOUS at 08:31

## 2024-01-01 RX ADMIN — AMLODIPINE BESYLATE 5 MILLIGRAM(S): 2.5 TABLET ORAL at 05:53

## 2024-01-01 RX ADMIN — Medication 10 MILLIGRAM(S): at 21:32

## 2024-01-01 RX ADMIN — LIDOCAINE 1 PATCH: 4 CREAM TOPICAL at 11:55

## 2024-01-01 RX ADMIN — Medication 20 MILLIGRAM(S): at 21:10

## 2024-01-01 RX ADMIN — ATORVASTATIN CALCIUM 80 MILLIGRAM(S): 80 TABLET, FILM COATED ORAL at 21:04

## 2024-01-01 RX ADMIN — Medication 1 APPLICATION(S): at 17:22

## 2024-01-01 RX ADMIN — Medication 500000 UNIT(S): at 18:24

## 2024-01-01 RX ADMIN — DULOXETINE HYDROCHLORIDE 60 MILLIGRAM(S): 30 CAPSULE, DELAYED RELEASE ORAL at 13:44

## 2024-01-01 RX ADMIN — Medication 1 PATCH: at 11:25

## 2024-01-01 RX ADMIN — Medication 650 MILLIGRAM(S): at 23:01

## 2024-01-01 RX ADMIN — Medication 1 MILLIGRAM(S): at 12:44

## 2024-01-01 RX ADMIN — Medication 650 MILLIGRAM(S): at 01:20

## 2024-01-01 RX ADMIN — Medication 20 MILLIGRAM(S): at 12:18

## 2024-01-01 RX ADMIN — PANTOPRAZOLE SODIUM 40 MILLIGRAM(S): 20 TABLET, DELAYED RELEASE ORAL at 06:58

## 2024-01-01 RX ADMIN — TAMSULOSIN HYDROCHLORIDE 0.4 MILLIGRAM(S): 0.4 CAPSULE ORAL at 21:58

## 2024-01-01 RX ADMIN — SENNA PLUS 2 TABLET(S): 8.6 TABLET ORAL at 21:10

## 2024-01-01 RX ADMIN — Medication 1 APPLICATION(S): at 05:48

## 2024-01-01 RX ADMIN — Medication 500000 UNIT(S): at 00:12

## 2024-01-01 RX ADMIN — Medication 650 MILLIGRAM(S): at 07:15

## 2024-01-01 RX ADMIN — Medication 1000 MILLIGRAM(S): at 02:54

## 2024-01-01 RX ADMIN — DULOXETINE HYDROCHLORIDE 60 MILLIGRAM(S): 30 CAPSULE, DELAYED RELEASE ORAL at 15:23

## 2024-01-01 RX ADMIN — ATORVASTATIN CALCIUM 80 MILLIGRAM(S): 80 TABLET, FILM COATED ORAL at 21:23

## 2024-01-01 RX ADMIN — ENOXAPARIN SODIUM 30 MILLIGRAM(S): 100 INJECTION SUBCUTANEOUS at 18:22

## 2024-01-01 RX ADMIN — Medication 500000 UNIT(S): at 23:08

## 2024-01-01 RX ADMIN — CHLORHEXIDINE GLUCONATE 1 APPLICATION(S): 213 SOLUTION TOPICAL at 05:55

## 2024-01-01 RX ADMIN — PANTOPRAZOLE SODIUM 40 MILLIGRAM(S): 20 TABLET, DELAYED RELEASE ORAL at 06:00

## 2024-01-01 RX ADMIN — Medication 650 MILLIGRAM(S): at 07:08

## 2024-01-01 RX ADMIN — SODIUM CHLORIDE 500 MILLILITER(S): 9 INJECTION INTRAMUSCULAR; INTRAVENOUS; SUBCUTANEOUS at 01:15

## 2024-01-01 RX ADMIN — Medication 650 MILLIGRAM(S): at 05:53

## 2024-01-01 RX ADMIN — Medication 40 MILLIEQUIVALENT(S): at 17:23

## 2024-01-01 RX ADMIN — LIDOCAINE 1 PATCH: 4 CREAM TOPICAL at 11:45

## 2024-01-01 RX ADMIN — Medication 650 MILLIGRAM(S): at 12:22

## 2024-01-01 RX ADMIN — CHLORHEXIDINE GLUCONATE 1 APPLICATION(S): 213 SOLUTION TOPICAL at 06:09

## 2024-01-01 RX ADMIN — Medication 500000 UNIT(S): at 06:48

## 2024-01-01 RX ADMIN — Medication 1 APPLICATION(S): at 05:43

## 2024-01-01 RX ADMIN — POLYETHYLENE GLYCOL 3350 17 GRAM(S): 17 POWDER, FOR SOLUTION ORAL at 11:11

## 2024-01-01 RX ADMIN — Medication 20 MILLIGRAM(S): at 12:24

## 2024-01-01 RX ADMIN — Medication 20 MILLIGRAM(S): at 06:58

## 2024-01-01 RX ADMIN — Medication 650 MILLIGRAM(S): at 17:23

## 2024-01-01 RX ADMIN — Medication 100 MILLIGRAM(S): at 11:46

## 2024-01-01 RX ADMIN — Medication 20 MILLIGRAM(S): at 21:17

## 2024-01-01 RX ADMIN — LIDOCAINE 1 PATCH: 4 CREAM TOPICAL at 11:31

## 2024-01-01 RX ADMIN — Medication 500000 UNIT(S): at 05:15

## 2024-01-01 RX ADMIN — Medication 650 MILLIGRAM(S): at 11:12

## 2024-01-01 RX ADMIN — Medication 3 MILLILITER(S): at 14:25

## 2024-01-01 RX ADMIN — Medication 650 MILLIGRAM(S): at 18:22

## 2024-01-01 RX ADMIN — Medication 650 MILLIGRAM(S): at 06:35

## 2024-01-01 RX ADMIN — METHOCARBAMOL 500 MILLIGRAM(S): 500 TABLET, FILM COATED ORAL at 17:26

## 2024-01-01 RX ADMIN — Medication 1 TABLET(S): at 12:44

## 2024-01-01 RX ADMIN — ENOXAPARIN SODIUM 30 MILLIGRAM(S): 100 INJECTION SUBCUTANEOUS at 05:36

## 2024-01-01 RX ADMIN — Medication 1 MILLIGRAM(S): at 11:26

## 2024-01-01 RX ADMIN — Medication 1 TABLET(S): at 14:32

## 2024-01-01 RX ADMIN — Medication 30 MILLILITER(S): at 16:31

## 2024-01-01 RX ADMIN — Medication 500000 UNIT(S): at 18:06

## 2024-01-01 RX ADMIN — PIPERACILLIN AND TAZOBACTAM 25 GRAM(S): 4; .5 INJECTION, POWDER, LYOPHILIZED, FOR SOLUTION INTRAVENOUS at 13:41

## 2024-01-01 RX ADMIN — DULOXETINE HYDROCHLORIDE 60 MILLIGRAM(S): 30 CAPSULE, DELAYED RELEASE ORAL at 11:55

## 2024-01-01 RX ADMIN — ENOXAPARIN SODIUM 30 MILLIGRAM(S): 100 INJECTION SUBCUTANEOUS at 18:24

## 2024-01-01 RX ADMIN — Medication 650 MILLIGRAM(S): at 01:00

## 2024-01-01 RX ADMIN — Medication 1 MILLIGRAM(S): at 11:46

## 2024-01-01 RX ADMIN — Medication 650 MILLIGRAM(S): at 05:41

## 2024-01-01 RX ADMIN — Medication 20 MILLIGRAM(S): at 16:12

## 2024-01-01 RX ADMIN — SODIUM CHLORIDE 1000 MILLILITER(S): 9 INJECTION INTRAMUSCULAR; INTRAVENOUS; SUBCUTANEOUS at 01:00

## 2024-04-26 NOTE — H&P ADULT - ASSESSMENT
ASSESSMENT/PLAN:   Hypercholesterolemia without hypertriglyceridemia  (primary encounter diagnosis) Check blood. Acute vaginitis Try diflucan another dose have at home.      Orders Placed This Encounter      Lipid Panel [E]      Comp Metabolic Pane 78y F pmh PUD, CABG, HTN pw acute diarrhea and vomiting admitted for acute infectious colitis.     #Acute Infectious Colitis  - nausea/vomiting/non-bloody diarrhea, wbc 20k, hd stable, no sepsis on admission, lactate -ve, denies abdominal pain   - CTAP Mild mucosal hyperenhancement of the sigmoid colon and rectum. Additional mild enhancement of small bowel loops within the pelvis.   - cw cipro 400mg IV bid, flagyl 500mg IV tid   - increase diet as tolerated     #Peptic ulcer disease  - denies abdominal pain, no hematochezia/melena, hgb 14k  - cw ppi qd  - op gi fu     #CAD s/p CABG  #HTN  #TOBIAS  - fw Dr. Brunson  - cw amlodipine 5mg qd, holding ACEi-HCTZ 40-25mg iso TOBIAS   - repeat BMP   - Cr 2.2 bsl 11/23 1.3   - CTAP w/ R kidney cortical atrophy - likely high-grade stenosis at the right renal ostium secondary to atherosclerotic calcifications - may be CKD progression   - gentle hydration 70cc/hr    #Infrarenal AAA  - 2.5cm on CTAP   - op vascular fu for monitoring     #Severe OA   - PT/OT eval    # To follow up  - sxs improvement  - BMP for Cr     # Misc  - DVT Prophylaxis: heparin  - GI Prophylaxis: PPI  - Diet: CLD  - Activity: IAT  - Code Status: Full     Devin Conner  PGY2, Internal Medicine   [facility]     78y F pmh active smoker,  PUD, CAD s/p CABG 09, mod -MR, Aflutter sp ablation, pre-DMII, anxiety,  HTN pw acute diarrhea and vomiting admitted for acute infectious colitis.     #Acute Infectious Colitis  - nausea/vomiting/non-bloody diarrhea, wbc 20k, hd stable, no sepsis on admission, lactate -ve, denies abdominal pain   - CTAP Mild mucosal hyperenhancement of the sigmoid colon and rectum. Additional mild enhancement of small bowel loops within the pelvis.   - cw cipro 400mg IV bid, flagyl 500mg IV tid   - increase diet as tolerated     #Peptic ulcer disease  - denies abdominal pain, no hematochezia/melena, hgb 14k  - cw ppi qd  - op gi fu     #CAD s/p CABG  #Aflutter sp ablation  #Mod-MR  #L subclavian artery and L ICA stenosis  #HTN  #TOBIAS  - fw Dr. Brunson and Dr. Mccarty   - cw amlodipine 5mg qd, holding ACEi-HCTZ 40-25mg iso TOBIAS   - repeat BMP   - Cr 2.2 bsl 11/23 1.3   - CTAP w/ R kidney cortical atrophy - likely high-grade stenosis at the right renal ostium secondary to atherosclerotic calcifications - may be CKD progression   - gentle hydration 70cc/hr  - op fu     #Infrarenal AAA  - 2.5cm on CTAP   - op vascular fu for monitoring     #Severe OA   - PT/OT eval    # To follow up  - sxs improvement  - BMP for Cr     # Misc  - DVT Prophylaxis: heparin  - GI Prophylaxis: PPI  - Diet: CLD  - Activity: IAT  - Code Status: Full     Devin Conner  PGY2, Internal Medicine   [facility]

## 2024-04-26 NOTE — H&P ADULT - HISTORY OF PRESENT ILLNESS
78y F pmh PUD, CABG, HTN pw acute diarrhea and vomiting. Patient sxs started Tuesday where she had some nausea and loose stools. These sxs continued and starting yesterday has been unable to tolerate any food intake. She denies any abdominal pain, denies blood in the stool. Patient denies eat any stale/raw/undercooked meat or leftovers or exotic food. No sick contacts. ROS -ve for cp, sob, dizziness, lightheadedness, confusion, hematuria, dysuria, melena, hematochezia, rash, fever, chills, night sweats or weight loss. Non-smoker, no etoh or recreational drug use. Last colonoscopy ~5yrs ago reports no significant results. Last EGD in october showing gastric ulcers, had fu appt 1 month ago w/ GI with complete symptom resolution.     Vitals in the ED  T 98.9  HR 58  /84  RR 18 SpO2 99 On RA     Significant Labs  wbc 20k hgb 14.4 plt 345  Na 138 K 3.7 BUN/Cr 49/2.2  lactate 2.0, lipase 23, UA -ve    CTAP   Relative cortical atrophy of the right kidney. Liquid stool colon. Mild mucosal hyperenhancement of the sigmoid colon and rectum. Additional mild enhancement of small bowel loops within the pelvis. No free air.  Severe degenerative changes of the right hip and pubic symphysis. Stable severe compression deformity of the T11 vertebral body.  Infrarenal abdominal aorta measuring up to 2.5 cm (coronal series 601, image 36). Likely high-grade stenosis at the right renal ostium secondary to atherosclerotic calcifications.    Patient received  3L, cipro, flagyl, maalox, pepcid, zofran in the ED. Admitted to medicine for management.      78y F pmh active smoker,  PUD, CAD s/p CABG 09, mod -MR, Aflutter sp ablation, pre-DMII, anxiety,  HTN pw acute diarrhea and vomiting. Patient sxs started Tuesday where she had some nausea and loose stools. These sxs continued and starting yesterday has been unable to tolerate any food intake. She denies any abdominal pain, denies blood in the stool. Patient denies eat any stale/raw/undercooked meat or leftovers or exotic food. No sick contacts. ROS -ve for cp, sob, dizziness, lightheadedness, confusion, hematuria, dysuria, melena, hematochezia, rash, fever, chills, night sweats or weight loss. Non-smoker, no etoh or recreational drug use. Last colonoscopy ~5yrs ago reports no significant results. Last EGD in october showing gastric ulcers, had fu appt 1 month ago w/ GI with complete symptom resolution.     Vitals in the ED  T 98.9  HR 58  /84  RR 18 SpO2 99 On RA     Significant Labs  wbc 20k hgb 14.4 plt 345  Na 138 K 3.7 BUN/Cr 49/2.2  lactate 2.0, lipase 23, UA -ve    CTAP   Relative cortical atrophy of the right kidney. Liquid stool colon. Mild mucosal hyperenhancement of the sigmoid colon and rectum. Additional mild enhancement of small bowel loops within the pelvis. No free air.  Severe degenerative changes of the right hip and pubic symphysis. Stable severe compression deformity of the T11 vertebral body.  Infrarenal abdominal aorta measuring up to 2.5 cm (coronal series 601, image 36). Likely high-grade stenosis at the right renal ostium secondary to atherosclerotic calcifications.    Patient received  3L, cipro, flagyl, maalox, pepcid, zofran in the ED. Admitted to medicine for management.      78y F pmh active smoker,  PUD, CAD s/p CABG 09, mod -MR, Aflutter sp ablation, pre-DMII, anxiety,  HTN pw acute diarrhea and vomiting. Patient sxs started Tuesday where she had some nausea and loose stools. These sxs continued and starting yesterday has been unable to tolerate any food intake. She denies any abdominal pain, denies blood in the stool. Patient denies eat any stale/raw/undercooked meat or leftovers or exotic food. No sick contacts. ROS -ve for cp, sob, dizziness, lightheadedness, confusion, hematuria, dysuria, melena, hematochezia, rash, fever, chills, night sweats or weight loss. active-smoker, no etoh or recreational drug use. Last colonoscopy ~5yrs ago reports no significant results. Last EGD in october showing gastric ulcers, had fu appt 1 month ago w/ GI with complete symptom resolution.     Vitals in the ED  T 98.9  HR 58  /84  RR 18 SpO2 99 On RA     Significant Labs  wbc 20k hgb 14.4 plt 345  Na 138 K 3.7 BUN/Cr 49/2.2  lactate 2.0, lipase 23, UA -ve    CTAP   Relative cortical atrophy of the right kidney. Liquid stool colon. Mild mucosal hyperenhancement of the sigmoid colon and rectum. Additional mild enhancement of small bowel loops within the pelvis. No free air.  Severe degenerative changes of the right hip and pubic symphysis. Stable severe compression deformity of the T11 vertebral body.  Infrarenal abdominal aorta measuring up to 2.5 cm (coronal series 601, image 36). Likely high-grade stenosis at the right renal ostium secondary to atherosclerotic calcifications.    Patient received  3L, cipro, flagyl, maalox, pepcid, zofran in the ED. Admitted to medicine for management.

## 2024-04-26 NOTE — ED PROVIDER NOTE - PROGRESS NOTE DETAILS
JOSE CARLOS Grubbs - Patient feeling well tolerating PO however elevated WBC 20, elevated HR, TOBIAS and colitis on CT noted.

## 2024-04-26 NOTE — ED PROVIDER NOTE - CLINICAL SUMMARY MEDICAL DECISION MAKING FREE TEXT BOX
78-year-old female with a history of peptic ulcer disease, CABG x 3 vessel, HTN presents to ED with abdominal pain.  Patient reports 3 days of crampy abdominal pain associated with nausea, frequent vomiting, loose stools. pt received as sign out to follow on imaging and reevaluate. labs showed TOBIAS, likely due to dehydration, leukocytosis, ct showed colitis, given aggressive ivf, abx and admitted for further management.

## 2024-04-26 NOTE — ED PROVIDER NOTE - OBJECTIVE STATEMENT
78-year-old female with a history of peptic ulcer disease, CABG x 3 vessel, HTN presents to ED with abdominal pain.  Patient reports 3 days of crampy abdominal pain associated with nausea, frequent vomiting, loose stools.  She states that she has had similar symptoms from her peptic ulcer disease in the past.  Denies fever, chills, chest pain, shortness of breath, bloody stools, dysuria, hematuria, vaginal bleeding, skin rashes.

## 2024-04-26 NOTE — H&P ADULT - NSHPLABSRESULTS_GEN_ALL_CORE
14.4   20.54 )-----------( 345      ( 26 Apr 2024 15:59 )             42.8       04-26    138  |  95<L>  |  49<H>  ----------------------------<  100<H>  3.7   |  26  |  2.2<H>    Ca    10.5      26 Apr 2024 15:59  Mg     1.9     04-26    TPro  8.0  /  Alb  4.4  /  TBili  0.3  /  DBili  x   /  AST  22  /  ALT  10  /  AlkPhos  93  04-26              Urinalysis Basic - ( 26 Apr 2024 22:40 )    Color: Yellow / Appearance: Clear / SG: >1.030 / pH: x  Gluc: x / Ketone: Negative mg/dL  / Bili: Negative / Urobili: 0.2 mg/dL   Blood: x / Protein: 30 mg/dL / Nitrite: Negative   Leuk Esterase: Negative / RBC: 2 /HPF / WBC 8 /HPF   Sq Epi: x / Non Sq Epi: 1 /HPF / Bacteria: Negative /HPF        PT/INR - ( 26 Apr 2024 17:49 )   PT: 11.50 sec;   INR: 1.01 ratio         PTT - ( 26 Apr 2024 17:49 )  PTT:24.9 sec    Lactate Trend  04-26 @ 17:49 Lactate:2.0             CAPILLARY BLOOD GLUCOSE

## 2024-04-26 NOTE — H&P ADULT - ATTENDING COMMENTS
patient seen and examined independently of [facility] - agree with the note unless otherwise stated     Vital Signs Last 24 Hrs  T(C): 35.8 (27 Apr 2024 06:00), Max: 37.2 (26 Apr 2024 13:03)  T(F): 96.5 (27 Apr 2024 06:00), Max: 98.9 (26 Apr 2024 13:03)  HR: 80 (27 Apr 2024 06:00) (58 - 91)  BP: 160/70 (27 Apr 2024 06:00) (110/53 - 178/72)  BP(mean): 100 (27 Apr 2024 06:00) (85 - 100)  RR: 16 (27 Apr 2024 06:00) (16 - 18)  SpO2: 99% (27 Apr 2024 02:30) (98% - 100%)    Parameters below as of 26 Apr 2024 22:35  Patient On (Oxygen Delivery Method): room air                          11.6   11.93 )-----------( 267      ( 27 Apr 2024 10:15 )             33.4   04-27    142  |  101  |  33<H>  ----------------------------<  86  3.0<L>   |  28  |  1.5    Ca    8.9      27 Apr 2024 10:15  Mg     1.9     04-26    TPro  6.7  /  Alb  3.7  /  TBili  0.3  /  DBili  x   /  AST  18  /  ALT  7   /  AlkPhos  76  04-27    # Acute sigmoid colitis   # SIRS  # TOBIAS on suspected CKD stage III  # hypokalemia   # HTN  # A Flutter  # L subclavian artery and L ICA stenosis / AAA   # CAD/CABG    -advance diet as tolerated; ambulate the patient - ok with current abx - ct abd/pelvis noted   -IVF; monitor serum Cr - avoid nephrotoxins   -home meds   -replete electrolytes   -skin care as per nursing   -patient needs inpatient monitoring for medical stabilization   -updated son at bedside - answered all questions     DISPO: from home     Attending Physician Dr. Alicia Mims # [id number]

## 2024-04-26 NOTE — ED ADULT TRIAGE NOTE - CHIEF COMPLAINT QUOTE
Pt presents to the ED w/ c/o abdominal pain. Pt states she has had an ulcer for the last 8 months and the pain has been getting worse

## 2024-04-26 NOTE — H&P ADULT - NSHPPHYSICALEXAM_GEN_ALL_CORE
GEN: NAD, Resting comfortably in bed, cooperative  PULM: Clear to auscultation bilaterally, No wheezing, rales, rhonchi  CVS: Regular rate and rhythm, S1-S2, no murmurs, heaves, thrills  ABD: Soft, non-tender, non-distended, no guarding, BS +  EXT: No edema/cyanosis, extremities warm/dry, peripheral pulses palpable   NEURO: A&Ox3, no focal deficits, follows commands, answers appropriately

## 2024-04-26 NOTE — ED PROVIDER NOTE - CHILD ABUSE FACILITY
[facility] [Left] : left wrist [There are no fractures, subluxations or dislocations. No significant abnormalities are seen] : There are no fractures, subluxations or dislocations. No significant abnormalities are seen [de-identified] : right wrist: no swelling/ecchymosis ttp over TFCC farom nvid

## 2024-04-26 NOTE — ED PROVIDER NOTE - PHYSICAL EXAMINATION
Physical Exam    Constitutional: No acute distress.   Eyes: Conjunctiva pink, Sclera clear, PERRLA, EOMI.  ENT: No sinus tenderness. No nasal discharge. No oropharyngeal erythema, edema, or exudates. Uvula midline.   Cardiovascular: Regular rate, regular rhythm. No noted murmurs rubs or gallops.  Respiratory: unlabored respiratory effort, clear to auscultation bilaterally no wheezing, rales or rhonchi  Gastrointestinal: Normal bowel sounds. soft, non distended, epigastric abdominal tenderness to palpation  Musculoskeletal: supple neck, no midline tenderness. No joint or bony deformity.   Integumentary: warm, dry, no rash  Neurologic: awake, alert, cranial nerves II-XII grossly intact, extremities’ motor and sensory functions grossly intact

## 2024-04-26 NOTE — ED PROVIDER NOTE - CARE PLAN
Principal Discharge DX:	Colitis  Secondary Diagnosis:	Elevated WBCs  Secondary Diagnosis:	TOBIAS (acute kidney injury)  Secondary Diagnosis:	Nausea and vomiting   1

## 2024-04-26 NOTE — ED PROVIDER NOTE - TOBACCO USE
Your current Orthopaedic problem we are working together to treat is:  Spine pain.      IMAGING:  You have been recommended to undergo an EMG of both lower extremities, to help us better understand and treat your symptoms. Please stop at reception to schedule the imaging appointment or call [facility] for Imaging - [phone] (all  sites & [facility] sites). Make a follow up appointment to discuss your results.    It is recommended you schedule a follow-up appointment with Jamar Salazar MD in 6 weeks.    Office hours are 8:00 am to 5:00 pm Monday through Friday. If it is urgent that you speak with someone outside of these hours, our [facility] Call Center will be able to assist you. You can reach the office by calling the [facility] scheduling line at [phone].    We do highly recommend [facility], if you do not already have this. You can request access via the internet or by simply talking with a  at any of the clinics.  [email or web address].      Thank you for choosing [facility] as your Orthopaedic provider!   Never smoker

## 2024-04-27 NOTE — DISCHARGE NOTE PROVIDER - NSDCCPCAREPLAN_GEN_ALL_CORE_FT
PRINCIPAL DISCHARGE DIAGNOSIS  Diagnosis: Colitis  Assessment and Plan of Treatment: You were admitted to the hospital for nausea, vomitting, and diarrhea. You had imaging of your abdomen that demonstrated an acute infection of your colon, known as colitis. You were treated with IV antiboitics while inpatient and will be discharge on a short course of oral antibiotics. Please take all your medications exactly as prescribed and complete your total antibitoic course even if your symptoms are improved.   You were also found to have an acute kidney injury. You were treated with IV fluids and had improvement in your creatinine. You will need to follow up with nephrology as an outpatient.   Call your doctor or returnt o the emergency room or call 911 immediately if you have any of the following: fever of 100.4°F (38°C) or higher, shaking chills, abdominal pain that doesn't improve with medication, acute weakness, or increased nausea, vomitting, or diarrhea         SECONDARY DISCHARGE DIAGNOSES  Diagnosis: Elevated WBCs  Assessment and Plan of Treatment:     Diagnosis: TOBIAS (acute kidney injury)  Assessment and Plan of Treatment:     Diagnosis: Nausea and vomiting  Assessment and Plan of Treatment:      PRINCIPAL DISCHARGE DIAGNOSIS  Diagnosis: Colitis  Assessment and Plan of Treatment: You were admitted to the hospital for nausea, vomitting, and diarrhea. You had imaging of your abdomen that demonstrated an acute infection of your colon, known as colitis. You were treated with IV antiboitics while inpatient and will be discharge on a short course of oral antibiotics. Please take all your medications exactly as prescribed and complete your total antibitoic course even if your symptoms are improved.   You were also found to have an acute kidney injury. You were treated with IV fluids and had improvement in your creatinine. You will need to follow up with nephrology as an outpatient.   Call your doctor or returnt o the emergency room or call 911 immediately if you have any of the following: fever of 100.4°F (38°C) or higher, shaking chills, abdominal pain that doesn't improve with medication, acute weakness, or increased nausea, vomitting, or diarrhea

## 2024-04-27 NOTE — DISCHARGE NOTE PROVIDER - CARE PROVIDER_API CALL
Yordy Agarwal  Internal Medicine  [address] [place] [address]  Phone: [phone]  Fax: [phone]  Follow Up Time: 2 weeks

## 2024-04-27 NOTE — PHYSICAL THERAPY INITIAL EVALUATION ADULT - PERTINENT HX OF CURRENT PROBLEM, REHAB EVAL
78y F pmh active smoker,  PUD, CAD s/p CABG 09, mod -MR, Aflutter sp ablation, pre-DMII, anxiety,  HTN pw acute diarrhea and vomiting. Patient sxs started Tuesday where she had some nausea and loose stools. These sxs continued and starting yesterday has been unable to tolerate any food intake. She denies any abdominal pain, denies blood in the stool. Patient denies eat any stale/raw/undercooked meat or leftovers or exotic food. No sick contacts. ROS -ve for cp, sob, dizziness, lightheadedness, confusion, hematuria, dysuria, melena, hematochezia, rash, fever, chills, night sweats or weight loss. active-smoker, no etoh or recreational drug use. Last colonoscopy ~5yrs ago reports no significant results. Last EGD in october showing gastric ulcers, had fu appt 1 month ago w/ GI with complete symptom resolution.

## 2024-04-27 NOTE — DISCHARGE NOTE PROVIDER - NSDCMRMEDTOKEN_GEN_ALL_CORE_FT
amLODIPine 5 mg oral tablet: 1 tab(s) orally once a day  ciprofloxacin 500 mg oral tablet: 1 tab(s) orally every 12 hours  dicyclomine 10 mg oral capsule: 2 cap(s) orally 4 times a day  DULoxetine 60 mg oral delayed release capsule: 1 cap(s) orally once a day  metroNIDAZOLE 500 mg oral tablet: 1 tab(s) orally every 8 hours  olmesartan-hydrochlorothiazide 40 mg-25 mg oral tablet: 1 tab(s) orally once a day  pantoprazole 40 mg oral granule, delayed release: 1 each orally once a day  rosuvastatin 20 mg oral capsule: 1 cap(s) orally once a day

## 2024-04-27 NOTE — PHYSICAL THERAPY INITIAL EVALUATION ADULT - GENERAL OBSERVATIONS, REHAB EVAL
8:23-8:48am Pt encountered supine in bed, A & O x 4 in NAD, no c/o and agreeable to PT. Pt performed bed mobility independently. Requires supervision in transfers and ambulation 100 ft using SC with step-to gait/minimal gait instability secondary to RLE weakness(h/o fx as per pt). Pt negotiated 7 steps CGA using 1HR/SC, step-to pattern. Pt tolerated PT session. Pt will benefit from skilled PT2-3x/wk for thera ex, transfer activity training, balance and gait training to increase mobility and return to previous level of function.

## 2024-04-27 NOTE — OCCUPATIONAL THERAPY INITIAL EVALUATION ADULT - ADDITIONAL COMMENTS
Pt. is Independent with ADLs, however requires additional time or needs limited assistance to complete RLE dressing secondary to pain in RLE. Pt's daughter assists with IADLS (cooking, laundry)

## 2024-04-27 NOTE — DISCHARGE NOTE PROVIDER - HOSPITAL COURSE
HPI:   78y F pmh active smoker,  PUD, CAD s/p CABG 09, mod -MR, Aflutter sp ablation, pre-DMII, anxiety,  HTN pw acute diarrhea and vomiting. Patient sxs started Tuesday where she had some nausea and loose stools. These sxs continued and starting yesterday has been unable to tolerate any food intake. She denies any abdominal pain, denies blood in the stool. Patient denies eat any stale/raw/undercooked meat or leftovers or exotic food. No sick contacts. ROS -ve for cp, sob, dizziness, lightheadedness, confusion, hematuria, dysuria, melena, hematochezia, rash, fever, chills, night sweats or weight loss. active-smoker, no etoh or recreational drug use. Last colonoscopy ~5yrs ago reports no significant results. Last EGD in october showing gastric ulcers, had fu appt 1 month ago w/ GI with complete symptom resolution.     ED course:   T 98.9  HR 58  /84  RR 18 SpO2 99 On RA     Significant Labs  wbc 20k hgb 14.4 plt 345  Na 138 K 3.7 BUN/Cr 49/2.2  lactate 2.0, lipase 23, UA -ve    CTAP   Relative cortical atrophy of the right kidney. Liquid stool colon. Mild mucosal hyperenhancement of the sigmoid colon and rectum. Additional mild enhancement of small bowel loops within the pelvis. No free air.  Severe degenerative changes of the right hip and pubic symphysis. Stable severe compression deformity of the T11 vertebral body.  Infrarenal abdominal aorta measuring up to 2.5 cm (coronal series 601, image 36). Likely high-grade stenosis at the right renal ostium secondary to atherosclerotic calcifications.  Patient received  3L, cipro, flagyl, maalox, pepcid, zofran in the ED. Admitted to medicine for management.    Hospital course:   #Acute Infectious Colitis  - nausea/vomiting/non-bloody diarrhea, wbc 20k, hd stable, no sepsis on admission, lactate -ve, denies abdominal pain   - CTAP Mild mucosal hyperenhancement of the sigmoid colon and rectum. Additional mild enhancement of small bowel loops within the pelvis.   - c/w cipro 500mg PO q12h and flagyl 500mg PO q8h x 5 days (day 1/5)   - patient reports resolution of N/V/D, tolerating diet     #Peptic ulcer disease  - denies abdominal pain, no hematochezia/melena, hgb 14k  - cw ppi qd  - op gi fu     #CAD s/p CABG  #Aflutter sp ablation  #Mod-MR  #L subclavian artery and L ICA stenosis  #HTN  #TOBIAS  - fw Dr. Brunson and Dr. Mccarty   - cw amlodipine 5mg qd, holding ACEi-HCTZ 40-25mg iso TOBIAS   - Cr 2.2 bsl 11/23 1.3   - CTAP w/ R kidney cortical atrophy - likely high-grade stenosis at the right renal ostium secondary to atherosclerotic calcifications - may be CKD progression   - gentle hydration 70cc/hr  - op fu     #Infrarenal AAA  - 2.5cm on CTAP   - op vascular fu for monitoring     #Severe OA   - PT/OT eval     HPI:   78y F pmh active smoker,  PUD, CAD s/p CABG 09, mod -MR, Aflutter sp ablation, pre-DMII, anxiety,  HTN pw acute diarrhea and vomiting. Patient sxs started Tuesday where she had some nausea and loose stools. These sxs continued and starting yesterday has been unable to tolerate any food intake. She denies any abdominal pain, denies blood in the stool. Patient denies eat any stale/raw/undercooked meat or leftovers or exotic food. No sick contacts. ROS -ve for cp, sob, dizziness, lightheadedness, confusion, hematuria, dysuria, melena, hematochezia, rash, fever, chills, night sweats or weight loss. active-smoker, no etoh or recreational drug use. Last colonoscopy ~5yrs ago reports no significant results. Last EGD in october showing gastric ulcers, had fu appt 1 month ago w/ GI with complete symptom resolution.     ED course:   T 98.9  HR 58  /84  RR 18 SpO2 99 On RA     Significant Labs  wbc 20k hgb 14.4 plt 345  Na 138 K 3.7 BUN/Cr 49/2.2  lactate 2.0, lipase 23, UA -ve    CTAP   Relative cortical atrophy of the right kidney. Liquid stool colon. Mild mucosal hyperenhancement of the sigmoid colon and rectum. Additional mild enhancement of small bowel loops within the pelvis. No free air.  Severe degenerative changes of the right hip and pubic symphysis. Stable severe compression deformity of the T11 vertebral body.  Infrarenal abdominal aorta measuring up to 2.5 cm (coronal series 601, image 36). Likely high-grade stenosis at the right renal ostium secondary to atherosclerotic calcifications.  Patient received  3L, cipro, flagyl, maalox, pepcid, zofran in the ED. Admitted to medicine for management.    Hospital course:   #Acute Infectious Colitis  - nausea/vomiting/non-bloody diarrhea, wbc 20k, hd stable, no sepsis on admission, lactate -ve, denies abdominal pain   - CTAP Mild mucosal hyperenhancement of the sigmoid colon and rectum. Additional mild enhancement of small bowel loops within the pelvis.   - c/w cipro 500mg PO q12h and flagyl 500mg PO q8h x 5 days (day 1/5)   - patient reports resolution of N/V/D, tolerating clear liquid diet, will advance     #Peptic ulcer disease  - denies abdominal pain, no hematochezia/melena, hgb 14k  - cw ppi qd  - op gi fu     #CAD s/p CABG  #Aflutter sp ablation  #Mod-MR  #L subclavian artery and L ICA stenosis  #HTN  #TOBIAS  - fw Dr. Brunson and Dr. Mccarty   - cw amlodipine 5mg qd, holding ACEi-HCTZ 40-25mg iso TOBIAS   - Cr 2.2 bsl 11/23 1.3   - CTAP w/ R kidney cortical atrophy - likely high-grade stenosis at the right renal ostium secondary to atherosclerotic calcifications - may be CKD progression   - gentle hydration 70cc/hr  - f/u repeat BMP   - op fu     #Infrarenal AAA  - 2.5cm on CTAP   - op vascular fu for monitoring     #Severe OA   - PT/OT eval     HPI:   78y F pmh active smoker,  PUD, CAD s/p CABG 09, mod -MR, Aflutter sp ablation, pre-DMII, anxiety,  HTN pw acute diarrhea and vomiting. Patient sxs started Tuesday where she had some nausea and loose stools. These sxs continued and starting yesterday has been unable to tolerate any food intake. She denies any abdominal pain, denies blood in the stool. Patient denies eat any stale/raw/undercooked meat or leftovers or exotic food. No sick contacts. ROS -ve for cp, sob, dizziness, lightheadedness, confusion, hematuria, dysuria, melena, hematochezia, rash, fever, chills, night sweats or weight loss. active-smoker, no etoh or recreational drug use. Last colonoscopy ~5yrs ago reports no significant results. Last EGD in october showing gastric ulcers, had fu appt 1 month ago w/ GI with complete symptom resolution.     ED course:   T 98.9  HR 58  /84  RR 18 SpO2 99 On RA     Significant Labs  wbc 20k hgb 14.4 plt 345  Na 138 K 3.7 BUN/Cr 49/2.2  lactate 2.0, lipase 23, UA -ve    CTAP   Relative cortical atrophy of the right kidney. Liquid stool colon. Mild mucosal hyperenhancement of the sigmoid colon and rectum. Additional mild enhancement of small bowel loops within the pelvis. No free air.  Severe degenerative changes of the right hip and pubic symphysis. Stable severe compression deformity of the T11 vertebral body.  Infrarenal abdominal aorta measuring up to 2.5 cm (coronal series 601, image 36). Likely high-grade stenosis at the right renal ostium secondary to atherosclerotic calcifications.  Patient received  3L, cipro, flagyl, maalox, pepcid, zofran in the ED. Admitted to medicine for management.    Hospital course:   #Acute Infectious Colitis  - nausea/vomiting/non-bloody diarrhea, wbc 20k, hd stable, no sepsis on admission, lactate -ve, denies abdominal pain   - CTAP Mild mucosal hyperenhancement of the sigmoid colon and rectum. Additional mild enhancement of small bowel loops within the pelvis.   - c/w cipro 500mg PO q12h and flagyl 500mg PO q8h x 5 days (day 1/5)   - patient reports resolution of N/V/D, tolerating clear liquid diet, will advance     #Peptic ulcer disease  - denies abdominal pain, no hematochezia/melena, hgb 14k  - cw ppi qd  - op gi fu     #CAD s/p CABG  #Aflutter sp ablation  #Mod-MR  #L subclavian artery and L ICA stenosis  #HTN  #TOBIAS, resolved   - fw Dr. Brunson and Dr. Mccarty   - cw amlodipine 5mg qd, resume ACEi-HCTZ 40-25mg on dc   - Cr 2.2 bsl 11/23 1.3, now 1.5; encourage oral intake   - CTAP w/ R kidney cortical atrophy - likely high-grade stenosis at the right renal ostium secondary to atherosclerotic calcifications - may be CKD progression   - op fu     #Infrarenal AAA  - 2.5cm on CTAP   - op vascular fu for monitoring     #Severe OA   - PT/OT eval     HPI:   78y F pmh active smoker,  PUD, CAD s/p CABG 09, mod -MR, Aflutter sp ablation, pre-DMII, anxiety,  HTN pw acute diarrhea and vomiting. Patient sxs started Tuesday where she had some nausea and loose stools. These sxs continued and starting yesterday has been unable to tolerate any food intake. She denies any abdominal pain, denies blood in the stool. Patient denies eat any stale/raw/undercooked meat or leftovers or exotic food. No sick contacts. ROS -ve for cp, sob, dizziness, lightheadedness, confusion, hematuria, dysuria, melena, hematochezia, rash, fever, chills, night sweats or weight loss. active-smoker, no etoh or recreational drug use. Last colonoscopy ~5yrs ago reports no significant results. Last EGD in october showing gastric ulcers, had fu appt 1 month ago w/ GI with complete symptom resolution.     ED course:   T 98.9  HR 58  /84  RR 18 SpO2 99 On RA     Significant Labs  wbc 20k hgb 14.4 plt 345  Na 138 K 3.7 BUN/Cr 49/2.2  lactate 2.0, lipase 23, UA -ve    CTAP   Relative cortical atrophy of the right kidney. Liquid stool colon. Mild mucosal hyperenhancement of the sigmoid colon and rectum. Additional mild enhancement of small bowel loops within the pelvis. No free air.  Severe degenerative changes of the right hip and pubic symphysis. Stable severe compression deformity of the T11 vertebral body.  Infrarenal abdominal aorta measuring up to 2.5 cm (coronal series 601, image 36). Likely high-grade stenosis at the right renal ostium secondary to atherosclerotic calcifications.  Patient received  3L, cipro, flagyl, maalox, pepcid, zofran in the ED. Admitted to medicine for management.    Hospital course:   #Acute Infectious Colitis  - nausea/vomiting/non-bloody diarrhea, wbc 20k, hd stable, no sepsis on admission, lactate -ve, denies abdominal pain   - CTAP Mild mucosal hyperenhancement of the sigmoid colon and rectum. Additional mild enhancement of small bowel loops within the pelvis.   - c/w cipro 500mg PO q12h and flagyl 500mg PO q8h x 5 days (day 1/5)   - patient reports resolution of N/V/D, tolerating clear liquid diet, will advance     #Peptic ulcer disease  - denies abdominal pain, no hematochezia/melena, hgb 14k  - cw ppi qd  - op gi fu     #CAD s/p CABG  #Aflutter sp ablation  #Mod-MR  #L subclavian artery and L ICA stenosis  #HTN  #TOBIAS, likely prerenal iso of enteritis, resolved   - fw Dr. Brunson and Dr. Mccarty   - cw amlodipine 5mg qd, resume ACEi-HCTZ 40-25mg on dc   - Cr 2.2 bsl 11/23 1.3, now 1.5; encourage oral intake   - CTAP w/ R kidney cortical atrophy - likely high-grade stenosis at the right renal ostium secondary to atherosclerotic calcifications - may be CKD progression   - op fu     #Infrarenal AAA  - 2.5cm on CTAP   - op vascular fu for monitoring     #Severe OA   - PT/OT eval

## 2024-04-28 NOTE — PROGRESS NOTE ADULT - SUBJECTIVE AND OBJECTIVE BOX
JAYLYN AGARWAL  78y  Female      Patient is a 78y old  Female who presents with a chief complaint of Diarrhea (27 Apr 2024 09:42)      INTERVAL HPI/OVERNIGHT EVENTS:    pt seen and examined at bedside this morning   -doing well   -stable for d/c home today   -tolerated diet - no abdominal pain - labs noted   -meds sent to her pharmacy     Vital Signs Last 24 Hrs  T(C): 36.1 (28 Apr 2024 05:15), Max: 36.7 (27 Apr 2024 20:23)  T(F): 97 (28 Apr 2024 05:15), Max: 98 (27 Apr 2024 20:23)  HR: 84 (28 Apr 2024 05:15) (82 - 84)  BP: 131/68 (28 Apr 2024 05:15) (120/57 - 132/59)  RR: 18 (28 Apr 2024 05:15) (18 - 18)  SpO2: 98% (28 Apr 2024 05:15) (98% - 98%)        PHYSICAL EXAM:  GENERAL: Not in distress - speaking in full sentences - ambulating on medical floor   HEAD:  Atraumatic, Normocephalic  EYES: EOMI, PERRLA, conjunctiva and sclera clear  NERVOUS SYSTEM:  Alert & Oriented X 3  CHEST/LUNG: Clear air entry   CV/HEART: Regular rate and rhythm   GI/ABDOMEN: Soft abdomen   EXTREMITIES:  moves all extrem   LAB:                        12.8   12.56 )-----------( 298      ( 28 Apr 2024 08:07 )             37.5     04-28    140  |  100  |  20  ----------------------------<  101<H>  4.1   |  28  |  1.3    Ca    9.8      28 Apr 2024 08:07  Mg     1.7     04-28    TPro  7.6  /  Alb  4.2  /  TBili  0.3  /  DBili  x   /  AST  19  /  ALT  8   /  AlkPhos  87  04-28        Daily     Daily   CAPILLARY BLOOD GLUCOSE        Urinalysis Basic - ( 28 Apr 2024 08:07 )    Color: x / Appearance: x / SG: x / pH: x  Gluc: 101 mg/dL / Ketone: x  / Bili: x / Urobili: x   Blood: x / Protein: x / Nitrite: x   Leuk Esterase: x / RBC: x / WBC x   Sq Epi: x / Non Sq Epi: x / Bacteria: x      LIVER FUNCTIONS - ( 28 Apr 2024 08:07 )  Alb: 4.2 g/dL / Pro: 7.6 g/dL / ALK PHOS: 87 U/L / ALT: 8 U/L / AST: 19 U/L / GGT: x               acetaminophen     Tablet .. 650 milliGRAM(s) Oral every 6 hours PRN  amLODIPine   Tablet 5 milliGRAM(s) Oral daily  atorvastatin 80 milliGRAM(s) Oral at bedtime  ciprofloxacin   IVPB 400 milliGRAM(s) IV Intermittent every 12 hours  dicyclomine 10 milliGRAM(s) Oral four times a day before meals  DULoxetine 60 milliGRAM(s) Oral daily  heparin   Injectable 5000 Unit(s) SubCutaneous every 8 hours  melatonin 3 milliGRAM(s) Oral at bedtime PRN  metroNIDAZOLE  IVPB 500 milliGRAM(s) IV Intermittent every 8 hours  ondansetron Injectable 4 milliGRAM(s) IV Push every 8 hours PRN  sodium chloride 0.9%. 1000 milliLiter(s) IV Continuous <Continuous>

## 2024-04-28 NOTE — DISCHARGE NOTE NURSING/CASE MANAGEMENT/SOCIAL WORK - PATIENT PORTAL LINK FT
You can access the FollowMyHealth Patient Portal offered by [facility] by registering at the following website: [email or web address]. By joining [employer]’s FollowMyHealth portal, you will also be able to view your health information using other applications (apps) compatible with our system.

## 2024-04-28 NOTE — PROGRESS NOTE ADULT - ASSESSMENT
# Acute sigmoid colitis   # SIRS  # TOBIAS on suspected CKD stage III  # hypokalemia   # HTN  # A Flutter  # L subclavian artery and L ICA stenosis / AAA (patient aware)  # CAD/CABG    -tolerating diet   -kidney function normalized   -home meds   -repleted electrolytes   -skin care as per nursing   -aware of the abnormal findings on imaging - will follow up in the community     DISPO: discharge home today   -spoke to patient this am   -updated son yesterday     Attending Physician Dr. Alicia Mims # [id number] .

## 2024-04-28 NOTE — PROGRESS NOTE ADULT - TIME BILLING
direct patient care and chart review  -coordinated current plan of care with medical staff on MDR   -d/c papers edited by me

## 2024-05-02 NOTE — PRE-ANESTHESIA EVALUATION ADULT - WEIGHT IN LBS
[FreeTextEntry1] : Target: HbA1c < 7%, BP < 130/80  HbA1c is above goal so I prescribed Glipizide ER BP is above goal but I will monitor this for now.   Patient is on Aspirin, no indication for ACEi/ARB - I defer management of statin to Cardiology who prescribes Praluent  Last lipid panel - Jan 2024 - , Trig 211 Last HbA1c - 04/30/2024 - 7.1% Last Vitamin B12 - N/A Last urine albumin panel - June 2023 - neg Last BMP/CMP - April 2024 - Cr, K, AST, ALT N  Plan: 1. Start Glipizide ER 2.5 mg po daily 2. Fingersticks to be done once daily 3. Labs to be done in 3 months - CBC, CMP, HbA1c, urine microalbumin panel 4. Follow up in 3 months to review results and meter. 149.9

## 2024-05-23 NOTE — ED PROVIDER NOTE - OBJECTIVE STATEMENT
78-year-old female history HTN, CAD status post CABG, a flutter status post ablation, chronic back pain (on meloxicam), denies other significant PSH, cigarette smoker, denies daily EtOH use, now presents with low back pain for the past several days, also reports generalized weakness and right foot swelling, sx are constant, worse with certain movements and position, better with rest, denies fever, tactile temp, chills, paresthesias, focal weakness, abdominal or chest pain, SOB, orthopnea, PND, bowel or bladder dysfunction, urinary sx, LE weakness, saddle anesthesia, or other associated complaints at present. Pt denies recent heavy lifting or trauma. Old chart reviewed. I have reviewed and agree with the initial nursing note, except as documented in my note.       VSS, awake, alert, chest CTAB, +S1/S2, RRR, abdomen soft, NT, no pulsatile masses or bruits appreciated, no midline spinal tenderness, step-offs or deformities, no erythema, swelling or ecchymosis, no skin rash or lesions, able to dorsiflex b/l great toe, no foot drop, motor and sensation intact b/l LE and equal, NV intact, normal gait.

## 2024-05-23 NOTE — ED PROVIDER NOTE - NSFOLLOWUPINSTRUCTIONS_ED_ALL_ED_FT
Back Pain    Back pain is very common in adults. The cause of back pain is rarely dangerous and the pain often gets better over time. The cause of your back pain may not be known and may include strain of muscles or ligaments, degeneration of the spinal disks, or arthritis. Occasionally the pain may radiate down your leg(s). Over-the-counter medicines to reduce pain and inflammation are often the most helpful. Stretching and remaining active frequently helps the healing process.     SEEK IMMEDIATE MEDICAL CARE IF YOU HAVE ANY OF THE FOLLOWING SYMPTOMS: bowel or bladder control problems, unusual weakness or numbness in your arms or legs, nausea or vomiting, abdominal pain, fever, dizziness/lightheadedness. Our Emergency Department Referral Coordinators will be reaching out to you in the next 24-48 hours from 9:00am to 5:00pm to schedule a follow up appointment. Please expect a phone call from the hospital in that time frame. If you do not receive a call or if you have any questions or concerns, you can reach them at   [phone].      Back Pain    Back pain is very common in adults. The cause of back pain is rarely dangerous and the pain often gets better over time. The cause of your back pain may not be known and may include strain of muscles or ligaments, degeneration of the spinal disks, or arthritis. Occasionally the pain may radiate down your leg(s). Over-the-counter medicines to reduce pain and inflammation are often the most helpful. Stretching and remaining active frequently helps the healing process.     SEEK IMMEDIATE MEDICAL CARE IF YOU HAVE ANY OF THE FOLLOWING SYMPTOMS: bowel or bladder control problems, unusual weakness or numbness in your arms or legs, nausea or vomiting, abdominal pain, fever, dizziness/lightheadedness.

## 2024-05-23 NOTE — ED PROVIDER NOTE - PHYSICAL EXAMINATION
VSS, awake, alert, chest CTAB, +S1/S2, RRR, abdomen soft, NT, no pulsatile masses or bruits appreciated, no midline spinal tenderness, step-offs or deformities, no erythema, swelling or ecchymosis, no skin rash or lesions, able to dorsiflex b/l great toe, no foot drop, motor and sensation intact b/l LE and equal, NV intact, RLE 1+ pedal edema to mid-shin.

## 2024-05-23 NOTE — ED PROVIDER NOTE - PATIENT PORTAL LINK FT
You can access the FollowMyHealth Patient Portal offered by [facility] by registering at the following website: [email or web address]. By joining [employer]’s FollowMyHealth portal, you will also be able to view your health information using other applications (apps) compatible with our system.

## 2024-05-23 NOTE — ED PROVIDER NOTE - CARE PLAN
Take antibiotic as prescribed  Continue with supportive measures, OTC Tylenol/Ibuprofen, nasal decongestants, and cough suppressants (Tessalon Perles)   Cool mist humidifiers, throat lozenges, increased fluid intake and rest   Follow up with PCP in 3-5 days  Present to ER if symptoms worsen     Acute Bronchitis   AMBULATORY CARE:   Acute bronchitis  is swelling and irritation in your lungs. It is usually caused by a virus and most often happens in the winter. Bronchitis may also be caused by bacteria or by a chemical irritant, such as smoke.  Common symptoms:   Cough that lasts up to 3 weeks    Runny or stuffy nose    Hoarseness, sore throat    Fever    Feeling more tired than usual, and body aches    Wheezing or pain when you breathe or cough    Seek care immediately if:   You cough up blood.    Your lips or fingernails turn blue.    You feel like you are not getting enough air when you breathe.    Call your doctor if:   Your symptoms do not go away or get worse, even after treatment.    Your cough does not get better within 4 weeks.    You have questions or concerns about your condition or care.    Medicines:  You may need any of the following:  Cough suppressants  decrease your urge to cough.    Decongestants  help loosen mucus in your lungs and make it easier to cough up. This can help you breathe easier.    Inhalers  may be given. Your healthcare provider may give you one or more inhalers to help you breathe easier and cough less. An inhaler gives you medicine to open your airways. Ask your healthcare provider to show you how to use your inhaler correctly.         Antiviral medicine  treats infections caused by a virus.    Antibiotics  may be given if your bronchitis is caused by bacteria or if you have lung condition.    Acetaminophen  decreases pain and fever. It is available without a doctor's order. Ask how much to take and how often to take it. Follow directions. Read the labels of all other medicines you  are using to see if they also contain acetaminophen, or ask your doctor or pharmacist. Acetaminophen can cause liver damage if not taken correctly.    NSAIDs  help decrease swelling and pain or fever. This medicine is available with or without a doctor's order. NSAIDs can cause stomach bleeding or kidney problems in certain people. If you take blood thinner medicine, always ask your healthcare provider if NSAIDs are safe for you. Always read the medicine label and follow directions.    Self-care:   Drink liquids as directed.  You may need to drink more liquids than usual to stay hydrated. Ask how much liquid to drink each day and which liquids are best for you.    Use a cool mist humidifier.  This increases air moisture in your home. This may make it easier for you to breathe and help decrease your cough.     Get more rest.  Rest helps your body to heal. Slowly start to do more each day. Rest when you feel it is needed.    Prevent acute bronchitis:       Ask about vaccines you may need.  Get a flu vaccine each year as soon as recommended, usually in September or October. Ask your healthcare provider if you should also get a pneumonia or COVID-19 vaccine. Your healthcare provider can tell you if you should also get other vaccines, and when to get them.    Prevent the spread of germs.  You can decrease your risk for acute bronchitis and other illnesses by doing the following:    Wash your hands often with soap and water. Carry germ-killing hand lotion or gel with you. You can use the lotion or gel to clean your hands when soap and water are not available.         Do not touch your eyes, nose, or mouth unless you have washed your hands first.    Always cover your mouth when you cough to prevent the spread of germs. It is best to cough into a tissue or your shirt sleeve instead of into your hand. Ask those around you to cover their mouths when they cough.    Try to avoid people who have a cold or the flu. If you are  sick, stay away from others as much as possible.    Avoid irritants in the air.  Avoid chemicals, fumes, and dust. Wear a face mask if you must work around dust or fumes. Stay inside on days when air pollution levels are high. If you have allergies, stay inside when pollen counts are high. Do not use aerosol products, such as spray-on deodorant, bug spray, and hair spray.    Do not smoke or be around others who are smoking.  Nicotine and other chemicals in cigarettes and cigars can cause lung damage. Ask your healthcare provider for information if you currently smoke and need help to quit. E-cigarettes or smokeless tobacco still contain nicotine. Talk to your healthcare provider before you use these products.  Follow up with your doctor as directed:  Write down questions you have so you will remember to ask them during your follow-up visits.  © Copyright [employer] 2023 Information is for End User's use only and may not be sold, redistributed or otherwise used for commercial purposes.  The above information is an  only. It is not intended as medical advice for individual conditions or treatments. Talk to your doctor, nurse or pharmacist before following any medical regimen to see if it is safe and effective for you.     Principal Discharge DX:	Back pain   1 Principal Discharge DX:	Back pain  Secondary Diagnosis:	Foot swelling

## 2024-05-23 NOTE — ED ADULT NURSE NOTE - OBJECTIVE STATEMENT
Pt with C/O generalized body pain ,back ,legs pain weakness for 2 weeks worse today denies fever chills N/v/d .

## 2024-05-23 NOTE — ED PROVIDER NOTE - CLINICAL SUMMARY MEDICAL DECISION MAKING FREE TEXT BOX
This patient presents with back pain most consistent with neuromuscular etiology. Differential diagnoses includes lumbago versus musculoskeletal spasm / strain versus sciatica. No back pain red flags on history or physical. Presentation not consistent with malignancy (lack of history of malignancy, lack of B symptoms), fracture (no trauma, no bony tenderness to palpation), cauda equina (no bowel or urinary incontinence/retention, no saddle anesthesia, no distal weakness), AAA, viscus perforation, osteomyelitis or epidural abscess (no IVDU, vertebral tenderness), renal colic, pyelonephritis (afebrile, no CVAT, no urinary symptoms). Given the clinical picture, no indication for imaging at this time.    Patient presents with rt foot pain and swelling. Given history, exam and workup patient likely has arthritis vs dependent edema. I have low suspicion for fracture, dislocation, significant ligamentous injury, septic arthritis, gout flare, new autoimmune arthropathy, or gonococcal arthropathy.    Was explained that they should still follow up as an outpatient for further evaluation and management. They were given detailed return precautions and advised to return to the emergency department if any new symptoms developed, symptoms worsened or for any concerns. They were was offered the opportunity to ask questions and verbalized that they understand the diagnosis and discharge instructions.

## 2024-05-26 NOTE — ED ADULT TRIAGE NOTE - CHIEF COMPLAINT QUOTE
Pt c/o generalized weakness w inability to ambulate. Pt was here on thursday and released, she fell thursday due to weakness +Hs, -LOC, -AC. Denies fevers,

## 2024-05-26 NOTE — ED PROVIDER NOTE - PHYSICAL EXAMINATION
CONSTITUTIONAL: well developed; well nourished; well appearing in no acute distress  HEAD: normocephalic; atraumatic  EYES: no conjunctival injection, no scleral icterus  ENT: no nasal discharge; airway clear.  NECK: supple; non tender. + full passive ROM in all directions  CARD: warm and well perfused, not tachycardic  RESP: breathing comfortably on RA, speaking in full sentences w/o distress  ABD: soft; non-distended; non-tender. No rebound, no guarding, no pulsatile abdominal mass  SPINE: no c/t/l spinal tenderness but +sacral tenderness  EXT: moving all extremities spontaneously, normal ROM. No clubbing, cyanosis or edema  SKIN: warm and dry, no lesions noted  NEURO: alert, oriented, CN II-XII grossly intact, sensory grossly intact, +b/l LE weakness, speech nonslurred, gait deferred, no focal deficits. GCS 15  PSYCH: calm, cooperative, appropriate, good eye contact, logical thought process, no apparent danger to self or others

## 2024-05-26 NOTE — H&P ADULT - ASSESSMENT
ASSESSMENT:  78y Female  w/ PMHx of of CAD, tobacco dependence, HTN, anxiety, atrial flutter, seen as Trauma Consult s/p multiple falls with complaint of chest pain. Trauma assessment in ED: ABCs intact , GCS 15 , ARGUETA.     Injuries identified:   - multiple left sided 5-11th rib fractures  - acute nondispalced right superior pubic ramus fracture    PLAN:   - Admit to trauma step down   - Respiratory monitoring   - Pain control   - Hemodynamic monitoring   - PT/rehab    Above plan discussed with Trauma attending, Dr. Ruiz, patient, patient family, and ED team  --------------------------------------------------------------------------------------  05-26-24 @ 14:23

## 2024-05-26 NOTE — ED PROVIDER NOTE - CARE PLAN
1 Principal Discharge DX:	Multiple closed fractures of ribs of left side  Secondary Diagnosis:	Multiple closed fractures of ribs of right side  Secondary Diagnosis:	Fall from standing  Secondary Diagnosis:	Fracture of single pubic ramus  Secondary Diagnosis:	Back pain

## 2024-05-26 NOTE — ED PROVIDER NOTE - SECONDARY DIAGNOSIS.
Fracture of single pubic ramus Multiple closed fractures of ribs of right side Back pain Fall from standing

## 2024-05-26 NOTE — CONSULT NOTE ADULT - ASSESSMENT
78y Female  w/     PMHx of CAD (coronary artery disease)    Tobacco dependence    HTN (hypertension)    Anxiety    Atrial flutter        and a PSHx of S/P CABG x 3        now admitted to SICU for **********    **Surgery**  -       NEURO/PSYCH:  #Sedation  - RASS goal: 0 to -1        #Acute pain    - APAP prn, Gabapentin 100mg q8    - if intubated, Fentanyl 12.5 mcg q4 prn    - if extubated, remove all sedatives, fentanyl gtt/IV pushes    #PMHx of     acetaminophen     Tablet .. 650 milliGRAM(s) Oral every 6 hours  DULoxetine 60 milliGRAM(s) Oral daily  methocarbamol 500 milliGRAM(s) Oral every 6 hours  oxyCODONE    IR 5 milliGRAM(s) Oral every 6 hours PRN Severe Pain (7 - 10)  oxyCODONE    IR 2.5 milliGRAM(s) Oral every 6 hours PRN Moderate Pain (4 - 6)      Home meds: DULoxetine 60 mg oral delayed release capsule      RESP:   #Oxygenation   - Intubated on **/**. ETT: ** f, lip line **.       - wean as tolerated   -   #Activity   - increase as tolerated  - Activity - Ambulate as Tolerated:     Time/Priority:  Routine (05-26-24 @ 15:27)      CXR:     #PMHx of         Home Meds:     CARDIAC:    - SBP **    #PMHx of    Imaging:   - EKG: **    Labs:     amLODIPine   Tablet 5 milliGRAM(s) Oral daily      Home meds:     GI/NUTR:   #Diet   -        - NG tube at ** length   - NG Tube to **   - aspiration precautions, HOB 30    #GI Prophylaxis   - Indication: **   - pantoprazole    Tablet: [Known as PROTONIX   DR]  40 milliGRAM(s), Oral, before breakfast  Administration Instructions: This is a Look-alike/Sound-alike Medication  Swallow whole. Do not crush, break, or chew tablet.  Provider's Contact #: [phone] (05-26-24 @ 15:17)      #Bowel regimen   - senna:   2 Tablet(s), Oral, at bedtime  Provider's Contact #: [phone] (05-26-24 @ 15:27)     -     #PMHx of    dicyclomine 20 milliGRAM(s) Oral four times a day before meals  pantoprazole    Tablet 40 milliGRAM(s) Oral before breakfast  senna 2 Tablet(s) Oral at bedtime      Home meds: dicyclomine 10 mg oral capsule: 2 cap(s) orally 4 times a day  pantoprazole 40 mg oral granule, delayed release: 1 each orally once a day      /RENAL:   #urine output in critically ill    - indwelling mann (placed **)    Labs:   Labs:          BUN/Cr -  22/1.1  -->          Electrolytes-(05-26 @ 12:13)Na 124 // K 3.4 // Mg 1.8 // Phos --       atorvastatin 80 milliGRAM(s) Oral at bedtime      Home meds:       HEME/ONC:   #DVT prophylaxis    - SCDs    - if DVT prophylaxis to be held, document and place VTE order         #PMHx of    Labs:   Labs: Hgb/Hct:  9.8/27.6  (05-26 @ 12:13)  -->                      Platelets:  351  -->                 PTT/INR:  26.9/0.94  --->             Home:      T&S:  Expires: **  Blood Consent-obtain if acute anemia, q6 CBC    ID:  #    WBC- 12.07  --->>  Temp trend- 24hrs T(F): 98 (05-26 @ 15:45), Max: 98 (05-26 @ 15:45)  Current antibiotics-      #PMHx of    ENDO:    - FSG q6 if NPO or Tube feeds    - Glucose goal 140-180. if above 180 start ISS    #PMHx of    atorvastatin 80 milliGRAM(s) Oral at bedtime      Home: rosuvastatin 20 mg oral capsule: 1 cap(s) orally once a day      MSK:     Activity - Ambulate as Tolerated:     Time/Priority:  Routine (05-26-24 @ 15:27)      #PMHx of    DERM:  - DTI screen  - Preventative dressings in place    LINES/DRAINS:  PIV, Mann , TLC (**), Arterial Line (**), ETT (**), NG / OG (**)    ADVANCED DIRECTIVES:      HCP/Emergency Contact -    INDICATION FOR SDU: Multiple consecutive rib fractures    DISPO:  Case discussed with attending Dr. Ruiz 78y Female w/ multiple comorbidities presenting with multiple consecutive rib fractures s/p multiple falls due to lower extremities now admitted to SICU for hemodynamic monitoring.    NEURO/PSYCH:  #Acute pain  - acetaminophen Tablet .. 650 milliGRAM(s) Oral every 6 hours  - methocarbamol 500 milliGRAM(s) Oral every 6 hours  - oxyCODONE    IR 5 milliGRAM(s) Oral every 6 hours PRN Severe Pain (7 - 10)  - oxyCODONE    IR 2.5 milliGRAM(s) Oral every 6 hours PRN Moderate Pain (4 - 6)    #PMHx of anxiety  - Continue home DULoxetine 60 milliGRAM(s) Oral daily    RESP:   #multiple acute consecutive posterior left 6-11th rib fractures  - pain control  - 1250mL on IS; encourage use    #bilateral pleural effusion, r/o pneumothorax  - bilateral pleural effusion with punctate air, concerning for possible pneumothorax --> NC at 2L  - Repeat CXR    #Activity  - Activity - Ambulate as Tolerated:     #PMHx of tobacco use  - current smoker    CARDIAC:   #PMHx of HTN  - continue home amLODIPine Tablet 5 milliGRAM(s) Oral daily  - HOLDING home olmesartan-hydrochlorothiazide 40 mg-25 mg oral tablet    #PMHx of HLD  - atorvastatin 80 milliGRAM(s) Oral at bedtime (home med: rosuvastatin 20mg QD)    #PMHx of CAD s/p CABG x 3  - follows with Dr. Brunson    Imaging:   - EKG: pending  - TTE pending     GI/NUTR:   #Diet: DASH/TLC  - aspiration precautions, HOB 30    #GI Prophylaxis  - Indication: home medication, #PMHx of PUD  - pantoprazole Tablet: 40 milliGRAM(s), Oral, before breakfast    #Bowel regimen   - senna: 2 Tablet(s), Oral, at bedtime    #Recent admission for infective colitis (4/24)  - Continue home dicyclomine 20 milliGRAM(s) Oral four times a day before meals    #dilated CBD, colonic wall, esophageal fluid  - CTAP: Dilated common bile duct with intrahepatic biliary duct dilatation and a distended gallbladder. Findings are suspicious for a possible central obstructing lesion within the distal common bile duct is not excluded. Further evaluation with ERCP is recommended. Patulous, fluid-filled esophagus. Outpatient endoscopy is recommended.    /RENAL:   #urine output in critically ill  - voiding freely via primafit    #hyPOnatremia  - NS at 50mL    Labs:   Labs:          BUN/Cr -  22/1.1  -->          Electrolytes-(05-26 @ 12:13)Na 124 // K 3.4 // Mg 1.8 // Phos --   #hypokalemia - 20mEq KCl x 3 doses  #hyPOmagnesemia - 2g Mg      HEME/ONC:   #DVT prophylaxis    - SCDs    - heparin SubQ 5000 Unit(s) SubCutaneous every 8 hours    Labs:   Labs: Hgb/Hct:  9.8/27.6  (05-26 @ 12:13)  -->                      Platelets:  351  -->                 PTT/INR:  26.9/0.94  --->       ID:  #leukocytosis  WBC- 12.07  --->>  Temp trend- 24hrs T(F): 98 (05-26 @ 15:45), Max: 98 (05-26 @ 15:45)  Current antibiotics-    #PMHx of infectious colitis    ENDO:  - Glucose goal 140-180. if above 180 start ISS    MSK:  #acute right suprapubic rami fracture, acute greater trochanteric fracture  - Ortho c/s pending    #PMHx of osteoarthritis  - follows with Dr. Dubon    DERM:  - DTI screen  - Preventative dressings in place    LINES/DRAINS:  PIV, Randall , TLC (**), Arterial Line (**), ETT (**), NG / OG (**)    ADVANCED DIRECTIVES:      HCP/Emergency Contact - Carolyn Lobato (daughter) - [phone]    INDICATION FOR SDU: Multiple consecutive rib fractures    DISPO:  Case discussed with attending Dr. Ruiz 78y Female w/ multiple comorbidities presenting with multiple consecutive rib fractures s/p multiple falls due to lower extremities now admitted to SICU for hemodynamic monitoring.    NEURO/PSYCH:  #Acute pain  - acetaminophen Tablet .. 650 milliGRAM(s) Oral every 6 hours  - methocarbamol 500 milliGRAM(s) Oral every 6 hours  - oxyCODONE    IR 5 milliGRAM(s) Oral every 6 hours PRN Severe Pain (7 - 10)  - oxyCODONE    IR 2.5 milliGRAM(s) Oral every 6 hours PRN Moderate Pain (4 - 6)    #PMHx of anxiety  - Continue home DULoxetine 60 milliGRAM(s) Oral daily    RESP:   #multiple acute consecutive posterior left 6-11th rib fractures  - pain control  - 1250mL on IS; encourage use    #bilateral pleural effusion, r/o pneumothorax  - bilateral pleural effusion with punctate air, concerning for possible pneumothorax --> NC at 2L  - Repeat CXR    #Activity  - Activity - Ambulate as Tolerated:     #PMHx of tobacco use  - current smoker    CARDIAC:   #PMHx of HTN  - continue home amLODIPine Tablet 5 milliGRAM(s) Oral daily  - HOLDING home olmesartan-hydrochlorothiazide 40 mg-25 mg oral tablet    #PMHx of HLD  - atorvastatin 80 milliGRAM(s) Oral at bedtime (home med: rosuvastatin 20mg QD)    #PMHx of CAD s/p CABG x 3  - follows with Dr. Brunson    #PMHx of atrial flutter s/p ablation (2022)    Imaging:   - EKG: pending  - TTE pending     GI/NUTR:   #Diet: DASH/TLC  - aspiration precautions, HOB 30    #GI Prophylaxis  - Indication: home medication, #PMHx of PUD  - pantoprazole Tablet: 40 milliGRAM(s), Oral, before breakfast    #Bowel regimen   - senna: 2 Tablet(s), Oral, at bedtime    #Recent admission for infective colitis (4/24)  - Continue home dicyclomine 20 milliGRAM(s) Oral four times a day before meals    #dilated CBD, colonic wall, esophageal fluid  - CTAP: "Dilated common bile duct with intrahepatic biliary duct dilatation and a distended gallbladder. Findings are suspicious for a possible central obstructing lesion within the distal common bile duct is not excluded. Further evaluation with ERCP is recommended. Patulous, fluid-filled esophagus. Outpatient endoscopy is recommended"  - Follow-up outpatient    /RENAL:   #urine output in critically ill  - voiding freely via primafit    #hyPOnatremia  - NS at 50mL    Labs:   Labs:          BUN/Cr -  22/1.1  -->          Electrolytes-(05-26 @ 12:13)Na 124 // K 3.4 // Mg 1.8 // Phos --   #hypokalemia - 20mEq KCl x 3 doses  #hyPOmagnesemia - 2g Mg      HEME/ONC:   #DVT prophylaxis    - SCDs    - heparin SubQ 5000 Unit(s) SubCutaneous every 8 hours    Labs:   Labs: Hgb/Hct:  9.8/27.6  (05-26 @ 12:13)  -->                      Platelets:  351  -->                 PTT/INR:  26.9/0.94  --->       ID:  #leukocytosis  WBC- 12.07  --->>  Temp trend- 24hrs T(F): 98 (05-26 @ 15:45), Max: 98 (05-26 @ 15:45)  Current antibiotics-    #PMHx of infectious colitis    ENDO:  - Glucose goal 140-180. if above 180 start ISS    MSK:  #acute right suprapubic rami fracture, acute greater trochanteric fracture  - Ortho c/s pending    #PMHx of osteoarthritis  - follows with Dr. Jagdish ROBLES:  - DTI screen  - Preventative dressings in place    LINES/DRAINS:  PIV    ADVANCED DIRECTIVES:      HCP/Emergency Contact - Carolyn Lobato (daughter) - [phone]    INDICATION FOR SICU: Multiple consecutive rib fractures    DISPO: SICU Case discussed with attending Dr. Ruiz

## 2024-05-26 NOTE — ED ADULT NURSE NOTE - NSFALLUNIVINTERV_ED_ALL_ED
Bed/Stretcher in lowest position, wheels locked, appropriate side rails in place/Call bell, personal items and telephone in reach/Instruct patient to call for assistance before getting out of bed/chair/stretcher/Non-slip footwear applied when patient is off stretcher/[place] to call system/Physically safe environment - no spills, clutter or unnecessary equipment/Purposeful proactive rounding/Room/bathroom lighting operational, light cord in reach

## 2024-05-26 NOTE — CONSULT NOTE ADULT - SUBJECTIVE AND OBJECTIVE BOX
ORTHOPAEDIC SURGERY CONSULT NOTE    Reason for Consult: Right superior pubic rami and GT fractures    HPI: 78yFemale presents with pain in right hip. Patient fell from ground level 3 days ago. Patient had right hip pain following the fall. Since then, patient has been able to ambulate with pain. Also notes left knee pain. Ambulates with a walker at baseline.     PAST MEDICAL & SURGICAL HISTORY:  CAD (coronary artery disease)      Tobacco dependence      HTN (hypertension)      Anxiety      Atrial flutter      S/P CABG x 3        Allergies: No Known Allergies    Medications: acetaminophen     Tablet .. 650 milliGRAM(s) Oral every 6 hours  amLODIPine   Tablet 5 milliGRAM(s) Oral daily  atorvastatin 80 milliGRAM(s) Oral at bedtime  chlorhexidine 2% Cloths 1 Application(s) Topical <User Schedule>  dicyclomine 20 milliGRAM(s) Oral four times a day before meals  DULoxetine 60 milliGRAM(s) Oral daily  heparin   Injectable 5000 Unit(s) SubCutaneous every 8 hours  lidocaine   4% Patch 1 Patch Transdermal daily  methocarbamol 500 milliGRAM(s) Oral every 6 hours  oxyCODONE    IR 5 milliGRAM(s) Oral every 6 hours PRN  oxyCODONE    IR 2.5 milliGRAM(s) Oral every 6 hours PRN  pantoprazole    Tablet 40 milliGRAM(s) Oral before breakfast  potassium chloride  20 mEq/100 mL IVPB 20 milliEquivalent(s) IV Intermittent every 2 hours  senna 2 Tablet(s) Oral at bedtime  sodium chloride 0.9%. 1000 milliLiter(s) IV Continuous <Continuous>      PHYSICAL EXAM:  Vital Signs Last 24 Hrs  T(C): 36.4 (26 May 2024 19:00), Max: 36.7 (26 May 2024 15:45)  T(F): 97.5 (26 May 2024 19:00), Max: 98 (26 May 2024 15:45)  HR: 91 (26 May 2024 19:00) (88 - 100)  BP: 114/54 (26 May 2024 19:00) (114/54 - 138/75)  BP(mean): 78 (26 May 2024 19:00) (78 - 87)  RR: 18 (26 May 2024 19:00) (18 - 19)  SpO2: 94% (26 May 2024 19:00) (94% - 95%)    Parameters below as of 26 May 2024 19:00  Patient On (Oxygen Delivery Method): room air        Physical Exam:  Alert, NAD  Resp: NLB on RA.    BL UE:  No open skin or wounds  NTTP shoulder, upper arm, elbow, forearm, wrist or hand  Full baseline painless ROM at shoulder, elbow, wrist, and   SILT in axillary, musculocutaneous,  radial, median, and ulnar distributions.   AIN/PIN/U motor intact  2+ radial pulse with brisk cap refill at distal finger tips.   Compartments soft and compressible.    RLE:  Skin intact  TTP lateral hip  ROM limited by pain  No pain with log-roll or axial compression  Cannot SLR  SILT DP/SP/T/Tong/Sa.   EHL/FHL/TA/Gs motor intact.  2+ DP/PT pulses with brisk cap refill distally.  Compartments soft and compressible.   No pain on passive stretch.    LLE:   No open skin or wounds  Mild knee TTP  Knee AROM 0-60, minimal pain  No pain with log-roll or axial compression  Weak SLR  SILT DP/SP/T/Tong/Sa.   EHL/FHL/TA/Gs motor intact.  2+ DP/PT pulses with brisk cap refill distally.  Compartments soft and compressible. No pain on passive stretch.    Labs:                        9.8    12.07 )-----------( 351      ( 26 May 2024 12:13 )             27.6     05-26    124<L>  |  87<L>  |  22<H>  ----------------------------<  89  3.4<L>   |  25  |  1.1    Ca    8.8      26 May 2024 12:13  Mg     1.8     05-26    TPro  5.9<L>  /  Alb  3.3<L>  /  TBili  0.7  /  DBili  x   /  AST  22  /  ALT  14  /  AlkPhos  130<H>  05-26    PT/INR - ( 26 May 2024 12:13 )   PT: 10.70 sec;   INR: 0.94 ratio         PTT - ( 26 May 2024 12:13 )  PTT:26.9 sec    Imaging:  CT pelvis: Minimally displaced right superior pubic rami and greater trochanteric fracture.    A/P: 78y Female with right superior pubic rami and greater trochanter fracture. Recommend MRI right hip without contrast to evaluate for intertrochanteric extension. Will update WB following MRI.    - MRI right hip without contrast  - Pelvis, right hip, femur, bilateral knee xrays  - NWB RLE until MRI complete  - Pain control

## 2024-05-26 NOTE — ED PROVIDER NOTE - CLINICAL SUMMARY MEDICAL DECISION MAKING FREE TEXT BOX
78yF p/w generalized weakness, leg swelling (R worse than L) and back pain after several falls at home.  Pt stable and nontoxic appearing w/o resp distress but w/ sig bruising to torso/upper extremities and point tenderness to lower back.  Labs reassuring.  Imaging w/ extensive rib fxs b/l, chronic vertebral compression fx, superior pubic ramus fx.  Surg consulted and will adm for further care.

## 2024-05-26 NOTE — ED PROVIDER NOTE - OBJECTIVE STATEMENT
78yF p/w weakness - was seen 5d ago in the ED for generalized weakness and leg swelling and was discharged after a reassuring workup.  Daughter at bedside notes pt is still too weak to get around on her own w/ her usual cane and c/o severe low back pain.  She had a "big fall" 2 weeks ago prior to that ED visit but did not mention the severe back pain at that time.  Daughter notes that she fell the evening she came back from the ED.  Daughter and pt amenable to admission for PT/rehab.

## 2024-05-26 NOTE — H&P ADULT - NSHPPHYSICALEXAM_GEN_ALL_CORE
Primary Survey:    A - airway intact  B - bilateral breath sounds and good chest rise  C - palpable pulses in all extremities  D - GCS 15 on arrival, ARGUETA  Exposure obtained    Vital Signs Last 24 Hrs  T(C): 36.6 (26 May 2024 09:28), Max: 36.6 (26 May 2024 09:28)  T(F): 97.9 (26 May 2024 09:28), Max: 97.9 (26 May 2024 09:28)  HR: 100 (26 May 2024 09:28) (100 - 100)  BP: 138/75 (26 May 2024 09:28) (138/75 - 138/75)  BP(mean): --  RR: 19 (26 May 2024 09:28) (19 - 19)  SpO2: 95% (26 May 2024 09:28) (95% - 95%)    Parameters below as of 26 May 2024 09:28  Patient On (Oxygen Delivery Method): room air        Secondary Survey:   General: NAD  HEENT: Normocephalic, atraumatic, EOMI, PEERLA. no scalp lacerations   Neck: Soft, midline trachea. no c-spine tenderness  Chest: + left chest wall tenderness, no subcutaneous emphysema   Cardiac: S1, S2, RRR  Respiratory: Bilateral breath sounds, clear and equal bilaterally  Abdomen: Soft, non-distended, non-tender, no rebound, no guarding.  Groin: Normal appearing, pelvis stable   Ext:  Moving b/l upper and lower extremities. Palpable Radial b/l UE, b/l DP palpable in LE.   Back: No T/L/S spine tenderness, No palpable runoff/stepoff/deformity

## 2024-05-26 NOTE — CONSULT NOTE ADULT - SUBJECTIVE AND OBJECTIVE BOX
JAYLYN AGARWAL   [id number]   [birth year]  78yF    Admit Date: 05-26-24  Indication for SICU: multiple consecutive rib fractures s/p multiple mechanical falls        ============================  HPI   78F with PMHx of HTN, HLD, anxiety, atrial flutter s/p ablation (2023), OA, and       SICU Consult for hemodynamic monitoring in the setting of multiple consecutive rib fractures.      Pertinent Imaging    < from: CT Abdomen and Pelvis w/ IV Cont (05.26.24 @ 11:21) >  IMPRESSION:    Acute nondisplaced posterior left 6th, 7th rib fractures. Acute mildly displaced posterior left 8th, 9th, 10th, and 11th ribs fractures.    Acute nondisplaced fracture of the right superior pubic ramus (4-397).    Stable chronic T6, T7, T8, T11, L2 vertebral body compression deformities.    Mild mural thickening extending from the cecum to the mid transverse colon likely reflecting colitis of infectious or inflammatory etiology..    New Trace bilateral pleural effusions with adjacent consolidative opacities.    Additional attending comments:    Additional right greater trochanter fracture, seen on image 177 of series 602    There is a punctate focus of air within the left pleural effusion on image 32 series 2. Although no pneumothorax is identified on this examination, a possible trace pneumothorax cannot be excluded.    Dilated common bile duct with intrahepatic biliary duct dilatation and a distended gallbladder.    Findings are suspicious for a possible central obstructing lesion within the distal common bile duct is not excluded.    Further evaluation with ERCP is recommended.    Patulous, fluid-filled esophagus. Outpatient endoscopy is recommended.    Colonic wall thickening may be related to underdistention.    Left axillary lymphadenopathy require short-term interval follow-up.    < end of copied text >    < from: CT Cervical Spine No Cont (05.26.24 @ 11:14) >    IMPRESSION:  CT head: Age-indeterminate small infarct involving the left thalamus. No acute intracranial hemorrhage or mass effect.    CT cervical spine: No acute fracture or traumatic subluxation.    < end of copied text >       24 Hour Events  -Admission under SICU service    [X] A ten-point review of systems was otherwise negative except as noted above.  [  ] Due to altered mental status/intubation, subjective information was not attained from the patient. History was obtained, to the extent possible, from review of the chart and collateral sources of information.    =========================================================================================================================================      PMH  PAST MEDICAL & SURGICAL HISTORY:  CAD (coronary artery disease) S/P CABG x 3  Tobacco dependence  HTN (hypertension)  Anxiety  Atrial flutter    Home Meds:  Home Medications:  dicyclomine 10 mg oral capsule: 2 cap(s) orally 4 times a day (26 Apr 2024 22:04)  DULoxetine 60 mg oral delayed release capsule: 1 cap(s) orally once a day (26 Apr 2024 13:58)  pantoprazole 40 mg oral granule, delayed release: 1 each orally once a day (26 Apr 2024 22:04)  rosuvastatin 20 mg oral capsule: 1 cap(s) orally once a day (26 Apr 2024 22:04)     Allergies  Allergies - No Known Allergies, Intolerances       Current Medications:  acetaminophen     Tablet .. 650 milliGRAM(s) Oral every 6 hours  amLODIPine   Tablet 5 milliGRAM(s) Oral daily  atorvastatin 80 milliGRAM(s) Oral at bedtime  chlorhexidine 2% Cloths 1 Application(s) Topical <User Schedule>  dicyclomine 20 milliGRAM(s) Oral four times a day before meals  DULoxetine 60 milliGRAM(s) Oral daily  methocarbamol 500 milliGRAM(s) Oral every 6 hours  oxyCODONE    IR 5 milliGRAM(s) Oral every 6 hours PRN Severe Pain (7 - 10)  oxyCODONE    IR 2.5 milliGRAM(s) Oral every 6 hours PRN Moderate Pain (4 - 6)  pantoprazole    Tablet 40 milliGRAM(s) Oral before breakfast  senna 2 Tablet(s) Oral at bedtime    Vital Sings, Intake/Output (Last 24Hours)  ICU Vital Signs Last 24 Hrs  T(C): 36.7 (26 May 2024 15:45), Max: 36.7 (26 May 2024 15:45)  T(F): 98 (26 May 2024 15:45), Max: 98 (26 May 2024 15:45)  HR: 88 (26 May 2024 15:45) (88 - 100)  BP: 116/73 (26 May 2024 15:45) (116/73 - 138/75)  BP(mean): 87 (26 May 2024 15:45) (87 - 87)  ABP: --  ABP(mean): --  RR: 18 (26 May 2024 15:45) (18 - 19)  SpO2: 95% (26 May 2024 15:45) (95% - 95%)    O2 Parameters below as of 26 May 2024 15:45  Patient On (Oxygen Delivery Method): room air    I&O's Summary      Physical Exam:  ----------------------------------------------------------------------------------------------------------  PHYSICAL EXAM:    General: Pt lying comfortably in bed.     Neuro:  GCS: 15 = E 4  / V  5 / M 6   Neuro: Alert & oriented x 3, no focal deficits. CNs II-XII grossly intact. Strength: Upper extremities: 5/5 bilaterally. Lower extremities: 2/5 bilaterally.      Lungs: Clear to auscultation bilaterally, normal expansion/effort. Oxygen delivery: room air. Pulling 1250mL on IS.    Thorax: Marked ecchymosis on left side of posterior thorax, tender to palpation. Minimal ecchymosis on right side at inframammary ridge.     Cardiovascular: S1, S2.  Regular rate and rhythm. Cardiac Rhythm: NSR  Peripheral edema: lower extremities edematous bilaterally.     GI: Abdomen soft, Non-tender, mildly distended.     Extremities: Extremities warm, pink, well-perfused.     Derm: Good skin turgor, no skin breakdown.     : voiding freely.     Tubes/Lines/Drains   ----------------------------------------------------------------------------------------------------------  [x] Peripheral IV  [ ] Urinary Catheter Randall                                               [ ] Central Venous Line                   [ ] Arterial Line		       JAYLYN AGARWAL   [id number]   [birth year]  78yF    Admit Date: 05-26-24  Indication for SICU: multiple consecutive rib fractures s/p multiple mechanical falls        ============================  HPI   78F current smoker with PMHx of HTN, HLD, anxiety, CAD s/p CABG x 3 (2009), atrial flutter s/p ablation (2023), OA, and recent admission for infectious colitis (4/26/24), presented to the ED s/p multiple mechanical falls (-HT/-LOC/-AC). Of note, pt presented to ED on 5/23/24 with CC of back pain (pt did not endorse fall at this time), and was discharged without admission. Today, pt's daughter endorsed that pt has experienced multiple mechanical falls over the past month, most recently 5/23/24 after returning home from ED. Pt endorsed weakness in her legs, causing her to fall. CTAP showed acute nondisplaced posterior left 6th, 7th rib fractures, acute mildly displaced posterior left 8th, 9th, 10th, and 11th ribs fractures, acute nondisplaced fracture of the right superior pubic ramus, and right greater trochanter fracture, as well as several incidental findings (see below). CT head and neck were negative for acute pathology. Labs were significant for WBC 12, Na 124, K 3.4, and alk phos 130.     SICU Consult for hemodynamic monitoring in the setting of multiple consecutive rib fractures. Pt was examined in ED, see Physical exam below.       Pertinent Imaging    < from: CT Abdomen and Pelvis w/ IV Cont (05.26.24 @ 11:21) >  IMPRESSION:    Acute nondisplaced posterior left 6th, 7th rib fractures. Acute mildly displaced posterior left 8th, 9th, 10th, and 11th ribs fractures.    Acute nondisplaced fracture of the right superior pubic ramus (4-397).    Stable chronic T6, T7, T8, T11, L2 vertebral body compression deformities.    Mild mural thickening extending from the cecum to the mid transverse colon likely reflecting colitis of infectious or inflammatory etiology..    New Trace bilateral pleural effusions with adjacent consolidative opacities.    Additional attending comments:    Additional right greater trochanter fracture, seen on image 177 of series 602    There is a punctate focus of air within the left pleural effusion on image 32 series 2. Although no pneumothorax is identified on this examination, a possible trace pneumothorax cannot be excluded.    Dilated common bile duct with intrahepatic biliary duct dilatation and a distended gallbladder.    Findings are suspicious for a possible central obstructing lesion within the distal common bile duct is not excluded.    Further evaluation with ERCP is recommended.    Patulous, fluid-filled esophagus. Outpatient endoscopy is recommended.    Colonic wall thickening may be related to underdistention.    Left axillary lymphadenopathy require short-term interval follow-up.    < end of copied text >    < from: CT Cervical Spine No Cont (05.26.24 @ 11:14) >    IMPRESSION:  CT head: Age-indeterminate small infarct involving the left thalamus. No acute intracranial hemorrhage or mass effect.    CT cervical spine: No acute fracture or traumatic subluxation.     < end of copied text >       24 Hour Events  -Admission under SICU service    [X] A ten-point review of systems was otherwise negative except as noted above.  [  ] Due to altered mental status/intubation, subjective information was not attained from the patient. History was obtained, to the extent possible, from review of the chart and collateral sources of information.    =========================================================================================================================================      PMH  PAST MEDICAL & SURGICAL HISTORY:  CAD (coronary artery disease) S/P CABG x 3  Tobacco dependence  HTN (hypertension)  Anxiety  Atrial flutter    Home Meds:  Home Medications:  dicyclomine 10 mg oral capsule: 2 cap(s) orally 4 times a day (26 Apr 2024 22:04)  DULoxetine 60 mg oral delayed release capsule: 1 cap(s) orally once a day (26 Apr 2024 13:58)  pantoprazole 40 mg oral granule, delayed release: 1 each orally once a day (26 Apr 2024 22:04)  rosuvastatin 20 mg oral capsule: 1 cap(s) orally once a day (26 Apr 2024 22:04)     Allergies  Allergies - No Known Allergies, Intolerances       Current Medications:  acetaminophen     Tablet .. 650 milliGRAM(s) Oral every 6 hours  amLODIPine   Tablet 5 milliGRAM(s) Oral daily  atorvastatin 80 milliGRAM(s) Oral at bedtime  chlorhexidine 2% Cloths 1 Application(s) Topical <User Schedule>  dicyclomine 20 milliGRAM(s) Oral four times a day before meals  DULoxetine 60 milliGRAM(s) Oral daily  methocarbamol 500 milliGRAM(s) Oral every 6 hours  oxyCODONE    IR 5 milliGRAM(s) Oral every 6 hours PRN Severe Pain (7 - 10)  oxyCODONE    IR 2.5 milliGRAM(s) Oral every 6 hours PRN Moderate Pain (4 - 6)  pantoprazole    Tablet 40 milliGRAM(s) Oral before breakfast  senna 2 Tablet(s) Oral at bedtime    Vital Sings, Intake/Output (Last 24Hours)  ICU Vital Signs Last 24 Hrs  T(C): 36.7 (26 May 2024 15:45), Max: 36.7 (26 May 2024 15:45)  T(F): 98 (26 May 2024 15:45), Max: 98 (26 May 2024 15:45)  HR: 88 (26 May 2024 15:45) (88 - 100)  BP: 116/73 (26 May 2024 15:45) (116/73 - 138/75)  BP(mean): 87 (26 May 2024 15:45) (87 - 87)  ABP: --  ABP(mean): --  RR: 18 (26 May 2024 15:45) (18 - 19)  SpO2: 95% (26 May 2024 15:45) (95% - 95%)    O2 Parameters below as of 26 May 2024 15:45  Patient On (Oxygen Delivery Method): room air    I&O's Summary      Physical Exam:  ----------------------------------------------------------------------------------------------------------  PHYSICAL EXAM:    Vitals: BP: 119/77mmHg. HR: 85. SpO2: 95%, room air.     General: Pt lying comfortably in bed.     Neuro:  GCS: 15 = E 4  / V  5 / M 6   Neuro: Alert & oriented x 3, no focal deficits. CNs II-XII grossly intact. Strength: Upper extremities: 5/5 bilaterally. Lower extremities: 2/5 bilaterally.      Lungs: Clear to auscultation bilaterally, normal expansion/effort. Oxygen delivery: room air. Pulling 1250mL on IS.    Thorax: Marked ecchymosis on left side of posterior thorax, tender to palpation. Minimal ecchymosis on right side at lateral inframammary ridge, non-tender.    Cardiovascular: S1, S2.  Regular rate and rhythm. Cardiac Rhythm: NSR  Peripheral edema: lower extremities edematous bilaterally.     GI: Abdomen soft, Non-tender, mildly distended.     Extremities: Extremities warm, pink, well-perfused.     Derm: Good skin turgor, no skin breakdown.     /pelvis: No ecchymosis noted in pelvic region, non-tender throughout. Voiding freely.     Tubes/Lines/Drains   ----------------------------------------------------------------------------------------------------------  [x] Peripheral IV  [ ] Urinary Catheter Randall                                               [ ] Central Venous Line                   [ ] Arterial Line		       JAYLYN AGARWAL   [id number]   [birth year]  78yF    Admit Date: 05-26-24  Indication for SICU: multiple consecutive rib fractures s/p multiple mechanical falls        ============================  HPI   78F current smoker with PMHx of HTN, HLD, anxiety, CAD s/p CABG x 3 (2009), atrial flutter s/p ablation (2022), OA, and recent admission for infectious colitis (4/26/24), presented to the ED s/p multiple mechanical falls (-HT/-LOC/-AC). Of note, pt presented to ED on 5/23/24 with CC of back pain (pt did not endorse fall at this time), and was discharged without admission. Today, pt's daughter endorsed that pt has experienced multiple mechanical falls over the past month, most recently 5/23/24 after returning home from ED. Pt endorsed weakness in her legs, causing her to fall. CTAP showed acute nondisplaced posterior left 6th, 7th rib fractures, acute mildly displaced posterior left 8th, 9th, 10th, and 11th ribs fractures, acute nondisplaced fracture of the right superior pubic ramus, and right greater trochanter fracture, as well as several incidental findings (see below). CT head and neck were negative for acute pathology. Labs were significant for WBC 12, Na 124, K 3.4, and alk phos 130.     SICU Consult for hemodynamic monitoring in the setting of multiple consecutive rib fractures. Pt was examined in ED, stable, see Physical exam below.     Pertinent Imaging    < from: CT Abdomen and Pelvis w/ IV Cont (05.26.24 @ 11:21) >  IMPRESSION:    Acute nondisplaced posterior left 6th, 7th rib fractures. Acute mildly displaced posterior left 8th, 9th, 10th, and 11th ribs fractures.    Acute nondisplaced fracture of the right superior pubic ramus (4-397).    Stable chronic T6, T7, T8, T11, L2 vertebral body compression deformities.    Mild mural thickening extending from the cecum to the mid transverse colon likely reflecting colitis of infectious or inflammatory etiology..    New Trace bilateral pleural effusions with adjacent consolidative opacities.    Additional attending comments:    Additional right greater trochanter fracture, seen on image 177 of series 602    There is a punctate focus of air within the left pleural effusion on image 32 series 2. Although no pneumothorax is identified on this examination, a possible trace pneumothorax cannot be excluded.    Dilated common bile duct with intrahepatic biliary duct dilatation and a distended gallbladder.    Findings are suspicious for a possible central obstructing lesion within the distal common bile duct is not excluded.    Further evaluation with ERCP is recommended.    Patulous, fluid-filled esophagus. Outpatient endoscopy is recommended.    Colonic wall thickening may be related to underdistention.    Left axillary lymphadenopathy require short-term interval follow-up.    < end of copied text >    < from: CT Cervical Spine No Cont (05.26.24 @ 11:14) >    IMPRESSION:  CT head: Age-indeterminate small infarct involving the left thalamus. No acute intracranial hemorrhage or mass effect.    CT cervical spine: No acute fracture or traumatic subluxation.     < end of copied text >       24 Hour Events  -Admission under SICU service    [X] A ten-point review of systems was otherwise negative except as noted above.  [  ] Due to altered mental status/intubation, subjective information was not attained from the patient. History was obtained, to the extent possible, from review of the chart and collateral sources of information.    =========================================================================================================================================      PMH  PAST MEDICAL & SURGICAL HISTORY:  CAD (coronary artery disease) S/P CABG x 3  Tobacco dependence  HTN (hypertension)  Anxiety  Atrial flutter    Home Meds:  Home Medications:  dicyclomine 10 mg oral capsule: 2 cap(s) orally 4 times a day (26 Apr 2024 22:04)  DULoxetine 60 mg oral delayed release capsule: 1 cap(s) orally once a day (26 Apr 2024 13:58)  pantoprazole 40 mg oral granule, delayed release: 1 each orally once a day (26 Apr 2024 22:04)  rosuvastatin 20 mg oral capsule: 1 cap(s) orally once a day (26 Apr 2024 22:04)     Allergies  Allergies - No Known Allergies, Intolerances       Current Medications:  acetaminophen     Tablet .. 650 milliGRAM(s) Oral every 6 hours  amLODIPine   Tablet 5 milliGRAM(s) Oral daily  atorvastatin 80 milliGRAM(s) Oral at bedtime  chlorhexidine 2% Cloths 1 Application(s) Topical <User Schedule>  dicyclomine 20 milliGRAM(s) Oral four times a day before meals  DULoxetine 60 milliGRAM(s) Oral daily  methocarbamol 500 milliGRAM(s) Oral every 6 hours  oxyCODONE    IR 5 milliGRAM(s) Oral every 6 hours PRN Severe Pain (7 - 10)  oxyCODONE    IR 2.5 milliGRAM(s) Oral every 6 hours PRN Moderate Pain (4 - 6)  pantoprazole    Tablet 40 milliGRAM(s) Oral before breakfast  senna 2 Tablet(s) Oral at bedtime    Vital Sings, Intake/Output (Last 24Hours)  ICU Vital Signs Last 24 Hrs  T(C): 36.7 (26 May 2024 15:45), Max: 36.7 (26 May 2024 15:45)  T(F): 98 (26 May 2024 15:45), Max: 98 (26 May 2024 15:45)  HR: 88 (26 May 2024 15:45) (88 - 100)  BP: 116/73 (26 May 2024 15:45) (116/73 - 138/75)  BP(mean): 87 (26 May 2024 15:45) (87 - 87)  ABP: --  ABP(mean): --  RR: 18 (26 May 2024 15:45) (18 - 19)  SpO2: 95% (26 May 2024 15:45) (95% - 95%)    O2 Parameters below as of 26 May 2024 15:45  Patient On (Oxygen Delivery Method): room air    I&O's Summary      Physical Exam:  ----------------------------------------------------------------------------------------------------------  PHYSICAL EXAM:    Vitals: BP: 119/77mmHg. HR: 85. SpO2: 95%, room air.     General: Pt lying comfortably in bed.     Neuro:  GCS: 15 = E 4  / V  5 / M 6   Neuro: Alert & oriented x 3, no focal deficits. CNs II-XII grossly intact. Strength: Upper extremities: 5/5 bilaterally. Lower extremities: 2/5 bilaterally.      Lungs: Clear to auscultation bilaterally, normal expansion/effort. Oxygen delivery: room air. Pulling 1250mL on IS.    Thorax: Marked ecchymosis on left side of posterior thorax, tender to palpation. Minimal ecchymosis on right side at lateral inframammary ridge, non-tender.    Cardiovascular: S1, S2.  Regular rate and rhythm. Cardiac Rhythm: NSR  Peripheral edema: lower extremities edematous bilaterally.     GI: Abdomen soft, Non-tender, mildly distended.     Extremities: Extremities warm, pink, well-perfused.     Derm: Good skin turgor, no skin breakdown.     /pelvis: No ecchymosis noted in pelvic region, non-tender throughout. Voiding freely.     Tubes/Lines/Drains   ----------------------------------------------------------------------------------------------------------  [x] Peripheral IV  [ ] Urinary Catheter Randall                                               [ ] Central Venous Line                   [ ] Arterial Line

## 2024-05-26 NOTE — H&P ADULT - NSHPLABSRESULTS_GEN_ALL_CORE
Labs:  CAPILLARY BLOOD GLUCOSE               9.8    12.07 )-----------( 351      ( 26 May 2024 12:13 )             27.6       Auto Neutrophil %: 85.1 % (05-26-24 @ 12:13)  Auto Immature Granulocyte %: 0.7 % (05-26-24 @ 12:13)    05-26    124<L>  |  87<L>  |  22<H>  ----------------------------<  89  3.4<L>   |  25  |  1.1      Calcium: 8.8 mg/dL (05-26-24 @ 12:13)    LFTs:             5.9  | 0.7  | 22       ------------------[130     ( 26 May 2024 12:13 )  3.3  | x    | 14             Coags:     10.70  ----< 0.94    ( 26 May 2024 12:13 )     26.9        Urinalysis Basic - ( 26 May 2024 12:13 )    Color: x / Appearance: x / SG: x / pH: x  Gluc: 89 mg/dL / Ketone: x  / Bili: x / Urobili: x   Blood: x / Protein: x / Nitrite: x   Leuk Esterase: x / RBC: x / WBC x   Sq Epi: x / Non Sq Epi: x / Bacteria: x      Urinalysis with Rflx Culture (collected 26 May 2024 10:45)      RADIOLOGY & ADDITIONAL STUDIES:  < from: CT Head No Cont (05.26.24 @ 11:14) >  CT head: Age-indeterminate small infarct involving the left thalamus. No   acute intracranial hemorrhage or mass effect.    CT cervical spine: No acute fracture or traumatic subluxation.    < from: CT Chest w/ IV Cont (05.26.24 @ 11:21) >  Acute nondisplaced posterior left 6th, 7th rib fractures.  Acute mildly displaced posterior left 8th, 9th, 10th, and 11th ribs   fractures.  Acute nondisplaced fracture of the right superior pubic ramus (4-397).  Stable chronic T6, T7, T8, T11, L2 vertebral body compression deformities.  Mild mural thickening extending from the cecum to the mid transverse   colon likely reflecting colitis of infectious or inflammatory etiology..  New Trace bilateral pleural effusions with adjacent consolidative   opacities.    Additional right greater trochanter fracture, seen on image 177 of series   602    There is a punctate focus of air within the left pleural effusion on   image 32 series 2. Although no pneumothorax is identified on this   examination, a possible trace pneumothorax cannot be excluded.    Dilated common bile duct with intrahepatic biliary duct dilatation and a   distended gallbladder.    Findings are suspicious for a possible central obstructing lesion within   the distal common bile duct is not excluded.    Further evaluation with ERCP is recommended.    Patulous, fluid-filled esophagus. Outpatient endoscopy is recommended.    Colonic wall thickening may be related to underdistention.    Left axillary lymphadenopathy require short-term interval follow-up.

## 2024-05-26 NOTE — H&P ADULT - HISTORY OF PRESENT ILLNESS
TRAUMA ACTIVATION LEVEL:  CONSULT  ACTIVATED BY: ED**  INTUBATED: NO**      MECHANISM OF INJURY:   [x] Fall	    GCS: 15 	E: 4	V: 5	M: 6    HPI:  78yF w/ PMHx of CAD, tobacco dependence, HTN, anxiety, atrial flutter, seen as Trauma Consult s/p multiple falls, -HT, -LOC, -AC.  Trauma assessment in ED: ABCs intact , GCS 15 , AAOx3. Family at bedside reports that patient has been experiencing multiple falls for the past 2 weeks due to weakness, most recent fall on Thursday. Patient was recently discharged from ED 3 days ago and presents today for severe back pain. CT scan reveals multiple left sided rib fractures from 5-11 ribs and pelvic fracture. At bedside examiantion, patient has left chest wall tenderness. Pulling 1L on IS.

## 2024-05-26 NOTE — H&P ADULT - ATTENDING COMMENTS
This note reflects my exam and care of this patient on 5/26/2024.    This is 77 y/o female w/ PMHx of CAD, tobacco dependence, HTN, anxiety, atrial flutter, seen as Trauma Consult s/p multiple falls, -HT, -LOC, -AC.  Trauma assessment in ED: ABCs intact , GCS 15 , AAOx3. Family at bedside reports that patient has been experiencing multiple falls for the past 2 weeks due to weakness, most recent fall on Thursday. Patient was recently discharged from ED 3 days ago and presents today for severe back pain. CT scan reveals multiple left sided rib fractures from 5-11 ribs and pelvic fracture. At bedside examiantion, patient has left chest wall tenderness.    Primary and secondary surveys were performed.    AAO x3  GCS 15.  Neuro intact.  Neck- no step-offs  Chest: decreased breath sounds in bilateral lung bases, L>R' pain to palpation of left chest wall.  CV : rrr  Abdomen: soft  Extr: Right hip pain to motion.    All images and labs were reviewed.    ASSESSMENT:  77 y/o female, S/P Fall.  Closed Left 6-11th Rib Fractures. Tiny Left Pneumothorax.  Closed Right Superior Pubic rami Fracture.  Right Greater Trochanteric Femur Fracture.  Hyponatremia.  At risk for respiratory complications.    PLAN:  - pain control - use Tylenol, Gabapentin, Lidocain patches  - continuous Sao2 and hemodynamic monitoring  - use O2 with NC as needed  - follow CXR in am  - keep normotensive  - follow serum electrolytes and UOP  - follow Na  - ID - universal precautions  - DVT prophylaxis  Admit to SICU. This note reflects my exam and care of this patient on 5/26/2024.    This is 79 y/o female w/ PMHx of CAD, tobacco dependence, HTN, anxiety, atrial flutter, seen as Trauma Consult s/p multiple falls, -HT, -LOC, -AC.  Trauma assessment in ED: ABCs intact , GCS 15 , AAOx3. Family at bedside reports that patient has been experiencing multiple falls for the past 2 weeks due to weakness, most recent fall on Thursday. Patient was recently discharged from ED 3 days ago and presents today for severe back pain. CT scan reveals multiple left sided rib fractures from 5-11 ribs and pelvic fracture. At bedside examiantion, patient has left chest wall tenderness.    Primary and secondary surveys were performed.    AAO x3  GCS 15.  Neuro intact.  Neck- no step-offs  Chest: decreased breath sounds in bilateral lung bases, L>R' pain to palpation of left chest wall.  CV : rrr  Abdomen: soft  Extr: Right hip pain to motion.    All images and labs were reviewed.    ASSESSMENT:  79 y/o female, S/P Fall.  Closed Left 6-11th Rib Fractures. Tiny Left Pneumothorax.  Closed Right Superior Pubic rami Fracture.  Right Greater Trochanteric Femur Fracture.  Hyponatremia.  At risk for respiratory complications.    PLAN:  - pain control - use Tylenol, Gabapentin, Lidocain patches  - continuous Sao2 and hemodynamic monitoring  - use O2 with NC as needed  - follow CXR in am  - keep normotensive  - follow serum electrolytes and UOP  - follow Na  - ID - universal precautions  - DVT prophylaxis  - Ortho eval  Admit to SICU.

## 2024-05-27 NOTE — CONSULT NOTE ADULT - ASSESSMENT
78yF w/ PMHx of CAD on ASA, active smoker,  HTN, anxiety, atrial flutter, seen as Trauma Consult s/p multiple falls, -HT, -LOC, -AC.  Trauma assessment in ED: ABCs intact , GCS 15 , AAOx3. Family at bedside reports that patient has been experiencing multiple falls for the past 2 weeks due to weakness, most recent fall on Thursday.CT scan reveals multiple left sided rib fractures from 5-11 ribs and pelvic fracture. At bedside examiantion, patient has left chest wall tenderness. GI consulted for CT findings of dilated CBD. Patient denies any abdominal pain, nausea, vomiting, jaundice, unintentional weight loss.     #CT finding of dilated CBD with distended GB  #Patulous esophagus- no dysphagia  #Cecum thickening likely under distension  T kaila 0.7     AST 22  ALT 14  05-26-24 @ 12:13  T kaila 0.4     AST 18  ALT 9  05-23-24 @ 10:35  exam benign, afebrile, leucocytosis improving    RECS  - will recommend  78yF w/ PMHx of CAD on ASA, active smoker,  HTN, anxiety, atrial flutter, seen as Trauma Consult s/p multiple falls, -HT, -LOC, -AC.  Trauma assessment in ED: ABCs intact , GCS 15 , AAOx3. Family at bedside reports that patient has been experiencing multiple falls for the past 2 weeks due to weakness, most recent fall on Thursday.CT scan reveals multiple left sided rib fractures from 5-11 ribs and pelvic fracture. At bedside examiantion, patient has left chest wall tenderness. GI consulted for CT findings of dilated CBD. Patient denies any abdominal pain, nausea, vomiting, jaundice, unintentional weight loss.     #CT finding of dilated CBD with distended GB- incidental asytomatic  #Patulous esophagus- no dysphagia  #Cecum thickening likely under distension  T kaila 0.7     AST 22  ALT 14  05-26-24 @ 12:13  T kaila 0.4     AST 18  ALT 9  05-23-24 @ 10:35  exam benign, afebrile, leucocytosis improving    RECS  - will recommend EGD with EUS after hip fracture repair  - recall once optimized

## 2024-05-27 NOTE — PROGRESS NOTE ADULT - SUBJECTIVE AND OBJECTIVE BOX
JAYLYN AGARWAL   [id number]   [birth year]  78yF    Admit Date: 05-26-24  Indication for SICU: multiple consecutive rib fractures s/p multiple mechanical falls        ============================  HPI   78F current smoker with PMHx of HTN, HLD, anxiety, CAD s/p CABG x 3 (2009), atrial flutter s/p ablation (2022), OA, and recent admission for infectious colitis (4/26/24), presented to the ED s/p multiple mechanical falls (-HT/-LOC/-AC). Of note, pt presented to ED on 5/23/24 with CC of back pain (pt did not endorse fall at this time), and was discharged without admission. Today, pt's daughter endorsed that pt has experienced multiple mechanical falls over the past month, most recently 5/23/24 after returning home from ED. Pt endorsed weakness in her legs, causing her to fall. CTAP showed acute nondisplaced posterior left 6th, 7th rib fractures, acute mildly displaced posterior left 8th, 9th, 10th, and 11th ribs fractures, acute nondisplaced fracture of the right superior pubic ramus, and right greater trochanter fracture, as well as several incidental findings (see below). CT head and neck were negative for acute pathology. Labs were significant for WBC 12, Na 124, K 3.4, and alk phos 130.     SICU Consult for hemodynamic monitoring in the setting of multiple consecutive rib fractures. Pt was examined in ED, stable, see Physical exam below.     Pertinent Imaging    < from: CT Abdomen and Pelvis w/ IV Cont (05.26.24 @ 11:21) >  IMPRESSION:    Acute nondisplaced posterior left 6th, 7th rib fractures. Acute mildly displaced posterior left 8th, 9th, 10th, and 11th ribs fractures.    Acute nondisplaced fracture of the right superior pubic ramus (4-397).    Stable chronic T6, T7, T8, T11, L2 vertebral body compression deformities.    Mild mural thickening extending from the cecum to the mid transverse colon likely reflecting colitis of infectious or inflammatory etiology..    New Trace bilateral pleural effusions with adjacent consolidative opacities.    Additional attending comments:    Additional right greater trochanter fracture, seen on image 177 of series 602    There is a punctate focus of air within the left pleural effusion on image 32 series 2. Although no pneumothorax is identified on this examination, a possible trace pneumothorax cannot be excluded.    Dilated common bile duct with intrahepatic biliary duct dilatation and a distended gallbladder.    Findings are suspicious for a possible central obstructing lesion within the distal common bile duct is not excluded.    Further evaluation with ERCP is recommended.    Patulous, fluid-filled esophagus. Outpatient endoscopy is recommended.    Colonic wall thickening may be related to underdistention.    Left axillary lymphadenopathy require short-term interval follow-up.    < end of copied text >    < from: CT Cervical Spine No Cont (05.26.24 @ 11:14) >    IMPRESSION:  CT head: Age-indeterminate small infarct involving the left thalamus. No acute intracranial hemorrhage or mass effect.    CT cervical spine: No acute fracture or traumatic subluxation.     < end of copied text >       24 Hour Events    5/26  PM  -pelvic x rays done, ortho contacted--> will need R hip MRI, nonurgent   -A&O x3, on RA, 1L IS, abdomen soft  -voided 200cc  -u/a- +leuk/nitrites/occasional bacteria   -15NaPhos x 1     AM  - EKG, Echo ordered  - 2gm Mag x1, K riders x3  - monitor for void - voided in ED  - Ortho C/s - xrays ordered  - 12a labs    [X] A ten-point review of systems was otherwise negative except as noted above.  [  ] Due to altered mental status/intubation, subjective information was not attained from the patient. History was obtained, to the extent possible, from review of the chart and collateral sources of information.       ====================================   JAYLYN AGARWAL   [id number]   [birth year]  78yF    Admit Date: 05-26-24  Indication for SICU: multiple consecutive rib fractures s/p multiple mechanical falls        ============================  HPI   78F current smoker with PMHx of HTN, HLD, anxiety, CAD s/p CABG x 3 (2009), atrial flutter s/p ablation (2022), OA, and recent admission for infectious colitis (4/26/24), presented to the ED s/p multiple mechanical falls (-HT/-LOC/-AC). Of note, pt presented to ED on 5/23/24 with CC of back pain (pt did not endorse fall at this time), and was discharged without admission. Today, pt's daughter endorsed that pt has experienced multiple mechanical falls over the past month, most recently 5/23/24 after returning home from ED. Pt endorsed weakness in her legs, causing her to fall. CTAP showed acute nondisplaced posterior left 6th, 7th rib fractures, acute mildly displaced posterior left 8th, 9th, 10th, and 11th ribs fractures, acute nondisplaced fracture of the right superior pubic ramus, and right greater trochanter fracture, as well as several incidental findings (see below). CT head and neck were negative for acute pathology. Labs were significant for WBC 12, Na 124, K 3.4, and alk phos 130.     SICU Consult for hemodynamic monitoring in the setting of multiple consecutive rib fractures. Pt was examined in ED, stable, see Physical exam below.     Pertinent Imaging    < from: CT Abdomen and Pelvis w/ IV Cont (05.26.24 @ 11:21) >  IMPRESSION:    Acute nondisplaced posterior left 6th, 7th rib fractures. Acute mildly displaced posterior left 8th, 9th, 10th, and 11th ribs fractures.    Acute nondisplaced fracture of the right superior pubic ramus (4-397).    Stable chronic T6, T7, T8, T11, L2 vertebral body compression deformities.    Mild mural thickening extending from the cecum to the mid transverse colon likely reflecting colitis of infectious or inflammatory etiology..    New Trace bilateral pleural effusions with adjacent consolidative opacities.    Additional attending comments:    Additional right greater trochanter fracture, seen on image 177 of series 602    There is a punctate focus of air within the left pleural effusion on image 32 series 2. Although no pneumothorax is identified on this examination, a possible trace pneumothorax cannot be excluded.    Dilated common bile duct with intrahepatic biliary duct dilatation and a distended gallbladder.    Findings are suspicious for a possible central obstructing lesion within the distal common bile duct is not excluded.    Further evaluation with ERCP is recommended.    Patulous, fluid-filled esophagus. Outpatient endoscopy is recommended.    Colonic wall thickening may be related to underdistention.    Left axillary lymphadenopathy require short-term interval follow-up.    < end of copied text >    < from: CT Cervical Spine No Cont (05.26.24 @ 11:14) >    IMPRESSION:  CT head: Age-indeterminate small infarct involving the left thalamus. No acute intracranial hemorrhage or mass effect.    CT cervical spine: No acute fracture or traumatic subluxation.     < end of copied text >       24 Hour Events    5/26  PM  -pelvic x rays done, ortho contacted--> will need R hip MRI, nonurgent   -A&O x3, on RA, 1L IS, abdomen soft  -voided 200cc  -u/a- +leuk/nitrites/occasional bacteria   -15NaPhos x 1     AM  - EKG, Echo ordered  - 2gm Mag x1, K riders x3  - monitor for void - voided in ED  - Ortho C/s - xrays ordered  - 12a labs    [X] A ten-point review of systems was otherwise negative except as noted above.  [  ] Due to altered mental status/intubation, subjective information was not attained from the patient. History was obtained, to the extent possible, from review of the chart and collateral sources of information.       ====================================  Daily     Daily     Diet, DASH/TLC:   Sodium & Cholesterol Restricted (05-26-24 @ 17:42)      CURRENT MEDS:  Neurologic Medications  acetaminophen     Tablet .. 650 milliGRAM(s) Oral every 6 hours  DULoxetine 60 milliGRAM(s) Oral daily  methocarbamol 500 milliGRAM(s) Oral every 6 hours  oxyCODONE    IR 5 milliGRAM(s) Oral every 6 hours PRN Severe Pain (7 - 10)  oxyCODONE    IR 2.5 milliGRAM(s) Oral every 6 hours PRN Moderate Pain (4 - 6)    Respiratory Medications    Cardiovascular Medications  amLODIPine   Tablet 5 milliGRAM(s) Oral daily    Gastrointestinal Medications  dicyclomine 20 milliGRAM(s) Oral four times a day before meals  pantoprazole    Tablet 40 milliGRAM(s) Oral before breakfast  senna 2 Tablet(s) Oral at bedtime  sodium chloride 0.9%. 1000 milliLiter(s) IV Continuous <Continuous>    Genitourinary Medications    Hematologic/Oncologic Medications  heparin   Injectable 5000 Unit(s) SubCutaneous every 8 hours    Antimicrobial/Immunologic Medications    Endocrine/Metabolic Medications  atorvastatin 80 milliGRAM(s) Oral at bedtime    Topical/Other Medications  chlorhexidine 2% Cloths 1 Application(s) Topical <User Schedule>  lidocaine   4% Patch 1 Patch Transdermal daily      ICU Vital Signs Last 24 Hrs  T(C): 35.7 (27 May 2024 08:00), Max: 36.7 (26 May 2024 15:45)  T(F): 96.3 (27 May 2024 08:00), Max: 98 (26 May 2024 15:45)  HR: 92 (27 May 2024 08:00) (88 - 100)  BP: 147/70 (27 May 2024 08:00) (113/67 - 157/81)  BP(mean): 91 (27 May 2024 08:00) (78 - 111)  ABP: --  ABP(mean): --  RR: 18 (26 May 2024 19:00) (18 - 19)  SpO2: 96% (27 May 2024 08:00) (94% - 97%)    O2 Parameters below as of 27 May 2024 04:00  Patient On (Oxygen Delivery Method): nasal cannula  O2 Flow (L/min): 2                I&O's Summary    26 May 2024 07:01  -  27 May 2024 07:00  --------------------------------------------------------  IN: 1052 mL / OUT: 400 mL / NET: 652 mL      I&O's Detail    26 May 2024 07:01  -  27 May 2024 07:00  --------------------------------------------------------  IN:    IV PiggyBack: 452 mL    sodium chloride 0.9%: 600 mL  Total IN: 1052 mL    OUT:    Voided (mL): 400 mL  Total OUT: 400 mL    Total NET: 652 mL              PHYSICAL EXAM:    General: Pt lying comfortably in bed.     Neuro: Alert & oriented x 3, no focal deficits. CNs II-XII grossly intact. Strength: Upper extremities intact. Lower extremities: strength 1/5 on bilateral lower extremities. Plantar flexion, dorsiflexion 5/5 bilaterally. Sensation intact throughout    Lungs: Clear to auscultation bilaterally, normal expansion/effort. Oxygen delivery: 2L NC . Pulling 1L on IS. Ecchymosis noted on left posterior thorax, not TTP.     Cardiovascular : S1, S2.  Regular rate and rhythm. Cardiac Rhythm: NSR  Peripheral edema: 1+ pitting edema on lower extremities bilaterally    GI: Abdomen soft, Non-tender, Non-distended.      Extremities: Extremities warm, pink, well-perfused.     Derm: Good skin turgor, no skin breakdown.     : voiding freely.     CXR:     LABS:  CAPILLARY BLOOD GLUCOSE                              9.7    13.61 )-----------( 263      ( 27 May 2024 02:10 )             28.7       05-27    127<L>  |  91<L>  |  18  ----------------------------<  95  4.0   |  24  |  0.9    Ca    8.7      27 May 2024 02:10  Phos  2.9     05-27  Mg     2.6     05-27    TPro  5.9<L>  /  Alb  3.3<L>  /  TBili  0.7  /  DBili  x   /  AST  22  /  ALT  14  /  AlkPhos  130<H>  05-26      PT/INR - ( 26 May 2024 12:13 )   PT: 10.70 sec;   INR: 0.94 ratio         PTT - ( 26 May 2024 12:13 )  PTT:26.9 sec      Urinalysis Basic - ( 27 May 2024 02:10 )    Color: x / Appearance: x / SG: x / pH: x  Gluc: 95 mg/dL / Ketone: x  / Bili: x / Urobili: x   Blood: x / Protein: x / Nitrite: x   Leuk Esterase: x / RBC: x / WBC x   Sq Epi: x / Non Sq Epi: x / Bacteria: x        Urinalysis with Rflx Culture (collected 26 May 2024 10:45)       JAYLYN AGARWAL   [id number]   [birth year]  78yF    Admit Date: 05-26-24  Indication for SICU: multiple consecutive rib fractures s/p multiple mechanical falls        ============================  HPI   78F current smoker with PMHx of HTN, HLD, anxiety, CAD s/p CABG x 3 (2009), atrial flutter s/p ablation (2022), OA, and recent admission for infectious colitis (4/26/24), presented to the ED s/p multiple mechanical falls (-HT/-LOC/-AC). Of note, pt presented to ED on 5/23/24 with CC of back pain (pt did not endorse fall at this time), and was discharged without admission. Today, pt's daughter endorsed that pt has experienced multiple mechanical falls over the past month, most recently 5/23/24 after returning home from ED. Pt endorsed weakness in her legs, causing her to fall. CTAP showed acute nondisplaced posterior left 6th, 7th rib fractures, acute mildly displaced posterior left 8th, 9th, 10th, and 11th ribs fractures, acute nondisplaced fracture of the right superior pubic ramus, and right greater trochanter fracture, as well as several incidental findings (see below). CT head and neck were negative for acute pathology. Labs were significant for WBC 12, Na 124, K 3.4, and alk phos 130.     SICU Consult for hemodynamic monitoring in the setting of multiple consecutive rib fractures. Pt was examined in ED, stable, see Physical exam below.     Pertinent Imaging    < from: CT Abdomen and Pelvis w/ IV Cont (05.26.24 @ 11:21) >  IMPRESSION:    Acute nondisplaced posterior left 6th, 7th rib fractures. Acute mildly displaced posterior left 8th, 9th, 10th, and 11th ribs fractures.    Acute nondisplaced fracture of the right superior pubic ramus (4-397).    Stable chronic T6, T7, T8, T11, L2 vertebral body compression deformities.    Mild mural thickening extending from the cecum to the mid transverse colon likely reflecting colitis of infectious or inflammatory etiology..    New Trace bilateral pleural effusions with adjacent consolidative opacities.    Additional attending comments:    Additional right greater trochanter fracture, seen on image 177 of series 602    There is a punctate focus of air within the left pleural effusion on image 32 series 2. Although no pneumothorax is identified on this examination, a possible trace pneumothorax cannot be excluded.    Dilated common bile duct with intrahepatic biliary duct dilatation and a distended gallbladder.    Findings are suspicious for a possible central obstructing lesion within the distal common bile duct is not excluded.    Further evaluation with ERCP is recommended.    Patulous, fluid-filled esophagus. Outpatient endoscopy is recommended.    Colonic wall thickening may be related to underdistention.    Left axillary lymphadenopathy require short-term interval follow-up.    < end of copied text >    < from: CT Cervical Spine No Cont (05.26.24 @ 11:14) >    IMPRESSION:  CT head: Age-indeterminate small infarct involving the left thalamus. No acute intracranial hemorrhage or mass effect.    CT cervical spine: No acute fracture or traumatic subluxation.     < end of copied text >       24 Hour Events    5/26  PM  -pelvic x rays done, ortho contacted--> will need R hip MRI, nonurgent   -A&O x3, on RA, 1L IS, abdomen soft  -voided 200cc  -u/a- +leuk/nitrites/occasional bacteria   -15NaPhos x 1     AM  - EKG, Echo ordered  - 2gm Mag x1, K riders x3  - monitor for void - voided in ED  - Ortho C/s - xrays ordered  - 12a labs    [X] A ten-point review of systems was otherwise negative except as noted above.  [  ] Due to altered mental status/intubation, subjective information was not attained from the patient. History was obtained, to the extent possible, from review of the chart and collateral sources of information.       ====================================  Daily     Daily     Diet, DASH/TLC:   Sodium & Cholesterol Restricted (05-26-24 @ 17:42)      CURRENT MEDS:  Neurologic Medications  acetaminophen     Tablet .. 650 milliGRAM(s) Oral every 6 hours  DULoxetine 60 milliGRAM(s) Oral daily  methocarbamol 500 milliGRAM(s) Oral every 6 hours  oxyCODONE    IR 5 milliGRAM(s) Oral every 6 hours PRN Severe Pain (7 - 10)  oxyCODONE    IR 2.5 milliGRAM(s) Oral every 6 hours PRN Moderate Pain (4 - 6)    Respiratory Medications    Cardiovascular Medications  amLODIPine   Tablet 5 milliGRAM(s) Oral daily    Gastrointestinal Medications  dicyclomine 20 milliGRAM(s) Oral four times a day before meals  pantoprazole    Tablet 40 milliGRAM(s) Oral before breakfast  senna 2 Tablet(s) Oral at bedtime  sodium chloride 0.9%. 1000 milliLiter(s) IV Continuous <Continuous>    Genitourinary Medications    Hematologic/Oncologic Medications  heparin   Injectable 5000 Unit(s) SubCutaneous every 8 hours    Antimicrobial/Immunologic Medications    Endocrine/Metabolic Medications  atorvastatin 80 milliGRAM(s) Oral at bedtime    Topical/Other Medications  chlorhexidine 2% Cloths 1 Application(s) Topical <User Schedule>  lidocaine   4% Patch 1 Patch Transdermal daily      ICU Vital Signs Last 24 Hrs  T(C): 35.7 (27 May 2024 08:00), Max: 36.7 (26 May 2024 15:45)  T(F): 96.3 (27 May 2024 08:00), Max: 98 (26 May 2024 15:45)  HR: 92 (27 May 2024 08:00) (88 - 100)  BP: 147/70 (27 May 2024 08:00) (113/67 - 157/81)  BP(mean): 91 (27 May 2024 08:00) (78 - 111)  ABP: --  ABP(mean): --  RR: 18 (26 May 2024 19:00) (18 - 19)  SpO2: 96% (27 May 2024 08:00) (94% - 97%)    O2 Parameters below as of 27 May 2024 04:00  Patient On (Oxygen Delivery Method): nasal cannula  O2 Flow (L/min): 2      I&O's Summary    26 May 2024 07:01  -  27 May 2024 07:00  --------------------------------------------------------  IN: 1052 mL / OUT: 400 mL / NET: 652 mL      I&O's Detail    26 May 2024 07:01  -  27 May 2024 07:00  --------------------------------------------------------  IN:    IV PiggyBack: 452 mL    sodium chloride 0.9%: 600 mL  Total IN: 1052 mL    OUT:    Voided (mL): 400 mL  Total OUT: 400 mL    Total NET: 652 mL      PHYSICAL EXAM:    General: Pt lying comfortably in bed.     Neuro: Alert & oriented x 3, no focal deficits. CNs II-XII grossly intact. Strength: Upper extremities intact. Lower extremities: strength 1/5 on bilateral lower extremities. Plantar flexion, dorsiflexion 5/5 bilaterally. Sensation intact throughout    Lungs: Clear to auscultation bilaterally, normal expansion/effort. Oxygen delivery: 2L NC . Pulling 1L on IS. Ecchymosis noted on left posterior thorax, not TTP.     Cardiovascular : S1, S2.  Regular rate and rhythm. Cardiac Rhythm: NSR  Peripheral edema: 1+ pitting edema on lower extremities bilaterally    GI: Abdomen soft, Non-tender, Non-distended.      Extremities: Extremities warm, pink, well-perfused.     Derm: Good skin turgor, no skin breakdown.     : voiding freely.     LABS:                        9.7    13.61 )-----------( 263      ( 27 May 2024 02:10 )             28.7       05-27    127<L>  |  91<L>  |  18  ----------------------------<  95  4.0   |  24  |  0.9    Ca    8.7      27 May 2024 02:10  Phos  2.9     05-27  Mg     2.6     05-27    TPro  5.9<L>  /  Alb  3.3<L>  /  TBili  0.7  /  DBili  x   /  AST  22  /  ALT  14  /  AlkPhos  130<H>  05-26      PT/INR - ( 26 May 2024 12:13 )   PT: 10.70 sec;   INR: 0.94 ratio         PTT - ( 26 May 2024 12:13 )  PTT:26.9 sec    Urinalysis Basic - ( 27 May 2024 02:10 )    Color: x / Appearance: x / SG: x / pH: x  Gluc: 95 mg/dL / Ketone: x  / Bili: x / Urobili: x   Blood: x / Protein: x / Nitrite: x   Leuk Esterase: x / RBC: x / WBC x   Sq Epi: x / Non Sq Epi: x / Bacteria: x    Urinalysis with Rflx Culture (collected 26 May 2024 10:45)

## 2024-05-27 NOTE — PROGRESS NOTE ADULT - ASSESSMENT
Assessment and Recommendation:   78y Female w/ multiple comorbidities presenting with multiple consecutive rib fractures s/p multiple falls due to lower extremities now admitted to SICU for hemodynamic monitoring.    NEURO/PSYCH:  #Acute pain  - acetaminophen Tablet .. 650 milliGRAM(s) Oral every 6 hours  - methocarbamol 500 milliGRAM(s) Oral every 6 hours  - oxycodone  IR 2.5 milliGRAM(s) Oral every 6 hours PRN Moderate Pain; 5 milliGRAM(s) Oral every 6 hours PRN Severe   - lidocaine patch    #PMHx of anxiety  - Continue home DULoxetine 60 milliGRAM(s) Oral daily    RESP:   #multiple acute consecutive posterior left 6-11th rib fractures  - pain control  - 1250mL on IS; encourage use    #bilateral pleural effusion, r/o pneumothorax  - bilateral pleural effusion with punctate air, concerning for possible pneumothorax   - f/u AM CXR    #Activity  - Activity - Ambulate as Tolerated:     #PMHx of tobacco use    CARDIAC:   #PMHx of HTN  - continue home amlodipine Tablet 5 milliGRAM(s) Oral daily  - HOLDING home olmesartan-hydrochlorothiazide 40 mg-25 mg oral tablet    #PMHx of HLD  - atorvastatin 80 milliGRAM(s) Oral at bedtime (home med: rosuvastatin 20mg QD)    #PMHx of CAD s/p CABG x 3  - follows with Dr. Brunson    #PMHx of atrial flutter s/p ablation (2022)    Imaging:   - EKG: pending  - TTE pending    GI/NUTR:   #Diet: DASH/TLC  - aspiration precautions, HOB 30    #GI Prophylaxis  - Indication: home medication, #PMHx of PUD  - pantoprazole Tablet: 40 milliGRAM(s), Oral, before breakfast    #Bowel regimen   - senna: 2 Tablet(s), Oral, at bedtime    #Recent admission for infective colitis (4/24)  - Continue home dicyclomine 20 milliGRAM(s) Oral four times a day before meals    #dilated CBD, colonic wall, esophageal fluid  - CTAP: "Dilated common bile duct with intrahepatic biliary duct dilatation and a distended gallbladder. Findings are suspicious for a possible central obstructing lesion within the distal common bile duct is not excluded. Further evaluation with ERCP is recommended. Patulous, fluid-filled esophagus. Outpatient endoscopy is recommended"  - Follow-up outpatient    /RENAL:   #urine output in critically ill  - voiding freely via primafit    Labs:          BUN/Cr- 22/1.1  -->,  18/0.9  -->          Electrolytes-(05-27 @ 02:10)Na 127 // K 4.0 // Mg 2.6 // Phos 2.9   #hyponatremia-NS @ 50cc/hr  #hypophosphatemia-repleted with 15mmol sodium phosphate x 1         HEME/ONC:   #DVT prophylaxis    - SCDs    - heparin SubQ 5000 Unit(s) SubCutaneous every 8 hours    Labs: Hb/Hct:  9.8/27.6  -->,  9.7/28.7  -->                      Plts:  351  -->,  263  -->                 PTT/INR:  26.9/0.94  --->       ID:  #leukocytosis in setting of trauma vs UTI  WBC- 12.07  --->>,  13.61  --->>  Temp trend- 24hrs T(F): 98 (05-27 @ 00:00), Max: 98 (05-26 @ 15:45)  #UTI present on admission  -U/a- +leuk/nitrites/occasional bacteria     #left axillary lymphadenopathy seen on CT  - follow up outpatient    #PMHx of infectious colitis    ENDO:  #Blood glucose monitoring  - Glucose goal 140-180. if above 180 start ISS    MSK:  #acute right suprapubic rami fracture, acute greater trochanteric fracture  - Ortho c/s pending  - Xrays done, MRI R hip pending     #PMHx of osteoarthritis  - follows with Dr. Dubon    DERM:  - DTI screen  - Preventative dressings in place    LINES/DRAINS:  PIV    ADVANCED DIRECTIVES:      HCP/Emergency Contact - Carolyn Lobato (daughter) - [phone]    INDICATION FOR SICU: Multiple consecutive rib fractures    DISPO: SICU    Assessment and Recommendation:   78y Female w/ multiple comorbidities presenting with multiple consecutive rib fractures s/p multiple falls due to lower extremities now admitted to SICU for hemodynamic monitoring.    NEURO/PSYCH:  #Acute pain  - acetaminophen Tablet .. 650 milliGRAM(s) Oral every 6 hours  - methocarbamol 500 milliGRAM(s) Oral every 6 hours  - oxycodone  IR 2.5 milliGRAM(s) Oral every 6 hours PRN Moderate Pain; 5 milliGRAM(s) Oral every 6 hours PRN Severe   - lidocaine patch    #PMHx of anxiety  - Continue home DULoxetine 60 milliGRAM(s) Oral daily    RESP:   #multiple acute consecutive posterior left 6-11th rib fractures  - pain control  - 1250mL on IS; encourage use    #bilateral pleural effusion, r/o pneumothorax  - bilateral pleural effusion with punctate air, concerning for possible pneumothorax   - f/u AM CXR    #Activity  - Activity - Ambulate as Tolerated:     #PMHx of tobacco use    CARDIAC:   #PMHx of HTN  - continue home amlodipine Tablet 5 milliGRAM(s) Oral daily  - HOLDING home olmesartan-hydrochlorothiazide 40 mg-25 mg oral tablet    #PMHx of HLD  - atorvastatin 80 milliGRAM(s) Oral at bedtime (home med: rosuvastatin 20mg QD)    #PMHx of CAD s/p CABG x 3  - follows with Dr. Brunson    #PMHx of atrial flutter s/p ablation (2022)    Imaging:   - EKG: pending  - TTE pending    GI/NUTR:   #Diet: DASH/TLC  - aspiration precautions, HOB 30    #GI Prophylaxis  - Indication: home medication, #PMHx of PUD  - pantoprazole Tablet: 40 milliGRAM(s), Oral, before breakfast    #Bowel regimen   - senna: 2 Tablet(s), Oral, at bedtime    #Recent admission for infective colitis (4/24)  - Continue home dicyclomine 20 milliGRAM(s) Oral four times a day before meals    #dilated CBD, colonic wall, esophageal fluid  - CTAP: "Dilated common bile duct with intrahepatic biliary duct dilatation and a distended gallbladder. Findings are suspicious for a possible central obstructing lesion within the distal common bile duct is not excluded. Further evaluation with ERCP is recommended. Patulous, fluid-filled esophagus. Outpatient endoscopy is recommended"  - Follow-up outpatient    /RENAL:   #urine output in critically ill  - voiding freely via primafit    Labs:          BUN/Cr- 22/1.1  -->,  18/0.9  -->          Electrolytes-(05-27 @ 02:10)Na 127 // K 4.0 // Mg 2.6 // Phos 2.9   #hyponatremia-NS @ 50cc/hr  #hypophosphatemia-repleted with 15mmol sodium phosphate x 1     HEME/ONC:   #DVT prophylaxis    - SCDs    - heparin SubQ 5000 Unit(s) SubCutaneous every 8 hours    Labs: Hb/Hct:  9.8/27.6  -->,  9.7/28.7  -->                      Plts:  351  -->,  263  -->                 PTT/INR:  26.9/0.94  --->       ID:  #leukocytosis in setting of trauma vs UTI  WBC- 12.07  --->>,  13.61  --->>  Temp trend- 24hrs T(F): 98 (05-27 @ 00:00), Max: 98 (05-26 @ 15:45)  #UTI present on admission  -U/a- +leuk/nitrites/occasional bacteria     #left axillary lymphadenopathy seen on CT  - follow up outpatient    #PMHx of infectious colitis    ENDO:  #Blood glucose monitoring  - Glucose goal 140-180. if above 180 start ISS    MSK:  #acute right suprapubic rami fracture, acute greater trochanteric fracture  - Ortho c/s pending  - Xrays done, MRI R hip pending     #PMHx of osteoarthritis  - follows with Dr. Dubon    DERM:  - DTI screen  - Preventative dressings in place    LINES/DRAINS:  PIV    ADVANCED DIRECTIVES:      HCP/Emergency Contact - aCrolyn Lobato (daughter) - [phone]    INDICATION FOR SICU: Multiple consecutive rib fractures    DISPO: SICU

## 2024-05-27 NOTE — PROGRESS NOTE ADULT - ASSESSMENT
The patient location is: [place]  The chief complaint leading to consultation is: asthma, COVID-19  Visit type: audiovisual  Total time spent with patient: 10  Each patient to whom he or she provides medical services by telemedicine is:  (1) informed of the relationship between the physician and patient and the respective role of any other health care provider with respect to management of the patient; and (2) notified that he or she may decline to receive medical services by telemedicine and may withdraw from such care at any time.    Notes:           Diagnosed with asthma 10 years ago and was using Proair and Symbicort.  Her symptoms slowly improved and she didn't require maintenance meds  Hasn't needed albuterol in one week, before this occurred she was using it TID while she was being treated for viral PNA  Had been using advair for 14 days, got some good relief, currently out    Back at work, a little bit of anxiety at times but overall feeling a bit better  Pulse ox has been around 98%    Has been off of Oxygen for about a week and a half    Had a repeat CXR and her lungs have cleared    Patient has seen Dr. Aguila in the past for her asthma.    Problem List Items Addressed This Visit        Pulmonary    Asthma    Overview     Continue PRN VIOLETA  Start Symbicort 2 puffs BID  Await PFTs         Current Assessment & Plan     Recommend ICS/LABA, unclear if she had used Advair or Breo more recently.  Await PFTs         Relevant Orders    Spirometry with/without bronchodilator    DLCO-Carbon Monoxide Diffusing Capacity    LUNG VOLUMES           78yFemale w/ PMHx of HTN, HLD, anxiety, CAD s/p CABG x3 (2009), Atrial flutter s/p ablation (2022), OA, recent admission for infectious colitis (4/26/24), seen as a TRAUMA CONSULT s/p multiple mechanical falls (-HT, -LOC, -AC). The following injuries were identified:     # Acute nondisplaced posterior left 6-7 rib fractures   # Acute mildly displaced left 8-11 rib fractures   # Acute nondisplaced fracture of the right superior pubic ramus    # Right greater trochanteric fracture     PLAN:   - Ortho consult - obtain non-contrast right hip MRI; keep NWB RLE until MRI complete   - Multimodal pain control w/ APAP 650 q6h, Robaxin 500 TID, Oxycodone IR 2.5 q6h PRN for moderate pain / Oxycodone 5mg q6h PRN for severe pain, lidocaine patch   - Monitor respiratory status (currently on RA), continue IS use  - F/u AM CXR   - 20:00 labs w/ repletion of electrolytes as needed   - DIet/Fluids: DASH, NS @ 50 mL/Hr   - HOLDING home olmesartan-HCTZ 40mg-25mg, continue home amlodipine 5mg QD   - PPX: Protonix 40 qd, HSQ   - Rest of care per SICU     For outpatient F/U:   - Needs ERCP (CT A/P 5/26 - intrahepatic biliary duct dilatation and distended GB, findings suspicious for possible central obstructing lesion w/i the distal CBD)   - Needs EGD (CT chest 5/26 - patulous, fluid-filled esophagus)   ------------------------------  Trauma Surgery  [phone]   Date/Time: 05-27-24 @ 08:28

## 2024-05-27 NOTE — CONSULT NOTE ADULT - ASSESSMENT
IMPRESSION: Rehab of multiple trauma: acute 6- 11th rib fractures, right sup. pubic ramus fracture, right greater troch fracture. RLE NWB until MRI to eval right hip.    PRECAUTIONS: [  ] Cardiac  [  ] Respiratory  [  ] Seizures [  ] Contact Isolation  [  ] Droplet Isolation  [  ] Other    Weight Bearing Status:  NWB RLE    RECOMMENDATION:    Out of Bed to Chair     DVT/Decubiti Prophylaxis    REHAB PLAN:     [ x  ] Bedside P/T 3-5 times a week   [ x  ]   Bedside O/T  2-3 times a week             [   ] No Rehab Therapy Indicated                   [   ]  Speech Therapy   Conditioning/ROM                                    ADL  Bed Mobility                                               Conditioning/ROM  Transfers                                                     Bed Mobility  Sitting /Standing Balance                         Transfers                                        Gait Training                                               Sitting/Standing Balance  Stair Training [   ]Applicable                    Home equipment Eval                                                                        Splinting  [   ] Only      GOALS:   ADL   [ x  ]   Independent                    Transfers  [ x  ] Independent                          Ambulation  [ x  ] Independent     [  x  ] With device                            [   ]  CG                                                         [   ]  CG                                                                  [   ] CG                            [    ] Min A                                                   [   ] Min A                                                              [   ] Min  A          DISCHARGE PLAN:   [   ]  Good candidate for Intensive Rehabilitation/Hospital based-[facility]A [facility]                                             Will tolerate 3hrs Intensive Rehab Daily                                       [    ]  Short Term Rehab in Skilled Nursing Facility                                       [    ]  Home with Outpatient or VN services                                         [ xx   ]  Possible Candidate for Intensive Hospital based Rehab - will follow and reeval after MRI and therapy sessions

## 2024-05-27 NOTE — PROGRESS NOTE ADULT - SUBJECTIVE AND OBJECTIVE BOX
TRAUMA SURGERY PROGRESS NOTE    Patient: JAYLYN AGARWAL , 78y ([birth year])Female   MRN: [id number]  Location: [address]  Visit: 05-26-24 Inpatient  Date: 05-27-24 @ 08:28    Hospital Day #: 2     INJURIES / DIAGNOSIS / PROCEDURES:  ACUTE NONDISPLACED POSTERIOR LEFT RIB FRACTURE 6-7   ACUTE MILDLY DISPLACED LEFT RIB FRACTURE 8-11  ACUTE NONDISPLACED FRACTURE OF THE RIGHT SUPERIOR PUBIC RAMUS   RIGHT GREATER TROCHANTERIC FRACTURE     INTERVAL HX:  No acute events overnight. Pulling >1L on IS.   Afebrile, VSS.     VITALS:   T(F): 96.3 (05-27-24 @ 08:00), Max: 98 (05-26-24 @ 15:45)  HR: 92 (05-27-24 @ 08:00)  BP: 147/70 (05-27-24 @ 08:00)  RR: 18 (05-26-24 @ 19:00)  SpO2: 96% (05-27-24 @ 08:00)        05-26 @ 07:01  -  05-27 @ 07:00  --------------------------------------------------------  OUT:    Voided (mL): 400 mL  Total OUT: 400 mL      PHYSICAL EXAM:  General Appearance: NAD, AAOx3   HEENT: EOMI, sclera non-icteric.  Heart: Regular rate   Lungs: Breathing comfortably on room air   Abdomen:  Soft, nontender, nondistended.   MSK/Extremities: Warm & well-perfused.   Skin: Warm, dry. No jaundice.     LABS:                        9.7    13.61 )-----------( 263       05-27 @ 02:10             28.7     127  |  91  |  18  ----------------------------<  95        05-27 @ 02:10  4.0   |  24  |  0.9      Coagulation Studies - 05-26 @ 12:13  PT: 10.70 sec  PTT: 26.9 sec<L>  INR: 0.94 ratio    Calcium: 8.7 mg/dL (05-27 @ 02:10)  Magnesium: 2.6 mg/dL *H* (05-27 @ 02:10)  Phosphorus: 2.9 mg/dL (05-27 @ 02:10)      LIVER FUNCTIONS - ( 26 May 2024 12:13 )  Alb: 3.3 g/dL / Pro: 5.9 g/dL / ALK PHOS: 130 U/L / ALT: 14 U/L / AST: 22 U/L / GGT: x             RADIOLOGY & OTHER STUDIES:   < from: CT Abdomen and Pelvis w/ IV Cont (05.26.24 @ 11:21) >  IMPRESSION:    Acute nondisplaced posterior left 6th, 7th rib fractures.  Acute mildly displaced posterior left 8th, 9th, 10th, and 11th ribs   fractures.    Acute nondisplaced fracture of the right superior pubic ramus (4-397).    Stable chronic T6, T7, T8, T11, L2 vertebral body compression deformities.    Mild mural thickening extending from the cecum to the mid transverse   colon likely reflecting colitis of infectious or inflammatory etiology..    [place] bilateral pleural effusions with adjacent consolidative   opacities.        Additional attending comments:    Additional right greater trochanter fracture, seen on image 177 of series   602    There is a punctate focus of air within the left pleural effusion on   image 32 series 2. Although no pneumothorax is identified on this   examination, a possible trace pneumothorax cannot be excluded.    Dilated common bile duct with intrahepatic biliary duct dilatation and a   distended gallbladder.    Findings are suspicious for a possible central obstructing lesion within   the distal common bile duct is not excluded.    Further evaluation with ERCP is recommended.    Patulous, fluid-filled esophagus. Outpatient endoscopy is recommended.    Colonic wall thickening may be related to underdistention.    Left axillary lymphadenopathy require short-term interval follow-up.    --- End of Report ---    < end of copied text >      ACCESS DEVICES:  [X] Peripheral IV  [ ] Central Venous Line	[ ] R	[ ] L	[ ] IJ	[ ] Fem	[ ] SC	Placed:   [ ] Arterial Line		[ ] R	[ ] L	[ ] Fem	[ ] Rad	[ ] Ax	Placed:   [ ] PICC:					[ ] Mediport  [ ] Urinary Catheter,  Date Placed:   [ ] Chest tube: [ ] Right, [ ] Left  [ ] ELVA/Jack Drains

## 2024-05-27 NOTE — CONSULT NOTE ADULT - SUBJECTIVE AND OBJECTIVE BOX
HPI:  TRAUMA ACTIVATION LEVEL:  CONSULT  ACTIVATED BY: ED**  INTUBATED: NO**      MECHANISM OF INJURY:   [x] Fall	    GCS: 15 	E: 4	V: 5	M: 6    HPI:  78yF w/ PMHx of CAD, tobacco dependence, HTN, anxiety, atrial flutter, seen as Trauma Consult s/p multiple falls, -HT, -LOC, -AC.  Trauma assessment in ED: ABCs intact , GCS 15 , AAOx3. Family at bedside reports that patient has been experiencing multiple falls for the past 2 weeks due to weakness, most recent fall on Thursday. Patient was recently discharged from ED 3 days ago and presents today for severe back pain. CT scan reveals multiple left sided rib fractures from 5-11 ribs and pelvic fracture. At bedside examiantion, patient has left chest wall tenderness. Pulling 1L on IS.   (26 May 2024 15:12)    < from: CT Abdomen and Pelvis w/ IV Cont (05.26.24 @ 11:21) >  Acute nondisplaced posterior left 6th, 7th rib fractures.  Acute mildly displaced posterior left 8th, 9th, 10th, and 11th ribs   fractures.    Acute nondisplaced fracture of the right superior pubic ramus (4-397).    < end of copied text >  < from: CT Abdomen and Pelvis w/ IV Cont (05.26.24 @ 11:21) >  Additional right greater trochanter fracture, seen on image 177 of series   602    < end of copied text >  < from: CT Abdomen and Pelvis w/ IV Cont (05.26.24 @ 11:21) >  Dilated common bile duct with intrahepatic biliary duct dilatation and a   distended gallbladder.    < end of copied text >        PAST MEDICAL & SURGICAL HISTORY:  CAD (coronary artery disease)      Tobacco dependence      HTN (hypertension)      Anxiety      Atrial flutter      S/P CABG x 3          Hospital Course:  Patient seen by ortho consult. Made NWDAYNA GOODWIN pending MRI (scheduled for today)    TODAY'S SUBJECTIVE & REVIEW OF SYMPTOMS: pain in right greater than left hips and chest wall      Constitutional WNL   Cardio WNL   Resp WNL   GI WNL  Heme WNL  Endo WNL  Skin WNL  MSK WNL  Neuro WNL  Cognitive WNL  Psych WNL      MEDICATIONS  (STANDING):  acetaminophen     Tablet .. 650 milliGRAM(s) Oral every 6 hours  amLODIPine   Tablet 5 milliGRAM(s) Oral daily  atorvastatin 80 milliGRAM(s) Oral at bedtime  cefTRIAXone   IVPB 1000 milliGRAM(s) IV Intermittent every 24 hours  chlorhexidine 2% Cloths 1 Application(s) Topical <User Schedule>  dicyclomine 20 milliGRAM(s) Oral four times a day before meals  DULoxetine 60 milliGRAM(s) Oral daily  enoxaparin Injectable 30 milliGRAM(s) SubCutaneous every 12 hours  gabapentin 100 milliGRAM(s) Oral every 8 hours  lidocaine   4% Patch 1 Patch Transdermal daily  methocarbamol 500 milliGRAM(s) Oral every 6 hours  pantoprazole    Tablet 40 milliGRAM(s) Oral before breakfast  senna 2 Tablet(s) Oral at bedtime  sodium chloride 0.9%. 1000 milliLiter(s) (50 mL/Hr) IV Continuous <Continuous>    MEDICATIONS  (PRN):  traMADol 25 milliGRAM(s) Oral every 6 hours PRN Moderate Pain (4 - 6)      FAMILY HISTORY:  FH: heart disease  son, mother, & father        Allergies    No Known Allergies    Intolerances        SOCIAL HISTORY:    [  ] Etoh  [x  ] Smoking - 1/2 ppd  [  ] Substance abuse     Home Environment:  [  ] Home Alone  [ x ] Lives with Family  [  ] Home Health Aid    Dwelling:  [  ] Apartment  [x  ] Private House  [  ] Adult Home  [  ] Skilled Nursing Facility      [  ] Short Term  [  ] Long Term  [ x ] Stairs       Elevator [  ]    FUNCTIONAL STATUS PTA: (Check all that apply)  Ambulation: [ x  ]Independent   [   ] Requires Assistance   [  ] Dependent     [  ] Non-Ambulatory       Assistive Device: [x  ] SA Cane  [  ]  Q Cane  [  ] Walker  [  ]  Wheelchair  ADL : [ x ] Independent    [   ] Requires Assistance    [  ]  Dependent       Vital Signs Last 24 Hrs  T(C): 35.6 (27 May 2024 11:00), Max: 36.7 (26 May 2024 15:45)  T(F): 96 (27 May 2024 11:00), Max: 98 (26 May 2024 15:45)  HR: 90 (27 May 2024 13:00) (86 - 95)  BP: 132/91 (27 May 2024 13:00) (113/67 - 157/81)  BP(mean): 104 (27 May 2024 13:00) (78 - 111)  RR: 18 (26 May 2024 19:00) (18 - 18)  SpO2: 94% (27 May 2024 13:00) (93% - 97%)    Parameters below as of 27 May 2024 12:00  Patient On (Oxygen Delivery Method): room air          PHYSICAL EXAM: Alert & Oriented X3  GENERAL: NAD, well-groomed, well-developed  HEAD:  Atraumatic, Normocephalic  EYES: EOMI, PERRL  NECK: Supple  CHEST/LUNG: Clear to auscultation  HEART: Regular rate and rhythm  ABDOMEN: Soft, Nontender, Nondistended  EXTREMITIES:  No clubbing, cyanosis, or edema  ROM:  [  x ] WFL all extremities  [x  ] Abnormal - right hip not tested    NERVOUS SYSTEM: speech/ cognition grossly WNL  Cranial Nerves 2-12: [ x  ] intact  [  ] Abnormal   Motor Strength: [   ] WFL all extremities   [x  ] Abnormal - BUE WFL, BLE hips limited by pain. Knees and ankles MP WFL   Sensation: [ x  ] intact to light touch  [  ] Abnormal   Reflexes: [ x  ] Symmetric  [  ]  Abnormal     FUNCTIONAL STATUS:  Bed Mobility: [   ]Independent  [  ] Supervision  [x  ] Needs Assistance   [  ] N/A   Transfers: [   ] Independent  [  ] Supervision  [ x ] Needs Assistance  [  ]  N/A   Ambulation: [   ] Independent  [  ] Supervision  [x  ] Needs Assistance  [  ] N/A   ADL: [   ] Independent  [ x ] Requires Assistance  [  ] N/A       LABS:                        9.7    13.61 )-----------( 263      ( 27 May 2024 02:10 )             28.7     05-27    127<L>  |  91<L>  |  18  ----------------------------<  95  4.0   |  24  |  0.9    Ca    8.7      27 May 2024 02:10  Phos  2.9     05-27  Mg     2.6     05-27    TPro  5.9<L>  /  Alb  3.3<L>  /  TBili  0.7  /  DBili  x   /  AST  22  /  ALT  14  /  AlkPhos  130<H>  05-26    PT/INR - ( 26 May 2024 12:13 )   PT: 10.70 sec;   INR: 0.94 ratio         PTT - ( 26 May 2024 12:13 )  PTT:26.9 sec  Urinalysis Basic - ( 27 May 2024 02:10 )    Color: x / Appearance: x / SG: x / pH: x  Gluc: 95 mg/dL / Ketone: x  / Bili: x / Urobili: x   Blood: x / Protein: x / Nitrite: x   Leuk Esterase: x / RBC: x / WBC x   Sq Epi: x / Non Sq Epi: x / Bacteria: x        RADIOLOGY & ADDITIONAL STUDIES:    as above    All xrays reviewed    Assesment:

## 2024-05-27 NOTE — CONSULT NOTE ADULT - SUBJECTIVE AND OBJECTIVE BOX
Gastroenterology Consultation:    Patient is a 78y old  Female who presents with a chief complaint of       Admitted on: 05-26-24        HPI:  78yF w/ PMHx of CAD on ASA, active smoker,  HTN, anxiety, atrial flutter, seen as Trauma Consult s/p multiple falls, -HT, -LOC, -AC.  Trauma assessment in ED: ABCs intact , GCS 15 , AAOx3. Family at bedside reports that patient has been experiencing multiple falls for the past 2 weeks due to weakness, most recent fall on Thursday.CT scan reveals multiple left sided rib fractures from 5-11 ribs and pelvic fracture. At bedside examiantion, patient has left chest wall tenderness. GI consulted for CT findings of dilated CBD. Patient denies any abdominal pain, nausea, vomiting, jaundice, unintentional weight loss.         Prior EGD: 10/23 with Chris Darby with ulcer per patient    Prior Colonoscopy: 2 years ago with Sae polyps removed      PAST MEDICAL & SURGICAL HISTORY:  CAD (coronary artery disease)      Tobacco dependence      HTN (hypertension)      Anxiety      Atrial flutter      S/P CABG x 3            FAMILY HISTORY:  FH: heart disease  son, mother, & father        Social History:  Tobacco: denies  Alcohol: denies  Drugs: denies    Home Medications:  dicyclomine 10 mg oral capsule: 2 cap(s) orally 4 times a day (26 Apr 2024 22:04)  DULoxetine 60 mg oral delayed release capsule: 1 cap(s) orally once a day (26 Apr 2024 13:58)  pantoprazole 40 mg oral granule, delayed release: 1 each orally once a day (26 Apr 2024 22:04)  rosuvastatin 20 mg oral capsule: 1 cap(s) orally once a day (26 Apr 2024 22:04)        MEDICATIONS  (STANDING):  acetaminophen     Tablet .. 650 milliGRAM(s) Oral every 6 hours  amLODIPine   Tablet 5 milliGRAM(s) Oral daily  atorvastatin 80 milliGRAM(s) Oral at bedtime  cefTRIAXone   IVPB 1000 milliGRAM(s) IV Intermittent every 24 hours  chlorhexidine 2% Cloths 1 Application(s) Topical <User Schedule>  dicyclomine 20 milliGRAM(s) Oral four times a day before meals  DULoxetine 60 milliGRAM(s) Oral daily  enoxaparin Injectable 30 milliGRAM(s) SubCutaneous every 12 hours  gabapentin 100 milliGRAM(s) Oral every 8 hours  lidocaine   4% Patch 1 Patch Transdermal daily  methocarbamol 500 milliGRAM(s) Oral every 6 hours  pantoprazole    Tablet 40 milliGRAM(s) Oral before breakfast  senna 2 Tablet(s) Oral at bedtime  sodium chloride 0.9%. 1000 milliLiter(s) (50 mL/Hr) IV Continuous <Continuous>    MEDICATIONS  (PRN):  traMADol 25 milliGRAM(s) Oral every 6 hours PRN Moderate Pain (4 - 6)      Allergies  No Known Allergies      Review of Systems:   Constitutional:  No Fever, No Chills  ENT/Mouth:  No Hearing Changes,  No Difficulty Swallowing  Eyes:  No Eye Pain, No Vision Changes  Cardiovascular:  No Chest Pain, No Palpitations  Respiratory:  No Cough, No Dyspnea  Gastrointestinal:  As described in HPI          Physical Examination:  T(C): 35.6 (05-27-24 @ 11:00), Max: 36.7 (05-26-24 @ 15:45)  HR: 87 (05-27-24 @ 12:00) (86 - 95)  BP: 157/65 (05-27-24 @ 12:00) (113/67 - 157/81)  RR: 18 (05-26-24 @ 19:00) (18 - 18)  SpO2: 94% (05-27-24 @ 12:00) (93% - 97%)      05-26-24 @ 07:01  -  05-27-24 @ 07:00  --------------------------------------------------------  IN: 1052 mL / OUT: 400 mL / NET: 652 mL    05-27-24 @ 07:01  -  05-27-24 @ 12:16  --------------------------------------------------------  IN: 363 mL / OUT: 1990 mL / NET: -1627 mL          GENERAL: AAOx3, no acute distress.  HEAD:  Atraumatic, Normocephalic  EYES: conjunctiva and sclera clear  CHEST/LUNG: Clear to auscultation bilaterally; No wheeze, rhonchi, or rales  HEART: Regular rate and rhythm; normal S1, S2, No murmurs.  ABDOMEN: Soft, nontender, nondistended; Bowel sounds present  SKIN: Intact, no jaundice        Data:                        9.7    13.61 )-----------( 263      ( 27 May 2024 02:10 )             28.7     Hgb Trend:  9.7  05-27-24 @ 02:10  9.8  05-26-24 @ 12:13        05-27    127<L>  |  91<L>  |  18  ----------------------------<  95  4.0   |  24  |  0.9    Ca    8.7      27 May 2024 02:10  Phos  2.9     05-27  Mg     2.6     05-27    TPro  5.9<L>  /  Alb  3.3<L>  /  TBili  0.7  /  DBili  x   /  AST  22  /  ALT  14  /  AlkPhos  130<H>  05-26    Liver panel trend:  TBili 0.7   /   AST 22   /   ALT 14   /   AlkP 130   /   Tptn 5.9   /   Alb 3.3    /   DBili --      05-26  TBili 0.4   /   AST 18   /   ALT 9   /   AlkP 134   /   Tptn 6.7   /   Alb 3.8    /   DBili --      05-23      PT/INR - ( 26 May 2024 12:13 )   PT: 10.70 sec;   INR: 0.94 ratio         PTT - ( 26 May 2024 12:13 )  PTT:26.9 sec    Urinalysis with Rflx Culture (collected 26 May 2024 10:45)    Culture - Urine (collected 26 May 2024 10:45)  Source: Clean Catch None  Final Report (27 May 2024 12:12):    >=3 organisms. Probable collection contamination.          Radiology:    < from: CT Abdomen and Pelvis w/ IV Cont (05.26.24 @ 11:21) >    There is a punctate focus of air within the left pleural effusion on   image 32 series 2. Although no pneumothorax is identified on this   examination, a possible trace pneumothorax cannot be excluded.    Dilated common bile duct with intrahepatic biliary duct dilatation and a   distended gallbladder.    Findings are suspicious for a possible central obstructing lesion within   the distal common bile duct is not excluded.    Further evaluation with ERCP is recommended.    Patulous, fluid-filled esophagus. Outpatient endoscopy is recommended.    Colonic wall thickening may be related to underdistention.    Left axillary lymphadenopathy require short-term interval follow-up.    < end of copied text >

## 2024-05-27 NOTE — PATIENT PROFILE ADULT - FALL HARM RISK - HARM RISK INTERVENTIONS
Assistance with ambulation/Assistance OOB with selected safe patient handling equipment/Communicate Risk of Fall with Harm to all staff/Discuss with provider need for PT consult/Monitor gait and stability/Reinforce activity limits and safety measures with patient and family/Tailored Fall Risk Interventions/Visual Cue: Yellow wristband and red socks/Bed in lowest position, wheels locked, appropriate side rails in place/Call bell, personal items and telephone in reach/Instruct patient to call for assistance before getting out of bed or chair/Non-slip footwear when patient is out of bed/[place] to call system/Physically safe environment - no spills, clutter or unnecessary equipment/Purposeful Proactive Rounding/Room/bathroom lighting operational, light cord in reach

## 2024-05-28 NOTE — PROGRESS NOTE ADULT - ASSESSMENT
Assessment and Recommendation:   78y Female w/ multiple comorbidities presenting with multiple consecutive rib fractures s/p multiple falls due to lower extremities now admitted to SICU for hemodynamic monitoring.    NEURO/PSYCH:  #Acute pain  - acetaminophen Tablet .. 650 milliGRAM(s) Oral every 6 hours  - methocarbamol 500 milliGRAM(s) Oral every 6 hours  - tramadol PRN  - lidocaine patch    #Bilateral lower extremity weakness; PMHx of stable chronic vertebral fractures; r/o impingment   - NSGY consulted, recs pending  - MRI lumbar spine pending    #PMHx of anxiety  - Continue home DULoxetine 60 milliGRAM(s) Oral daily    RESP:   #multiple acute consecutive posterior left 6-11th rib fractures  - pain control  - 1250mL on IS; encourage use    #bilateral pleural effusion, r/o pneumothorax  - bilateral pleural effusion with punctate air, concerning for possible pneumothorax  - f/u AM CXR    #Activity  - Activity - NWB RLE    #Current smoker, 1/2 PPD  - refused nicotine patch    CARDIAC:   #PMHx of HTN  - continue home amlodipine Tablet 5 milliGRAM(s) Oral daily  - HOLDING home olmesartan-hydrochlorothiazide 40 mg-25 mg oral tablet    #PMHx of HLD  - atorvastatin 80 milliGRAM(s) Oral at bedtime (home med: rosuvastatin 20mg QD)    #PMHx of CAD s/p CABG x 3  - follows with Dr. Brunson    #PMHx of atrial flutter s/p ablation (2022)  - EKG NSR    Imaging:   - EKG: NSR  - TTE, 5/27 - EF 67%. Mild MR, Mild/mod TR, mild pHTN, simple atheroma in aortic arch    GI/NUTR:   #Diet: Regular  - aspiration precautions, HOB 30    #GI Prophylaxis  - Indication: home medication, #PMHx of PUD  - pantoprazole Tablet: 40 milliGRAM(s), Oral, before breakfast    #Bowel regimen   - senna: 2 Tablet(s), Oral, at bedtime    #Recent admission for infective colitis (4/24)  - Continue home dicyclomine 20 milliGRAM(s) Oral four times a day before meals    #dilated CBD, colonic wall, esophageal fluid  - CTAP: "Dilated common bile duct with intrahepatic biliary duct dilatation and a distended gallbladder. Findings are suspicious for a possible central obstructing lesion within the distal common bile duct is not excluded. Further evaluation with ERCP is recommended. Patulous, fluid-filled esophagus. Outpatient endoscopy is recommended"  - Follow-up outpatient  - GI recs appreciated: may need ERCP    /RENAL:   #urine output in critically ill  - Mann placed for CT cystogram, 1.5L output on insertion --> retaining mann    Labs:          BUN/Cr- 18/0.9  -->,  15/0.9  -->          Electrolytes-(05-27 @ 21:03)Na 123 // K 4.2 // Mg 1.8 // Phos 3.2 (magnesium repleted)  #acute right pubic rami fracture  - CT cystogram negative  #hyponatremia  - started 2% saline @ 30 cc/hr  - f/u AM BMP  - q6 BMP      HEME/ONC:   #DVT prophylaxis    - SCDs    - enoxaparin    Labs: Hb/Hct:  9.7/28.7  -->,  9.7/27.6  -->                      Plts:  263  -->,  337  -->                 PTT/INR:       ID:  #leukocytosis in setting of trauma vs UTI  WBC- 12.07  --->>,  13.61  --->>,  14.37  --->>  Temp trend- 24hrs T(F): 97.5 (05-27 @ 20:00), Max: 98 (05-27 @ 04:00)  #UTI present on admission  -U/a- +leuk/nitrites/occasional bacteria   - on Rocephin x 3 days    #left axillary lymphadenopathy seen on CT  - follow up outpatient    #PMHx of infectious colitis    ENDO:  #Blood glucose monitoring  - Glucose goal 140-180. if above 180 start ISS    MSK:  #acute right suprapubic rami fracture, acute greater trochanteric fracture  - Ortho c/s --> Xrays done, MRI R hip pending     #PMHx of osteoarthritis  - follows with Dr. Dubon    DERM:  - DTI screen  - Preventative dressings in place    LINES/DRAINS:  PIV    ADVANCED DIRECTIVES:      HCP/Emergency Contact - Carolyn Lobato (daughter) - [phone]    INDICATION FOR SICU: Multiple consecutive rib fractures    DISPO: SICU  Assessment and Recommendation:   78y Female w/ multiple comorbidities presenting with multiple consecutive rib fractures s/p multiple falls due to lower extremities now admitted to SICU for hemodynamic monitoring.    NEURO/PSYCH:  #Acute pain  - acetaminophen Tablet .. 650 milliGRAM(s) Oral every 6 hours  - methocarbamol 500 milliGRAM(s) Oral every 6 hours  - tramadol PRN  - lidocaine patch    #Bilateral lower extremity weakness; PMHx of stable chronic vertebral fractures; r/o impingment   - NSGY consulted, recs pending  - MRI lumbar spine pending    #PMHx of anxiety  - Continue home DULoxetine 60 milliGRAM(s) Oral daily    RESP:   #multiple acute consecutive posterior left 6-11th rib fractures  - pain control  - 1250mL on IS; encourage use    #bilateral pleural effusion, r/o pneumothorax  - bilateral pleural effusion with punctate air, concerning for possible pneumothorax  - f/u AM CXR    #Activity  - Activity - NWB RLE    #Current smoker, 1/2 PPD  - refused nicotine patch    CARDIAC:   #PMHx of HTN  - continue home amlodipine Tablet 5 milliGRAM(s) Oral daily  - HOLDING home olmesartan-hydrochlorothiazide 40 mg-25 mg oral tablet    #PMHx of HLD  - atorvastatin 80 milliGRAM(s) Oral at bedtime (home med: rosuvastatin 20mg QD)    #PMHx of CAD s/p CABG x 3  - follows with Dr. Brunson    #PMHx of atrial flutter s/p ablation (2022)  - EKG NSR    Imaging:   - EKG: NSR  - TTE, 5/27 - EF 67%. Mild MR, Mild/mod TR, mild pHTN, simple atheroma in aortic arch    GI/NUTR:   #Diet: Regular  - aspiration precautions, HOB 30    #GI Prophylaxis  - Indication: home medication, #PMHx of PUD  - pantoprazole Tablet: 40 milliGRAM(s), Oral, before breakfast    #Bowel regimen   - senna: 2 Tablet(s), Oral, at bedtime    #Recent admission for infective colitis (4/24)  - Continue home dicyclomine 20 milliGRAM(s) Oral four times a day before meals    #dilated CBD, colonic wall, esophageal fluid  - CTAP: "Dilated common bile duct with intrahepatic biliary duct dilatation and a distended gallbladder. Findings are suspicious for a possible central obstructing lesion within the distal common bile duct is not excluded. Further evaluation with ERCP is recommended. Patulous, fluid-filled esophagus. Outpatient endoscopy is recommended"  - Follow-up outpatient  - GI recs appreciated: may need ERCP    /RENAL:   #urine output in critically ill  - Mann placed for CT cystogram, 1.5L output on insertion --> retaining mann    Labs:          BUN/Cr- 18/0.9  -->,  15/0.9  -->          Electrolytes-(05-27 @ 21:03)Na 123 // K 4.2 // Mg 1.8 // Phos 3.2 (magnesium repleted)  #acute right pubic rami fracture  - CT cystogram negative  #hyponatremia  - started 2% saline @ 30 cc/hr  - f/u AM BMP  - q6 BMP      HEME/ONC:   #DVT prophylaxis    - SCDs    - enoxaparin    Labs: Hb/Hct:  9.7/28.7  -->,  9.7/27.6  -->                      Plts:  263  -->,  337  -->                 PTT/INR:       ID:  #leukocytosis in setting of trauma vs UTI  WBC- 12.07  --->>,  13.61  --->>,  14.37  --->>  Temp trend- 24hrs T(F): 97.6 (28 May 2024 00:00), Max: 97.6 (28 May 2024 00:00)  #UTI present on admission  -U/a- +leuk/nitrites/occasional bacteria   - on Rocephin x 3 days    #left axillary lymphadenopathy seen on CT  - follow up outpatient    #PMHx of infectious colitis    ENDO:  #Blood glucose monitoring  - Glucose goal 140-180. if above 180 start ISS    MSK:  #acute right suprapubic rami fracture, acute greater trochanteric fracture  - Ortho c/s --> Xrays done, MRI R hip pending     #PMHx of osteoarthritis  - follows with Dr. Jagdish ROBLES:  - DTI screen  - Preventative dressings in place    LINES/DRAINS:  PIV, foleyy    ADVANCED DIRECTIVES:      HCP/Emergency Contact - Carolyn Lobato (daughter) - [phone]    INDICATION FOR SICU: Multiple consecutive rib fractures    DISPO: SICU

## 2024-05-28 NOTE — PROGRESS NOTE ADULT - SUBJECTIVE AND OBJECTIVE BOX
GENERAL SURGERY PROGRESS NOTE    Patient: JAYLYN AGARWAL , 78y ([birth year])Female   MRN: [id number]  Location: [address]  Visit: 05-26-24 Inpatient  Date: 05-28-24 @ 13:27      Procedure/Dx/Injuries:    # Acute nondisplaced posterior left 6-7 rib fractures   # Acute mildly displaced left 8-11 rib fractures   # Acute nondisplaced fracture of the right superior pubic ramus    # Right greater trochanteric fracture     Events of past 24 hours: AOX2, delerious, NICOM +, Agitated, b/l upper extremity mittens, neurology consult    PAST MEDICAL & SURGICAL HISTORY:  CAD (coronary artery disease)      Tobacco dependence      HTN (hypertension)      Anxiety      Atrial flutter      S/P CABG x 3          Vitals:   T(F): 98.3 (05-28-24 @ 12:00), Max: 99.3 (05-28-24 @ 08:00)  HR: 119 (05-28-24 @ 13:00)  BP: 114/60 (05-28-24 @ 13:00)  RR: 23 (05-28-24 @ 13:00)  SpO2: 94% (05-28-24 @ 13:00)      Diet, Regular      Fluids:     I & O's:    05-27-24 @ 07:01  -  05-28-24 @ 07:00  --------------------------------------------------------  IN:    IV PiggyBack: 63 mL    IV PiggyBack: 50 mL    Oral Fluid: 420 mL    sodium chloride 0.9%: 750 mL    sodium chloride 2%: 210 mL    sodium chloride 2%: 20 mL  Total IN: 1513 mL    OUT:    Indwelling Catheter - Urethral (mL): 2720 mL  Total OUT: 2720 mL    Total NET: -1207 mL          PHYSICAL EXAM:  GENERAL: No acute distress, well-developed  CHEST/LUNG: CTAB; No wheezes, rales, or rhonchi  HEART: Regular rate and rhythm.   ABDOMEN: Soft, non-tender, non-distended; normal bowel sounds, no organomegaly  EXTREMITIES:  2+ peripheral pulses b/l, No clubbing, cyanosis, or edema      MEDICATIONS  (STANDING):  acetaminophen     Tablet .. 650 milliGRAM(s) Oral every 6 hours  amLODIPine   Tablet 5 milliGRAM(s) Oral daily  atorvastatin 80 milliGRAM(s) Oral at bedtime  cefTRIAXone   IVPB 1000 milliGRAM(s) IV Intermittent every 24 hours  chlorhexidine 2% Cloths 1 Application(s) Topical <User Schedule>  dicyclomine 20 milliGRAM(s) Oral four times a day before meals  DULoxetine 60 milliGRAM(s) Oral daily  enoxaparin Injectable 30 milliGRAM(s) SubCutaneous every 12 hours  folic acid 1 milliGRAM(s) Oral daily  gabapentin 100 milliGRAM(s) Oral every 8 hours  lidocaine   4% Patch 1 Patch Transdermal daily  melatonin 3 milliGRAM(s) Oral at bedtime  multivitamin 1 Tablet(s) Oral daily  pantoprazole    Tablet 40 milliGRAM(s) Oral before breakfast  polyethylene glycol 3350 17 Gram(s) Oral every 24 hours  propranolol 10 milliGRAM(s) Oral once  senna 2 Tablet(s) Oral at bedtime  sodium chloride 2% . 1000 milliLiter(s) (20 mL/Hr) IV Continuous <Continuous>  thiamine 100 milliGRAM(s) Oral daily    MEDICATIONS  (PRN):  meclizine 12.5 milliGRAM(s) Oral once PRN Dizziness/vertigo  traMADol 25 milliGRAM(s) Oral every 6 hours PRN Moderate Pain (4 - 6)      DVT PROPHYLAXIS: enoxaparin Injectable 30 milliGRAM(s) SubCutaneous every 12 hours    GI PROPHYLAXIS: pantoprazole    Tablet 40 milliGRAM(s) Oral before breakfast    ANTICOAGULATION:   ANTIBIOTICS:  cefTRIAXone   IVPB 1000 milliGRAM(s)            LAB/STUDIES:  Labs:  CAPILLARY BLOOD GLUCOSE      POCT Blood Glucose.: 101 mg/dL (28 May 2024 05:54)                          9.7    14.37 )-----------( 337      ( 27 May 2024 21:03 )             27.6       Auto Neutrophil %: 87.7 % (05-27-24 @ 21:03)  Auto Immature Granulocyte %: 0.8 % (05-27-24 @ 21:03)    05-28    131<L>  |  98  |  16  ----------------------------<  67<L>  4.1   |  22  |  1.0      Calcium: 8.4 mg/dL (05-28-24 @ 11:33)      LFTs:         Coags:            Urinalysis Basic - ( 28 May 2024 11:33 )    Color: x / Appearance: x / SG: x / pH: x  Gluc: 67 mg/dL / Ketone: x  / Bili: x / Urobili: x   Blood: x / Protein: x / Nitrite: x   Leuk Esterase: x / RBC: x / WBC x   Sq Epi: x / Non Sq Epi: x / Bacteria: x        Urinalysis with Rflx Culture (collected 26 May 2024 10:45)    Culture - Urine (collected 26 May 2024 10:45)  Source: Clean Catch None  Final Report (27 May 2024 12:12):    >=3 organisms. Probable collection contamination.              IMAGING:      ACCESS/ DEVICES:  [ xx] Peripheral IV  [ ] Central Venous Line	[ ] R	[ ] L	[ ] IJ	[ ] Fem	[ ] SC	Placed:   [ ] Arterial Line		[ ] R	[ ] L	[ ] Fem	[ ] Rad	[ ] Ax	Placed:   [ ] PICC:					[ ] Mediport  [ ] Urinary Catheter,  Date Placed:   [ ] Chest tube: [ ] Right, [ ] Left  [ ] ELVA/Jack Drains

## 2024-05-28 NOTE — CONSULT NOTE ADULT - ATTENDING COMMENTS
78yF w/ PMHx of CAD, tobacco dependence, HTN, anxiety, atrial flutter, seen as Trauma Consult s/p multiple falls, -HT, -LOC, -AC.  Trauma assessment in ED: ABCs intact , GCS 15 , AAOx3. Family at bedside reports that patient has been experiencing multiple falls for the past 2 weeks due to weakness, most recent fall on Thursday.     Neurology was consulted movements in the UE noted b/l.   Started overnight acutely.  There were no focal deficits noted.   Will continue with the plan as the above.   Will continue to follow.
Dilated CBD, asymptomatic and incidentally found. Will benefit from ERCP EUS pending ortho evaluation for hip fracture.
Please see my H&P

## 2024-05-28 NOTE — PROGRESS NOTE ADULT - SUBJECTIVE AND OBJECTIVE BOX
JAYLYN AGARWAL   [id number]   [birth year]  78yF    Admit Date: 05-26-24  Indication for SICU: multiple consecutive rib fractures s/p multiple mechanical falls        ============================  HPI   78F current smoker with PMHx of HTN, HLD, anxiety, CAD s/p CABG x 3 (2009), atrial flutter s/p ablation (2022), OA, and recent admission for infectious colitis (4/26/24), presented to the ED s/p multiple mechanical falls (-HT/-LOC/-AC). Of note, pt presented to ED on 5/23/24 with CC of back pain (pt did not endorse fall at this time), and was discharged without admission. Today, pt's daughter endorsed that pt has experienced multiple mechanical falls over the past month, most recently 5/23/24 after returning home from ED. Pt endorsed weakness in her legs, causing her to fall. CTAP showed acute nondisplaced posterior left 6th, 7th rib fractures, acute mildly displaced posterior left 8th, 9th, 10th, and 11th ribs fractures, acute nondisplaced fracture of the right superior pubic ramus, and right greater trochanter fracture, as well as several incidental findings (see below). CT head and neck were negative for acute pathology. Labs were significant for WBC 12, Na 124, K 3.4, and alk phos 130.     SICU Consult for hemodynamic monitoring in the setting of multiple consecutive rib fractures. Pt was examined in ED, stable, see Physical exam below.     Pertinent Imaging    < from: CT Abdomen and Pelvis w/ IV Cont (05.26.24 @ 11:21) >  IMPRESSION:    Acute nondisplaced posterior left 6th, 7th rib fractures. Acute mildly displaced posterior left 8th, 9th, 10th, and 11th ribs fractures.    Acute nondisplaced fracture of the right superior pubic ramus (4-397).    Stable chronic T6, T7, T8, T11, L2 vertebral body compression deformities.    Mild mural thickening extending from the cecum to the mid transverse colon likely reflecting colitis of infectious or inflammatory etiology..    New Trace bilateral pleural effusions with adjacent consolidative opacities.    Additional attending comments:    Additional right greater trochanter fracture, seen on image 177 of series 602    There is a punctate focus of air within the left pleural effusion on image 32 series 2. Although no pneumothorax is identified on this examination, a possible trace pneumothorax cannot be excluded.    Dilated common bile duct with intrahepatic biliary duct dilatation and a distended gallbladder.    Findings are suspicious for a possible central obstructing lesion within the distal common bile duct is not excluded.    Further evaluation with ERCP is recommended.    Patulous, fluid-filled esophagus. Outpatient endoscopy is recommended.    Colonic wall thickening may be related to underdistention.    Left axillary lymphadenopathy require short-term interval follow-up.    < end of copied text >    < from: CT Cervical Spine No Cont (05.26.24 @ 11:14) >    IMPRESSION:  CT head: Age-indeterminate small infarct involving the left thalamus. No acute intracranial hemorrhage or mass effect.    CT cervical spine: No acute fracture or traumatic subluxation.     < end of copied text >       24 Hour Events  5/27  NIGHT  -pain under control, A&Ox2, delirious thinks she is at her daughters house moves all extemitityes, strength to dorsiflexion and plantar flexion 5/5, unable to lift both legs, both legs are warm to touch and sensation intact bilaterally, LCTAB   -ordered melatonin  -started sodium chloride 2% at 30mL/hr and repeat BMP in AM  -1gm Mag x1  - NICOM +  - 500cc NS bolus  - Total fluids given: 750cc NS    DAY  - Heparin subq--> lovenox  - Neurosurgery consult placed --> MR lumbar spine   - d/c narcotics (oxy) --> start gabapentin and tramadol  - d/c NC (for pneumothorax), note current smoker in plan  - cont holding olmesartan-HCTZ for fluxtuating kidney function  - repeat ekg  - TTE, 5/27 - EF 67%. Mild MR, Mild/mod TR, mild pHTN, simple atheroma in aortic arch  - MRI R hip pending  - DASH --> regular diet d/t hyponatremia   - GI consult for ERCP  - CT cystogram for urine with RBCs + pelvic fracture --> prelim negative  - Rocephin for UTI  - place mann for ct cysto and remove after CT --> 1.5L on insertion --> keep  - Refused MRI --> called MRI, reordered, will attempt again if schedule permits    [X] A ten-point review of systems was otherwise negative except as noted above.  [  ] Due to altered mental status/intubation, subjective information was not attained from the patient. History was obtained, to the extent possible, from review of the chart and collateral sources of information.   JAYLYN AGARWAL   [id number]   [birth year]  78yF    Admit Date: 05-26-24  Indication for SICU: multiple consecutive rib fractures s/p multiple mechanical falls        ============================  HPI   78F current smoker with PMHx of HTN, HLD, anxiety, CAD s/p CABG x 3 (2009), atrial flutter s/p ablation (2022), OA, and recent admission for infectious colitis (4/26/24), presented to the ED s/p multiple mechanical falls (-HT/-LOC/-AC). Of note, pt presented to ED on 5/23/24 with CC of back pain (pt did not endorse fall at this time), and was discharged without admission. Today, pt's daughter endorsed that pt has experienced multiple mechanical falls over the past month, most recently 5/23/24 after returning home from ED. Pt endorsed weakness in her legs, causing her to fall. CTAP showed acute nondisplaced posterior left 6th, 7th rib fractures, acute mildly displaced posterior left 8th, 9th, 10th, and 11th ribs fractures, acute nondisplaced fracture of the right superior pubic ramus, and right greater trochanter fracture, as well as several incidental findings (see below). CT head and neck were negative for acute pathology. Labs were significant for WBC 12, Na 124, K 3.4, and alk phos 130.     SICU Consult for hemodynamic monitoring in the setting of multiple consecutive rib fractures. Pt was examined in ED, stable, see Physical exam below.     Pertinent Imaging    < from: CT Abdomen and Pelvis w/ IV Cont (05.26.24 @ 11:21) >  IMPRESSION:    Acute nondisplaced posterior left 6th, 7th rib fractures. Acute mildly displaced posterior left 8th, 9th, 10th, and 11th ribs fractures.    Acute nondisplaced fracture of the right superior pubic ramus (4-397).    Stable chronic T6, T7, T8, T11, L2 vertebral body compression deformities.    Mild mural thickening extending from the cecum to the mid transverse colon likely reflecting colitis of infectious or inflammatory etiology..    New Trace bilateral pleural effusions with adjacent consolidative opacities.    Additional attending comments:    Additional right greater trochanter fracture, seen on image 177 of series 602    There is a punctate focus of air within the left pleural effusion on image 32 series 2. Although no pneumothorax is identified on this examination, a possible trace pneumothorax cannot be excluded.    Dilated common bile duct with intrahepatic biliary duct dilatation and a distended gallbladder.    Findings are suspicious for a possible central obstructing lesion within the distal common bile duct is not excluded.    Further evaluation with ERCP is recommended.    Patulous, fluid-filled esophagus. Outpatient endoscopy is recommended.    Colonic wall thickening may be related to underdistention.    Left axillary lymphadenopathy require short-term interval follow-up.    < end of copied text >    < from: CT Cervical Spine No Cont (05.26.24 @ 11:14) >    IMPRESSION:  CT head: Age-indeterminate small infarct involving the left thalamus. No acute intracranial hemorrhage or mass effect.    CT cervical spine: No acute fracture or traumatic subluxation.     < end of copied text >       24 Hour Events  5/27  NIGHT  -pain under control, A&Ox2, delirious thinks she is at her daughters house moves all extemitityes, strength to dorsiflexion and plantar flexion 5/5, unable to lift both legs, both legs are warm to touch and sensation intact bilaterally, LCTAB   -ordered melatonin  -started sodium chloride 2% at 30mL/hr and repeat BMP in AM  -1gm Mag x1  - NICOM +  - 500cc NS bolus  - Total fluids given: 750cc NS    DAY  - Heparin subq--> lovenox  - Neurosurgery consult placed --> MR lumbar spine   - d/c narcotics (oxy) --> start gabapentin and tramadol  - d/c NC (for pneumothorax), note current smoker in plan  - cont holding olmesartan-HCTZ for fluxtuating kidney function  - repeat ekg  - TTE, 5/27 - EF 67%. Mild MR, Mild/mod TR, mild pHTN, simple atheroma in aortic arch  - MRI R hip pending  - DASH --> regular diet d/t hyponatremia   - GI consult for ERCP  - CT cystogram for urine with RBCs + pelvic fracture --> prelim negative  - Rocephin for UTI  - place mann for ct cysto and remove after CT --> 1.5L on insertion --> keep  - Refused MRI --> called MRI, reordered, will attempt again if schedule permits    [X] A ten-point review of systems was otherwise negative except as noted above.  [  ] Due to altered mental status/intubation, subjective information was not attained from the patient. History was obtained, to the extent possible, from review of the chart and collateral sources of information.  Daily     Daily     Diet, Regular (05-27-24 @ 09:00)      CURRENT MEDS:  Neurologic Medications  acetaminophen     Tablet .. 650 milliGRAM(s) Oral every 6 hours  DULoxetine 60 milliGRAM(s) Oral daily  gabapentin 100 milliGRAM(s) Oral every 8 hours  meclizine 12.5 milliGRAM(s) Oral once PRN Dizziness/vertigo  melatonin 3 milliGRAM(s) Oral at bedtime  methocarbamol 500 milliGRAM(s) Oral every 6 hours  traMADol 25 milliGRAM(s) Oral every 6 hours PRN Moderate Pain (4 - 6)    Respiratory Medications    Cardiovascular Medications  amLODIPine   Tablet 5 milliGRAM(s) Oral daily    Gastrointestinal Medications  dicyclomine 20 milliGRAM(s) Oral four times a day before meals  pantoprazole    Tablet 40 milliGRAM(s) Oral before breakfast  senna 2 Tablet(s) Oral at bedtime  sodium chloride 2% . 1000 milliLiter(s) IV Continuous <Continuous>    Genitourinary Medications    Hematologic/Oncologic Medications  enoxaparin Injectable 30 milliGRAM(s) SubCutaneous every 12 hours    Antimicrobial/Immunologic Medications  cefTRIAXone   IVPB 1000 milliGRAM(s) IV Intermittent every 24 hours    Endocrine/Metabolic Medications  atorvastatin 80 milliGRAM(s) Oral at bedtime    Topical/Other Medications  chlorhexidine 2% Cloths 1 Application(s) Topical <User Schedule>  lidocaine   4% Patch 1 Patch Transdermal daily      ICU Vital Signs Last 24 Hrs  T(C): 36.4 (28 May 2024 00:00), Max: 36.4 (27 May 2024 20:00)  T(F): 97.6 (28 May 2024 00:00), Max: 97.6 (28 May 2024 00:00)  HR: 101 (28 May 2024 06:00) (85 - 107)  BP: 121/56 (28 May 2024 06:00) (90/53 - 157/65)  BP(mean): 80 (28 May 2024 06:00) (67 - 104)  ABP: --  ABP(mean): --  RR: 22 (28 May 2024 06:00) (20 - 47)  SpO2: 91% (28 May 2024 06:00) (89% - 100%)    O2 Parameters below as of 28 May 2024 06:00  Patient On (Oxygen Delivery Method): nasal cannula  O2 Flow (L/min): 2      I&O's Summary    27 May 2024 07:01  -  28 May 2024 07:00  --------------------------------------------------------  IN: 1513 mL / OUT: 2720 mL / NET: -1207 mL      I&O's Detail    27 May 2024 07:01  -  28 May 2024 07:00  --------------------------------------------------------  IN:    IV PiggyBack: 63 mL    IV PiggyBack: 50 mL    Oral Fluid: 420 mL    sodium chloride 0.9%: 750 mL    sodium chloride 2%: 210 mL    sodium chloride 2%: 20 mL  Total IN: 1513 mL    OUT:    Indwelling Catheter - Urethral (mL): 2720 mL  Total OUT: 2720 mL    Total NET: -1207 mL          PHYSICAL EXAM:    General/Neuro  RASS:             GCS:     = E   / V   / M      Deficits:                             alert & oriented x 3, no focal deficits  Pupils:    Lungs:      clear to auscultation, Normal expansion/effort.   -L posterior chest wall ecchymotic, soft, no hematoma    Cardiovascular : S1, S2.  Regular rate and rhythm.  Peripheral edema   Cardiac Rhythm: Normal Sinus Rhythm    GI: Abdomen soft, Non-tender, Non-distended.      Extremities: Extremities warm, pink, well-perfused. Pulses:Rt     Lt    Derm: Good skin turgor, no skin breakdown.      :       Mann catheter in place.      CXR:     LABS:  CAPILLARY BLOOD GLUCOSE      POCT Blood Glucose.: 101 mg/dL (28 May 2024 05:54)                          9.7    14.37 )-----------( 337      ( 27 May 2024 21:03 )             27.6       05-28    127<L>  |  97<L>  |  15  ----------------------------<  72  4.4   |  19  |  0.9    Ca    7.7<L>      28 May 2024 04:45  Phos  3.2     05-27  Mg     1.8     05-27    TPro  5.9<L>  /  Alb  3.3<L>  /  TBili  0.7  /  DBili  x   /  AST  22  /  ALT  14  /  AlkPhos  130<H>  05-26      PT/INR - ( 26 May 2024 12:13 )   PT: 10.70 sec;   INR: 0.94 ratio         PTT - ( 26 May 2024 12:13 )  PTT:26.9 sec      Urinalysis Basic - ( 28 May 2024 04:45 )    Color: x / Appearance: x / SG: x / pH: x  Gluc: 72 mg/dL / Ketone: x  / Bili: x / Urobili: x   Blood: x / Protein: x / Nitrite: x   Leuk Esterase: x / RBC: x / WBC x   Sq Epi: x / Non Sq Epi: x / Bacteria: x        Urinalysis with Rflx Culture (collected 26 May 2024 10:45)    Culture - Urine (collected 26 May 2024 10:45)  Source: Clean Catch None  Final Report (27 May 2024 12:12):    >=3 organisms. Probable collection contamination.       JAYLYN AGARWAL   [id number]   [birth year]  78yF    Admit Date: 05-26-24  Indication for SICU: multiple consecutive rib fractures s/p multiple mechanical falls        ============================  HPI   78F current smoker with PMHx of HTN, HLD, anxiety, CAD s/p CABG x 3 (2009), atrial flutter s/p ablation (2022), OA, and recent admission for infectious colitis (4/26/24), presented to the ED s/p multiple mechanical falls (-HT/-LOC/-AC). Of note, pt presented to ED on 5/23/24 with CC of back pain (pt did not endorse fall at this time), and was discharged without admission. Today, pt's daughter endorsed that pt has experienced multiple mechanical falls over the past month, most recently 5/23/24 after returning home from ED. Pt endorsed weakness in her legs, causing her to fall. CTAP showed acute nondisplaced posterior left 6th, 7th rib fractures, acute mildly displaced posterior left 8th, 9th, 10th, and 11th ribs fractures, acute nondisplaced fracture of the right superior pubic ramus, and right greater trochanter fracture, as well as several incidental findings (see below). CT head and neck were negative for acute pathology. Labs were significant for WBC 12, Na 124, K 3.4, and alk phos 130.     SICU Consult for hemodynamic monitoring in the setting of multiple consecutive rib fractures. Pt was examined in ED, stable, see Physical exam below.     Pertinent Imaging    < from: CT Abdomen and Pelvis w/ IV Cont (05.26.24 @ 11:21) >  IMPRESSION:    Acute nondisplaced posterior left 6th, 7th rib fractures. Acute mildly displaced posterior left 8th, 9th, 10th, and 11th ribs fractures.    Acute nondisplaced fracture of the right superior pubic ramus (4-397).    Stable chronic T6, T7, T8, T11, L2 vertebral body compression deformities.    Mild mural thickening extending from the cecum to the mid transverse colon likely reflecting colitis of infectious or inflammatory etiology..    New Trace bilateral pleural effusions with adjacent consolidative opacities.    Additional attending comments:    Additional right greater trochanter fracture, seen on image 177 of series 602    There is a punctate focus of air within the left pleural effusion on image 32 series 2. Although no pneumothorax is identified on this examination, a possible trace pneumothorax cannot be excluded.    Dilated common bile duct with intrahepatic biliary duct dilatation and a distended gallbladder.    Findings are suspicious for a possible central obstructing lesion within the distal common bile duct is not excluded.    Further evaluation with ERCP is recommended.    Patulous, fluid-filled esophagus. Outpatient endoscopy is recommended.    Colonic wall thickening may be related to underdistention.    Left axillary lymphadenopathy require short-term interval follow-up.    < end of copied text >    < from: CT Cervical Spine No Cont (05.26.24 @ 11:14) >    IMPRESSION:  CT head: Age-indeterminate small infarct involving the left thalamus. No acute intracranial hemorrhage or mass effect.    CT cervical spine: No acute fracture or traumatic subluxation.     < end of copied text >       24 Hour Events  5/27  NIGHT  -pain under control, A&Ox2, delirious thinks she is at her daughters house moves all extemitityes, strength to dorsiflexion and plantar flexion 5/5, unable to lift both legs, both legs are warm to touch and sensation intact bilaterally, LCTAB   -ordered melatonin  -started sodium chloride 2% at 30mL/hr and repeat BMP in AM  -1gm Mag x1  - NICOM +  - 500cc NS bolus  - Total fluids given: 750cc NS    DAY  - Heparin subq--> lovenox  - Neurosurgery consult placed --> MR lumbar spine   - d/c narcotics (oxy) --> start gabapentin and tramadol  - d/c NC (for pneumothorax), note current smoker in plan  - cont holding olmesartan-HCTZ for fluxtuating kidney function  - repeat ekg  - TTE, 5/27 - EF 67%. Mild MR, Mild/mod TR, mild pHTN, simple atheroma in aortic arch  - MRI R hip pending  - DASH --> regular diet d/t hyponatremia   - GI consult for ERCP  - CT cystogram for urine with RBCs + pelvic fracture --> prelim negative  - Rocephin for UTI  - place mann for ct cysto and remove after CT --> 1.5L on insertion --> keep  - Refused MRI --> called MRI, reordered, will attempt again if schedule permits    [X] A ten-point review of systems was otherwise negative except as noted above.  [  ] Due to altered mental status/intubation, subjective information was not attained from the patient. History was obtained, to the extent possible, from review of the chart and collateral sources of information.  Daily     Daily     Diet, Regular (05-27-24 @ 09:00)      CURRENT MEDS:  Neurologic Medications  acetaminophen     Tablet .. 650 milliGRAM(s) Oral every 6 hours  DULoxetine 60 milliGRAM(s) Oral daily  gabapentin 100 milliGRAM(s) Oral every 8 hours  meclizine 12.5 milliGRAM(s) Oral once PRN Dizziness/vertigo  melatonin 3 milliGRAM(s) Oral at bedtime  methocarbamol 500 milliGRAM(s) Oral every 6 hours  traMADol 25 milliGRAM(s) Oral every 6 hours PRN Moderate Pain (4 - 6)    Respiratory Medications    Cardiovascular Medications  amLODIPine   Tablet 5 milliGRAM(s) Oral daily    Gastrointestinal Medications  dicyclomine 20 milliGRAM(s) Oral four times a day before meals  pantoprazole    Tablet 40 milliGRAM(s) Oral before breakfast  senna 2 Tablet(s) Oral at bedtime  sodium chloride 2% . 1000 milliLiter(s) IV Continuous <Continuous>    Genitourinary Medications    Hematologic/Oncologic Medications  enoxaparin Injectable 30 milliGRAM(s) SubCutaneous every 12 hours    Antimicrobial/Immunologic Medications  cefTRIAXone   IVPB 1000 milliGRAM(s) IV Intermittent every 24 hours    Endocrine/Metabolic Medications  atorvastatin 80 milliGRAM(s) Oral at bedtime    Topical/Other Medications  chlorhexidine 2% Cloths 1 Application(s) Topical <User Schedule>  lidocaine   4% Patch 1 Patch Transdermal daily      ICU Vital Signs Last 24 Hrs  T(C): 36.4 (28 May 2024 00:00), Max: 36.4 (27 May 2024 20:00)  T(F): 97.6 (28 May 2024 00:00), Max: 97.6 (28 May 2024 00:00)  HR: 101 (28 May 2024 06:00) (85 - 107)  BP: 121/56 (28 May 2024 06:00) (90/53 - 157/65)  BP(mean): 80 (28 May 2024 06:00) (67 - 104)  ABP: --  ABP(mean): --  RR: 22 (28 May 2024 06:00) (20 - 47)  SpO2: 91% (28 May 2024 06:00) (89% - 100%)    O2 Parameters below as of 28 May 2024 06:00  Patient On (Oxygen Delivery Method): nasal cannula  O2 Flow (L/min): 2      I&O's Summary    27 May 2024 07:01  -  28 May 2024 07:00  --------------------------------------------------------  IN: 1513 mL / OUT: 2720 mL / NET: -1207 mL      I&O's Detail    27 May 2024 07:01  -  28 May 2024 07:00  --------------------------------------------------------  IN:    IV PiggyBack: 63 mL    IV PiggyBack: 50 mL    Oral Fluid: 420 mL    sodium chloride 0.9%: 750 mL    sodium chloride 2%: 210 mL    sodium chloride 2%: 20 mL  Total IN: 1513 mL    OUT:    Indwelling Catheter - Urethral (mL): 2720 mL  Total OUT: 2720 mL    Total NET: -1207 mL          PHYSICAL EXAM:    General/Neuro  RASS:             GCS: 14    = E   / V   / M      Deficits:                             alert & oriented x 2, no focal deficits  Pupils:    Lungs:      clear to auscultation, Normal expansion/effort.   -L posterior chest wall ecchymotic, soft, no hematoma    Cardiovascular : S1, S2.  Regular rate and rhythm.  No Peripheral edema   Cardiac Rhythm: Normal Sinus Rhythm    GI: Abdomen soft, Non-tender, Non-distended.      Extremities: Extremities warm, pink, well-perfused. Pulses: palpable dp b/l    Derm: Good skin turgor, no skin breakdown.      :       Mann catheter in place.      CXR:     LABS:  CAPILLARY BLOOD GLUCOSE      POCT Blood Glucose.: 101 mg/dL (28 May 2024 05:54)                          9.7    14.37 )-----------( 337      ( 27 May 2024 21:03 )             27.6       05-28    127<L>  |  97<L>  |  15  ----------------------------<  72  4.4   |  19  |  0.9    Ca    7.7<L>      28 May 2024 04:45  Phos  3.2     05-27  Mg     1.8     05-27    TPro  5.9<L>  /  Alb  3.3<L>  /  TBili  0.7  /  DBili  x   /  AST  22  /  ALT  14  /  AlkPhos  130<H>  05-26      PT/INR - ( 26 May 2024 12:13 )   PT: 10.70 sec;   INR: 0.94 ratio         PTT - ( 26 May 2024 12:13 )  PTT:26.9 sec      Urinalysis Basic - ( 28 May 2024 04:45 )    Color: x / Appearance: x / SG: x / pH: x  Gluc: 72 mg/dL / Ketone: x  / Bili: x / Urobili: x   Blood: x / Protein: x / Nitrite: x   Leuk Esterase: x / RBC: x / WBC x   Sq Epi: x / Non Sq Epi: x / Bacteria: x        Urinalysis with Rflx Culture (collected 26 May 2024 10:45)    Culture - Urine (collected 26 May 2024 10:45)  Source: Clean Catch None  Final Report (27 May 2024 12:12):    >=3 organisms. Probable collection contamination.

## 2024-05-28 NOTE — PROGRESS NOTE ADULT - ASSESSMENT
78yFemale w/ PMHx of HTN, HLD, anxiety, CAD s/p CABG x3 (2009), Atrial flutter s/p ablation (2022), OA, recent admission for infectious colitis (4/26/24), seen as a TRAUMA CONSULT s/p multiple mechanical falls (-HT, -LOC, -AC). The following injuries were identified:     # Acute nondisplaced posterior left 6-7 rib fractures   # Acute mildly displaced left 8-11 rib fractures   # Acute nondisplaced fracture of the right superior pubic ramus    # Right greater trochanteric fracture     PLAN:   - SICU care  - Ortho consult - obtain non-contrast right hip MRI; keep NWB RLE until MRI complete   - Multimodal pain control w/ APAP 650 q6h, Robaxin 500 TID, Oxycodone IR 2.5 q6h PRN for moderate pain / Oxycodone 5mg q6h PRN for severe pain, lidocaine patch   - Monitor respiratory status (currently on RA), continue IS use  - DIet/Fluids: DASH, NS @ 50 mL/Hr   - HOLDING home olmesartan-HCTZ 40mg-25mg, continue home amlodipine 5mg QD   - PPX: Protonix 40 qd, HSQ     For outpatient F/U:   - Needs ERCP (CT A/P 5/26 - intrahepatic biliary duct dilatation and distended GB, findings suspicious for possible central obstructing lesion w/i the distal CBD)   - Needs EGD (CT chest 5/26 - patulous, fluid-filled esophagus)   ------------------------------  Trauma Surgery  [phone]   Date/Time: 05-27-24 @ 08:28

## 2024-05-28 NOTE — CONSULT NOTE ADULT - SUBJECTIVE AND OBJECTIVE BOX
NEUROLOGY CONSULT    HPI:  78yF w/ PMHx of CAD, tobacco dependence, HTN, anxiety, atrial flutter, seen as Trauma Consult s/p multiple falls, -HT, -LOC, -AC.  Trauma assessment in ED: ABCs intact , GCS 15 , AAOx3. Family at bedside reports that patient has been experiencing multiple falls for the past 2 weeks due to weakness, most recent fall on Thursday. Patient was recently discharged from ED 3 days ago and presents today for severe back pain. CT scan reveals multiple left sided rib fractures from 5-11 ribs and pelvic fracture. At bedside examiantion, patient has left chest wall tenderness. Pulling 1L on IS. Patient admitted to SICU for monitoring.   (26 May 2024 15:12)    Neurology consulted on 5/28 for "EPS and hand movements." Per nursing and patient's son, patient was at her baseline mental status up until yesterday afternoon, when she started to talk differently. Overnight patient was agitated and received 1 dose of Zyprexa. This morning when son came to visit, he noticed abnormal hand movements which were new. Patient evaluated at bedside, she was grossly oriented although unable to sustain a meaningful conversation, denies new complaints, shows persistent bilateral hand movements.       MEDICATIONS  Home Medications:  dicyclomine 10 mg oral capsule: 2 cap(s) orally 4 times a day (26 Apr 2024 22:04)  DULoxetine 60 mg oral delayed release capsule: 1 cap(s) orally once a day (26 Apr 2024 13:58)  pantoprazole 40 mg oral granule, delayed release: 1 each orally once a day (26 Apr 2024 22:04)  rosuvastatin 20 mg oral capsule: 1 cap(s) orally once a day (26 Apr 2024 22:04)    MEDICATIONS  (STANDING):  acetaminophen     Tablet .. 650 milliGRAM(s) Oral every 6 hours  amLODIPine   Tablet 5 milliGRAM(s) Oral daily  atorvastatin 80 milliGRAM(s) Oral at bedtime  cefTRIAXone   IVPB 1000 milliGRAM(s) IV Intermittent every 24 hours  chlorhexidine 2% Cloths 1 Application(s) Topical <User Schedule>  dicyclomine 20 milliGRAM(s) Oral four times a day before meals  DULoxetine 60 milliGRAM(s) Oral daily  enoxaparin Injectable 30 milliGRAM(s) SubCutaneous every 12 hours  folic acid 1 milliGRAM(s) Oral daily  gabapentin 100 milliGRAM(s) Oral every 8 hours  lidocaine   4% Patch 1 Patch Transdermal daily  melatonin 3 milliGRAM(s) Oral at bedtime  multivitamin 1 Tablet(s) Oral daily  pantoprazole    Tablet 40 milliGRAM(s) Oral before breakfast  polyethylene glycol 3350 17 Gram(s) Oral every 24 hours  senna 2 Tablet(s) Oral at bedtime  sodium chloride 2% . 1000 milliLiter(s) (20 mL/Hr) IV Continuous <Continuous>  thiamine 100 milliGRAM(s) Oral daily    MEDICATIONS  (PRN):  meclizine 12.5 milliGRAM(s) Oral once PRN Dizziness/vertigo  traMADol 25 milliGRAM(s) Oral every 6 hours PRN Moderate Pain (4 - 6)      FAMILY HISTORY:  FH: heart disease  son, mother, & father      SOCIAL HISTORY: tobacco use half pack a day for over 30 yrs.    Allergies: No Known Allergies      NEURO:   MENTAL STATUS: AAOx3  LANG/SPEECH: intact naming, repetition & comprehension  CRANIAL NERVES:  II: Pupils equal and reactive, no RAPD, normal visual field  III, IV, VI: EOM intact, no gaze preference or deviation  V: normal  VII: no facial asymmetry  VIII: normal hearing to speech  MOTOR: 4/5 in both upper and lower extremities  REFLEXES: 1+/4 throughout, bilateral flexor plantars  SENSORY: Normal to touch, temperature & pin prick in all extremities  COORD: coarse tremor in bilateral UE, asymmetric, irregular, nonrhythmic, subsides with intention and distraction  Gait: deferred.   Negative Babinski.      LABS:                        9.7    14.37 )-----------( 337      ( 27 May 2024 21:03 )             27.6     05-28    127<L>  |  97<L>  |  15  ----------------------------<  72  4.4   |  19  |  0.9    Ca    7.7<L>      28 May 2024 04:45  Phos  3.2     05-27  Mg     1.8     05-27    TPro  5.9<L>  /  Alb  3.3<L>  /  TBili  0.7  /  DBili  x   /  AST  22  /  ALT  14  /  AlkPhos  130<H>  05-26    Hemoglobin A1C:   Vitamin B12   PT/INR - ( 26 May 2024 12:13 )   PT: 10.70 sec;   INR: 0.94 ratio         PTT - ( 26 May 2024 12:13 )  PTT:26.9 sec  CAPILLARY BLOOD GLUCOSE      POCT Blood Glucose.: 101 mg/dL (28 May 2024 05:54)      Urinalysis Basic - ( 28 May 2024 04:45 )    Color: x / Appearance: x / SG: x / pH: x  Gluc: 72 mg/dL / Ketone: x  / Bili: x / Urobili: x   Blood: x / Protein: x / Nitrite: x   Leuk Esterase: x / RBC: x / WBC x   Sq Epi: x / Non Sq Epi: x / Bacteria: x      Microbiology:    Urinalysis with Rflx Culture (collected 26 May 2024 10:45)    Culture - Urine (collected 26 May 2024 10:45)  Source: Clean Catch None  Final Report (27 May 2024 12:12):    >=3 organisms. Probable collection contamination.        RADIOLOGY, EKG AND ADDITIONAL TESTS: Reviewed.

## 2024-05-28 NOTE — CONSULT NOTE ADULT - ASSESSMENT
78y Female w/ multiple comorbidities presenting with multiple consecutive rib fractures s/p multiple falls due to lower extremities now admitted to SICU for hemodynamic monitoring. Neurology consulted on 5/28 for 1 day duration of abnormal hand movements. Physical exam today revealed bilateral UE coarse nonrhythmic tremor that subsided with intention/distraction. Patient is grossly oriented though showing impaired attention and logic. CTH 5/26 showed age-indeterminate small infarct involving the left thalamus. Patient's presentation is likely due to toxic metabolic etiology, but need to rule out myelopathy s/p trauma.    Plan:  - obtain MRI brain and cervical spine w/o contrast  - tizanidine PRN for intolerable movement, if patient and family are agreeable  - toxic metabolic workup

## 2024-05-28 NOTE — OCCUPATIONAL THERAPY INITIAL EVALUATION ADULT - SPECIFY REASON(S)
attempted to see pt for OT assessment, pt's son @ b/s, pt demo increased rapid involuntary movement @ all extremities, unable to sustain upright sitting position, hold OT, to f/u when appropriate

## 2024-05-29 NOTE — PROGRESS NOTE ADULT - ASSESSMENT
Assessment and Recommendation:   78y Female w/ multiple comorbidities presenting with multiple consecutive rib fractures s/p multiple falls due to lower extremities now admitted to SICU for hemodynamic monitoring.    NEURO/PSYCH:  #Acute pain  - acetaminophen Tablet .. 650 milliGRAM(s) Oral every 6 hours  - tramadol PRN  - lidocaine patch  #Bilateral lower extremity weakness; PMHx of stable chronic vertebral fractures; r/o impingment   - NSGY consulted, recs pending  - MRI lumbar spine pending  #PMHx of anxiety  - Continue home DULoxetine 60 milliGRAM(s) Oral daily  #B/L UE fasciculations  - neurology c/s - tizanidine prn, toxic metabolic w/u, MRI brain/c-spine  - holding zyprexa   - added thiamine/folate/MVI  - started propranolol 10mg q12    RESP:   #multiple acute consecutive posterior left 6-11th rib fractures  - pain control  - 1250mL on IS; encourage use  #bilateral pleural effusion, r/o pneumothorax  - bilateral pleural effusion with punctate air, concerning for possible pneumothorax  - f/u AM CXR  #Activity  - Activity - NWB RLE  #Current smoker, 1/2 PPD  - refused nicotine patch    CARDIAC:   #PMHx of HTN  - continue home amlodipine Tablet 5 milliGRAM(s) Oral daily  - HOLDING home olmesartan-hydrochlorothiazide 40 mg-25 mg oral tablet   #PMHx of HLD  - atorvastatin 80 milliGRAM(s) Oral at bedtime (home med: rosuvastatin 20mg QD)  #PMHx of CAD s/p CABG x 3  - follows with Dr. Brunson  #PMHx of atrial flutter s/p ablation (2022)  - EKG NSR  #Imaging:   - EKG: NSR  - TTE, 5/27 - EF 67%. Mild MR, Mild/mod TR, mild pHTN, simple atheroma in aortic arch    GI/NUTR:   #Diet: Regular  - aspiration precautions, HOB 30  #GI Prophylaxis  - Indication: home medication, #PMHx of PUD  - pantoprazole Tablet: 40 milliGRAM(s), Oral, before breakfast  #Bowel regimen   - senna: 2 Tablet(s), Oral, at bedtime   -miralax   #Recent admission for infective colitis (4/24)  - Continue home dicyclomine 20 milliGRAM(s) Oral four times a day before meals  #dilated CBD, colonic wall, esophageal fluid  - CTAP: "Dilated common bile duct with intrahepatic biliary duct dilatation and a distended gallbladder. Findings are suspicious for a possible central obstructing lesion within the distal common bile duct is not excluded. Further evaluation with ERCP is recommended. Patulous, fluid-filled esophagus. Outpatient endoscopy is recommended"  - Follow-up outpatient  - GI recs appreciated: may need ERCP    /RENAL:   #urine output in critically ill  - Mann placed for CT cystogram, 1.5L output on insertion --> retaining mann    Labs:          BUN/Cr- 16/1.0  -->,  17/1.1  -->          Electrolytes-(05-28 @ 20:54)Na 132 // K 4.0 // Mg 2.1 // Phos 2.8 (phosphate repleted)  #acute right pubic rami fracture  - CT cystogram negative  #hyponatremia  - hold HCTZ  - 2% saline @ 10 cc/hr  - monitor BMP      HEME/ONC:   #DVT prophylaxis    - SCDs    - enoxaparin    Labs: Hb/Hct:  9.7/27.6  -->,  8.9/25.5  -->                      Plts:  337  -->,  330  -->                 PTT/INR:        ID:  #leukocytosis in setting of trauma vs UTI  WBC- 13.61  --->>,  14.37  --->>,  14.57  --->>  Temp trend- 24hrs T(F): 101.1 (05-28 @ 22:00), Max: 101.3 (05-28 @ 18:00)  #UTI present on admission  -U/a- +leuk/nitrites/occasional bacteria   - on Rocephin x 3 days  #left axillary lymphadenopathy seen on CT  - follow up outpatient    ENDO:  #Blood glucose monitoring  - Glucose goal 140-180. if above 180 start ISS  #hypoglycemia  -started D51/2 NS @50cc      MSK:  #acute right suprapubic rami fracture, acute greater trochanteric fracture  - Ortho c/s --> Xrays done, MRI R hip pending     #PMHx of osteoarthritis  - follows with Dr. Dubon    DERM:  - DTI screen  - Preventative dressings in place    LINES/DRAINS:  PIV, mann     ADVANCED DIRECTIVES:      HCP/Emergency Contact - Carolyn Lobato (daughter) - [phone]    INDICATION FOR SICU: Multiple consecutive rib fractures    DISPO: SICU  Assessment and Recommendation:   78y Female w/ multiple comorbidities presenting with multiple consecutive rib fractures s/p multiple falls due to lower extremities now admitted to SICU for hemodynamic monitoring.    NEURO/PSYCH:  #Acute pain  - acetaminophen Tablet .. 650 milliGRAM(s) Oral every 6 hours  - tramadol PRN  - lidocaine patch  #Bilateral lower extremity weakness; PMHx of stable chronic vertebral fractures; r/o impingment   - NSGY consulted, recs pending  - MRI lumbar spine pending  #PMHx of anxiety  - Continue home DULoxetine 60 milliGRAM(s) Oral daily  #B/L UE fasciculations  - neurology c/s - tizanidine prn, toxic metabolic w/u, MRI brain/c-spine  - holding zyprexa   - added thiamine/folate/MVI  - started propranolol 10mg q12    RESP:   #multiple acute consecutive posterior left 6-11th rib fractures  - on 2LNC, sating well  - unable to participate w/ IS this morning   ABG 7.52, 25, 136, 20 LA 0.9  #bilateral pleural effusion, r/o pneumothorax  - bilateral pleural effusion with punctate air, concerning for possible pneumothorax  - f/u AM CXR  #Activity  - Activity - NWB RLE  #Current smoker, 1/2 PPD  - refused nicotine patch    CARDIAC:   #PMHx of HTN  - continue home amlodipine Tablet 5 milliGRAM(s) Oral daily  - HOLDING home olmesartan-hydrochlorothiazide 40 mg-25 mg oral tablet   #PMHx of HLD  - atorvastatin 80 milliGRAM(s) Oral at bedtime (home med: rosuvastatin 20mg QD)  #PMHx of CAD s/p CABG x 3  - follows with Dr. Brunson  #PMHx of atrial flutter s/p ablation (2022)  - EKG NSR  #Imaging:   - EKG: NSR  - TTE, 5/27 - EF 67%. Mild MR, Mild/mod TR, mild pHTN, simple atheroma in aortic arch    GI/NUTR:   #Diet: Regular  - aspiration precautions, HOB 30  #GI Prophylaxis  - Indication: home medication, #PMHx of PUD  - pantoprazole Tablet: 40 milliGRAM(s), Oral, before breakfast  #Bowel regimen   - senna: 2 Tablet(s), Oral, at bedtime   -miralax   #Recent admission for infective colitis (4/24)  - Continue home dicyclomine 20 milliGRAM(s) Oral four times a day before meals  #dilated CBD, colonic wall, esophageal fluid  - CTAP: "Dilated common bile duct with intrahepatic biliary duct dilatation and a distended gallbladder. Findings are suspicious for a possible central obstructing lesion within the distal common bile duct is not excluded. Further evaluation with ERCP is recommended. Patulous, fluid-filled esophagus. Outpatient endoscopy is recommended"  - Follow-up outpatient  - GI recs appreciated: may need ERCP    /RENAL:   #urine output in critically ill  - Mann placed for CT cystogram, 1.5L output on insertion --> retaining mann    Labs:          BUN/Cr- 16/1.0  -->,  17/1.1  -->          Electrolytes-(05-28 @ 20:54)Na 132 // K 4.0 // Mg 2.1 // Phos 2.8 (phosphate repleted)  #acute right pubic rami fracture  - CT cystogram negative  #hyponatremia  - hold HCTZ  - 2% saline @ 10 cc/hr  - monitor BMP      HEME/ONC:   #DVT prophylaxis    - SCDs    - enoxaparin    Labs: Hb/Hct:  9.7/27.6  -->,  8.9/25.5  -->                      Plts:  337  -->,  330  -->                 PTT/INR:        ID:  #leukocytosis in setting of trauma vs UTI  WBC- 13.61  --->>,  14.37  --->>,  14.57  --->>  Temp trend- 24hrs T(F): 101.1 (05-28 @ 22:00), Max: 101.3 (05-28 @ 18:00)  #UTI present on admission  -U/a- +leuk/nitrites/occasional bacteria   - on Rocephin x 3 days  #left axillary lymphadenopathy seen on CT  - follow up outpatient    ENDO:  #Blood glucose monitoring  - Glucose goal 140-180. if above 180 start ISS  #hypoglycemia  -started D51/2 NS @50cc      MSK:  #acute right suprapubic rami fracture, acute greater trochanteric fracture  - Ortho c/s --> Xrays done, MRI R hip pending     #PMHx of osteoarthritis  - follows with Dr. Dubon    DERM:  - DTI screen  - Preventative dressings in place    LINES/DRAINS:  PIV, mann, NGT    ADVANCED DIRECTIVES:      HCP/Emergency Contact - Carolyn Lobato (daughter) - [phone]    INDICATION FOR SICU: Multiple consecutive rib fractures    DISPO: SICU

## 2024-05-29 NOTE — PHYSICAL THERAPY INITIAL EVALUATION ADULT - GENERAL OBSERVATIONS, REHAB EVAL
PT IE 8-8:30am. Patient left in supine in NAD. +call bell, +bed alarm, +IV medications, +cold sheet placed over torso, +sequentials, +telemetry, +mann, +ng tube, +O2 2-3L, +mittens at B UE due to safety (patient has tendency to pull out lines). +Son Romeo present and aware of patient's progress.

## 2024-05-29 NOTE — PHYSICAL THERAPY INITIAL EVALUATION ADULT - PERTINENT HX OF CURRENT PROBLEM, REHAB EVAL
Acute nondisplaced posterior left 6-7 rib fractures   Acute mildly displaced left 8-11 rib fractures   Acute nondisplaced fracture of the right superior pubic ramus    Right greater trochanteric fracture

## 2024-05-29 NOTE — PHYSICAL THERAPY INITIAL EVALUATION ADULT - IMPAIRED TRANSFERS: SIT/STAND, REHAB EVAL
Unsafe to attempt due to difficulty with comprhending commands (resulting in impaired bed mobility)./impaired balance/cognition/decreased strength

## 2024-05-29 NOTE — PROGRESS NOTE ADULT - ASSESSMENT
78yFemale w/ PMHx of HTN, HLD, anxiety, CAD s/p CABG x3 (2009), Atrial flutter s/p ablation (2022), OA, recent admission for infectious colitis (4/26/24), seen as a TRAUMA CONSULT s/p multiple mechanical falls (-HT, -LOC, -AC). The following injuries were identified:     # Acute nondisplaced posterior left 6-7 rib fractures   # Acute mildly displaced left 8-11 rib fractures   # Acute nondisplaced fracture of the right superior pubic ramus    # Right greater trochanteric fracture  --> MRI right hip pending      PLAN:   - Multimodal pain control w/ APAP 650 q6h, Tramadol PRN, Lidocaine patch   - Monitor respiratory status (currently on 2L), continue IS use  - Keep NWB RLE until hip MRI complete   - Pending MRI right hip, L-spine w/o contrast, brain and c-spine   - 20:00 labs w/ repletion of electrolytes as needed   - Diet/Fluids: Tube feeds via NGT @ 20 cc/hr, D5NS @ 50 cc/hr   - PPX: Protonix 40 qd, Lovenox 30 bid    - Rest of care per SICU     # Bilateral UE fasciculations   # Bilateral LE weakness   - NSGY following   - Pending MRI brain/c-spine   - Propranolol 10mg q12h for EPS     For outpatient F/U:   - Needs ERCP (CT A/P 5/26 - intrahepatic biliary duct dilatation and distended GB, findings suspicious for possible central obstructing lesion w/i the distal CBD)   - Needs EGD (CT chest 5/26 - patulous, fluid-filled esophagus)    DISPO: Rehab    ------------------------------  Trauma Surgery  [phone]   Date/Time: 05-29-24 @ 11:51

## 2024-05-29 NOTE — PROGRESS NOTE ADULT - SUBJECTIVE AND OBJECTIVE BOX
TRAUMA SURGERY PROGRESS NOTE    Patient: JAYLYN AGARWAL , 78y ([birth year])Female   MRN: [id number]  Location: [address]  Visit: 05-26-24 Inpatient  Date: 05-29-24 @ 11:51    Hospital Day #: 4    INJURIES / DIAGNOSIS / PROCEDURES:  1) ACUTE NONDISPLACED POSTERIOR L RIB FX 6 AND 7  2) ACUTE MILDLY DISPLACED L RIB FX 8-11  3) ACUTE NONDISPLACED FX OF THE RIGHT SUPERIOR PUBIC RAMUS  4) RIGHT GREATER TROCHANTERIC FX    INTERVAL HX:  Febrile overnight - Tmax 101.3. Tachycardic w/ -112. Currently stable (80s).   Blood cultures obtained.   Hyponatremia improving on 2% saline (@10 cc/hr).   NGT placed due to inability to swallow. Started on tube feeds.     VITALS:   T(F): 99.6 (05-29-24 @ 08:04), Max: 101.3 (05-28-24 @ 18:00)  HR: 87 (05-29-24 @ 11:00)  BP: 159/78 (05-29-24 @ 11:00)  RR: 20 (05-29-24 @ 11:00)  SpO2: 95% (05-29-24 @ 11:00)        05-28 @ 07:01  -  05-29 @ 07:00  --------------------------------------------------------  OUT:    Indwelling Catheter - Urethral (mL): 815 mL  Total OUT: 815 mL      05-29 @ 07:01  -  05-29 @ 11:51  --------------------------------------------------------  OUT:    Indwelling Catheter - Urethral (mL): 170 mL  Total OUT: 170 mL    PHYSICAL EXAM:  General Appearance: NAD, AAOx2 (AAOx1-2 @baseline)   HEENT: EOMI, sclera non-icteric. NGT in place.   Heart: Regular rate   Lungs: Breathing comfortably on 2L NC  Abdomen:  Soft, nontender, nondistended.   MSK/Extremities: Warm & well-perfused.   Skin: Warm, dry. No jaundice.     LABS:                        8.9    14.57 )-----------( 330       05-28 @ 20:54             25.5     129  |  96  |  16  ----------------------------<  88        05-29 @ 06:07  3.7   |  21  |  1.1      Coagulation Studies - 05-26 @ 12:13  PT: 10.70 sec  PTT: 26.9 sec<L>  INR: 0.94 ratio    Calcium: 8.4 mg/dL (05-29 @ 06:07)  Magnesium: 2.1 mg/dL (05-28 @ 20:54)  Phosphorus: 2.8 mg/dL (05-28 @ 20:54)      LIVER FUNCTIONS - ( 28 May 2024 20:54 )  Alb: 3.3 g/dL / Pro: 5.8 g/dL / ALK PHOS: 129 U/L / ALT: 16 U/L / AST: 27 U/L / GGT: x             RADIOLOGY & OTHER STUDIES:       ACCESS DEVICES:  [X] Peripheral IV  [ ] Central Venous Line	[ ] R	[ ] L	[ ] IJ	[ ] Fem	[ ] SC	Placed:   [ ] Arterial Line		[ ] R	[ ] L	[ ] Fem	[ ] Rad	[ ] Ax	Placed:   [ ] PICC:					[ ] Mediport  [X] Urinary Catheter,  Date Placed: 5/28  [ ] Chest tube: [ ] Right, [ ] Left  [ ] ELVA/Jack Drains  [X] NGT

## 2024-05-29 NOTE — PROGRESS NOTE ADULT - SUBJECTIVE AND OBJECTIVE BOX
JAYLYN AGARWAL   [id number]   [birth year]  78yF    Admit Date: 05-26-24  Indication for SICU: multiple consecutive rib fractures s/p multiple mechanical falls        ============================  HPI   78F current smoker with PMHx of HTN, HLD, anxiety, CAD s/p CABG x 3 (2009), atrial flutter s/p ablation (2022), OA, and recent admission for infectious colitis (4/26/24), presented to the ED s/p multiple mechanical falls (-HT/-LOC/-AC). Of note, pt presented to ED on 5/23/24 with CC of back pain (pt did not endorse fall at this time), and was discharged without admission. Today, pt's daughter endorsed that pt has experienced multiple mechanical falls over the past month, most recently 5/23/24 after returning home from ED. Pt endorsed weakness in her legs, causing her to fall. CTAP showed acute nondisplaced posterior left 6th, 7th rib fractures, acute mildly displaced posterior left 8th, 9th, 10th, and 11th ribs fractures, acute nondisplaced fracture of the right superior pubic ramus, and right greater trochanter fracture, as well as several incidental findings (see below). CT head and neck were negative for acute pathology. Labs were significant for WBC 12, Na 124, K 3.4, and alk phos 130.     SICU Consult for hemodynamic monitoring in the setting of multiple consecutive rib fractures. Pt was examined in ED, stable, see Physical exam below.     Pertinent Imaging    < from: CT Abdomen and Pelvis w/ IV Cont (05.26.24 @ 11:21) >  IMPRESSION:    Acute nondisplaced posterior left 6th, 7th rib fractures. Acute mildly displaced posterior left 8th, 9th, 10th, and 11th ribs fractures.    Acute nondisplaced fracture of the right superior pubic ramus (4-397).    Stable chronic T6, T7, T8, T11, L2 vertebral body compression deformities.    Mild mural thickening extending from the cecum to the mid transverse colon likely reflecting colitis of infectious or inflammatory etiology..    New Trace bilateral pleural effusions with adjacent consolidative opacities.    Additional attending comments:    Additional right greater trochanter fracture, seen on image 177 of series 602    There is a punctate focus of air within the left pleural effusion on image 32 series 2. Although no pneumothorax is identified on this examination, a possible trace pneumothorax cannot be excluded.    Dilated common bile duct with intrahepatic biliary duct dilatation and a distended gallbladder.    Findings are suspicious for a possible central obstructing lesion within the distal common bile duct is not excluded.    Further evaluation with ERCP is recommended.    Patulous, fluid-filled esophagus. Outpatient endoscopy is recommended.    Colonic wall thickening may be related to underdistention.    Left axillary lymphadenopathy require short-term interval follow-up.    < end of copied text >    < from: CT Cervical Spine No Cont (05.26.24 @ 11:14) >    IMPRESSION:  CT head: Age-indeterminate small infarct involving the left thalamus. No acute intracranial hemorrhage or mass effect.    CT cervical spine: No acute fracture or traumatic subluxation.     < end of copied text >       24 Hour Events  5/28   Night  - PM rounds: a/o x2 (to person and time), moves bilateral lower extremities, palpable DP pulse bilaterally, right lower extremity soft to palpation, right hip soft to palpation  - 15mmol sodium phos    5/28  DAY  - start Thiamine and folic acid, and multivitamin  - Add miralax to bowel regimen  - d/c  oxy and robaxin, keep tramadol  - chest x ray am  - neurology c/s - tizanidine prn, toxic metabolic w/u, MRI brain/c-spine  - propanolol 10 x1 for EPS treatment  - Na improved to 131, decr 2% Nacl to 10cc  FS 67, started D51/2 NS @50cc  ABG 7.52, 25, 136, 20 LA 0.9  DVT sono    [X] A ten-point review of systems was otherwise negative except as noted above.  [  ] Due to altered mental status/intubation, subjective information was not attained from the patient. History was obtained, to the extent possible, from review of the chart and collateral sources of information.   JAYLYN AGARWAL   [id number]   [birth year]  78yF    Admit Date: 24  Indication for SICU: multiple consecutive rib fractures s/p multiple mechanical falls        ============================  HPI   78F current smoker with PMHx of HTN, HLD, anxiety, CAD s/p CABG x 3 (), atrial flutter s/p ablation (), OA, and recent admission for infectious colitis (24), presented to the ED s/p multiple mechanical falls (-HT/-LOC/-AC). Of note, pt presented to ED on 24 with CC of back pain (pt did not endorse fall at this time), and was discharged without admission. Today, pt's daughter endorsed that pt has experienced multiple mechanical falls over the past month, most recently 24 after returning home from ED. Pt endorsed weakness in her legs, causing her to fall. CTAP showed acute nondisplaced posterior left 6th, 7th rib fractures, acute mildly displaced posterior left 8th, 9th, 10th, and 11th ribs fractures, acute nondisplaced fracture of the right superior pubic ramus, and right greater trochanter fracture, as well as several incidental findings (see below). CT head and neck were negative for acute pathology. Labs were significant for WBC 12, Na 124, K 3.4, and alk phos 130.     SICU Consult for hemodynamic monitoring in the setting of multiple consecutive rib fractures. Pt was examined in ED, stable, see Physical exam below.     Pertinent Imaging    < from: CT Abdomen and Pelvis w/ IV Cont (24 @ 11:21) >  IMPRESSION:    Acute nondisplaced posterior left 6th, 7th rib fractures. Acute mildly displaced posterior left 8th, 9th, 10th, and 11th ribs fractures.    Acute nondisplaced fracture of the right superior pubic ramus (4-397).    Stable chronic T6, T7, T8, T11, L2 vertebral body compression deformities.    Mild mural thickening extending from the cecum to the mid transverse colon likely reflecting colitis of infectious or inflammatory etiology..    New Trace bilateral pleural effusions with adjacent consolidative opacities.    Additional attending comments:    Additional right greater trochanter fracture, seen on image 177 of series 602    There is a punctate focus of air within the left pleural effusion on image 32 series 2. Although no pneumothorax is identified on this examination, a possible trace pneumothorax cannot be excluded.    Dilated common bile duct with intrahepatic biliary duct dilatation and a distended gallbladder.    Findings are suspicious for a possible central obstructing lesion within the distal common bile duct is not excluded.    Further evaluation with ERCP is recommended.    Patulous, fluid-filled esophagus. Outpatient endoscopy is recommended.    Colonic wall thickening may be related to underdistention.    Left axillary lymphadenopathy require short-term interval follow-up.    < end of copied text >    < from: CT Cervical Spine No Cont (24 @ 11:14) >    IMPRESSION:  CT head: Age-indeterminate small infarct involving the left thalamus. No acute intracranial hemorrhage or mass effect.    CT cervical spine: No acute fracture or traumatic subluxation.     < end of copied text >       24 Hour Events     Night  - PM rounds: a/o x2 (to person and time), moves bilateral lower extremities, palpable DP pulse bilaterally, right lower extremity soft to palpation, right hip soft to palpation  - 15mmol sodium phos      DAY  - start Thiamine and folic acid, and multivitamin  - Add miralax to bowel regimen  - d/c  oxy and robaxin, keep tramadol  - chest x ray am  - neurology c/s - tizanidine prn, toxic metabolic w/u, MRI brain/c-spine  - propanolol 10 x1 for EPS treatment  - Na improved to 131, decr 2% Nacl to 10cc  FS 67, started D51/2 NS @50cc  ABG 7.52, 25, 136, 20 LA 0.9  DVT sono    [X] A ten-point review of systems was otherwise negative except as noted above.  [  ] Due to altered mental status/intubation, subjective information was not attained from the patient. History was obtained, to the extent possible, from review of the chart and collateral sources of information.  Daily     Daily Weight in k.2 (28 May 2024 22:00)    Diet, NPO:   Except Medications (24 @ 07:09)      CURRENT MEDS:  Neurologic Medications  acetaminophen     Tablet .. 650 milliGRAM(s) Oral every 6 hours  DULoxetine 60 milliGRAM(s) Oral daily  gabapentin 100 milliGRAM(s) Oral every 8 hours  meclizine 12.5 milliGRAM(s) Oral once PRN Dizziness/vertigo  melatonin 3 milliGRAM(s) Oral at bedtime  traMADol 25 milliGRAM(s) Oral every 6 hours PRN Moderate Pain (4 - 6)    Respiratory Medications    Cardiovascular Medications  amLODIPine   Tablet 5 milliGRAM(s) Oral daily  propranolol 10 milliGRAM(s) Oral every 12 hours    Gastrointestinal Medications  dextrose 5% + sodium chloride 0.45%. 1000 milliLiter(s) IV Continuous <Continuous>  dicyclomine 20 milliGRAM(s) Oral four times a day before meals  folic acid 1 milliGRAM(s) Oral daily  multivitamin 1 Tablet(s) Oral daily  pantoprazole    Tablet 40 milliGRAM(s) Oral before breakfast  polyethylene glycol 3350 17 Gram(s) Oral every 24 hours  senna 2 Tablet(s) Oral at bedtime  sodium chloride 2% . 1000 milliLiter(s) IV Continuous <Continuous>  thiamine 100 milliGRAM(s) Oral daily    Genitourinary Medications    Hematologic/Oncologic Medications  enoxaparin Injectable 30 milliGRAM(s) SubCutaneous every 12 hours    Antimicrobial/Immunologic Medications  cefTRIAXone   IVPB 1000 milliGRAM(s) IV Intermittent every 24 hours    Endocrine/Metabolic Medications  atorvastatin 80 milliGRAM(s) Oral at bedtime    Topical/Other Medications  chlorhexidine 2% Cloths 1 Application(s) Topical <User Schedule>  lidocaine   4% Patch 1 Patch Transdermal daily      ICU Vital Signs Last 24 Hrs  T(C): 36.7 (29 May 2024 04:00), Max: 38.5 (28 May 2024 18:00)  T(F): 98 (29 May 2024 04:00), Max: 101.3 (28 May 2024 18:00)  HR: 89 (29 May 2024 07:00) (89 - 119)  BP: 128/76 (29 May 2024 07:00) (102/58 - 181/70)  BP(mean): 89 (29 May 2024 07:00) (77 - 115)  ABP: --  ABP(mean): --  RR: 17 (29 May 2024 06:00) (17 - 24)  SpO2: 96% (29 May 2024 07:00) (93% - 97%)    O2 Parameters below as of 29 May 2024 06:00  Patient On (Oxygen Delivery Method): nasal cannula  O2 Flow (L/min): 2      ABG - ( 28 May 2024 17:04 )  pH, Arterial: 7.52  pH, Blood: x     /  pCO2: 25    /  pO2: 136   / HCO3: 20    / Base Excess: -1.7  /  SaO2: 99.6          I&O's Summary    28 May 2024 07:01  -  29 May 2024 07:00  --------------------------------------------------------  IN: 1460 mL / OUT: 815 mL / NET: 645 mL      I&O's Detail    28 May 2024 07:01  -  29 May 2024 07:00  --------------------------------------------------------  IN:    dextrose 5% + sodium chloride 0.45%: 750 mL    IV PiggyBack: 50 mL    IV PiggyBack: 250 mL    Oral Fluid: 100 mL    sodium chloride 2%: 140 mL    sodium chloride 2%: 170 mL  Total IN: 1460 mL    OUT:    Indwelling Catheter - Urethral (mL): 815 mL  Total OUT: 815 mL    Total NET: 645 mL          PHYSICAL EXAM:    General/Neuro  RASS:             GCS:  15   = E   / V   / M      Deficits:                             alert & oriented x 2, no focal deficits  Pupils:    Lungs:     course breath sounds b/l, Normal expansion/effort.   -L chest wall ecchymosis, soft, no hematoma    Cardiovascular : S1, S2.  Regular rate and rhythm. No Peripheral edema   Cardiac Rhythm: Normal Sinus Rhythm    GI: Abdomen soft, Non-tender, Non-distended.      Extremities: Extremities warm, pink, well-perfused. Pulses:palpable dp b/l    Derm: Good skin turgor, no skin breakdown.      :       Randall catheter in place.      CXR:     LABS:  CAPILLARY BLOOD GLUCOSE      POCT Blood Glucose.: 73 mg/dL (28 May 2024 16:18)                          8.9    14.57 )-----------( 330      ( 28 May 2024 20:54 )             25.5           132<L>  |  98  |  17  ----------------------------<  74  4.0   |  22  |  1.1    Ca    8.6      28 May 2024 20:54  Phos  2.8       Mg     2.1         TPro  5.8<L>  /  Alb  3.3<L>  /  TBili  0.6  /  DBili  0.2  /  AST  27  /  ALT  16  /  AlkPhos  129<H>              Urinalysis Basic - ( 28 May 2024 20:54 )    Color: x / Appearance: x / SG: x / pH: x  Gluc: 74 mg/dL / Ketone: x  / Bili: x / Urobili: x   Blood: x / Protein: x / Nitrite: x   Leuk Esterase: x / RBC: x / WBC x   Sq Epi: x / Non Sq Epi: x / Bacteria: x        Urinalysis with Rflx Culture (collected 26 May 2024 10:45)    Culture - Urine (collected 26 May 2024 10:45)  Source: Clean Catch None  Final Report (27 May 2024 12:12):    >=3 organisms. Probable collection contamination.

## 2024-05-29 NOTE — PHYSICAL THERAPY INITIAL EVALUATION ADULT - LEVEL OF INDEPENDENCE: STAIR NEGOTIATION, REHAB EVAL
Unsafe to attempt due to difficulty with comprhending commands (resulting in no standing balance)./unable to perform

## 2024-05-29 NOTE — OCCUPATIONAL THERAPY INITIAL EVALUATION ADULT - SPECIFY REASON(S)
attempted to see pt for OT assessment; pt presents w/change in medical status w/lethargy, pt demo decreased arousability; as per RN medical team aware, hold OT, to f/u when appropriate

## 2024-05-29 NOTE — PHYSICAL THERAPY INITIAL EVALUATION ADULT - IMPAIRMENTS CONTRIBUTING TO GAIT DEVIATIONS, PT EVAL
Unsafe to attempt due to difficulty with comprhending commands (resulting in impaired sitting balance)./impaired balance/cognition/decreased strength

## 2024-05-30 NOTE — PROGRESS NOTE ADULT - SUBJECTIVE AND OBJECTIVE BOX
JAYLYN AGARWAL   [id number]   49  78yF    Admit Date: 24  Indication for SICU: multiple consecutive rib fractures s/p multiple mechanical falls        ============================  HPI   78F current smoker with PMHx of HTN, HLD, anxiety, CAD s/p CABG x 3 (), atrial flutter s/p ablation (), OA, and recent admission for infectious colitis (24), presented to the ED s/p multiple mechanical falls (-HT/-LOC/-AC). Of note, pt presented to ED on 24 with CC of back pain (pt did not endorse fall at this time), and was discharged without admission. Today, pt's daughter endorsed that pt has experienced multiple mechanical falls over the past month, most recently 24 after returning home from ED. Pt endorsed weakness in her legs, causing her to fall. CTAP showed acute nondisplaced posterior left 6th, 7th rib fractures, acute mildly displaced posterior left 8th, 9th, 10th, and 11th ribs fractures, acute nondisplaced fracture of the right superior pubic ramus, and right greater trochanter fracture, as well as several incidental findings (see below). CT head and neck were negative for acute pathology. Labs were significant for WBC 12, Na 124, K 3.4, and alk phos 130.     SICU Consult for hemodynamic monitoring in the setting of multiple consecutive rib fractures. Pt was examined in ED, stable, see Physical exam below.     Pertinent Imaging    < from: CT Abdomen and Pelvis w/ IV Cont (24 @ 11:21) >  IMPRESSION:    Acute nondisplaced posterior left 6th, 7th rib fractures. Acute mildly displaced posterior left 8th, 9th, 10th, and 11th ribs fractures.    Acute nondisplaced fracture of the right superior pubic ramus (4-397).    Stable chronic T6, T7, T8, T11, L2 vertebral body compression deformities.    Mild mural thickening extending from the cecum to the mid transverse colon likely reflecting colitis of infectious or inflammatory etiology..    New Trace bilateral pleural effusions with adjacent consolidative opacities.    Additional attending comments:    Additional right greater trochanter fracture, seen on image 177 of series 602    There is a punctate focus of air within the left pleural effusion on image 32 series 2. Although no pneumothorax is identified on this examination, a possible trace pneumothorax cannot be excluded.    Dilated common bile duct with intrahepatic biliary duct dilatation and a distended gallbladder.    Findings are suspicious for a possible central obstructing lesion within the distal common bile duct is not excluded.    Further evaluation with ERCP is recommended.    Patulous, fluid-filled esophagus. Outpatient endoscopy is recommended.    Colonic wall thickening may be related to underdistention.    Left axillary lymphadenopathy require short-term interval follow-up.    < end of copied text >    < from: CT Cervical Spine No Cont (24 @ 11:14) >    IMPRESSION:  CT head: Age-indeterminate small infarct involving the left thalamus. No acute intracranial hemorrhage or mass effect.    CT cervical spine: No acute fracture or traumatic subluxation.     < end of copied text >       24 Hour Events    NIGHT  -PM rounds: unable to communicate, following commands, ARGUETA, saturating well on 40L/60% HFNC   -PM AB.38/39/99/23 - keep on HFNC 40L/60%   -23:30 labs ordered  -AM CXR ordered      DAY  - NGT for feeding placed overnight to give meds d/t inability to swallow  - ABG stat 7.29, 46, 66, 22 La 0.6 increased NC to 6L, then HF  - miralax q12  - possible intubation today  - start tube feeds @20cc/hr (held due to HF)  - chest PT every 2 hrs, duonebs, Inhaled saline 3%  - d/c 2% Nacl, started D5NS   - Temp 100.2, tylenol goven , cooling blanket applied   -completed rocephin, started zosyn for pneumonia    [X] A ten-point review of systems was otherwise negative except as noted above.  [  ] Due to altered mental status/intubation, subjective information was not attained from the patient. History was obtained, to the extent possible, from review of the chart and collateral sources of information.       ====================================   JAYLYN AGARWAL   [id number]   49  78yF    Admit Date: 24  Indication for SICU: multiple consecutive rib fractures s/p multiple mechanical falls        ============================  HPI   78F current smoker with PMHx of HTN, HLD, anxiety, CAD s/p CABG x 3 (), atrial flutter s/p ablation (), OA, and recent admission for infectious colitis (24), presented to the ED s/p multiple mechanical falls (-HT/-LOC/-AC). Of note, pt presented to ED on 24 with CC of back pain (pt did not endorse fall at this time), and was discharged without admission. Today, pt's daughter endorsed that pt has experienced multiple mechanical falls over the past month, most recently 24 after returning home from ED. Pt endorsed weakness in her legs, causing her to fall. CTAP showed acute nondisplaced posterior left 6th, 7th rib fractures, acute mildly displaced posterior left 8th, 9th, 10th, and 11th ribs fractures, acute nondisplaced fracture of the right superior pubic ramus, and right greater trochanter fracture, as well as several incidental findings (see below). CT head and neck were negative for acute pathology. Labs were significant for WBC 12, Na 124, K 3.4, and alk phos 130.     SICU Consult for hemodynamic monitoring in the setting of multiple consecutive rib fractures. Pt was examined in ED, stable, see Physical exam below.     Pertinent Imaging    < from: CT Abdomen and Pelvis w/ IV Cont (24 @ 11:21) >  IMPRESSION:    Acute nondisplaced posterior left 6th, 7th rib fractures. Acute mildly displaced posterior left 8th, 9th, 10th, and 11th ribs fractures.    Acute nondisplaced fracture of the right superior pubic ramus (4-397).    Stable chronic T6, T7, T8, T11, L2 vertebral body compression deformities.    Mild mural thickening extending from the cecum to the mid transverse colon likely reflecting colitis of infectious or inflammatory etiology..    New Trace bilateral pleural effusions with adjacent consolidative opacities.    Additional attending comments:    Additional right greater trochanter fracture, seen on image 177 of series 602    There is a punctate focus of air within the left pleural effusion on image 32 series 2. Although no pneumothorax is identified on this examination, a possible trace pneumothorax cannot be excluded.    Dilated common bile duct with intrahepatic biliary duct dilatation and a distended gallbladder.    Findings are suspicious for a possible central obstructing lesion within the distal common bile duct is not excluded.    Further evaluation with ERCP is recommended.    Patulous, fluid-filled esophagus. Outpatient endoscopy is recommended.    Colonic wall thickening may be related to underdistention.    Left axillary lymphadenopathy require short-term interval follow-up.    < end of copied text >    < from: CT Cervical Spine No Cont (24 @ 11:14) >    IMPRESSION:  CT head: Age-indeterminate small infarct involving the left thalamus. No acute intracranial hemorrhage or mass effect.    CT cervical spine: No acute fracture or traumatic subluxation.     < end of copied text >       24 Hour Events    DAY  -trial of BiPAP for a few hrs d/t resp acidosis  -when off bipap tube feeds @20cc/hr   - added nicotine patch once      NIGHT  -PM rounds: unable to communicate, following commands, ARGUETA, saturating well on 40L/60% HFNC   -PM AB.38/39/99/23 - keep on HFNC 40L/60%   -23:30 labs ordered  -AM CXR ordered      DAY  - NGT for feeding placed overnight to give meds d/t inability to swallow  - ABG stat 7.29, 46, 66, 22 La 0.6 increased NC to 6L, then HF  - miralax q12  - possible intubation today  - start tube feeds @20cc/hr (held due to HF)  - chest PT every 2 hrs, duonebs, Inhaled saline 3%  - d/c 2% Nacl, started D5NS   - Temp 100.2, tylenol goven , cooling blanket applied   -completed rocephin, started zosyn for pneumonia    [X] A ten-point review of systems was otherwise negative except as noted above.  [  ] Due to altered mental status/intubation, subjective information was not attained from the patient. History was obtained, to the extent possible, from review of the chart and collateral sources of information.       ====================================   CURRENT MEDS:   Neurologic Medications  acetaminophen     Tablet .. 650 milliGRAM(s) Oral every 6 hours  DULoxetine 60 milliGRAM(s) Oral daily  meclizine 12.5 milliGRAM(s) Oral once PRN Dizziness/vertigo  melatonin 3 milliGRAM(s) Oral at bedtime  traMADol 25 milliGRAM(s) Oral every 6 hours PRN Moderate Pain (4 - 6)    Respiratory Medications  albuterol/ipratropium for Nebulization 3 milliLiter(s) Nebulizer every 6 hours  sodium chloride 3%  Inhalation 4 milliLiter(s) Inhalation every 12 hours    Cardiovascular Medications  amLODIPine   Tablet 5 milliGRAM(s) Oral daily  propranolol 10 milliGRAM(s) Oral every 12 hours    Gastrointestinal Medications  dextrose 5% + sodium chloride 0.9%. 1000 milliLiter(s) IV Continuous <Continuous>  dicyclomine 20 milliGRAM(s) Oral four times a day before meals  folic acid 1 milliGRAM(s) Oral daily  multivitamin 1 Tablet(s) Oral daily  pantoprazole    Tablet 40 milliGRAM(s) Oral before breakfast  polyethylene glycol 3350 17 Gram(s) Oral every 12 hours  senna 2 Tablet(s) Oral at bedtime  thiamine 100 milliGRAM(s) Oral daily    Genitourinary Medications    Hematologic/Oncologic Medications  enoxaparin Injectable 30 milliGRAM(s) SubCutaneous every 12 hours    Antimicrobial/Immunologic Medications  nystatin    Suspension 032924 Unit(s) Oral every 6 hours  piperacillin/tazobactam IVPB.. 3.375 Gram(s) IV Intermittent every 8 hours    Endocrine/Metabolic Medications  atorvastatin 80 milliGRAM(s) Oral at bedtime    Topical/Other Medications  chlorhexidine 2% Cloths 1 Application(s) Topical <User Schedule>  lidocaine   4% Patch 1 Patch Transdermal daily  nicotine -   7 mG/24Hr(s) Patch 1 Patch Transdermal once    ICU Vital Signs Last 24 Hrs  T(C): 36.8 (30 May 2024 08:00), Max: 37.9 (29 May 2024 12:30)  T(F): 98.3 (30 May 2024 08:00), Max: 100.2 (29 May 2024 12:30)  HR: 71 (30 May 2024 11:00) (70 - 85)  BP: 115/55 (30 May 2024 11:00) (92/58 - 153/65)  BP(mean): 79 (30 May 2024 11:00) (70 - 127)  ABP: --  ABP(mean): --  RR: 18 (30 May 2024 07:55) (18 - 20)  SpO2: 100% (30 May 2024 11:00) (98% - 100%)    O2 Parameters below as of 30 May 2024 11:00  Patient On (Oxygen Delivery Method): nasal cannula, high flow             @ 04:17--7.29 / 49 / 103 / 24 / [id number] 99.3 /  iCal 1.20 /Lactate 0.6    @ 20:49--7.38 / 39 / 99 / 23 / [id number] 99.2 /  iCal 1.23 /Lactate 0.6     I&O's Summary    29 May 2024 07:  -  30 May 2024 07:00  --------------------------------------------------------  IN: 1690 mL / OUT: 985 mL / NET: 705 mL      I&O's Detail    29 May 2024 07:01  -  30 May 2024 07:00  --------------------------------------------------------  IN:    dextrose 5% + sodium chloride 0.45%: 200 mL    dextrose 5% + sodium chloride 0.9%: 1000 mL    IV PiggyBack: 50 mL    IV PiggyBack: 100 mL    IV PiggyBack: 300 mL    sodium chloride 2%: 40 mL  Total IN: 1690 mL    OUT:    Indwelling Catheter - Urethral (mL): 985 mL  Total OUT: 985 mL    Total NET: 705 mL           PHYSICAL EXAM:      a&ox2 (self and location)  follows commands  no acute distress  equal chest rise b/l  abdomen soft  extremities soft   urinary cath in place       JAYLYN AGARWAL   [id number]   [birth year]  78yF    Admit Date: 24  Indication for SICU: multiple consecutive rib fractures s/p multiple mechanical falls        ============================  HPI   78F current smoker with PMHx of HTN, HLD, anxiety, CAD s/p CABG x 3 (), atrial flutter s/p ablation (), OA, and recent admission for infectious colitis (24), presented to the ED s/p multiple mechanical falls (-HT/-LOC/-AC). Of note, pt presented to ED on 24 with CC of back pain (pt did not endorse fall at this time), and was discharged without admission. Today, pt's daughter endorsed that pt has experienced multiple mechanical falls over the past month, most recently 24 after returning home from ED. Pt endorsed weakness in her legs, causing her to fall. CTAP showed acute nondisplaced posterior left 6th, 7th rib fractures, acute mildly displaced posterior left 8th, 9th, 10th, and 11th ribs fractures, acute nondisplaced fracture of the right superior pubic ramus, and right greater trochanter fracture, as well as several incidental findings (see below). CT head and neck were negative for acute pathology. Labs were significant for WBC 12, Na 124, K 3.4, and alk phos 130.     SICU Consult for hemodynamic monitoring in the setting of multiple consecutive rib fractures. Pt was examined in ED, stable, see Physical exam below.     Pertinent Imaging    < from: CT Abdomen and Pelvis w/ IV Cont (24 @ 11:21) >    Acute nondisplaced posterior left 6th, 7th rib fractures. Acute mildly displaced posterior left 8th, 9th, 10th, and 11th ribs fractures.    Acute nondisplaced fracture of the right superior pubic ramus (4-397).    Stable chronic T6, T7, T8, T11, L2 vertebral body compression deformities.    Mild mural thickening extending from the cecum to the mid transverse colon likely reflecting colitis of infectious or inflammatory etiology..    New Trace bilateral pleural effusions with adjacent consolidative opacities.    Additional attending comments:    Additional right greater trochanter fracture, seen on image 177 of series 602    There is a punctate focus of air within the left pleural effusion on image 32 series 2. Although no pneumothorax is identified on this examination, a possible trace pneumothorax cannot be excluded.    Dilated common bile duct with intrahepatic biliary duct dilatation and a distended gallbladder.    Findings are suspicious for a possible central obstructing lesion within the distal common bile duct is not excluded.    Further evaluation with ERCP is recommended.    Patulous, fluid-filled esophagus. Outpatient endoscopy is recommended.    Colonic wall thickening may be related to underdistention.    Left axillary lymphadenopathy require short-term interval follow-up.    < from: CT Cervical Spine No Cont (24 @ 11:14) >    IMPRESSION:  CT head: Age-indeterminate small infarct involving the left thalamus. No acute intracranial hemorrhage or mass effect.    CT cervical spine: No acute fracture or traumatic subluxation.     < end of copied text >       24 Hour Events    DAY  -trial of BiPAP for a few hrs d/t resp acidosis  -when off bipap tube feeds @20cc/hr   - added nicotine patch once      NIGHT  -PM rounds: unable to communicate, following commands, ARGUETA, saturating well on 40L/60% HFNC   -PM AB.38/39/99/23 - keep on HFNC 40L/60%   -23:30 labs ordered  -AM CXR ordered      DAY  - NGT for feeding placed overnight to give meds d/t inability to swallow  - ABG stat 7.29, 46, 66, 22 La 0.6 increased NC to 6L, then HF  - miralax q12  - possible intubation today  - start tube feeds @20cc/hr (held due to HF)  - chest PT every 2 hrs, duonebs, Inhaled saline 3%  - d/c 2% Nacl, started D5NS   - Temp 100.2, tylenol goven , cooling blanket applied   -completed rocephin, started zosyn for pneumonia    [X] A ten-point review of systems was otherwise negative except as noted above.  [  ] Due to altered mental status/intubation, subjective information was not attained from the patient. History was obtained, to the extent possible, from review of the chart and collateral sources of information.       ====================================   CURRENT MEDS:   Neurologic Medications  acetaminophen     Tablet .. 650 milliGRAM(s) Oral every 6 hours  DULoxetine 60 milliGRAM(s) Oral daily  meclizine 12.5 milliGRAM(s) Oral once PRN Dizziness/vertigo  melatonin 3 milliGRAM(s) Oral at bedtime  traMADol 25 milliGRAM(s) Oral every 6 hours PRN Moderate Pain (4 - 6)    Respiratory Medications  albuterol/ipratropium for Nebulization 3 milliLiter(s) Nebulizer every 6 hours  sodium chloride 3%  Inhalation 4 milliLiter(s) Inhalation every 12 hours    Cardiovascular Medications  amLODIPine   Tablet 5 milliGRAM(s) Oral daily  propranolol 10 milliGRAM(s) Oral every 12 hours    Gastrointestinal Medications  dextrose 5% + sodium chloride 0.9%. 1000 milliLiter(s) IV Continuous <Continuous>  dicyclomine 20 milliGRAM(s) Oral four times a day before meals  folic acid 1 milliGRAM(s) Oral daily  multivitamin 1 Tablet(s) Oral daily  pantoprazole    Tablet 40 milliGRAM(s) Oral before breakfast  polyethylene glycol 3350 17 Gram(s) Oral every 12 hours  senna 2 Tablet(s) Oral at bedtime  thiamine 100 milliGRAM(s) Oral daily    Genitourinary Medications    Hematologic/Oncologic Medications  enoxaparin Injectable 30 milliGRAM(s) SubCutaneous every 12 hours    Antimicrobial/Immunologic Medications  nystatin    Suspension 243343 Unit(s) Oral every 6 hours  piperacillin/tazobactam IVPB.. 3.375 Gram(s) IV Intermittent every 8 hours    Endocrine/Metabolic Medications  atorvastatin 80 milliGRAM(s) Oral at bedtime    Topical/Other Medications  chlorhexidine 2% Cloths 1 Application(s) Topical <User Schedule>  lidocaine   4% Patch 1 Patch Transdermal daily  nicotine -   7 mG/24Hr(s) Patch 1 Patch Transdermal once    ICU Vital Signs Last 24 Hrs  T(C): 36.8 (30 May 2024 08:00), Max: 37.9 (29 May 2024 12:30)  T(F): 98.3 (30 May 2024 08:00), Max: 100.2 (29 May 2024 12:30)  HR: 71 (30 May 2024 11:00) (70 - 85)  BP: 115/55 (30 May 2024 11:00) (92/58 - 153/65)  BP(mean): 79 (30 May 2024 11:00) (70 - 127)  ABP: --  ABP(mean): --  RR: 18 (30 May 2024 07:55) (18 - 20)  SpO2: 100% (30 May 2024 11:00) (98% - 100%)    O2 Parameters below as of 30 May 2024 11:00  Patient On (Oxygen Delivery Method): nasal cannula, high flow             @ 04:17--7.29 / 49 / 103 / 24 / Set81Bdo 99.3 /  iCal 1.20 /Lactate 0.6    @ 20:49--7.38 / 39 / 99 / 23 / [id number] 99.2 /  iCal 1.23 /Lactate 0.6     I&O's Summary    29 May 2024 07:  -  30 May 2024 07:00  --------------------------------------------------------  IN: 1690 mL / OUT: 985 mL / NET: 705 mL      I&O's Detail    29 May 2024 07:01  -  30 May 2024 07:00  --------------------------------------------------------  IN:    dextrose 5% + sodium chloride 0.45%: 200 mL    dextrose 5% + sodium chloride 0.9%: 1000 mL    IV PiggyBack: 50 mL    IV PiggyBack: 100 mL    IV PiggyBack: 300 mL    sodium chloride 2%: 40 mL  Total IN: 1690 mL    OUT:    Indwelling Catheter - Urethral (mL): 985 mL  Total OUT: 985 mL    Total NET: 705 mL           PHYSICAL EXAM:      a&ox2 (self and location)  follows commands  no acute distress  equal chest rise b/l, requiring HFNC alternating with BiPAP  abdomen soft  extremities soft   urinary cath in place

## 2024-05-30 NOTE — DIETITIAN INITIAL EVALUATION ADULT - ORAL INTAKE PTA/DIET HISTORY
Pt confused & lethargic at time of RD visit; unable to participate in nutrition hx at this time. No family at bedside & unable to reach emergency contact at this time.

## 2024-05-30 NOTE — OCCUPATIONAL THERAPY INITIAL EVALUATION ADULT - LEVEL OF INDEPENDENCE: SUPINE/SIT, REHAB EVAL
2/2 8/10 pain in the rib and decreased mobility in hip/trunk flexion. Pt tolerated bed in chair mode/unable to perform

## 2024-05-30 NOTE — PROGRESS NOTE ADULT - ASSESSMENT
Assessment and Recommendation:   78y Female w/ multiple comorbidities presenting with multiple consecutive rib fractures s/p multiple falls due to lower extremities now admitted to SICU for hemodynamic monitoring.    NEURO/PSYCH:  #Acute pain  - acetaminophen Tablet .. 650 milliGRAM(s) Oral every 6 hours  - tramadol PRN  - lidocaine patch  #Bilateral lower extremity weakness; PMHx of stable chronic vertebral fractures; r/o impingment   - NSGY consulted, recs pending  - MRI lumbar spine pending  #PMHx of anxiety  - Continue home DULoxetine 60 milliGRAM(s) Oral daily  #B/L UE fasciculations  - neurology c/s - tizanidine prn, toxic metabolic w/u, MRI brain/c-spine  - holding zyprexa   - added thiamine/folate/MVI  - started propranolol 10mg q12    RESP:   #multiple acute consecutive posterior left 6-11th rib fractures  -pain control  #Respiratory failue  - increased respiratory support requirement, 6L NC > HF  -course breath sounds/rhonchi, started chest PT/3%saline/duonebs  -unable to participate in IS use   #bilateral pleural effusion, r/o pneumothorax  - bilateral pleural effusion with punctate air, concerning for possible pneumothorax  - f/u AM CXR  #Activity  - Activity - NWB RLE  #Current smoker, 1/2 PPD  - refused nicotine patch    CARDIAC:   #PMHx of HTN  - continue home amlodipine Tablet 5 milliGRAM(s) Oral daily  - HOLDING home olmesartan-hydrochlorothiazide 40 mg-25 mg oral tablet   #PMHx of HLD  - atorvastatin 80 milliGRAM(s) Oral at bedtime (home med: rosuvastatin 20mg QD)  #PMHx of CAD s/p CABG x 3  - follows with Dr. Brunson  #PMHx of atrial flutter s/p ablation (2022)  - EKG NSR  #Imaging:   - EKG: NSR  - TTE, 5/27 - EF 67%. Mild MR, Mild/mod TR, mild pHTN, simple atheroma in aortic arch    GI/NUTR:   #Diet: NPO except meds  -NGT in place   - aspiration precautions, HOB 30  #GI Prophylaxis  - Indication: home medication, #PMHx of PUD  - pantoprazole Tablet: 40 milliGRAM(s), Oral, before breakfast  #Bowel regimen   - senna: 2 Tablet(s), Oral, at bedtime   -miralax q12  #Recent admission for infective colitis (4/24)  - Continue home dicyclomine 20 milliGRAM(s) Oral four times a day before meals  #dilated CBD, colonic wall, esophageal fluid  - CTAP: "Dilated common bile duct with intrahepatic biliary duct dilatation and a distended gallbladder. Findings are suspicious for a possible central obstructing lesion within the distal common bile duct is not excluded. Further evaluation with ERCP is recommended. Patulous, fluid-filled esophagus. Outpatient endoscopy is recommended"  - Follow-up outpatient  - GI recs appreciated: may need ERCP    /RENAL:   #urine output in critically ill  - Mann placed for CT cystogram, 1.5L output on insertion --> retaining mann  Monitor UO-mann in place  Labs:          BUN/Cr- 17/1.1  -->,  16/1.1  -->,  16/1.0  -->          Electrolytes-(05-29 @ 11:25)Na 131 // K 3.9 // Mg -- // Phos --     #acute right pubic rami fracture  - CT cystogram negative  #hyponatremia  - hold HCTZ  - d/c 2% Nacl and started D5NS  - monitor BMP      HEME/ONC:   #DVT prophylaxis    - SCDs    - enoxaparin  DVT propylaxis-enoxaparin Injectable  Hb/Hct:  9.7/28.7  -->,  9.7/27.6  -->,  8.9/25.5  -->  Plts:  263  -->,  337  -->,  330  -->      ID:  #leukocytosis in setting of trauma vs UTI  WBC- 13.61  --->>,  14.37  --->>,  14.57  --->>  Temp trend- 24hrs T(F): 99.7 (05-29 @ 20:00), Max: 100.2 (05-29 @ 12:30)  Antibiotics-piperacillin/tazobactam IVPB.. 3.375 every 8 hours  #UTI present on admission  -U/a- +leuk/nitrites/occasional bacteria   - on Rocephin x 3 days (completed)  #Pneumonia  -started Zosyn   #left axillary lymphadenopathy seen on CT  - follow up outpatient    ENDO:  #Blood glucose monitoring  - Glucose goal 140-180. if above 180 start ISS      MSK:  #acute right suprapubic rami fracture, acute greater trochanteric fracture  - Ortho c/s --> Xrays done, MRI R hip pending     #PMHx of osteoarthritis  - follows with Dr. Jagdish ROBLES:  - DTI screen  - Preventative dressings in place    LINES/DRAINS:  PIV, mann, NGT    ADVANCED DIRECTIVES:      HCP/Emergency Contact - Carolyn Lobato (daughter) - [phone]    INDICATION FOR SICU: Multiple consecutive rib fractures    DISPO: SICU    Assessment and Recommendation:   78y Female w/ multiple comorbidities presenting with multiple consecutive rib fractures s/p multiple falls due to lower extremities now admitted to SICU for hemodynamic monitoring.    NEURO/PSYCH:  #Acute pain  - acetaminophen Tablet .. 650 milliGRAM(s) Oral every 6 hours  - tramadol PRN  - lidocaine patch  #Bilateral lower extremity weakness; PMHx of stable chronic vertebral fractures; r/o impingment   - NSGY consulted, recs pending  - MRI lumbar spine pending  #PMHx of anxiety  - Continue home DULoxetine 60 milliGRAM(s) Oral daily  #B/L UE fasciculations  - neurology c/s - tizanidine prn, toxic metabolic w/u, MRI brain/c-spine  - holding zyprexa   - added thiamine/folate/MVI  - started propranolol 10mg q12    RESP:   #multiple acute consecutive posterior left 6-11th rib fractures  -pain control  #Respiratory failue  - increased respiratory support requirement, 6L NC > HF 40L/50% 5/30  - started intermittent Bipap 5/30  -course breath sounds/rhonchi, started chest PT/3%saline/duonebs  -unable to participate in IS use   #bilateral pleural effusion, r/o pneumothorax  - bilateral pleural effusion with punctate air, concerning for possible pneumothorax  - f/u AM CXR  #Activity  - Activity - NWB RLE  #Current smoker, 1/2 PPD  - start nicotine patch 5/20    CARDIAC:   #PMHx of HTN  - continue home amlodipine Tablet 5 milliGRAM(s) Oral daily  - HOLDING home olmesartan-hydrochlorothiazide 40 mg-25 mg oral tablet   #PMHx of HLD  - atorvastatin 80 milliGRAM(s) Oral at bedtime (home med: rosuvastatin 20mg QD)  #PMHx of CAD s/p CABG x 3  - follows with Dr. Brunson  #PMHx of atrial flutter s/p ablation (2022)  - EKG NSR  #Imaging:   - EKG: NSR  - TTE, 5/27 - EF 67%. Mild MR, Mild/mod TR, mild pHTN, simple atheroma in aortic arch    GI/NUTR:   #Diet: Tube feeds 20cc/hr in between Bipap  -NGT in place   - aspiration precautions, HOB 30  #GI Prophylaxis  - Indication: home medication, #PMHx of PUD  - pantoprazole Tablet: 40 milliGRAM(s), Oral, before breakfast  #Bowel regimen   - senna: 2 Tablet(s), Oral, at bedtime   -miralax q12  #Recent admission for infective colitis (4/24)  - Continue home dicyclomine 20 milliGRAM(s) Oral four times a day before meals  #dilated CBD, colonic wall, esophageal fluid  - CTAP: "Dilated common bile duct with intrahepatic biliary duct dilatation and a distended gallbladder. Findings are suspicious for a possible central obstructing lesion within the distal common bile duct is not excluded. Further evaluation with ERCP is recommended. Patulous, fluid-filled esophagus. Outpatient endoscopy is recommended"  - Follow-up outpatient  - GI recs appreciated: may need ERCP    /RENAL:   #urine output in critically ill  - Mann placed for CT cystogram, 1.5L output on insertion --> retaining mann  Monitor UO-mann in place  Labs:          BUN/Cr- 17/1.1  -->,  16/1.1  -->,  16/1.0  -->          Electrolytes-(05-29 @ 11:25)Na 131 // K 3.9 // Mg -- // Phos --     #acute right pubic rami fracture  - CT cystogram negative  #hyponatremia  - hold HCTZ  - d/c 2% Nacl and started D5NS  - monitor BMP      HEME/ONC:   #DVT prophylaxis    - SCDs    - enoxaparin  DVT propylaxis-enoxaparin Injectable  Hb/Hct:  9.7/28.7  -->,  9.7/27.6  -->,  8.9/25.5  -->  Plts:  263  -->,  337  -->,  330  -->      ID:  #leukocytosis in setting of trauma vs UTI  WBC- 13.61  --->>,  14.37  --->>,  14.57  --->>  Temp trend- 24hrs T(F): 99.7 (05-29 @ 20:00), Max: 100.2 (05-29 @ 12:30)  Antibiotics-piperacillin/tazobactam IVPB.. 3.375 every 8 hours  #UTI present on admission  -U/a- +leuk/nitrites/occasional bacteria   - on Rocephin x 3 days (completed)  #Pneumonia  -started Zosyn   #left axillary lymphadenopathy seen on CT  - follow up outpatient    ENDO:  #Blood glucose monitoring  - Glucose goal 140-180. if above 180 start ISS      MSK:  #acute right suprapubic rami fracture, acute greater trochanteric fracture  - Ortho c/s --> Xrays done, MRI R hip pending     #PMHx of osteoarthritis  - follows with Dr. Dubon    DERM:  - DTI screen  - Preventative dressings in place    LINES/DRAINS:  PIV, mann, NGT    ADVANCED DIRECTIVES:      HCP/Emergency Contact - Carolyn Lobato (daughter) - [phone]    INDICATION FOR SICU: Multiple consecutive rib fractures    DISPO: SICU    Assessment and Recommendation:   78y Female w/ multiple comorbidities presenting with multiple consecutive rib fractures s/p multiple falls due to lower extremities now admitted to SICU for hemodynamic monitoring.    NEURO/PSYCH:  #Acute pain  - acetaminophen Tablet .. 650 milliGRAM(s) Oral every 6 hours  - tramadol PRN  - lidocaine patch  #Bilateral lower extremity weakness; PMHx of stable chronic vertebral fractures; r/o impingment   - MRI lumbar spine pending  #PMHx of anxiety  - Continue home DULoxetine 60 milliGRAM(s) Oral daily  #B/L UE fasciculations  - neurology c/s - tizanidine prn, toxic metabolic w/u, MRI brain/c-spine  - holding zyprexa   - added thiamine/folate/MVI  - started propranolol 10mg q12    RESP:   #multiple acute consecutive posterior left 6-11th rib fractures  -pain control  #Respiratory failure  - increased respiratory support requirement, 6L NC > HF 40L/50% 5/30  - started intermittent Bipap 5/30  -course breath sounds/rhonchi, started chest PT/3%saline/duonebs  -unable to participate in IS use   #bilateral pleural effusion, r/o pneumothorax  - bilateral pleural effusion with punctate air, concerning for possible pneumothorax  - CXR, no PTX  #Activity  - Activity - NWB RLE  #Current smoker, 1/2 PPD  - start nicotine patch 5/20    CARDIAC:   #PMHx of HTN  - continue home amlodipine Tablet 5 milliGRAM(s) Oral daily  - HOLDING home olmesartan-hydrochlorothiazide 40 mg-25 mg oral tablet   #PMHx of HLD  - atorvastatin 80 milliGRAM(s) Oral at bedtime (home med: rosuvastatin 20mg QD)  #PMHx of CAD s/p CABG x 3  - follows with Dr. Brunson  #PMHx of atrial flutter s/p ablation (2022)  - EKG NSR  #Imaging:   - EKG: NSR  - TTE, 5/27 - EF 67%. Mild MR, Mild/mod TR, mild pHTN, simple atheroma in aortic arch    GI/NUTR:   #Diet: Tube feeds 20cc/hr in between Bipap  -NGT in place   - aspiration precautions, HOB 30  #GI Prophylaxis  - Indication: home medication, #PMHx of PUD  - pantoprazole Tablet: 40 milliGRAM(s), Oral, before breakfast  #Bowel regimen   - senna: 2 Tablet(s), Oral, at bedtime   -miralax q12  #Recent admission for infective colitis (4/24)  - Continue home dicyclomine 20 milliGRAM(s) Oral four times a day before meals  #dilated CBD, colonic wall, esophageal fluid  - CTAP: "Dilated common bile duct with intrahepatic biliary duct dilatation and a distended gallbladder. Findings are suspicious for a possible central obstructing lesion within the distal common bile duct is not excluded. Further evaluation with ERCP is recommended. Patulous, fluid-filled esophagus. Outpatient endoscopy is recommended"  - Follow-up outpatient  - GI recs appreciated: may need ERCP    /RENAL:   #urine output in critically ill  - Mann placed for CT cystogram, 1.5L output on insertion --> retaining mann  Monitor UO-mann in place  Labs:          BUN/Cr- 17/1.1  -->,  16/1.1  -->,  16/1.0  -->          Electrolytes-(05-29 @ 11:25)Na 131 // K 3.9 // Mg -- // Phos --     #acute right pubic rami fracture  - CT cystogram negative  #hyponatremia  - hold HCTZ  - d/c 2% Nacl and started D5NS  - monitor BMP      HEME/ONC:   #DVT prophylaxis    - SCDs    - enoxaparin  DVT propylaxis-enoxaparin Injectable  Hb/Hct:  9.7/28.7  -->,  9.7/27.6  -->,  8.9/25.5  -->  Plts:  263  -->,  337  -->,  330  -->      ID:  #leukocytosis in setting of trauma vs UTI  WBC- 13.61  --->>,  14.37  --->>,  14.57  --->>  Temp trend- 24hrs T(F): 99.7 (05-29 @ 20:00), Max: 100.2 (05-29 @ 12:30)  Antibiotics-piperacillin/tazobactam IVPB.. 3.375 every 8 hours  #UTI present on admission  -U/a- +leuk/nitrites/occasional bacteria   - on Rocephin x 3 days (completed)  #Pneumonia  -started Zosyn   #left axillary lymphadenopathy seen on CT  - follow up outpatient    ENDO:  #Blood glucose monitoring  - Glucose goal 140-180. if above 180 start ISS      MSK:  #acute right suprapubic rami fracture, acute greater trochanteric fracture  - Ortho c/s --> Xrays done, MRI R hip pending     #PMHx of osteoarthritis  - follows with Dr. Dubon    DERM:  - DTI screen  - Preventative dressings in place    LINES/DRAINS:  PIV, mann, NGT    ADVANCED DIRECTIVES:      HCP/Emergency Contact - Carolyn Lobato (daughter) - [phone]    INDICATION FOR SICU: Multiple consecutive rib fractures    DISPO: SICU    Assessment and Recommendation:   78y Female w/ multiple comorbidities presenting with multiple consecutive rib fractures s/p multiple falls due to lower extremities now admitted to SICU for hemodynamic monitoring.    NEURO/PSYCH:  #Acute pain  - acetaminophen Tablet .. 650 milliGRAM(s) Oral every 6 hours  - tramadol PRN  - lidocaine patch  #Bilateral lower extremity weakness; PMHx of stable chronic vertebral fractures; r/o impingment   - MRI lumbar spine pending  #PMHx of anxiety  - Continue home DULoxetine 60 milliGRAM(s) Oral daily  #B/L UE fasciculations  - neurology c/s - tizanidine prn, toxic metabolic w/u, MRI brain/c-spine  - holding zyprexa   - added thiamine/folate/MVI  - started propranolol 10mg q12    RESP:   #multiple acute consecutive posterior left 6-11th rib fractures  -pain control  #Respiratory failure  - increased respiratory support requirement, 6L NC > HF 40L/50% 5/30  - started intermittent Bipap 5/30  -course breath sounds/rhonchi, started chest PT/3%saline/duonebs  -unable to participate in IS use   #bilateral pleural effusion, r/o pneumothorax  - bilateral pleural effusion with punctate air, concerning for possible pneumothorax  - CXR, no PTX evident  #Activity  - Activity - NWB RLE  #Current smoker, 1/2 PPD  - nicotine patch    CARDIAC:   #PMHx of HTN  - continue home amlodipine Tablet 5 milliGRAM(s) Oral daily  - HOLDING home olmesartan-hydrochlorothiazide 40 mg-25 mg oral tablet   #PMHx of HLD  - atorvastatin 80 milliGRAM(s) Oral at bedtime (home med: rosuvastatin 20mg QD)  #PMHx of CAD s/p CABG x 3  - follows with Dr. Brunson  #PMHx of atrial flutter s/p ablation (2022)  - EKG NSR  #Imaging:   - EKG: NSR  - TTE, 5/27 - EF 67%. Mild MR, Mild/mod TR, mild pHTN, simple atheroma in aortic arch    GI/NUTR:   #Diet: Tube feeds 20cc/hr in between Bipap  -NGT in place   - aspiration precautions, HOB 30  #GI Prophylaxis  - Indication: home medication, #PMHx of PUD  - pantoprazole Tablet: 40 milliGRAM(s), Oral, before breakfast  #Bowel regimen   - senna: 2 Tablet(s), Oral, at bedtime   -miralax q12  #Recent admission for infective colitis (4/24)  - Continue home dicyclomine 20 milliGRAM(s) Oral four times a day before meals  #dilated CBD, colonic wall, esophageal fluid  - CTAP: "Dilated common bile duct with intrahepatic biliary duct dilatation and a distended gallbladder. Findings are suspicious for a possible central obstructing lesion within the distal common bile duct is not excluded. Further evaluation with ERCP is recommended. Patulous, fluid-filled esophagus. Outpatient endoscopy is recommended"  - Follow-up outpatient  - GI recs appreciated: may need ERCP    /RENAL:   #urine output in critically ill  - Mann placed for CT cystogram, 1.5L output on insertion --> retaining mann  Monitor UO-mann in place  Labs:          BUN/Cr- 17/1.1  -->,  16/1.1  -->,  16/1.0  -->          Electrolytes-(05-29 @ 11:25)Na 131 // K 3.9 // Mg -- // Phos --     #acute right pubic rami fracture  - CT cystogram negative  #hyponatremia  - hold HCTZ  - d/c 2% Nacl and started D5NS  - monitor BMP      HEME/ONC:   #DVT prophylaxis    - SCDs    - enoxaparin  DVT propylaxis-enoxaparin Injectable  Hb/Hct:  9.7/28.7  -->,  9.7/27.6  -->,  8.9/25.5  -->  Plts:  263  -->,  337  -->,  330  -->      ID:  #leukocytosis in setting of trauma vs UTI  WBC- 13.61  --->>,  14.37  --->>,  14.57  --->>  Temp trend- 24hrs T(F): 99.7 (05-29 @ 20:00), Max: 100.2 (05-29 @ 12:30)  Antibiotics-piperacillin/tazobactam IVPB.. 3.375 every 8 hours  #UTI present on admission  -U/a- +leuk/nitrites/occasional bacteria   - on Rocephin x 3 days (completed)  #Pneumonia  -started Zosyn   #left axillary lymphadenopathy seen on CT  - follow up outpatient    ENDO:  #Blood glucose monitoring  - Glucose goal 140-180. if above 180 start ISS      MSK:  #acute right suprapubic rami fracture, acute greater trochanteric fracture  - Ortho c/s --> Xrays done, MRI R hip pending     #PMHx of osteoarthritis  - follows with Dr. Dubon    DERM:  - DTI screen  - Preventative dressings in place    LINES/DRAINS:  PIV, mann, NGT    ADVANCED DIRECTIVES:      HCP/Emergency Contact - Carolyn Lobato (daughter) - [phone]    INDICATION FOR SICU: Multiple consecutive rib fractures    DISPO: SICU

## 2024-05-30 NOTE — DIETITIAN INITIAL EVALUATION ADULT - OTHER INFO
Pertinent Medical Information: Pt presented to ED on 5/23 with back pain; was discharged without admission then returned with daughter endorsing pt had experienced multiple mechanical falls over past month. CTAP showed acute nondisplaced posterior left 6th, 7th rib fractures, acute mildly displaced posterior left 8th, 9th, 10th, and 11th ribs fractures, acute nondisplaced fracture of the right superior pubic ramus, and right greater trochanter fracture. Admitted to SICU for hemodynamic monitoring. NGT for feeding placed overnight to give meds d/t inability to swallow.    PMH includes HTN, HLD, anxiety, CAD s/p CABG x 3 (2009), atrial flutter s/p ablation (2022), OA, and recent admission for infectious colitis (4/26/24).

## 2024-05-30 NOTE — DIETITIAN INITIAL EVALUATION ADULT - NUTRITIONGOAL OUTCOME1
Nutrition regimen initiated as soon as medically feasible; pt to demonstrate tolerance to nutrition regimen, meeting >85% & <105% of estimated nutrient needs over next 3-5 days. Pt at high nutrition risk; RD to follow-up in 3-5 days.    Monitor: Skin, labs, BM, wt, nutrition focused physical findings, body composition, GI, swallow function, hemodynamic status, diet order, energy intake.

## 2024-05-30 NOTE — DIETITIAN INITIAL EVALUATION ADULT - ADD RECOMMEND
Recommendation: SLP eval to assess swallow function - diet order texture/consistency per SLP. If tube feeds to be started, would initiate Jevity 1.2 at 120 mL q8hrs on day 1 to assess tolerance to tube feeds. If tolerance observed, would increase Jevity 1.2 rate to 240 mL q8hrs. If pt continues to demonstrate tolerance to tube feed regimen, provide tube feed goal rate on day 3 - Jevity 1.2 at 300 mL q6hrs with 100 mL flushes q6hrs. Tube feed regimen at goal to provide 1440 kcal, 67 g protein, 1372 mL free H2O. Monitor Mg/PO4/K prior to & throughout initiation of tube feeds & replete as needed.

## 2024-05-30 NOTE — DIETITIAN INITIAL EVALUATION ADULT - PERTINENT MEDS FT
MEDICATIONS  (STANDING):  acetaminophen     Tablet .. 650 milliGRAM(s) Oral every 6 hours  albuterol/ipratropium for Nebulization 3 milliLiter(s) Nebulizer every 6 hours  amLODIPine   Tablet 5 milliGRAM(s) Oral daily  atorvastatin 80 milliGRAM(s) Oral at bedtime  chlorhexidine 2% Cloths 1 Application(s) Topical <User Schedule>  dextrose 5% + sodium chloride 0.9%. 1000 milliLiter(s) (50 mL/Hr) IV Continuous <Continuous>  dicyclomine 20 milliGRAM(s) Oral four times a day before meals  DULoxetine 60 milliGRAM(s) Oral daily  enoxaparin Injectable 30 milliGRAM(s) SubCutaneous every 12 hours  folic acid 1 milliGRAM(s) Oral daily  gabapentin 100 milliGRAM(s) Oral every 8 hours  lidocaine   4% Patch 1 Patch Transdermal daily  melatonin 3 milliGRAM(s) Oral at bedtime  multivitamin 1 Tablet(s) Oral daily  pantoprazole    Tablet 40 milliGRAM(s) Oral before breakfast  piperacillin/tazobactam IVPB.. 3.375 Gram(s) IV Intermittent every 8 hours  polyethylene glycol 3350 17 Gram(s) Oral every 12 hours  propranolol 10 milliGRAM(s) Oral every 12 hours  senna 2 Tablet(s) Oral at bedtime  sodium chloride 3%  Inhalation 4 milliLiter(s) Inhalation every 12 hours  thiamine 100 milliGRAM(s) Oral daily    MEDICATIONS  (PRN):  meclizine 12.5 milliGRAM(s) Oral once PRN Dizziness/vertigo  traMADol 25 milliGRAM(s) Oral every 6 hours PRN Moderate Pain (4 - 6)

## 2024-05-30 NOTE — OCCUPATIONAL THERAPY INITIAL EVALUATION ADULT - DIAGNOSIS, OT EVAL
Multitrauma Acute nondisplaced posterior left 6-7 rib fractures, Acute mildly displaced left 8-11 rib fractures, Acute nondisplaced fracture of the right superior pubic ramus , Right greater trochanteric fracture

## 2024-05-30 NOTE — DIETITIAN INITIAL EVALUATION ADULT - PERTINENT LABORATORY DATA
05-30    133<L>  |  103  |  16  ----------------------------<  102<H>  3.8   |  22  |  1.1    Ca    8.0<L>      30 May 2024 00:22  Phos  3.5     05-30  Mg     1.9     05-30    TPro  5.7<L>  /  Alb  3.2<L>  /  TBili  0.3  /  DBili  <0.2  /  AST  28  /  ALT  16  /  AlkPhos  129<H>  05-29  POCT Blood Glucose.: 114 mg/dL (05-30-24 @ 00:21)

## 2024-05-30 NOTE — PROGRESS NOTE ADULT - SUBJECTIVE AND OBJECTIVE BOX
GENERAL SURGERY PROGRESS NOTE    Patient: JAYLYN AGARWAL , 78y ([birth year])Female   MRN: [id number]  Location: [address]  Visit: 05-26-24 Inpatient  Date: 05-30-24 @ 09:22    Hospital Day #: 5    Events of past 24 hours: Still on high flow, awaiting MRI of brain/c spine, c,l spine, Right hip MRI. Urine output appropriate.    PAST MEDICAL & SURGICAL HISTORY:  CAD (coronary artery disease)  Tobacco dependence  HTN (hypertension)  Anxiety  Atrial flutter  S/P CABG x 3      Vitals:   T(F): 98.3 (05-30-24 @ 08:00), Max: 100.2 (05-29-24 @ 12:30)  HR: 70 (05-30-24 @ 07:00)  BP: 135/60 (05-30-24 @ 07:00)  RR: 18 (05-30-24 @ 07:55)  SpO2: 98% (05-30-24 @ 07:55)      Diet, NPO:   Except Medications      Fluids:     I & O's:    05-29-24 @ 07:01  -  05-30-24 @ 07:00  --------------------------------------------------------  IN:    dextrose 5% + sodium chloride 0.45%: 200 mL    dextrose 5% + sodium chloride 0.9%: 1000 mL    IV PiggyBack: 50 mL    IV PiggyBack: 100 mL    IV PiggyBack: 300 mL    sodium chloride 2%: 40 mL  Total IN: 1690 mL    OUT:    Indwelling Catheter - Urethral (mL): 985 mL  Total OUT: 985 mL    Total NET: 705 mL    PHYSICAL EXAM:  GENERAL: NAD, ill-appearing, high flow, feeding ngt.  CHEST/LUNG: Equal chest rise bilaterally  HEART: Regular rate and rhythm  ABDOMEN: Soft, Nontender, Nondistended; Bowel sounds present  EXTREMITIES:  No clubbing, cyanosis, or edema      MEDICATIONS  (STANDING):  acetaminophen     Tablet .. 650 milliGRAM(s) Oral every 6 hours  albuterol/ipratropium for Nebulization 3 milliLiter(s) Nebulizer every 6 hours  amLODIPine   Tablet 5 milliGRAM(s) Oral daily  atorvastatin 80 milliGRAM(s) Oral at bedtime  chlorhexidine 2% Cloths 1 Application(s) Topical <User Schedule>  dextrose 5% + sodium chloride 0.9%. 1000 milliLiter(s) (50 mL/Hr) IV Continuous <Continuous>  dicyclomine 20 milliGRAM(s) Oral four times a day before meals  DULoxetine 60 milliGRAM(s) Oral daily  enoxaparin Injectable 30 milliGRAM(s) SubCutaneous every 12 hours  folic acid 1 milliGRAM(s) Oral daily  gabapentin 100 milliGRAM(s) Oral every 8 hours  lidocaine   4% Patch 1 Patch Transdermal daily  melatonin 3 milliGRAM(s) Oral at bedtime  multivitamin 1 Tablet(s) Oral daily  pantoprazole    Tablet 40 milliGRAM(s) Oral before breakfast  piperacillin/tazobactam IVPB.. 3.375 Gram(s) IV Intermittent every 8 hours  polyethylene glycol 3350 17 Gram(s) Oral every 12 hours  propranolol 10 milliGRAM(s) Oral every 12 hours  senna 2 Tablet(s) Oral at bedtime  sodium chloride 3%  Inhalation 4 milliLiter(s) Inhalation every 12 hours  thiamine 100 milliGRAM(s) Oral daily    MEDICATIONS  (PRN):  meclizine 12.5 milliGRAM(s) Oral once PRN Dizziness/vertigo  traMADol 25 milliGRAM(s) Oral every 6 hours PRN Moderate Pain (4 - 6)      DVT PROPHYLAXIS: enoxaparin Injectable 30 milliGRAM(s) SubCutaneous every 12 hours    GI PROPHYLAXIS: pantoprazole    Tablet 40 milliGRAM(s) Oral before breakfast    ANTICOAGULATION:   ANTIBIOTICS:  piperacillin/tazobactam IVPB.. 3.375 Gram(s)      LAB/STUDIES:  Labs:  CAPILLARY BLOOD GLUCOSE      POCT Blood Glucose.: 117 mg/dL (30 May 2024 05:02)  POCT Blood Glucose.: 114 mg/dL (30 May 2024 00:21)                          8.1    12.46 )-----------( 240      ( 30 May 2024 00:22 )             24.7         05-30    133<L>  |  103  |  16  ----------------------------<  102<H>  3.8   |  22  |  1.1      Calcium: 8.0 mg/dL (05-30-24 @ 00:22)      LFTs:             5.7  | 0.3  | 28       ------------------[129     ( 29 May 2024 20:00 )  3.2  | <0.2 | 16           Blood Gas Arterial, Lactate: 0.6 mmol/L (05-30-24 @ 04:17)  Blood Gas Arterial, Lactate: 0.6 mmol/L (05-29-24 @ 20:49)  Blood Gas Arterial, Lactate: 0.6 mmol/L (05-29-24 @ 10:52)  Blood Gas Arterial, Lactate: 0.9 mmol/L (05-28-24 @ 17:04)    ABG - ( 30 May 2024 04:17 )  pH: 7.29  /  pCO2: 49    /  pO2: 103   / HCO3: 24    / Base Excess: -3.4  /  SaO2: 99.3    ABG - ( 29 May 2024 20:49 )  pH: 7.38  /  pCO2: 39    /  pO2: 99    / HCO3: 23    / Base Excess: -1.8  /  SaO2: 99.2    ABG - ( 29 May 2024 10:52 )  pH: 7.29  /  pCO2: 46    /  pO2: 66    / HCO3: 22    / Base Excess: -4.4  /  SaO2: 93.5      Urinalysis Basic - ( 30 May 2024 00:22 )    Color: x / Appearance: x / SG: x / pH: x  Gluc: 102 mg/dL / Ketone: x  / Bili: x / Urobili: x   Blood: x / Protein: x / Nitrite: x   Leuk Esterase: x / RBC: x / WBC x   Sq Epi: x / Non Sq Epi: x / Bacteria: x    IMAGING:  < from: Xray Chest 1 View- PORTABLE-Routine (Xray Chest 1 View- PORTABLE-Routine in AM.) (05.30.24 @ 04:05) >  Findings/  impression:    Support devices: Enteric tube courses below the diaphragm.    Cardiac/mediastinum/hilum: Unchanged.    Lung parenchyma/Pleura: Increased left upper lung zone opacity. Unchanged   interstitial edema and bibasilar opacities/effusions. No pneumothorax.    Skeleton/soft tissues: Unchanged.        --- End of Report ---    < end of copied text >      ACCESS/ DEVICES:  [x ] Peripheral IV

## 2024-05-30 NOTE — OCCUPATIONAL THERAPY INITIAL EVALUATION ADULT - PERTINENT HX OF CURRENT PROBLEM, REHAB EVAL
78yFemale w/ PMHx of HTN, HLD, anxiety, CAD s/p CABG x3 (2009), Atrial flutter s/p ablation (2022), OA, recent admission for infectious colitis (4/26/24), seen as a TRAUMA CONSULT s/p multiple mechanical falls (-HT, -LOC, -AC). The following injuries were identified:

## 2024-05-30 NOTE — CHART NOTE - NSCHARTNOTEFT_GEN_A_CORE
Please contact us if MRI imagings are completed and resulted with abnormal findings.   No further recommendations from neurological standpoint at this time.     Thank you for the courtesy of this consult, we sign off the case. please contact us if any neurological changes or questions.   Rhett Ordaz M.D  PGY4 Neurology Resident   Spectra [phone] Please contact us if MRI brain and CS are completed and resulted with abnormal findings.   No further recommendations from neurological standpoint at this time.     Thank you for the courtesy of this consult, we sign off the case. please contact us if any neurological changes or questions.   Rhett Ordaz M.D  PGY4 Neurology Resident   Spectra [phone]

## 2024-05-30 NOTE — OCCUPATIONAL THERAPY INITIAL EVALUATION ADULT - RANGE OF MOTION EXAMINATION, UPPER EXTREMITY
Pt with discomfort with with assessment of bilateral ue. AAROM 0-60 bilateral shoulders limited by 8/10 rib pain. distally wfl

## 2024-05-30 NOTE — PROGRESS NOTE ADULT - ASSESSMENT
78yFemale w/ PMHx of HTN, HLD, anxiety, CAD s/p CABG x3 (2009), Atrial flutter s/p ablation (2022), OA, recent admission for infectious colitis (4/26/24), seen as a TRAUMA CONSULT s/p multiple mechanical falls (-HT, -LOC, -AC). The following injuries were identified:     # Acute nondisplaced posterior left 6-7 rib fractures   # Acute mildly displaced left 8-11 rib fractures   # Acute nondisplaced fracture of the right superior pubic ramus    # Right greater trochanteric fracture  --> MRI right hip pending      PLAN:   - Multimodal pain control w/ APAP 650 q6h, Tramadol PRN, Lidocaine patch   - Monitor respiratory status (currently on 2L), continue IS use  - Keep NWB RLE until hip MRI complete   - Pending MRI right hip, L-spine w/o contrast, brain and c-spine   - 20:00 labs w/ repletion of electrolytes as needed   - Diet/Fluids: Tube feeds via NGT @ 20 cc/hr, D5NS @ 50 cc/hr   - PPX: Protonix 40 qd, Lovenox 30 bid    - Rest of care per SICU     # Bilateral UE fasciculations   # Bilateral LE weakness   - NSGY following   - Pending MRI brain/c-spine   - Propranolol 10mg q12h for EPS     For outpatient F/U:   - Needs ERCP (CT A/P 5/26 - intrahepatic biliary duct dilatation and distended GB, findings suspicious for possible central obstructing lesion w/i the distal CBD)   - Needs EGD (CT chest 5/26 - patulous, fluid-filled esophagus)    DISPO: Rehab    ------------------------------  Trauma Surgery  [phone]

## 2024-05-30 NOTE — OCCUPATIONAL THERAPY INITIAL EVALUATION ADULT - NS ASR FOLLOW COMMAND OT EVAL
[FreeTextEntry1] : IMPROVING WITH PT\par ADDITIONAL PT ADVISED\par TRANSITION TO HEP AFTER NEXT 6 WKS\par F/U PRN 75% of the time/able to follow single-step instructions

## 2024-05-31 NOTE — PROGRESS NOTE ADULT - ASSESSMENT
78yFemale w/ PMHx of HTN, HLD, anxiety, CAD s/p CABG x3 (2009), Atrial flutter s/p ablation (2022), OA, recent admission for infectious colitis (4/26/24), seen as a TRAUMA CONSULT s/p multiple mechanical falls (-HT, -LOC, -AC). The following injuries were identified:     # Acute nondisplaced posterior left 6-7 rib fractures   # Acute mildly displaced left 8-11 rib fractures   # Acute nondisplaced fracture of the right superior pubic ramus    # Right greater trochanteric fracture  --> MRI right hip pending      PLAN:   - Multimodal pain control w/ APAP 650 q6h, Tramadol PRN, Lidocaine patch   - Monitor respiratory status - on HFNC  - Keep NWB RLE until hip MRI complete  - Pending MRI right hip, L-spine w/o contrast, brain and c-spine    - Diet/Fluids: Tube feeds via NGT @ 20 cc/hr, D5NS @ 50 cc/hr   - PPX: Protonix 40 qd, Lovenox 30 bid    - Propranolol 10mg q12h for EPS   - Rest of care per SICU       For outpatient F/U:   - Needs ERCP (CT A/P 5/26 - intrahepatic biliary duct dilatation and distended GB, findings suspicious for possible central obstructing lesion w/i the distal CBD)   - Needs EGD (CT chest 5/26 - patulous, fluid-filled esophagus)    DISPO: Rehab

## 2024-05-31 NOTE — PROGRESS NOTE ADULT - SUBJECTIVE AND OBJECTIVE BOX
JAYLYN AGARWAL   [id number]   [birth year]  78yF    Admit Date: 24  Indication for SICU: multiple consecutive rib fractures s/p multiple mechanical falls        ============================  HPI   78F current smoker with PMHx of HTN, HLD, anxiety, CAD s/p CABG x 3 (), atrial flutter s/p ablation (), OA, and recent admission for infectious colitis (24), presented to the ED s/p multiple mechanical falls (-HT/-LOC/-AC). Of note, pt presented to ED on 24 with CC of back pain (pt did not endorse fall at this time), and was discharged without admission. Today, pt's daughter endorsed that pt has experienced multiple mechanical falls over the past month, most recently 24 after returning home from ED. Pt endorsed weakness in her legs, causing her to fall. CTAP showed acute nondisplaced posterior left 6th, 7th rib fractures, acute mildly displaced posterior left 8th, 9th, 10th, and 11th ribs fractures, acute nondisplaced fracture of the right superior pubic ramus, and right greater trochanter fracture, as well as several incidental findings (see below). CT head and neck were negative for acute pathology. Labs were significant for WBC 12, Na 124, K 3.4, and alk phos 130.     SICU Consult for hemodynamic monitoring in the setting of multiple consecutive rib fractures. Pt was examined in ED, stable, see Physical exam below.     Pertinent Imaging    < from: CT Abdomen and Pelvis w/ IV Cont (24 @ 11:21) >  IMPRESSION:  Acute nondisplaced posterior left 6th, 7th rib fractures. Acute mildly displaced posterior left 8th, 9th, 10th, and 11th ribs fractures.  Acute nondisplaced fracture of the right superior pubic ramus (4-397).  Stable chronic T6, T7, T8, T11, L2 vertebral body compression deformities.  Mild mural thickening extending from the cecum to the mid transverse colon likely reflecting colitis of infectious or inflammatory etiology..  New Trace bilateral pleural effusions with adjacent consolidative opacities.    Additional attending comments:  Additional right greater trochanter fracture, seen on image 177 of series 602  There is a punctate focus of air within the left pleural effusion on image 32 series 2. Although no pneumothorax is identified on this examination, a possible trace pneumothorax cannot be excluded.    Dilated common bile duct with intrahepatic biliary duct dilatation and a distended gallbladder.  Findings are suspicious for a possible central obstructing lesion within the distal common bile duct is not excluded. Further evaluation with ERCP is recommended.  Patulous, fluid-filled esophagus. Outpatient endoscopy is recommended.  Colonic wall thickening may be related to underdistention.  Left axillary lymphadenopathy require short-term interval follow-up.  < end of copied text >    < from: CT Cervical Spine No Cont (24 @ 11:14) >  IMPRESSION:  CT head: Age-indeterminate small infarct involving the left thalamus. No acute intracranial hemorrhage or mass effect.  CT cervical spine: No acute fracture or traumatic subluxation.   < end of copied text >       24 Hour Events    NIGHT  -plan for BIPAP overnight /6, pupils L>R, A&Ox3, following commands, abomen soft, non distended. palpable DP bilaterally   - No repletions   - AM ABG:       DAY  -trial of BiPAP for a few hrs d/t resp acidosis  -Pureed diet when on HFNC  - added nicotine patch once  - AB.32/46/134/26 lac 0.5      [X] A ten-point review of systems was otherwise negative except as noted above.  [  ] Due to altered mental status/intubation, subjective information was not attained from the patient. History was obtained, to the extent possible, from review of the chart and collateral sources of information.       ====================================   JAYLYN AGARWAL   [id number]   [birth year]  78yF    Admit Date: 24  Indication for SICU: multiple consecutive rib fractures s/p multiple mechanical falls        ============================  HPI   78F current smoker with PMHx of HTN, HLD, anxiety, CAD s/p CABG x 3 (), atrial flutter s/p ablation (), OA, and recent admission for infectious colitis (24), presented to the ED s/p multiple mechanical falls (-HT/-LOC/-AC). Of note, pt presented to ED on 24 with CC of back pain (pt did not endorse fall at this time), and was discharged without admission. Today, pt's daughter endorsed that pt has experienced multiple mechanical falls over the past month, most recently 24 after returning home from ED. Pt endorsed weakness in her legs, causing her to fall. CTAP showed acute nondisplaced posterior left 6th, 7th rib fractures, acute mildly displaced posterior left 8th, 9th, 10th, and 11th ribs fractures, acute nondisplaced fracture of the right superior pubic ramus, and right greater trochanter fracture, as well as several incidental findings (see below). CT head and neck were negative for acute pathology. Labs were significant for WBC 12, Na 124, K 3.4, and alk phos 130.     SICU Consult for hemodynamic monitoring in the setting of multiple consecutive rib fractures. Pt was examined in ED, stable, see Physical exam below.     Pertinent Imaging    < from: CT Abdomen and Pelvis w/ IV Cont (24 @ 11:21) >  IMPRESSION:  Acute nondisplaced posterior left 6th, 7th rib fractures. Acute mildly displaced posterior left 8th, 9th, 10th, and 11th ribs fractures.  Acute nondisplaced fracture of the right superior pubic ramus (4-397).  Stable chronic T6, T7, T8, T11, L2 vertebral body compression deformities.  Mild mural thickening extending from the cecum to the mid transverse colon likely reflecting colitis of infectious or inflammatory etiology..  New Trace bilateral pleural effusions with adjacent consolidative opacities.    Additional attending comments:  Additional right greater trochanter fracture, seen on image 177 of series 602  There is a punctate focus of air within the left pleural effusion on image 32 series 2. Although no pneumothorax is identified on this examination, a possible trace pneumothorax cannot be excluded.    Dilated common bile duct with intrahepatic biliary duct dilatation and a distended gallbladder.  Findings are suspicious for a possible central obstructing lesion within the distal common bile duct is not excluded. Further evaluation with ERCP is recommended.  Patulous, fluid-filled esophagus. Outpatient endoscopy is recommended.  Colonic wall thickening may be related to underdistention.  Left axillary lymphadenopathy require short-term interval follow-up.  < end of copied text >    < from: CT Cervical Spine No Cont (24 @ 11:14) >  IMPRESSION:  CT head: Age-indeterminate small infarct involving the left thalamus. No acute intracranial hemorrhage or mass effect.  CT cervical spine: No acute fracture or traumatic subluxation.   < end of copied text >     24 Hour Events    NIGHT  -plan for BIPAP overnight /6, pupils L>R, A&Ox3, following commands, abdomen soft, non distended. palpable DP bilaterally   - No repletions   - AM ABG:     DAY  -trial of BiPAP for a few hrs d/t respiratory acidosis  - Pureed diet when on off BiPAP  - added nicotine patch once  - AB.32/46/134/26 lac 0.5    [X] A ten-point review of systems was otherwise negative except as noted above.  [  ] Due to altered mental status/intubation, subjective information was not attained from the patient. History was obtained, to the extent possible, from review of the chart and collateral sources of information. JAYLYN AGARWAL   [id number]   [birth year]  78yF    Admit Date: 24  Indication for SICU: multiple consecutive rib fractures s/p multiple mechanical falls        ============================  HPI   78F current smoker with PMHx of HTN, HLD, anxiety, CAD s/p CABG x 3 (), atrial flutter s/p ablation (), OA, and recent admission for infectious colitis (24), presented to the ED s/p multiple mechanical falls (-HT/-LOC/-AC). Of note, pt presented to ED on 24 with CC of back pain (pt did not endorse fall at this time), and was discharged without admission. Today, pt's daughter endorsed that pt has experienced multiple mechanical falls over the past month, most recently 24 after returning home from ED. Pt endorsed weakness in her legs, causing her to fall. CTAP showed acute nondisplaced posterior left 6th, 7th rib fractures, acute mildly displaced posterior left 8th, 9th, 10th, and 11th ribs fractures, acute nondisplaced fracture of the right superior pubic ramus, and right greater trochanter fracture, as well as several incidental findings (see below). CT head and neck were negative for acute pathology. Labs were significant for WBC 12, Na 124, K 3.4, and alk phos 130.     SICU Consult for hemodynamic monitoring in the setting of multiple consecutive rib fractures. Pt was examined in ED, stable, see Physical exam below.     Pertinent Imaging    < from: CT Abdomen and Pelvis w/ IV Cont (24 @ 11:21) >  IMPRESSION:  Acute nondisplaced posterior left 6th, 7th rib fractures. Acute mildly displaced posterior left 8th, 9th, 10th, and 11th ribs fractures.  Acute nondisplaced fracture of the right superior pubic ramus (4-397).  Stable chronic T6, T7, T8, T11, L2 vertebral body compression deformities.  Mild mural thickening extending from the cecum to the mid transverse colon likely reflecting colitis of infectious or inflammatory etiology..  New Trace bilateral pleural effusions with adjacent consolidative opacities.    Additional attending comments:  Additional right greater trochanter fracture, seen on image 177 of series 602  There is a punctate focus of air within the left pleural effusion on image 32 series 2. Although no pneumothorax is identified on this examination, a possible trace pneumothorax cannot be excluded.    Dilated common bile duct with intrahepatic biliary duct dilatation and a distended gallbladder.  Findings are suspicious for a possible central obstructing lesion within the distal common bile duct is not excluded. Further evaluation with ERCP is recommended.  Patulous, fluid-filled esophagus. Outpatient endoscopy is recommended.  Colonic wall thickening may be related to underdistention.  Left axillary lymphadenopathy require short-term interval follow-up.  < end of copied text >    < from: CT Cervical Spine No Cont (24 @ 11:14) >  IMPRESSION:  CT head: Age-indeterminate small infarct involving the left thalamus. No acute intracranial hemorrhage or mass effect.  CT cervical spine: No acute fracture or traumatic subluxation.   < end of copied text >     24 Hour Events    NIGHT  -plan for BIPAP overnight , pupils L>R, A&Ox3, following commands, abdomen soft, non distended. palpable DP bilaterally   - No repletions   - AM ABG:     DAY  -trial of BiPAP for a few hrs d/t respiratory acidosis  - Pureed diet when on off BiPAP  - added nicotine patch once  - AB.32/46/134/26 lac 0.5    [X] A ten-point review of systems was otherwise negative except as noted above.  [  ] Due to altered mental status/intubation, subjective information was not attained from the patient. History was obtained, to the extent possible, from review of the chart and collateral sources of information.    Daily     Daily Weight in k.2 (31 May 2024 04:00)    Diet, Pureed (24 @ 17:48)      CURRENT MEDS:  Neurologic Medications  acetaminophen     Tablet .. 650 milliGRAM(s) Oral every 6 hours  DULoxetine 60 milliGRAM(s) Oral daily  meclizine 12.5 milliGRAM(s) Oral once PRN Dizziness/vertigo  melatonin 3 milliGRAM(s) Oral at bedtime  traMADol 25 milliGRAM(s) Oral every 6 hours PRN Moderate Pain (4 - 6)    Respiratory Medications  albuterol/ipratropium for Nebulization 3 milliLiter(s) Nebulizer every 6 hours  sodium chloride 3%  Inhalation 4 milliLiter(s) Inhalation every 12 hours    Cardiovascular Medications  amLODIPine   Tablet 5 milliGRAM(s) Oral daily  furosemide   Injectable 20 milliGRAM(s) IV Push once  propranolol 10 milliGRAM(s) Oral every 12 hours    Gastrointestinal Medications  dextrose 5% + sodium chloride 0.9%. 1000 milliLiter(s) IV Continuous <Continuous>  dicyclomine 20 milliGRAM(s) Oral four times a day before meals  folic acid 1 milliGRAM(s) Oral daily  multivitamin 1 Tablet(s) Oral daily  pantoprazole    Tablet 40 milliGRAM(s) Oral before breakfast  polyethylene glycol 3350 17 Gram(s) Oral every 12 hours  senna 2 Tablet(s) Oral at bedtime  thiamine 100 milliGRAM(s) Oral daily    Genitourinary Medications    Hematologic/Oncologic Medications  enoxaparin Injectable 30 milliGRAM(s) SubCutaneous every 12 hours    Antimicrobial/Immunologic Medications  nystatin    Suspension 989408 Unit(s) Oral every 6 hours  piperacillin/tazobactam IVPB.. 3.375 Gram(s) IV Intermittent every 8 hours    Endocrine/Metabolic Medications  atorvastatin 80 milliGRAM(s) Oral at bedtime    Topical/Other Medications  chlorhexidine 2% Cloths 1 Application(s) Topical <User Schedule>  lidocaine   4% Patch 1 Patch Transdermal daily      ICU Vital Signs Last 24 Hrs  T(C): 35.8 (31 May 2024 08:03), Max: 37.1 (30 May 2024 12:00)  T(F): 96.5 (31 May 2024 08:03), Max: 98.8 (30 May 2024 12:00)  HR: 69 (31 May 2024 11:00) (61 - 73)  BP: 157/70 (31 May 2024 11:00) (112/56 - 163/60)  BP(mean): 101 (31 May 2024 11:00) (80 - 116)  ABP: --  ABP(mean): --  RR: --  SpO2: 99% (31 May 2024 11:00) (92% - 100%)        ABG - ( 31 May 2024 03:12 )  pH, Arterial: 7.34  pH, Blood: x     /  pCO2: 43    /  pO2: 99    / HCO3: 23    / Base Excess: -2.6  /  SaO2: 100.0               I&O's Summary    30 May 2024 07:01  -  31 May 2024 07:00  --------------------------------------------------------  IN: 1400 mL / OUT: 1660 mL / NET: -260 mL      I&O's Detail    30 May 2024 07:01  -  31 May 2024 07:00  --------------------------------------------------------  IN:    dextrose 5% + sodium chloride 0.9%: 1150 mL    IV PiggyBack: 250 mL  Total IN: 1400 mL    OUT:    Indwelling Catheter - Urethral (mL): 1660 mL  Total OUT: 1660 mL    Total NET: -260 mL          PHYSICAL EXAM:    General/Neuro: alert & oriented x 3, no focal deficits  Lungs: On HFNC, saturating well breathing comfortably. BiPAP overnight  Cardiovascular : Regular rate and rhythm.    Cardiac Rhythm: Normal Sinus Rhythm  GI: Abdomen soft, Non-tender, Non-distended.    Nasogastric tube in place.   Extremities: Extremities warm, pink, well-perfused.   Derm: Good skin turgor, no skin breakdown.    : Randall catheter in place.      CXR: Slightly increasing bilateral opacities/effusions.                              8.8    9.65  )-----------( 282      ( 30 May 2024 23:39 )             28.1       05-30    131<L>  |  102  |  16  ----------------------------<  110<H>  4.6   |  22  |  1.1    Ca    8.3<L>      30 May 2024 23:39  Phos  3.2     05-30  Mg     1.9     05-30    TPro  5.7<L>  /  Alb  3.2<L>  /  TBili  0.3  /  DBili  <0.2  /  AST  28  /  ALT  16  /  AlkPhos  129<H>  05-29            Urinalysis Basic - ( 30 May 2024 23:39 )    Color: x / Appearance: x / SG: x / pH: x  Gluc: 110 mg/dL / Ketone: x  / Bili: x / Urobili: x   Blood: x / Protein: x / Nitrite: x   Leuk Esterase: x / RBC: x / WBC x   Sq Epi: x / Non Sq Epi: x / Bacteria: x

## 2024-05-31 NOTE — PROGRESS NOTE ADULT - SUBJECTIVE AND OBJECTIVE BOX
GENERAL SURGERY PROGRESS NOTE     JAYLYN AGARWAL  78y  Female  Hospital day :5d  POD:  Procedure:   OVERNIGHT EVENTS: patient was on BiPAP overnight, now on HFNC 50% 40L, A&Ox3, worked with OT yesterday who recommend rehab, urine output 60-140cc/hr    T(F): 96.5 (05-31-24 @ 08:03), Max: 98.8 (05-30-24 @ 12:00)  HR: 62 (05-31-24 @ 07:31) (61 - 73)  BP: 139/62 (05-31-24 @ 07:00) (112/56 - 163/60)  ABP: --  ABP(mean): --  RR: --  SpO2: 100% (05-31-24 @ 07:31) (92% - 100%)    DIET/FLUIDS: dextrose 5% + sodium chloride 0.9%. 1000 milliLiter(s) IV Continuous <Continuous>  folic acid 1 milliGRAM(s) Oral daily  multivitamin 1 Tablet(s) Oral daily  thiamine 100 milliGRAM(s) Oral daily    NG:                                                                                DRAINS:     BM:     EMESIS:     URINE:   05-30-24 @ 07:01  -  05-31-24 @ 07:00  --------------------------------------------------------  OUT: 1660 mL     Indwelling Urethral Catheter:     Connect To:  Straight Drainage/Gravity    Indication:  Urine Output Monitoring in Critically Ill (05-31-24 @ 09:04)    GI proph:  pantoprazole    Tablet 40 milliGRAM(s) Oral before breakfast    AC/ proph: enoxaparin Injectable 30 milliGRAM(s) SubCutaneous every 12 hours    ABx: nystatin    Suspension 760353 Unit(s) Oral every 6 hours  piperacillin/tazobactam IVPB.. 3.375 Gram(s) IV Intermittent every 8 hours      PHYSICAL EXAM:  GENERAL: NAD  CHEST/LUNG: Non-labored breathing on HFNC  HEART: Regular rate and rhythm  ABDOMEN: Soft, Nontender, Nondistended;       LABS  Labs:  CAPILLARY BLOOD GLUCOSE                              8.8    9.65  )-----------( 282      ( 30 May 2024 23:39 )             28.1       Auto Neutrophil %: 82.1 % (05-30-24 @ 23:39)  Auto Immature Granulocyte %: 0.7 % (05-30-24 @ 23:39)    05-30    131<L>  |  102  |  16  ----------------------------<  110<H>  4.6   |  22  |  1.1      Calcium: 8.3 mg/dL (05-30-24 @ 23:39)      LFTs:             5.7  | 0.3  | 28       ------------------[129     ( 29 May 2024 20:00 )  3.2  | <0.2 | 16          Lipase:x      Amylase:x         Blood Gas Arterial, Lactate: 0.5 mmol/L (05-31-24 @ 03:12)  Blood Gas Arterial, Lactate: 0.5 mmol/L (05-30-24 @ 13:27)  Blood Gas Arterial, Lactate: 0.6 mmol/L (05-30-24 @ 04:17)  Blood Gas Arterial, Lactate: 0.6 mmol/L (05-29-24 @ 20:49)  Blood Gas Arterial, Lactate: 0.6 mmol/L (05-29-24 @ 10:52)  Blood Gas Arterial, Lactate: 0.9 mmol/L (05-28-24 @ 17:04)    ABG - ( 31 May 2024 03:12 )  pH: 7.34  /  pCO2: 43    /  pO2: 99    / HCO3: 23    / Base Excess: -2.6  /  SaO2: 100.0           ABG - ( 30 May 2024 13:27 )  pH: 7.32  /  pCO2: 46    /  pO2: 134   / HCO3: 24    / Base Excess: -2.3  /  SaO2: 100.0           ABG - ( 30 May 2024 04:17 )  pH: 7.29  /  pCO2: 49    /  pO2: 103   / HCO3: 24    / Base Excess: -3.4  /  SaO2: 99.3              Coags:            Urinalysis Basic - ( 30 May 2024 23:39 )    Color: x / Appearance: x / SG: x / pH: x  Gluc: 110 mg/dL / Ketone: x  / Bili: x / Urobili: x   Blood: x / Protein: x / Nitrite: x   Leuk Esterase: x / RBC: x / WBC x   Sq Epi: x / Non Sq Epi: x / Bacteria: x            RADIOLOGY & ADDITIONAL TESTS:      A/P

## 2024-05-31 NOTE — PHARMACOTHERAPY INTERVENTION NOTE - COMMENTS
Patient med reconciliation and hospital meds reviewed       Pt with hx of CAD not on aspirin or beta blocker at home . Started on propranolol 10 mg q12, reason unclear per note. If started for CAD, consider switching to Metoprolol .     Pt with hip fracture now with hx of osteoporosis. Outpatient Vit D level should be checked and she should be started on bisphonates.     At DC would not resume HCTZ, given increased risk for fall .     Once discharged, she can benefit from [facility] home visit program

## 2024-05-31 NOTE — PROGRESS NOTE ADULT - ASSESSMENT
Assessment and Recommendation:   78y Female w/ multiple comorbidities presenting with multiple consecutive rib fractures s/p multiple falls due to lower extremities now admitted to SICU for hemodynamic monitoring.    NEURO/PSYCH:  #Acute pain  - acetaminophen Tablet .. 650 milliGRAM(s) Oral every 6 hours  - tramadol PRN  - lidocaine patch  #Bilateral lower extremity weakness; PMHx of stable chronic vertebral fractures; r/o impingment   - MRI lumbar spine pending  #PMHx of anxiety  - Continue home DULoxetine 60 milliGRAM(s) Oral daily  #B/L UE fasciculations  - neurology c/s - tizanidine prn, toxic metabolic w/u, MRI brain/c-spine  - holding zyprexa   - added thiamine/folate/MVI  - started propranolol 10mg q12    RESP:   #multiple acute consecutive posterior left 6-11th rib fractures  -pain control  #Respiratory failure  - increased respiratory support requirement, 6L NC > HF 40L/50%   - Bipap overnight 5/31  -course breath sounds/rhonchi, started chest PT/3%saline/duonebs  #bilateral pleural effusion, r/o pneumothorax  - bilateral pleural effusion with punctate air, concerning for possible pneumothorax  - CXR, no PTX evident  #Activity  - Activity - NWB RLE  #Current smoker, 1/2 PPD  - nicotine patch    CARDIAC:   #PMHx of HTN  - continue home amlodipine Tablet 5 milliGRAM(s) Oral daily  - HOLDING home olmesartan-hydrochlorothiazide 40 mg-25 mg oral tablet   #PMHx of HLD  - atorvastatin 80 milliGRAM(s) Oral at bedtime (home med: rosuvastatin 20mg QD)  #PMHx of CAD s/p CABG x 3  - follows with Dr. Brunson  #PMHx of atrial flutter s/p ablation (2022)  - EKG NSR  #Imaging:   - EKG: NSR  - TTE, 5/27 - EF 67%. Mild MR, Mild/mod TR, mild pHTN, simple atheroma in aortic arch    GI/NUTR:   #Diet: pureed diet in between Bipap  - 1:1 feeds   -Feeding tube in place   - aspiration precautions, HOB 30  #GI Prophylaxis  - Indication: home medication, #PMHx of PUD  - pantoprazole Tablet: 40 milliGRAM(s), Oral, before breakfast  #Bowel regimen   - senna: 2 Tablet(s), Oral, at bedtime   - miralax q12  #Recent admission for infective colitis (4/24)  - Continue home dicyclomine 20 milliGRAM(s) Oral four times a day before meals  #dilated CBD, colonic wall, esophageal fluid  - CTAP: "Dilated common bile duct with intrahepatic biliary duct dilatation and a distended gallbladder. Findings are suspicious for a possible central obstructing lesion within the distal common bile duct is not excluded. Further evaluation with ERCP is recommended. Patulous, fluid-filled esophagus. Outpatient endoscopy is recommended"  - Follow-up outpatient  - GI recs appreciated: may need ERCP    /RENAL:   #urine output in critically ill  - Mann placed for CT cystogram, 1.5L output on insertion --> retaining mann  Monitor UO-mann in place    Labs:          BUN/Cr- 16/1.1  -->,  16/1.1  -->          Electrolytes-(05-30 @ 23:39)Na 131 // K 4.6 // Mg 1.9 // Phos 3.2   #acute right pubic rami fracture  - CT cystogram negative  #hyponatremia  - hold HCTZ  - d/c 2% Nacl and started D5NS  - monitor BMP      HEME/ONC:   #DVT prophylaxis    - SCDs    - enoxaparin 30 q12    Labs: Hb/Hct:  8.1/24.7  -->,  8.8/28.1  -->                      Plts:  240  -->,  282  -->                 PTT/INR:        ID:  #leukocytosis in setting of trauma vs UTI  WBC- 14.57  --->>,  12.46  --->>,  9.65  --->>  Temp trend- 24hrs T(F): 97.2 (05-31 @ 00:00), Max: 99.2 (05-30 @ 04:00)  Antibiotics-piperacillin/tazobactam IVPB.. 3.375 every 8 hours  #UTI present on admission  -U/a- +leuk/nitrites/occasional bacteria   - on Rocephin x 3 days (completed)  #Pneumonia  -started Zosyn   #left axillary lymphadenopathy seen on CT  - follow up outpatient    ENDO:  #Blood glucose monitoring  - Glucose goal 140-180. if above 180 start ISS      MSK:  #acute right suprapubic rami fracture, acute greater trochanteric fracture  - Ortho c/s --> Xrays complete, MRI R hip pending     #PMHx of osteoarthritis  - follows with Dr. Jagdish ROBLES:  - DTI screen  - Preventative dressings in place    LINES/DRAINS:  mackenzie MO NGT    ADVANCED DIRECTIVES:      HCP/Emergency Contact - Carolyn Lobato (daughter) - [phone]    INDICATION FOR SICU: Multiple consecutive rib fractures    DISPO: SICU  Assessment and Recommendation:   78y Female w/ multiple comorbidities presenting with multiple consecutive rib fractures s/p multiple falls due to lower extremities now admitted to SICU for hemodynamic monitoring, respiratory insufficiency with impending failure .    NEURO/PSYCH:  #Acute pain  - acetaminophen Tablet .. 650 milliGRAM(s) Oral every 6 hours  - tramadol PRN  - lidocaine patch  #Bilateral lower extremity weakness; PMHx of stable chronic vertebral fractures; r/o impingement   - MRI lumbar spine pending  #PMHx of anxiety  - Continue home DULoxetine 60 milliGRAM(s) Oral daily  #B/L UE fasciculations  - neurology c/s - tizanidine prn, toxic metabolic w/u, MRI brain/c-spine  - holding zyprexa   - added thiamine/folate/MVI  - started propranolol 10mg q12    RESP:   #multiple acute consecutive posterior left 6-11th rib fractures  -pain control  #Respiratory failure  - increased respiratory support requirement, 6L NC > HF 40L/50%   - Bipap overnight 5/31  -course breath sounds/rhonchi, started chest PT/3%saline/duonebs  #bilateral pleural effusion, r/o pneumothorax  - bilateral pleural effusion with punctate air, concerning for possible pneumothorax  - CXR, no PTX evident  #Activity  - Activity - NWB RLE  #Current smoker, 1/2 PPD  - nicotine patch    CARDIAC:   #PMHx of HTN  - continue home amlodipine Tablet 5 milliGRAM(s) Oral daily  - HOLDING home olmesartan-hydrochlorothiazide 40 mg-25 mg oral tablet   #PMHx of HLD  - atorvastatin 80 milliGRAM(s) Oral at bedtime (home med: rosuvastatin 20mg QD)  #PMHx of CAD s/p CABG x 3  - follows with Dr. Brunson  #PMHx of atrial flutter s/p ablation (2022)  - EKG NSR  #Imaging:   - EKG: NSR  - TTE, 5/27 - EF 67%. Mild MR, Mild/mod TR, mild pulmonary HTN, simple atheroma in aortic arch    GI/NUTR:   #Diet: pureed diet when off BiPAP  - 1:1 feeds   -Feeding tube in place   - aspiration precautions, HOB 30  #GI Prophylaxis  - Indication: home medication, #PMHx of PUD  - pantoprazole Tablet: 40 milliGRAM(s), Oral, before breakfast  #Bowel regimen   - senna: 2 Tablet(s), Oral, at bedtime   - Miralax q12  #Recent admission for infective colitis (4/24)  - Continue home dicyclomine 20 milliGRAM(s) Oral four times a day before meals  #dilated CBD, colonic wall, esophageal fluid  - CTAP: "Dilated common bile duct with intrahepatic biliary duct dilatation and a distended gallbladder. Findings are suspicious for a possible central obstructing lesion within the distal common bile duct is not excluded. Further evaluation with ERCP is recommended. Patulous, fluid-filled esophagus. Outpatient endoscopy is recommended"  - Follow-up outpatient  - GI recommendations appreciated: may need ERCP    /RENAL:   #urine output in critically ill  - Randall placed for CT cystogram, 1.5L output on insertion --> retaining Randall  Monitor UO-Randall in place    Labs:          BUN/Cr- 16/1.1  -->,  16/1.1  -->          Electrolytes-(05-30 @ 23:39)Na 131 // K 4.6 // Mg 1.9 // Phos 3.2   #acute right pubic rami fracture  - CT cystogram negative  #hyponatremia  - hold HCTZ  - d/c 2% NaCl and started D5NS  - monitor BMP      HEME/ONC:   #DVT prophylaxis    - SCDs    - enoxaparin 30 q12    Labs: Hb/Hct:  8.1/24.7  -->,  8.8/28.1  -->                      Plts:  240  -->,  282  -->                 PTT/INR:        ID:  #leukocytosis in setting of trauma vs UTI  WBC- 14.57  --->>,  12.46  --->>,  9.65  --->>  Temp trend- 24hrs T(F): 97.2 (05-31 @ 00:00), Max: 99.2 (05-30 @ 04:00)  Antibiotics-piperacillin/tazobactam IVPB 3.375 every 8 hours  #UTI present on admission  -U/a- +leuk/nitrites/occasional bacteria   - on Rocephin x 3 days (completed)  #Pneumonia  -started Zosyn   #left axillary lymphadenopathy seen on CT  - follow up outpatient    ENDO:  #Blood glucose monitoring  - Glucose goal 140-180. if above 180 start ISS    MSK:  #acute right suprapubic rami fracture, acute greater trochanteric fracture  - Ortho c/s --> Xrays complete, MRI R hip pending     #PMHx of osteoarthritis  - follows with Dr. Jagdish ROBLES:  - DTI screen  - Preventative dressings in place    LINES/DRAINS:  mackenzie MO NGT    ADVANCED DIRECTIVES:      HCP/Emergency Contact - Carolyn Lobato (daughter) - [phone]    INDICATION FOR SICU: Multiple consecutive rib fractures    DISPO: SICU  Assessment and Recommendation:   78y Female w/ multiple comorbidities presenting with multiple consecutive rib fractures s/p multiple falls due to lower extremities now admitted to SICU for hemodynamic monitoring, respiratory insufficiency with impending failure .    NEURO/PSYCH:  #Acute pain  - acetaminophen Tablet .. 650 milliGRAM(s) Oral every 6 hours  - tramadol PRN  - lidocaine patch  #Bilateral lower extremity weakness; PMHx of stable chronic vertebral fractures; r/o impingement   - MRI lumbar spine pending  #PMHx of anxiety  - Continue home DULoxetine 60 milliGRAM(s) Oral daily  #B/L UE fasciculations  - neurology c/s - tizanidine prn, toxic metabolic w/u, MRI brain/c-spine  - holding zyprexa   - added thiamine/folate/MVI  - started propranolol 10mg q12    RESP:   #multiple acute consecutive posterior left 6-11th rib fractures  -pain control  #Respiratory failure  - HFNC 40L/50%, weaning as tolerated  - Bipap overnight 5/31  -course breath sounds/rhonchi, started chest PT/3%saline/duonebs  #bilateral pleural effusion, r/o pneumothorax  - bilateral pleural effusion with punctate air, concerning for possible pneumothorax  - CXR reviewed, no PTX evident  #Activity  - Activity - NWB RLE  #Current smoker, 1/2 PPD  - nicotine patch    CARDIAC:   #PMHx of HTN  - continue home amlodipine Tablet 5 milliGRAM(s) Oral daily  - HOLDING home olmesartan-hydrochlorothiazide 40 mg-25 mg oral tablet   #PMHx of HLD  - atorvastatin 80 milliGRAM(s) Oral at bedtime (home med: rosuvastatin 20mg QD)  #PMHx of CAD s/p CABG x 3  - follows with Dr. Brunson  #PMHx of atrial flutter s/p ablation (2022)  - EKG NSR  #Imaging:   - EKG: NSR  - TTE, 5/27 - EF 67%. Mild MR, Mild/mod TR, mild pulmonary HTN, simple atheroma in aortic arch    GI/NUTR:   #Diet: pureed diet when off BiPAP  - 1:1 feeds   -Feeding tube in place   -Pending swallow assessment for PO meds, will remove NGT if passes  - aspiration precautions, HOB 30  #GI Prophylaxis  - Indication: home medication, #PMHx of PUD  - pantoprazole Tablet: 40 milliGRAM(s), Oral, before breakfast  #Bowel regimen   - senna: 2 Tablet(s), Oral, at bedtime   - Miralax q12  #Recent admission for infective colitis (4/24)  - Continue home dicyclomine 20 milliGRAM(s) Oral four times a day before meals  #dilated CBD, colonic wall, esophageal fluid  - CTAP: "Dilated common bile duct with intrahepatic biliary duct dilatation and a distended gallbladder. Findings are suspicious for a possible central obstructing lesion within the distal common bile duct is not excluded. Further evaluation with ERCP is recommended. Patulous, fluid-filled esophagus. Outpatient endoscopy is recommended"  - Follow-up outpatient  - GI recommendations appreciated: may need ERCP    /RENAL:   #urine output in critically ill  - Mann placed for CT cystogram, 1.5L output on insertion --> retaining Mann  Monitor UO-Mann in place    Labs:          BUN/Cr- 16/1.1  -->,  16/1.1  -->          Electrolytes-(05-30 @ 23:39)Na 131 // K 4.6 // Mg 1.9 // Phos 3.2   #acute right pubic rami fracture  - CT cystogram negative  #hyponatremia  - hold HCTZ  - d/c 2% NaCl and started D5NS  - monitor BMP      HEME/ONC:   #DVT prophylaxis    - SCDs    - enoxaparin 30 q12    Labs: Hb/Hct:  8.1/24.7  -->,  8.8/28.1  -->                      Plts:  240  -->,  282  -->                 PTT/INR:        ID:  #leukocytosis in setting of trauma vs UTI  WBC- 14.57  --->>,  12.46  --->>,  9.65  --->>  Temp trend- 24hrs T(F): 97.2 (05-31 @ 00:00), Max: 99.2 (05-30 @ 04:00)  Antibiotics-piperacillin/tazobactam IVPB 3.375 every 8 hours  #UTI present on admission  -U/a- +leuk/nitrites/occasional bacteria   - on Rocephin x 3 days (completed)  #Pneumonia  -started Zosyn   #left axillary lymphadenopathy seen on CT  - follow up outpatient    ENDO:  #Blood glucose monitoring  - Glucose goal 140-180. if above 180 start ISS    MSK:  #acute right suprapubic rami fracture, acute greater trochanteric fracture  - Ortho c/s --> Xrays complete, MRI R hip pending     #PMHx of osteoarthritis  - follows with Dr. Jagdish ROBLES:  - DTI screen  - Preventative dressings in place    LINES/DRAINS:  PIV, mann, NGT    ADVANCED DIRECTIVES:      HCP/Emergency Contact - Carolyn Lobato (daughter) - [phone]    INDICATION FOR SICU: Multiple consecutive rib fractures    DISPO: SICU

## 2024-06-01 NOTE — PROGRESS NOTE ADULT - ASSESSMENT
ASSESSMENT:  78yFemale w/ PMHx of HTN, HLD, anxiety, CAD s/p CABG x3 (2009), Atrial flutter s/p ablation (2022), OA, recent admission for infectious colitis (4/26/24), seen as a TRAUMA CONSULT s/p multiple mechanical falls (-HT, -LOC, -AC). The following injuries were identified:     # Acute nondisplaced posterior left 6-7 rib fractures   # Acute mildly displaced left 8-11 rib fractures   # Acute nondisplaced fracture of the right superior pubic ramus    # Right greater trochanteric fracture  --> MRI right hip pending      PLAN:   - Multimodal pain control w/ APAP 650 q6h, Tramadol PRN, Lidocaine patch   - Monitor respiratory status - on nasal cannula  - Keep NWB RLE until hip MRI complete  - Pending MRI right hip, L-spine w/o contrast, brain and c-spine    - Diet/Fluids: Pureed diet   - PPX: Protonix 40 qd, Lovenox 30 bid    - Propranolol 10mg q12h for EPS   - Rest of care per SICU       For outpatient F/U:   - Needs ERCP (CT A/P 5/26 - intrahepatic biliary duct dilatation and distended GB, findings suspicious for possible central obstructing lesion w/i the distal CBD)   - Needs EGD (CT chest 5/26 - patulous, fluid-filled esophagus)    DISPO: Rehab      [phone]

## 2024-06-01 NOTE — PROGRESS NOTE ADULT - SUBJECTIVE AND OBJECTIVE BOX
GENERAL SURGERY PROGRESS NOTE     JAYLYN AGARWAL  78y  Female  Hospital day :7d    OVERNIGHT EVENTS:  - Saturating well on 2-3 liters oxygen through nasal cannula  - Tolerating a pureed diet    T(F): 96.9 (05-31-24 @ 23:00), Max: 98.8 (05-31-24 @ 12:00)  HR: 70 (06-01-24 @ 01:00) (62 - 76)  BP: 128/61 (06-01-24 @ 01:00) (123/56 - 157/89)  SpO2: 91% (06-01-24 @ 01:00) (91% - 100%)    DIET/FLUIDS: dextrose 5% + sodium chloride 0.9%. 1000 milliLiter(s) IV Continuous <Continuous>  folic acid 1 milliGRAM(s) Oral daily  multivitamin 1 Tablet(s) Oral daily  thiamine 100 milliGRAM(s) Oral daily    URINE:   05-30-24 @ 07:01  -  05-31-24 @ 07:00  --------------------------------------------------------  OUT: 1660 mL     Indwelling Urethral Catheter:     Connect To:  Straight Drainage/Gravity    Indication:  Urine Output Monitoring in Critically Ill (05-31-24 @ 09:04)    GI proph:  pantoprazole    Tablet 40 milliGRAM(s) Oral before breakfast    AC/ proph: enoxaparin Injectable 30 milliGRAM(s) SubCutaneous every 12 hours    ABx: nystatin    Suspension 626807 Unit(s) Oral every 6 hours  piperacillin/tazobactam IVPB.. 3.375 Gram(s) IV Intermittent every 8 hours      PHYSICAL EXAM:  GENERAL: NAD  CHEST/LUNG: Clear to auscultation bilaterally  HEART: Regular rate and rhythm  ABDOMEN: Soft, Nontender, Nondistended;     LABS  Labs:  CAPILLARY BLOOD GLUCOSE      POCT Blood Glucose.: 111 mg/dL (31 May 2024 21:47)  POCT Blood Glucose.: 151 mg/dL (31 May 2024 19:25)  POCT Blood Glucose.: 130 mg/dL (31 May 2024 12:11)                          9.0    10.82 )-----------( 300      ( 31 May 2024 23:02 )             28.3         05-31    134<L>  |  102  |  14  ----------------------------<  118<H>  4.1   |  22  |  0.9      Calcium: 8.6 mg/dL (05-31-24 @ 20:50)      LFTs:     Blood Gas Arterial, Lactate: 0.5 mmol/L (05-31-24 @ 03:12)  Blood Gas Arterial, Lactate: 0.5 mmol/L (05-30-24 @ 13:27)  Blood Gas Arterial, Lactate: 0.6 mmol/L (05-30-24 @ 04:17)  Blood Gas Arterial, Lactate: 0.6 mmol/L (05-29-24 @ 20:49)  Blood Gas Arterial, Lactate: 0.6 mmol/L (05-29-24 @ 10:52)    ABG - ( 31 May 2024 03:12 )  pH: 7.34  /  pCO2: 43    /  pO2: 99    / HCO3: 23    / Base Excess: -2.6  /  SaO2: 100.0           ABG - ( 30 May 2024 13:27 )  pH: 7.32  /  pCO2: 46    /  pO2: 134   / HCO3: 24    / Base Excess: -2.3  /  SaO2: 100.0           ABG - ( 30 May 2024 04:17 )  pH: 7.29  /  pCO2: 49    /  pO2: 103   / HCO3: 24    / Base Excess: -3.4  /  SaO2: 99.3          Urinalysis Basic - ( 31 May 2024 20:50 )    Color: x / Appearance: x / SG: x / pH: x  Gluc: 118 mg/dL / Ketone: x  / Bili: x / Urobili: x   Blood: x / Protein: x / Nitrite: x   Leuk Esterase: x / RBC: x / WBC x   Sq Epi: x / Non Sq Epi: x / Bacteria: x

## 2024-06-01 NOTE — PROGRESS NOTE ADULT - ASSESSMENT
Assessment and Recommendation:   78y Female w/ multiple comorbidities presenting with multiple consecutive rib fractures s/p multiple falls due to lower extremities now admitted to SICU for hemodynamic monitoring.    NEURO/PSYCH:  #Acute pain  - acetaminophen Tablet .. 650 milliGRAM(s) Oral every 6 hours  - tramadol PRN  - lidocaine patch  #Bilateral lower extremity weakness; PMHx of stable chronic vertebral fractures; r/o impingment   - MRI lumbar spine pending  #PMHx of anxiety  - Continue home DULoxetine 60 milliGRAM(s) Oral daily  #B/L UE fasciculations  - neurology c/s - tizanidine prn, toxic metabolic w/u, MRI brain/c-spine  - holding zyprexa   - added thiamine/folate/MVI  - started propranolol 10mg q12    RESP:   #multiple acute consecutive posterior left 6-11th rib fractures  -pain control  #Respiratory failure  - increased respiratory support requirement, weaned to NC  - Bipap overnight 5/31  -course breath sounds/rhonchi, started chest PT/3%saline/duonebs  #bilateral pleural effusion, r/o pneumothorax  - bilateral pleural effusion with punctate air, concerning for possible pneumothorax  - CXR, no PTX evident  #Activity  - Activity - NWB RLE  #Current smoker, 1/2 PPD  - nicotine patch    CARDIAC:   #PMHx of HTN  - continue home amlodipine Tablet 5 milliGRAM(s) Oral daily  - HOLDING home olmesartan-hydrochlorothiazide 40 mg-25 mg oral tablet   #PMHx of HLD  - atorvastatin 80 milliGRAM(s) Oral at bedtime (home med: rosuvastatin 20mg QD)  #PMHx of CAD s/p CABG x 3  - follows with Dr. Brunson  #PMHx of atrial flutter s/p ablation (2022)  - EKG NSR  #Imaging:   - EKG: NSR  - TTE, 5/27 - EF 67%. Mild MR, Mild/mod TR, mild pHTN, simple atheroma in aortic arch    GI/NUTR:   #Diet: pureed diet in between Bipap  - 1:1 feeds   - aspiration precautions, HOB 30  #GI Prophylaxis  #hx of PUD    -- pantoprazole Tablet: 40 mg (home rx)  #Bowel regimen   - senna: 2 Tablet(s), Oral, at bedtime   - miralax q12  -last bowel movement 5/31  #Recent admission for infective colitis (4/24)  - Continue home dicyclomine 20 milliGRAM(s) Oral four times a day before meals  #dilated CBD, colonic wall, esophageal fluid  - Follow-up outpatient  - GI recs appreciated: possible ERCP    /RENAL:   #urine output in critically ill  - Mann placed for CT cystogram, 1.5L output on insertion --> retaining mann  Monitor UO-mann in place    Labs:          BUN/Cr- 16/1.1  -->,  16/1.1  -->14/0.9          Electrolytes-Na 134 // K 4.1 // Mg 1.7 //  Phos 3.1 05-31 @ 20:50  #hypomagnesemia   -repleted   #acute right pubic rami fracture  - CT cystogram negative  #hyponatremia  - hold HCTZ  - d/c 2% Nacl and started D5NS  - monitor BMP      HEME/ONC:   #DVT prophylaxis    - SCDs    - enoxaparin 30 q12    Labs:Hb/Hct:  8.1/24.7  -->,  8.8/28.1  -->,  9.0/28.3  -->  Plts:  240  -->,  282  -->,  300  -->              PTT/INR:        ID:  #leukocytosis in setting of trauma vs UTI  WBC- 14.57  --->>,  12.46  --->>,  9.65  --->>10.8  afebrile   Antibiotics-piperacillin/tazobactam IVPB.. 3.375 every 8 hours  #UTI present on admission  -U/a- +leuk/nitrites/occasional bacteria   - on Rocephin x 3 days (completed)  #Pneumonia  -started Zosyn   #left axillary lymphadenopathy seen on CT  - follow up outpatient    ENDO:  #Blood glucose monitoring  - Glucose goal 140-180. if above 180 start ISS      MSK:  #acute right suprapubic rami fracture, acute greater trochanteric fracture  - Ortho c/s --> Xrays complete, MRI R hip pending     #PMHx of osteoarthritis  - follows with Dr. Jagdish ROBLES:  - DTI screen  - Preventative dressings in place    LINES/DRAINS:  PIV, Indwelling mann      ADVANCED DIRECTIVES:      HCP/Emergency Contact - Carolyn Lobato (daughter) - [phone]    INDICATION FOR SICU: Multiple consecutive rib fractures    DISPO: SICU  Assessment and Recommendation:   78y Female w/ multiple comorbidities presenting with multiple consecutive rib fractures s/p multiple falls due to lower extremities now admitted to SICU for hemodynamic monitoring.    NEURO/PSYCH:  #Acute pain  - acetaminophen Tablet .. 650 milliGRAM(s) Oral every 6 hours  - tramadol PRN  - lidocaine patch  #Bilateral lower extremity weakness; PMHx of stable chronic vertebral fractures; r/o impingment   - MRI lumbar spine pending  #PMHx of anxiety  - Continue home DULoxetine 60 milliGRAM(s) Oral daily  #B/L UE fasciculations  - neurology c/s - tizanidine prn, toxic metabolic w/u, MRI brain/c-spine  - holding zyprexa   - added thiamine/folate/MVI  - started propranolol 10mg q12    RESP:   #multiple acute consecutive posterior left 6-11th rib fractures  -pain control  #Respiratory failure  - increased respiratory support requirement, weaned to 2L NC  - BiPAP overnight   -course breath sounds/rhonchi, started chest PT/3%saline/duonebs  #bilateral pleural effusion, r/o pneumothorax  - bilateral pleural effusion with punctate air, concerning for possible pneumothorax  - CXR, no PTX evident  #Activity  - Activity - NWB RLE  #Current smoker, 1/2 PPD  - nicotine patch    CARDIAC:   #PMHx of HTN  - continue home amlodipine Tablet 5 milliGRAM(s) Oral daily  - HOLDING home olmesartan-hydrochlorothiazide 40 mg-25 mg oral tablet   #PMHx of HLD  - atorvastatin 80 milliGRAM(s) Oral at bedtime (home med: rosuvastatin 20mg QD)  #PMHx of CAD s/p CABG x 3  - follows with Dr. Brunson  #PMHx of atrial flutter s/p ablation (2022)  - EKG NSR  #Imaging:   - EKG: NSR  - TTE, 5/27 - EF 67%. Mild MR, Mild/mod TR, mild pHTN, simple atheroma in aortic arch    GI/NUTR:   #Diet: pureed diet in between Bipap  - 1:1 feeds   - aspiration precautions, HOB 30  #GI Prophylaxis  #hx of PUD    -- pantoprazole Tablet: 40 mg (home rx)  #Bowel regimen   - senna: 2 Tablet(s), Oral, at bedtime   - miralax q12  -last bowel movement 5/31  #Recent admission for infective colitis (4/24)  - Continue home dicyclomine 20 milliGRAM(s) Oral four times a day before meals  #dilated CBD, colonic wall, esophageal fluid  - Follow-up outpatient  - GI recs appreciated: possible ERCP    /RENAL:   #urine output in critically ill  - Mann placed for CT cystogram, 1.5L output on insertion --> retaining mann  Monitor UO-mann in place    Labs:          BUN/Cr- 16/1.1  -->,  16/1.1  -->14/0.9          Electrolytes-Na 134 // K 4.1 // Mg 1.7 //  Phos 3.1 05-31 @ 20:50  #hypomagnesemia   -repleted   #acute right pubic rami fracture  - CT cystogram negative  #hyponatremia  - hold HCTZ  - d/c 2% Nacl and started D5NS  - monitor BMP      HEME/ONC:   #DVT prophylaxis    - SCDs    - enoxaparin 30 q12    Labs:Hb/Hct:  8.1/24.7  -->,  8.8/28.1  -->,  9.0/28.3  -->  Plts:  240  -->,  282  -->,  300  -->              PTT/INR:        ID:  #leukocytosis in setting of trauma vs UTI  WBC- 14.57  --->>,  12.46  --->>,  9.65  --->>10.8  afebrile   Antibiotics-piperacillin/tazobactam IVPB.. 3.375 every 8 hours  #UTI present on admission  -U/a- +leuk/nitrites/occasional bacteria   - on Rocephin x 3 days (completed)  #Pneumonia  -started Zosyn   #left axillary lymphadenopathy seen on CT  - follow up outpatient    ENDO:  #Blood glucose monitoring  - Glucose goal 140-180. if above 180 start ISS      MSK:  #acute right suprapubic rami fracture, acute greater trochanteric fracture  - Ortho c/s --> Xrays complete, MRI R hip pending     #PMHx of osteoarthritis  - follows with Dr. Dubon    DERM:  - DTI screen  - Preventative dressings in place    LINES/DRAINS:  PIV, Indwelling mann      ADVANCED DIRECTIVES:      HCP/Emergency Contact - Carolyn Lobato (daughter) - [phone]    INDICATION FOR SICU: Multiple consecutive rib fractures    DISPO: SICU

## 2024-06-01 NOTE — PROGRESS NOTE ADULT - SUBJECTIVE AND OBJECTIVE BOX
JAYLYN AGARWAL   [id number]   [birth year]  78yF    Admit Date: 05-26-24  Indication for SICU: multiple consecutive rib fractures s/p multiple mechanical falls        ============================  HPI   78F current smoker with PMHx of HTN, HLD, anxiety, CAD s/p CABG x 3 (2009), atrial flutter s/p ablation (2022), OA, and recent admission for infectious colitis (4/26/24), presented to the ED s/p multiple mechanical falls (-HT/-LOC/-AC). Of note, pt presented to ED on 5/23/24 with CC of back pain (pt did not endorse fall at this time), and was discharged without admission. Today, pt's daughter endorsed that pt has experienced multiple mechanical falls over the past month, most recently 5/23/24 after returning home from ED. Pt endorsed weakness in her legs, causing her to fall. CTAP showed acute nondisplaced posterior left 6th, 7th rib fractures, `ramus, and right greater trochanter fracture, as well as several incidental findings (see below). CT head and neck were negative for acute pathology. Labs were significant for WBC 12, Na 124, K 3.4, and alk phos 130.     SICU Consult for hemodynamic monitoring in the setting of multiple consecutive rib fractures. Pt was examined in ED, stable, see Physical exam below.     Pertinent Imaging    < from: CT Abdomen and Pelvis w/ IV Cont (05.26.24 @ 11:21) >  IMPRESSION:  Acute nondisplaced posterior left 6th, 7th rib fractures. Acute mildly displaced posterior left 8th, 9th, 10th, and 11th ribs fractures.  Acute nondisplaced fracture of the right superior pubic ramus (4-397).  Stable chronic T6, T7, T8, T11, L2 vertebral body compression deformities.  Mild mural thickening extending from the cecum to the mid transverse colon likely reflecting colitis of infectious or inflammatory etiology..  New Trace bilateral pleural effusions with adjacent consolidative opacities.    Additional attending comments:  Additional right greater trochanter fracture, seen on image 177 of series 602  There is a punctate focus of air within the left pleural effusion on image 32 series 2. Although no pneumothorax is identified on this examination, a possible trace pneumothorax cannot be excluded.    Dilated common bile duct with intrahepatic biliary duct dilatation and a distended gallbladder.  Findings are suspicious for a possible central obstructing lesion within the distal common bile duct is not excluded. Further evaluation with ERCP is recommended.  Patulous, fluid-filled esophagus. Outpatient endoscopy is recommended.  Colonic wall thickening may be related to underdistention.  Left axillary lymphadenopathy require short-term interval follow-up.  < end of copied text >    < from: CT Cervical Spine No Cont (05.26.24 @ 11:14) >  IMPRESSION:  CT head: Age-indeterminate small infarct involving the left thalamus. No acute intracranial hemorrhage or mass effect.  CT cervical spine: No acute fracture or traumatic subluxation.   < end of copied text >       24 Hour Events  5/31   NIGHT  -vital wnl, 2LNC IS 500cc       DAY  -CXR volume overload > urine lytes  -F/U BM   - send respiratory culture   - lasix 20mg x1      [X] A ten-point review of systems was otherwise negative except as noted above.  [  ] Due to altered mental status/intubation, subjective information was not attained from the patient. History was obtained, to the extent possible, from review of the chart and collateral sources of information.       ====================================   JAYLYN AGARWAL   [id number]   [birth year]  78yF    Admit Date: 05-26-24  Indication for SICU: multiple consecutive rib fractures s/p multiple mechanical falls        ============================  HPI   78F current smoker with PMHx of HTN, HLD, anxiety, CAD s/p CABG x 3 (2009), atrial flutter s/p ablation (2022), OA, and recent admission for infectious colitis (4/26/24), presented to the ED s/p multiple mechanical falls (-HT/-LOC/-AC). Of note, pt presented to ED on 5/23/24 with CC of back pain (pt did not endorse fall at this time), and was discharged without admission. Today, pt's daughter endorsed that pt has experienced multiple mechanical falls over the past month, most recently 5/23/24 after returning home from ED. Pt endorsed weakness in her legs, causing her to fall. CTAP showed acute nondisplaced posterior left 6th, 7th rib fractures, `ramus, and right greater trochanter fracture, as well as several incidental findings (see below). CT head and neck were negative for acute pathology. Labs were significant for WBC 12, Na 124, K 3.4, and alk phos 130.     SICU Consult for hemodynamic monitoring in the setting of multiple consecutive rib fractures. Pt was examined in ED, stable, see Physical exam below.     Pertinent Imaging    < from: CT Abdomen and Pelvis w/ IV Cont (05.26.24 @ 11:21) >  IMPRESSION:  Acute nondisplaced posterior left 6th, 7th rib fractures. Acute mildly displaced posterior left 8th, 9th, 10th, and 11th ribs fractures.  Acute nondisplaced fracture of the right superior pubic ramus (4-397).  Stable chronic T6, T7, T8, T11, L2 vertebral body compression deformities.  Mild mural thickening extending from the cecum to the mid transverse colon likely reflecting colitis of infectious or inflammatory etiology..  New Trace bilateral pleural effusions with adjacent consolidative opacities.    Additional attending comments:  Additional right greater trochanter fracture, seen on image 177 of series 602  There is a punctate focus of air within the left pleural effusion on image 32 series 2. Although no pneumothorax is identified on this examination, a possible trace pneumothorax cannot be excluded.    Dilated common bile duct with intrahepatic biliary duct dilatation and a distended gallbladder.  Findings are suspicious for a possible central obstructing lesion within the distal common bile duct is not excluded. Further evaluation with ERCP is recommended.  Patulous, fluid-filled esophagus. Outpatient endoscopy is recommended.  Colonic wall thickening may be related to underdistention.  Left axillary lymphadenopathy require short-term interval follow-up.  < end of copied text >    < from: CT Cervical Spine No Cont (05.26.24 @ 11:14) >  IMPRESSION:  CT head: Age-indeterminate small infarct involving the left thalamus. No acute intracranial hemorrhage or mass effect.  CT cervical spine: No acute fracture or traumatic subluxation.   < end of copied text >       24 Hour Events  5/31   NIGHT  -vital wnl, 2LNC IS 500cc       DAY  -CXR volume overload > urine lytes  -F/U BM   - send respiratory culture   - lasix 20mg x1      [X] A ten-point review of systems was otherwise negative except as noted above.  [  ] Due to altered mental status/intubation, subjective information was not attained from the patient. History was obtained, to the extent possible, from review of the chart and collateral sources of information.       ====================================    Daily     Daily     Diet, Pureed (05-30-24 @ 17:48)      CURRENT MEDS:  Neurologic Medications  acetaminophen     Tablet .. 650 milliGRAM(s) Oral every 6 hours  DULoxetine 60 milliGRAM(s) Oral daily  meclizine 12.5 milliGRAM(s) Oral once PRN Dizziness/vertigo  melatonin 3 milliGRAM(s) Oral at bedtime  traMADol 25 milliGRAM(s) Oral every 6 hours PRN Moderate Pain (4 - 6)    Respiratory Medications  albuterol/ipratropium for Nebulization 3 milliLiter(s) Nebulizer every 6 hours  sodium chloride 3%  Inhalation 4 milliLiter(s) Inhalation every 12 hours    Cardiovascular Medications  amLODIPine   Tablet 5 milliGRAM(s) Oral daily  propranolol 10 milliGRAM(s) Oral every 12 hours    Gastrointestinal Medications  dextrose 5% + sodium chloride 0.9%. 1000 milliLiter(s) IV Continuous <Continuous>  dicyclomine 20 milliGRAM(s) Oral four times a day before meals  folic acid 1 milliGRAM(s) Oral daily  multivitamin 1 Tablet(s) Oral daily  pantoprazole    Tablet 40 milliGRAM(s) Oral before breakfast  polyethylene glycol 3350 17 Gram(s) Oral every 12 hours  senna 2 Tablet(s) Oral at bedtime  thiamine 100 milliGRAM(s) Oral daily    Genitourinary Medications    Hematologic/Oncologic Medications  enoxaparin Injectable 30 milliGRAM(s) SubCutaneous every 12 hours    Antimicrobial/Immunologic Medications  nystatin    Suspension 825221 Unit(s) Oral every 6 hours  piperacillin/tazobactam IVPB.. 3.375 Gram(s) IV Intermittent every 8 hours    Endocrine/Metabolic Medications  atorvastatin 80 milliGRAM(s) Oral at bedtime    Topical/Other Medications  chlorhexidine 2% Cloths 1 Application(s) Topical <User Schedule>  lidocaine   4% Patch 1 Patch Transdermal daily      ICU Vital Signs Last 24 Hrs  T(C): 36.1 (01 Jun 2024 04:00), Max: 37.1 (31 May 2024 12:00)  T(F): 96.9 (01 Jun 2024 04:00), Max: 98.8 (31 May 2024 12:00)  HR: 76 (01 Jun 2024 06:24) (63 - 77)  BP: 143/65 (01 Jun 2024 06:24) (123/56 - 157/70)  BP(mean): 94 (01 Jun 2024 06:24) (80 - 127)  ABP: --  ABP(mean): --  RR: --  SpO2: 95% (01 Jun 2024 06:24) (90% - 100%)    O2 Parameters below as of 01 Jun 2024 06:24  Patient On (Oxygen Delivery Method): nasal cannula  O2 Flow (L/min): 2    ABG - ( 31 May 2024 03:12 )  pH, Arterial: 7.34  pH, Blood: x     /  pCO2: 43    /  pO2: 99    / HCO3: 23    / Base Excess: -2.6  /  SaO2: 100.0         I&O's Summary    31 May 2024 07:01  -  01 Jun 2024 07:00  --------------------------------------------------------  IN: 2305 mL / OUT: 1825 mL / NET: 480 mL      I&O's Detail    31 May 2024 07:01  -  01 Jun 2024 07:00  --------------------------------------------------------  IN:    dextrose 5% + sodium chloride 0.9%: 1715 mL    IV PiggyBack: 100 mL    Oral Fluid: 490 mL  Total IN: 2305 mL    OUT:    Indwelling Catheter - Urethral (mL): 1825 mL  Total OUT: 1825 mL    Total NET: 480 mL          PHYSICAL EXAM:    General/Neuro: GCS 15, alert & oriented x 3, no focal deficits  Lungs: Rhonchi b/l, Normal expansion/effort. On 2L NC  Cardiovascular: Regular rate and rhythm.   Cardiac Rhythm: Normal Sinus Rhythm  GI: Abdomen soft, Non-tender, Non-distended.    Extremities: Extremities warm, pink, well-perfused.  Derm: Good skin turgor, no skin breakdown. Bruising to posterior left back  : Randall catheter in place.        POCT Blood Glucose.: 115 mg/dL (01 Jun 2024 06:19)  POCT Blood Glucose.: 111 mg/dL (31 May 2024 21:47)  POCT Blood Glucose.: 151 mg/dL (31 May 2024 19:25)  POCT Blood Glucose.: 130 mg/dL (31 May 2024 12:11)                          9.0    10.82 )-----------( 300      ( 31 May 2024 23:02 )             28.3       05-31    134<L>  |  102  |  14  ----------------------------<  118<H>  4.1   |  22  |  0.9    Ca    8.6      31 May 2024 20:50  Phos  3.1     05-31  Mg     1.7     05-31              Urinalysis Basic - ( 31 May 2024 20:50 )    Color: x / Appearance: x / SG: x / pH: x  Gluc: 118 mg/dL / Ketone: x  / Bili: x / Urobili: x   Blood: x / Protein: x / Nitrite: x   Leuk Esterase: x / RBC: x / WBC x   Sq Epi: x / Non Sq Epi: x / Bacteria: x

## 2024-06-02 NOTE — PROGRESS NOTE ADULT - SUBJECTIVE AND OBJECTIVE BOX
JAYLYN AGARWAL   [id number]   [birth year]  78yF    Admit Date: 05-26-24  Indication for SICU: multiple consecutive rib fractures s/p multiple mechanical falls        ============================  HPI   78F current smoker with PMHx of HTN, HLD, anxiety, CAD s/p CABG x 3 (2009), atrial flutter s/p ablation (2022), OA, and recent admission for infectious colitis (4/26/24), presented to the ED s/p multiple mechanical falls (-HT/-LOC/-AC). Of note, pt presented to ED on 5/23/24 with CC of back pain (pt did not endorse fall at this time), and was discharged without admission. Today, pt's daughter endorsed that pt has experienced multiple mechanical falls over the past month, most recently 5/23/24 after returning home from ED. Pt endorsed weakness in her legs, causing her to fall. CTAP showed acute nondisplaced posterior left 6th, 7th rib fractures, `ramus, and right greater trochanter fracture, as well as several incidental findings (see below). CT head and neck were negative for acute pathology. Labs were significant for WBC 12, Na 124, K 3.4, and alk phos 130.     SICU Consult for hemodynamic monitoring in the setting of multiple consecutive rib fractures. Pt was examined in ED, stable, see Physical exam below.     Pertinent Imaging    < from: CT Abdomen and Pelvis w/ IV Cont (05.26.24 @ 11:21) >  IMPRESSION:  Acute nondisplaced posterior left 6th, 7th rib fractures. Acute mildly displaced posterior left 8th, 9th, 10th, and 11th ribs fractures.  Acute nondisplaced fracture of the right superior pubic ramus (4-397).  Stable chronic T6, T7, T8, T11, L2 vertebral body compression deformities.  Mild mural thickening extending from the cecum to the mid transverse colon likely reflecting colitis of infectious or inflammatory etiology..  New Trace bilateral pleural effusions with adjacent consolidative opacities.    Additional attending comments:  Additional right greater trochanter fracture, seen on image 177 of series 602  There is a punctate focus of air within the left pleural effusion on image 32 series 2. Although no pneumothorax is identified on this examination, a possible trace pneumothorax cannot be excluded.    Dilated common bile duct with intrahepatic biliary duct dilatation and a distended gallbladder.  Findings are suspicious for a possible central obstructing lesion within the distal common bile duct is not excluded. Further evaluation with ERCP is recommended.  Patulous, fluid-filled esophagus. Outpatient endoscopy is recommended.  Colonic wall thickening may be related to underdistention.  Left axillary lymphadenopathy require short-term interval follow-up.  < end of copied text >    < from: CT Cervical Spine No Cont (05.26.24 @ 11:14) >  IMPRESSION:  CT head: Age-indeterminate small infarct involving the left thalamus. No acute intracranial hemorrhage or mass effect.  CT cervical spine: No acute fracture or traumatic subluxation.   < end of copied text >       24 Hour Events  06/01  NIGHT  2LNC, 750cc IS  A&O x3    DAY  -additional lasix 20 x 1 for diuresis, f/u response  -requiring BiPAP, retaining CO2, f/u ABG-->7.40/37/69/23  - soft and bite sized diet   -MR hip tomorrow   - possible d/c mann later   - 1600 BMP  - sputum culture   -MRI R hip ordered   -2g Mg, 15Kphos  -supervised feeds       [X] A ten-point review of systems was otherwise negative except as noted above.  [  ] Due to altered mental status/intubation, subjective information was not attained from the patient. History was obtained, to the extent possible, from review of the chart and collateral sources of information.   JAYLYN AGARWAL   [id number]   [birth year]  78yF    Admit Date: 05-26-24  Indication for SICU: multiple consecutive rib fractures s/p multiple mechanical falls        ============================  HPI   78F current smoker with PMHx of HTN, HLD, anxiety, CAD s/p CABG x 3 (2009), atrial flutter s/p ablation (2022), OA, and recent admission for infectious colitis (4/26/24), presented to the ED s/p multiple mechanical falls (-HT/-LOC/-AC). Of note, pt presented to ED on 5/23/24 with CC of back pain (pt did not endorse fall at this time), and was discharged without admission. Today, pt's daughter endorsed that pt has experienced multiple mechanical falls over the past month, most recently 5/23/24 after returning home from ED. Pt endorsed weakness in her legs, causing her to fall. CTAP showed acute nondisplaced posterior left 6th, 7th rib fractures, `ramus, and right greater trochanter fracture, as well as several incidental findings (see below). CT head and neck were negative for acute pathology. Labs were significant for WBC 12, Na 124, K 3.4, and alk phos 130.     SICU Consult for hemodynamic monitoring in the setting of multiple consecutive rib fractures. Pt was examined in ED, stable, see Physical exam below.     Pertinent Imaging    < from: CT Abdomen and Pelvis w/ IV Cont (05.26.24 @ 11:21) >  IMPRESSION:  Acute nondisplaced posterior left 6th, 7th rib fractures. Acute mildly displaced posterior left 8th, 9th, 10th, and 11th ribs fractures.  Acute nondisplaced fracture of the right superior pubic ramus (4-397).  Stable chronic T6, T7, T8, T11, L2 vertebral body compression deformities.  Mild mural thickening extending from the cecum to the mid transverse colon likely reflecting colitis of infectious or inflammatory etiology..  New Trace bilateral pleural effusions with adjacent consolidative opacities.    Additional attending comments:  Additional right greater trochanter fracture, seen on image 177 of series 602  There is a punctate focus of air within the left pleural effusion on image 32 series 2. Although no pneumothorax is identified on this examination, a possible trace pneumothorax cannot be excluded.    Dilated common bile duct with intrahepatic biliary duct dilatation and a distended gallbladder.  Findings are suspicious for a possible central obstructing lesion within the distal common bile duct is not excluded. Further evaluation with ERCP is recommended.  Patulous, fluid-filled esophagus. Outpatient endoscopy is recommended.  Colonic wall thickening may be related to underdistention.  Left axillary lymphadenopathy require short-term interval follow-up.  < end of copied text >    < from: CT Cervical Spine No Cont (05.26.24 @ 11:14) >  IMPRESSION:  CT head: Age-indeterminate small infarct involving the left thalamus. No acute intracranial hemorrhage or mass effect.  CT cervical spine: No acute fracture or traumatic subluxation.   < end of copied text >       24 Hour Events  06/01  NIGHT  2LNC, 750cc IS  A&O x3    DAY  -additional lasix 20 x 1 for diuresis, f/u response  -requiring BiPAP, retaining CO2, f/u ABG-->7.40/37/69/23  - soft and bite sized diet   -MR hip tomorrow   - possible d/c mann later   - 1600 BMP  - sputum culture   -MRI R hip ordered   -2g Mg, 15Kphos  -supervised feeds       [X] A ten-point review of systems was otherwise negative except as noted above.  [  ] Due to altered mental status/intubation, subjective information was not attained from the patient. History was obtained, to the extent possible, from review of the chart and collateral sources of information.    Daily           CURRENT MEDS:  Neurologic Medications  acetaminophen     Tablet .. 650 milliGRAM(s) Oral every 6 hours  DULoxetine 60 milliGRAM(s) Oral daily  meclizine 12.5 milliGRAM(s) Oral once PRN Dizziness/vertigo  melatonin 3 milliGRAM(s) Oral at bedtime  traMADol 25 milliGRAM(s) Oral every 6 hours PRN Moderate Pain (4 - 6)    Respiratory Medications  albuterol/ipratropium for Nebulization 3 milliLiter(s) Nebulizer every 6 hours  sodium chloride 3%  Inhalation 4 milliLiter(s) Inhalation every 12 hours    Cardiovascular Medications  amLODIPine   Tablet 5 milliGRAM(s) Oral daily  propranolol 10 milliGRAM(s) Oral every 12 hours    Gastrointestinal Medications  dextrose 5% + sodium chloride 0.9%. 1000 milliLiter(s) IV Continuous <Continuous>  dicyclomine 20 milliGRAM(s) Oral four times a day before meals  folic acid 1 milliGRAM(s) Oral daily  multivitamin 1 Tablet(s) Oral daily  pantoprazole    Tablet 40 milliGRAM(s) Oral before breakfast  polyethylene glycol 3350 17 Gram(s) Oral every 12 hours  senna 2 Tablet(s) Oral at bedtime  thiamine 100 milliGRAM(s) Oral daily    Genitourinary Medications    Hematologic/Oncologic Medications  enoxaparin Injectable 30 milliGRAM(s) SubCutaneous every 12 hours    Antimicrobial/Immunologic Medications  nystatin    Suspension 420480 Unit(s) Oral every 6 hours  piperacillin/tazobactam IVPB.. 3.375 Gram(s) IV Intermittent every 8 hours    Endocrine/Metabolic Medications  atorvastatin 80 milliGRAM(s) Oral at bedtime    Topical/Other Medications  chlorhexidine 2% Cloths 1 Application(s) Topical <User Schedule>  lidocaine   4% Patch 1 Patch Transdermal daily  nicotine -   7 mG/24Hr(s) Patch 1 Patch Transdermal daily      ICU Vital Signs Last 24 Hrs  T(C): 36.3 (02 Jun 2024 08:00), Max: 36.3 (02 Jun 2024 04:00)  T(F): 97.4 (02 Jun 2024 08:00), Max: 97.4 (02 Jun 2024 04:00)  HR: 86 (02 Jun 2024 08:00) (69 - 91)  BP: 131/62 (02 Jun 2024 08:00) (107/77 - 178/79)  BP(mean): 88 (02 Jun 2024 08:00) (80 - 115)  ABP: --  ABP(mean): --  RR: 22 (02 Jun 2024 08:00) (17 - 22)  SpO2: 92% (02 Jun 2024 08:00) (52% - 100%)    O2 Parameters below as of 02 Jun 2024 07:30  Patient On (Oxygen Delivery Method): room air      ABG - ( 01 Jun 2024 11:31 )  pH, Arterial: 7.40  pH, Blood: x     /  pCO2: 37    /  pO2: 69    / HCO3: 23    / Base Excess: -1.6  /  SaO2: 96.3                I&O's Summary    01 Jun 2024 07:01  -  02 Jun 2024 07:00  --------------------------------------------------------  IN: 1570 mL / OUT: 2200 mL / NET: -630 mL    02 Jun 2024 07:01  -  02 Jun 2024 08:35  --------------------------------------------------------  IN: 250 mL / OUT: 0 mL / NET: 250 mL      I&O's Detail    01 Jun 2024 07:01  -  02 Jun 2024 07:00  --------------------------------------------------------  IN:    dextrose 5% + sodium chloride 0.9%: 1050 mL    IV PiggyBack: 100 mL    Oral Fluid: 420 mL  Total IN: 1570 mL    OUT:    Indwelling Catheter - Urethral (mL): 2200 mL  Total OUT: 2200 mL    Total NET: -630 mL      02 Jun 2024 07:01  -  02 Jun 2024 08:35  --------------------------------------------------------  IN:    dextrose 5% + sodium chloride 0.9%: 50 mL    Oral Fluid: 200 mL  Total IN: 250 mL    OUT:    Voided (mL): 0 mL  Total OUT: 0 mL    Total NET: 250 mL          PHYSICAL EXAM:    General/Neuro: alert & oriented x 3, no focal deficits.   Lungs: Rhonchi b/l, Normal expansion/effort. On RA  Cardiovascular: Regular rate and rhythm.   Cardiac Rhythm: Normal Sinus Rhythm  GI: Abdomen soft, Non-tender, Non-distended.    Extremities: Extremities warm, pink, well-perfused.   Derm: Good skin turgor, no skin breakdown.    : Voiding via primafit      CXR:       LABS:  CAPILLARY BLOOD GLUCOSE      POCT Blood Glucose.: 115 mg/dL (02 Jun 2024 07:44)  POCT Blood Glucose.: 116 mg/dL (02 Jun 2024 00:17)  POCT Blood Glucose.: 120 mg/dL (01 Jun 2024 11:34)                          9.1    9.39  )-----------( 335      ( 02 Jun 2024 04:45 )             26.3       06-02    132<L>  |  100  |  9<L>  ----------------------------<  102<H>  3.7   |  23  |  0.8    Ca    8.2<L>      02 Jun 2024 04:45  Phos  3.1     06-02  Mg     2.0     06-02              Urinalysis Basic - ( 02 Jun 2024 04:45 )    Color: x / Appearance: x / SG: x / pH: x  Gluc: 102 mg/dL / Ketone: x  / Bili: x / Urobili: x   Blood: x / Protein: x / Nitrite: x   Leuk Esterase: x / RBC: x / WBC x   Sq Epi: x / Non Sq Epi: x / Bacteria: x         JAYLYN AGARWAL   [id number]   [birth year]  78yF    Admit Date: 05-26-24  Indication for SICU: multiple consecutive rib fractures s/p multiple mechanical falls        ============================  HPI   78F current smoker with PMHx of HTN, HLD, anxiety, CAD s/p CABG x 3 (2009), atrial flutter s/p ablation (2022), OA, and recent admission for infectious colitis (4/26/24), presented to the ED s/p multiple mechanical falls (-HT/-LOC/-AC). Of note, pt presented to ED on 5/23/24 with CC of back pain (pt did not endorse fall at this time), and was discharged without admission. Today, pt's daughter endorsed that pt has experienced multiple mechanical falls over the past month, most recently 5/23/24 after returning home from ED. Pt endorsed weakness in her legs, causing her to fall. CTAP showed acute nondisplaced posterior left 6th, 7th rib fractures, `ramus, and right greater trochanter fracture, as well as several incidental findings (see below). CT head and neck were negative for acute pathology. Labs were significant for WBC 12, Na 124, K 3.4, and alk phos 130.     SICU Consult for hemodynamic monitoring in the setting of multiple consecutive rib fractures. Pt was examined in ED, stable, see Physical exam below.     Pertinent Imaging    < from: CT Abdomen and Pelvis w/ IV Cont (05.26.24 @ 11:21) >  IMPRESSION:  Acute nondisplaced posterior left 6th, 7th rib fractures. Acute mildly displaced posterior left 8th, 9th, 10th, and 11th ribs fractures.  Acute nondisplaced fracture of the right superior pubic ramus (4-397).  Stable chronic T6, T7, T8, T11, L2 vertebral body compression deformities.  Mild mural thickening extending from the cecum to the mid transverse colon likely reflecting colitis of infectious or inflammatory etiology..  New Trace bilateral pleural effusions with adjacent consolidative opacities.    Additional attending comments:  Additional right greater trochanter fracture, seen on image 177 of series 602  There is a punctate focus of air within the left pleural effusion on image 32 series 2. Although no pneumothorax is identified on this examination, a possible trace pneumothorax cannot be excluded.    Dilated common bile duct with intrahepatic biliary duct dilatation and a distended gallbladder.  Findings are suspicious for a possible central obstructing lesion within the distal common bile duct is not excluded. Further evaluation with ERCP is recommended.  Patulous, fluid-filled esophagus. Outpatient endoscopy is recommended.  Colonic wall thickening may be related to underdistention.  Left axillary lymphadenopathy require short-term interval follow-up.  < end of copied text >    < from: CT Cervical Spine No Cont (05.26.24 @ 11:14) >  IMPRESSION:  CT head: Age-indeterminate small infarct involving the left thalamus. No acute intracranial hemorrhage or mass effect.  CT cervical spine: No acute fracture or traumatic subluxation.   < end of copied text >       24 Hour Events  06/01  NIGHT  2LNC, 750cc IS  A&O x3    DAY  -additional Lasix 20 x 1 for diuresis, f/u response  -requiring BiPAP, retaining CO2, f/u ABG-->7.40/37/69/23  - soft and bite sized diet   -MR hip tomorrow   - possible d/c [place] later   - 1600 BMP  - sputum culture   -MRI R hip ordered   -2g Mg, 15Kphos  -supervised feeds       [X] A ten-point review of systems was otherwise negative except as noted above.  [  ] Due to altered mental status/intubation, subjective information was not attained from the patient. History was obtained, to the extent possible, from review of the chart and collateral sources of information.    Daily       CURRENT MEDS:  Neurologic Medications  acetaminophen     Tablet .. 650 milliGRAM(s) Oral every 6 hours  DULoxetine 60 milliGRAM(s) Oral daily  meclizine 12.5 milliGRAM(s) Oral once PRN Dizziness/vertigo  melatonin 3 milliGRAM(s) Oral at bedtime  traMADol 25 milliGRAM(s) Oral every 6 hours PRN Moderate Pain (4 - 6)    Respiratory Medications  albuterol/ipratropium for Nebulization 3 milliLiter(s) Nebulizer every 6 hours  sodium chloride 3%  Inhalation 4 milliLiter(s) Inhalation every 12 hours    Cardiovascular Medications  amLODIPine   Tablet 5 milliGRAM(s) Oral daily  propranolol 10 milliGRAM(s) Oral every 12 hours    Gastrointestinal Medications  dextrose 5% + sodium chloride 0.9%. 1000 milliLiter(s) IV Continuous <Continuous>  dicyclomine 20 milliGRAM(s) Oral four times a day before meals  folic acid 1 milliGRAM(s) Oral daily  multivitamin 1 Tablet(s) Oral daily  pantoprazole    Tablet 40 milliGRAM(s) Oral before breakfast  polyethylene glycol 3350 17 Gram(s) Oral every 12 hours  senna 2 Tablet(s) Oral at bedtime  thiamine 100 milliGRAM(s) Oral daily    Genitourinary Medications    Hematologic/Oncologic Medications  enoxaparin Injectable 30 milliGRAM(s) SubCutaneous every 12 hours    Antimicrobial/Immunologic Medications  nystatin    Suspension 173487 Unit(s) Oral every 6 hours  piperacillin/tazobactam IVPB.. 3.375 Gram(s) IV Intermittent every 8 hours    Endocrine/Metabolic Medications  atorvastatin 80 milliGRAM(s) Oral at bedtime    Topical/Other Medications  chlorhexidine 2% Cloths 1 Application(s) Topical <User Schedule>  lidocaine   4% Patch 1 Patch Transdermal daily  nicotine -   7 mG/24Hr(s) Patch 1 Patch Transdermal daily      ICU Vital Signs Last 24 Hrs  T(C): 36.3 (02 Jun 2024 08:00), Max: 36.3 (02 Jun 2024 04:00)  T(F): 97.4 (02 Jun 2024 08:00), Max: 97.4 (02 Jun 2024 04:00)  HR: 86 (02 Jun 2024 08:00) (69 - 91)  BP: 131/62 (02 Jun 2024 08:00) (107/77 - 178/79)  BP(mean): 88 (02 Jun 2024 08:00) (80 - 115)  ABP: --  ABP(mean): --  RR: 22 (02 Jun 2024 08:00) (17 - 22)  SpO2: 92% (02 Jun 2024 08:00) (52% - 100%)    O2 Parameters below as of 02 Jun 2024 07:30  Patient On (Oxygen Delivery Method): room air      ABG - ( 01 Jun 2024 11:31 )  pH, Arterial: 7.40  pH, Blood: x     /  pCO2: 37    /  pO2: 69    / HCO3: 23    / Base Excess: -1.6  /  SaO2: 96.3      I&O's Summary    01 Jun 2024 07:01  -  02 Jun 2024 07:00  --------------------------------------------------------  IN: 1570 mL / OUT: 2200 mL / NET: -630 mL    02 Jun 2024 07:01  -  02 Jun 2024 08:35  --------------------------------------------------------  IN: 250 mL / OUT: 0 mL / NET: 250 mL      I&O's Detail    01 Jun 2024 07:01  -  02 Jun 2024 07:00  --------------------------------------------------------  IN:    dextrose 5% + sodium chloride 0.9%: 1050 mL    IV PiggyBack: 100 mL    Oral Fluid: 420 mL  Total IN: 1570 mL    OUT:    Indwelling Catheter - Urethral (mL): 2200 mL  Total OUT: 2200 mL    Total NET: -630 mL      02 Jun 2024 07:01  -  02 Jun 2024 08:35  --------------------------------------------------------  IN:    dextrose 5% + sodium chloride 0.9%: 50 mL    Oral Fluid: 200 mL  Total IN: 250 mL    OUT:    Voided (mL): 0 mL  Total OUT: 0 mL    Total NET: 250 mL    PHYSICAL EXAM:    General/Neuro: alert & oriented x 3, no focal deficits.   Lungs: Rhonchi b/l, Normal expansion/effort. On RA  Cardiovascular: Regular rate and rhythm.   Cardiac Rhythm: Normal Sinus Rhythm  GI: Abdomen soft, Non-tender, Non-distended.    Extremities: Extremities warm, pink, well-perfused.   Derm: Good skin turgor, no skin breakdown.    : Voiding via primafit    LABS:  CAPILLARY BLOOD GLUCOSE  POCT Blood Glucose.: 115 mg/dL (02 Jun 2024 07:44)  POCT Blood Glucose.: 116 mg/dL (02 Jun 2024 00:17)  POCT Blood Glucose.: 120 mg/dL (01 Jun 2024 11:34)                          9.1    9.39  )-----------( 335      ( 02 Jun 2024 04:45 )             26.3       06-02    132<L>  |  100  |  9<L>  ----------------------------<  102<H>  3.7   |  23  |  0.8    Ca    8.2<L>      02 Jun 2024 04:45  Phos  3.1     06-02  Mg     2.0     06-02        Urinalysis Basic - ( 02 Jun 2024 04:45 )  Color: x / Appearance: x / SG: x / pH: x  Gluc: 102 mg/dL / Ketone: x  / Bili: x / Urobili: x   Blood: x / Protein: x / Nitrite: x   Leuk Esterase: x / RBC: x / WBC x   Sq Epi: x / Non Sq Epi: x / Bacteria: x

## 2024-06-02 NOTE — PROGRESS NOTE ADULT - ASSESSMENT
Assessment and Recommendation:   78y Female w/ multiple comorbidities presenting with multiple consecutive rib fractures s/p multiple falls due to lower extremities now admitted to SICU for hemodynamic monitoring.    NEURO/PSYCH:  #Acute pain  - acetaminophen Tablet .. 650 milliGRAM(s) Oral every 6 hours  - tramadol PRN  - lidocaine patch  #Bilateral lower extremity weakness; PMHx of stable chronic vertebral fractures; r/o impingment   - MRI lumbar spine pending  #PMHx of anxiety  - Continue home DULoxetine 60 milliGRAM(s) Oral daily  #B/L UE fasciculations  - neurology c/s - tizanidine prn, toxic metabolic w/u, MRI brain/c-spine  - holding zyprexa   - added thiamine/folate/MVI  - started propranolol 10mg q12    RESP:   #multiple acute consecutive posterior left 6-11th rib fractures  -pain control  #Respiratory failure  - increased respiratory support requirement, weaned to 2L NC  - BiPAP overnight   -course breath sounds/rhonchi, started chest PT/3%saline/duonebs  #bilateral pleural effusion, r/o pneumothorax  - bilateral pleural effusion with punctate air, concerning for possible pneumothorax  - CXR, no PTX evident  #Activity  - Activity - NWB RLE  #Current smoker, 1/2 PPD  - nicotine patch    CARDIAC:   #PMHx of HTN  - continue home amlodipine Tablet 5 milliGRAM(s) Oral daily  - HOLDING home olmesartan-hydrochlorothiazide 40 mg-25 mg oral tablet   #PMHx of HLD  - atorvastatin 80 milliGRAM(s) Oral at bedtime (home med: rosuvastatin 20mg QD)  #PMHx of CAD s/p CABG x 3  - follows with Dr. Brunson  #PMHx of atrial flutter s/p ablation (2022)  - EKG NSR  #Imaging:   - EKG: NSR  - TTE, 5/27 - EF 67%. Mild MR, Mild/mod TR, mild pHTN, simple atheroma in aortic arch    GI/NUTR:   #Diet: soft/bite sized  - 1:1 feeds   - aspiration precautions, HOB 30  #GI Prophylaxis  #hx of PUD    -- pantoprazole Tablet: 40 mg (home rx)  #Bowel regimen   - senna: 2 Tablet(s), Oral, at bedtime   - miralax q12  -last bowel movement 5/31  #Recent admission for infective colitis (4/24)  - Continue home dicyclomine 20 milliGRAM(s) Oral four times a day before meals  #dilated CBD, colonic wall, esophageal fluid  - Follow-up outpatient  - GI recs appreciated: possible ERCP    /RENAL:   #urine output in critically ill  - Mann placed for CT cystogram, 1.5L output on insertion --> retaining mann  Monitor UO-mann in place  #acute right pubic rami fracture  - CT cystogram negative  #hyponatremia  - hold HCTZ  - d/c 2% Nacl and started D5NS  - monitor BMP      HEME/ONC:   #DVT prophylaxis    - SCDs    - enoxaparin 30 q12    ID:  #leukocytosis in setting of trauma vs UTI  afebrile   Antibiotics-piperacillin/tazobactam IVPB.. 3.375 every 8 hours  #UTI present on admission  -U/a- +leuk/nitrites/occasional bacteria   - on Rocephin x 3 days (completed)  #Pneumonia  -started Zosyn   #left axillary lymphadenopathy seen on CT  - follow up outpatient    ENDO:  #Blood glucose monitoring  - Glucose goal 140-180. if above 180 start ISS      MSK:  #acute right suprapubic rami fracture, acute greater trochanteric fracture  - Ortho c/s --> Xrays complete, MRI R hip pending     #PMHx of osteoarthritis  - follows with Dr. Jagdish ROBLES:  - DTI screen  - Preventative dressings in place    LINES/DRAINS:  PIV, Indwelling mann      ADVANCED DIRECTIVES:      HCP/Emergency Contact - Carolyn Lobato (daughter) - [phone]    INDICATION FOR SICU: Multiple consecutive rib fractures    DISPO: SICU  Assessment and Recommendation:   78y Female w/ multiple comorbidities presenting with multiple consecutive rib fractures s/p multiple falls due to lower extremities now admitted to SICU for hemodynamic monitoring.    NEURO/PSYCH:  #Acute pain  - acetaminophen Tablet .. 650 milliGRAM(s) Oral every 6 hours  - tramadol PRN  - lidocaine patch  #Bilateral lower extremity weakness; PMHx of stable chronic vertebral fractures; r/o impingment   - MRI lumbar spine pending  #PMHx of anxiety  - Continue home DULoxetine 60 milliGRAM(s) Oral daily  #B/L UE fasciculations  - neurology c/s - tizanidine prn, toxic metabolic w/u, MRI brain/c-spine  - holding zyprexa   - added thiamine/folate/MVI  - started propranolol 10mg q12    RESP:   #multiple acute consecutive posterior left 6-11th rib fractures  -pain control  #Respiratory failure  - increased respiratory support requirement, weaned to 2L NC  - BiPAP overnight   -course breath sounds/rhonchi, started chest PT/3%saline/duonebs  #bilateral pleural effusion, r/o pneumothorax  - bilateral pleural effusion with punctate air, concerning for possible pneumothorax  - CXR, no PTX evident  #Activity  - Activity - NWB RLE  #Current smoker, 1/2 PPD  - nicotine patch    CARDIAC:   #PMHx of HTN  - continue home amlodipine Tablet 5 milliGRAM(s) Oral daily  - HOLDING home olmesartan-hydrochlorothiazide 40 mg-25 mg oral tablet   #PMHx of HLD  - atorvastatin 80 milliGRAM(s) Oral at bedtime (home med: rosuvastatin 20mg QD)  #PMHx of CAD s/p CABG x 3  - follows with Dr. Brunson  #PMHx of atrial flutter s/p ablation (2022)  - EKG NSR  #Imaging:   - EKG: NSR  - TTE, 5/27 - EF 67%. Mild MR, Mild/mod TR, mild pHTN, simple atheroma in aortic arch    GI/NUTR:   #Diet: soft/bite sized  - 1:1 feeds   - aspiration precautions, HOB 30  #GI Prophylaxis  #hx of PUD    -- pantoprazole Tablet: 40 mg (home rx)  #Bowel regimen   - senna: 2 Tablet(s), Oral, at bedtime   - miralax q12  -last bowel movement 5/30  #Recent admission for infective colitis (4/24)  - Continue home dicyclomine 20 milliGRAM(s) Oral four times a day before meals  #dilated CBD, colonic wall, esophageal fluid  - Follow-up outpatient  - GI recs appreciated: possible ERCP    /RENAL:   #urine output in critically ill  - Mann placed for CT cystogram, 1.5L output on insertion  -Mann removed 6/2, voiding via primafit  Monitor UO-mann in place  #acute right pubic rami fracture  - CT cystogram negative  #hyponatremia  - hold HCTZ  - d/c 2% Nacl and started D5NS @50cc/hr  - monitor BMP      HEME/ONC:   #DVT prophylaxis    - SCDs    - enoxaparin 30 q12    ID:  #leukocytosis in setting of trauma vs UTI  afebrile   Antibiotics-piperacillin/tazobactam IVPB.. 3.375 every 8 hours  #UTI present on admission  -U/a- +leuk/nitrites/occasional bacteria   - on Rocephin x 3 days (completed)  #Pneumonia  -started Zosyn   #left axillary lymphadenopathy seen on CT  - follow up outpatient    ENDO:  #Blood glucose monitoring  - Glucose goal 140-180. if above 180 start ISS      MSK:  #acute right suprapubic rami fracture, acute greater trochanteric fracture  - Ortho c/s --> Xrays complete, MRI R hip pending     #PMHx of osteoarthritis  - follows with Dr. Jagdish ROBLES:  - DTI screen  - Preventative dressings in place    LINES/DRAINS:  PIV, Indwelling mann (removed 6/2)      ADVANCED DIRECTIVES:      HCP/Emergency Contact - Carolyn Lobato (daughter) - [phone]    INDICATION FOR SICU: Multiple consecutive rib fractures    DISPO: SICU

## 2024-06-02 NOTE — CONSULT NOTE ADULT - SUBJECTIVE AND OBJECTIVE BOX
Orthopedic Consult  Time Consult called: 9:43Am ; Time Patient seen:9:43 ; Time Abx given (if appropriate):  CC: Asked to evaluate greater trochanteric and pelvic fracture by Trauma Surgery service.    HPI:  78yF w/ PMHx of CAD, tobacco dependence, HTN, anxiety, atrial flutter presented to [facility] with complaints of pain about above noted fracture.  Clarissat sustained the fracture as a result of a GLF.  HX of multiple falls.   No recent -HT, -LOC, -AC.  No Hx of head injury, LOC or diploplia.  Radiographs were attained, confirming above noted fracture and Orthopedics called to evaluate.  Patient initially evaluated by orthopedics, but asked by trauma surgery service to comment on patient's ability to weight bear.    Patient with complaints with pain localized to above noted fracture with no radiation.  Patient denies any sensory complaints associated with the fracture. Has prior hx of pelvic fractures.  Presumed Dx. of osteoporosis.      Review of Symptoms: No recent fevers, night sweats or other constitutional symptoms.  No unexplained weight loss, weight gain, bowl or bladder disturbance.    PMedHx and PsurgHx:     Multiple fractures of ribs, left side, initial encounter for closed fracture    FH:   1. heart disease    - CAD (coronary artery disease)     - Tobacco dependence    - HTN (hypertension)    - Atrial flutter    - S/P CABG x 3    Anxiety    2. Orthopedic Hx:     - Fracture of single pubic ramus    - Chronic Back pain    - presumed diagnosis of osteoporosis      Medications:   acetaminophen     Tablet .. 650 milliGRAM(s) Oral every 6 hours  albuterol/ipratropium for Nebulization 3 milliLiter(s) Nebulizer every 6 hours  amLODIPine   Tablet 5 milliGRAM(s) Oral daily  atorvastatin 80 milliGRAM(s) Oral at bedtime  dicyclomine 20 milliGRAM(s) Oral four times a day before meals  DULoxetine 60 milliGRAM(s) Oral daily  enoxaparin Injectable 30 milliGRAM(s) SubCutaneous every 12 hours  folic acid 1 milliGRAM(s) Oral daily  furosemide   Injectable 20 milliGRAM(s) IV Push once  lidocaine   4% Patch 1 Patch Transdermal daily  meclizine 12.5 milliGRAM(s) Oral once PRN  melatonin 3 milliGRAM(s) Oral at bedtime  nicotine -   7 mG/24Hr(s) Patch 1 Patch Transdermal daily  nystatin    Suspension 119451 Unit(s) Oral every 6 hours  pantoprazole    Tablet 40 milliGRAM(s) Oral before breakfast  propranolol 10 milliGRAM(s) Oral every 12 hours  traMADol 25 milliGRAM(s) Oral every 6 hours PRN    Allergies: No Known Allergies      Social: Ambulatory status walks with assistive device  Cigarette Use: chronic use  counseled at the bedside at length regarding the risks of continued tobacco use, length of counseling ~10min  cessation suggested, various options discussed  nicotine patch while inpatient offered  advised to follow up with outpatient PMD for continued monitoring and therapy  ETOH use: social; no evidence of abuse  Drugs: Denies.    Physical Exam:   Pelvis:   RLE:  Skin intact  TTP lateral hip (over fracture)   some limitations to ROM secondary to pain; but little pain with gentle IR/ER.  No pain with log-roll or axial compression  Cannot SLR  SILT DP/SP/T/Tong/Sa.   EHL/FHL/TA/Gs motor intact.  2+ DP/PT pulses with brisk cap refill distally.  Compartments soft and compressible.   No pain on passive stretch.      Laboratory:                        9.1    9.39  )-----------( 335      ( 02 Jun 2024 04:45 )             26.3       06-02    132<L>  |  100  |  9<L>  ----------------------------<  102<H>  3.7   |  23  |  0.8    Ca    8.2<L>      02 Jun 2024 04:45  Phos  3.1     06-02  Mg     2.0     06-02        06-02    132<L>  |  100  |  9<L>  ----------------------------<  102<H>  3.7   |  23  |  0.8    Ca    8.2<L>      02 Jun 2024 04:45  Phos  3.1     06-02  Mg     2.0     06-02          Radiographs  Pelvis and hip (AP, Lateral):  No  evidence of acute femoral neck fracture.  AVN with advanced hip arthritis.     CT Abd: Old left rami fractures.  Question radiology read about sacral buckle fracture.  May represent SI arthritis changes.  Advanced arthritic changed right hip.  Stable small greater trochanteric avulsion fracture.        Impression and Plan:78y Female patient with stable right greater trochanteric fracture. Advanced hip arthritis.  Will benefit from out patient total hip if patient can be medically optimized.  FOr now:  - WBAT.  Mobilize with PT.  - Does not need further w/u with MRI  - Will follow.

## 2024-06-02 NOTE — PROGRESS NOTE ADULT - SUBJECTIVE AND OBJECTIVE BOX
GENERAL SURGERY PROGRESS NOTE     JAYLYN AGARWAL  [address] [place] [address] day :8d    POD:  Procedure:   Surgical Attending: Rich Ruiz  Overnight events: no acute events overnight. Pt is tolerating a pureed diet. She is currently on 2L NC saturating well.     T(F): 96.8 (06-02-24 @ 00:00), Max: 97.2 (06-01-24 @ 12:00)  HR: 74 (06-02-24 @ 01:00) (69 - 83)  BP: 164/70 (06-02-24 @ 01:00) (107/77 - 164/70)  ABP: --  ABP(mean): --  RR: 19 (06-01-24 @ 16:00) (17 - 20)  SpO2: 97% (06-02-24 @ 01:00) (52% - 100%)    IN'S / OUT's:    05-31-24 @ 07:01  -  06-01-24 @ 07:00  --------------------------------------------------------  IN:    dextrose 5% + sodium chloride 0.9%: 1765 mL    IV PiggyBack: 100 mL    Oral Fluid: 490 mL  Total IN: 2355 mL    OUT:    Indwelling Catheter - Urethral (mL): 1825 mL  Total OUT: 1825 mL    Total NET: 530 mL      06-01-24 @ 07:01  -  06-02-24 @ 01:14  --------------------------------------------------------  IN:    dextrose 5% + sodium chloride 0.9%: 800 mL    IV PiggyBack: 100 mL    Oral Fluid: 420 mL  Total IN: 1320 mL    OUT:    Indwelling Catheter - Urethral (mL): 2200 mL  Total OUT: 2200 mL    Total NET: -880 mL          PHYSICAL EXAM:  GENERAL: NAD  CHEST/LUNG: equal chest rise b/l  HEART: Regular rate and rhythm  ABDOMEN: Soft, Nontender, Nondistended;   EXTREMITIES:  No clubbing, cyanosis, or edema      LABS  Labs:  CAPILLARY BLOOD GLUCOSE      POCT Blood Glucose.: 116 mg/dL (02 Jun 2024 00:17)  POCT Blood Glucose.: 120 mg/dL (01 Jun 2024 11:34)  POCT Blood Glucose.: 115 mg/dL (01 Jun 2024 06:19)                          9.0    10.82 )-----------( 300      ( 31 May 2024 23:02 )             28.3         06-01    133<L>  |  100  |  11  ----------------------------<  114<H>  3.9   |  22  |  0.8      Calcium: 8.2 mg/dL (06-01-24 @ 16:00)      LFTs:     Blood Gas Arterial, Lactate: 1.0 mmol/L (06-01-24 @ 11:31)  Blood Gas Arterial, Lactate: 0.5 mmol/L (05-31-24 @ 03:12)  Blood Gas Arterial, Lactate: 0.5 mmol/L (05-30-24 @ 13:27)  Blood Gas Arterial, Lactate: 0.6 mmol/L (05-30-24 @ 04:17)    ABG - ( 01 Jun 2024 11:31 )  pH: 7.40  /  pCO2: 37    /  pO2: 69    / HCO3: 23    / Base Excess: -1.6  /  SaO2: 96.3            ABG - ( 31 May 2024 03:12 )  pH: 7.34  /  pCO2: 43    /  pO2: 99    / HCO3: 23    / Base Excess: -2.6  /  SaO2: 100.0           ABG - ( 30 May 2024 13:27 )  pH: 7.32  /  pCO2: 46    /  pO2: 134   / HCO3: 24    / Base Excess: -2.3  /  SaO2: 100.0             Coags:            Urinalysis Basic - ( 01 Jun 2024 16:00 )    Color: x / Appearance: x / SG: x / pH: x  Gluc: 114 mg/dL / Ketone: x  / Bili: x / Urobili: x   Blood: x / Protein: x / Nitrite: x   Leuk Esterase: x / RBC: x / WBC x   Sq Epi: x / Non Sq Epi: x / Bacteria: x            RADIOLOGY & ADDITIONAL TESTS:      A/P:  JAYLYN STEFANO is a 78yFemale w/ PMHx of HTN, HLD, anxiety, CAD s/p CABG x3 (2009), Atrial flutter s/p ablation (2022), OA, recent admission for infectious colitis (4/26/24), seen as a TRAUMA CONSULT s/p multiple mechanical falls (-HT, -LOC, -AC). The following injuries were identified:     # Acute nondisplaced posterior left 6-7 rib fractures   # Acute mildly displaced left 8-11 rib fractures   # Acute nondisplaced fracture of the right superior pubic ramus    # Right greater trochanteric fracture  --> MRI right hip pending        PLAN:   - hemodynamic monitoring  - f/u MRI Hip, C-spine, L-spine, and Brain  - keep NWB RLE until MRI hip complete  - DVT and GI ppx  - daily labs, replete electrolytes as needed   - multi modal pain control   - continue with pureed diet  - Rest of care per SICU      #Antibiotics: nystatin    Suspension 462650 Unit(s) Oral every 6 hours, 05-30-24 @ 09:48  piperacillin/tazobactam IVPB.. 3.375 Gram(s) IV Intermittent every 8 hours, 05-30-24 @ 06:00   Day **  #DVT ppx: enoxaparin Injectable 30 milliGRAM(s) SubCutaneous every 12 hours    #GI ppx: pantoprazole    Tablet 40 milliGRAM(s) Oral before breakfast    Disposition:  SICU    Above plan to be discussed with Attending Surgeon Dr. Cornell  , patient, patient family, and Primary team    [employer] [id number]

## 2024-06-03 NOTE — CHART NOTE - NSCHARTNOTEFT_GEN_A_CORE
Registered Dietitian Follow-Up     Patient Profile Reviewed                           Yes [x]   No []     Pertinent Subjective Information:  RD spoke with RN - patient with poor PO intake - is more lethargic.     Pertinent Medical Interventions:  Acute nondisplaced posterior left 6-7 rib fractures; Acute mildly displaced left 8-11 rib fractures; Acute nondisplaced fracture of the right superior pubic ramus;  Right greater trochanteric fracture  Respiratory failure 2/2 to hypercapnia, requiring NIV support - Bipap overnight as needed; 1:1 feeds; Holding 2/2 loose bowel movements - last BM movement 6/3; dilated CVD, colonic wall, esophageal fluid - possible ERCP    Diet order:   Diet, Regular (24 @ 00:58) [Active]    Anthropometrics:  Height (cm): 157.5 (24 @ 04:53)  Weight: 59 kg   BMI: 23.8  IBW: 50 kg     Daily Weight in k.2 (05-31), Weight in k.2 (-28)    MEDICATIONS  (STANDING):  acetaminophen     Tablet .. 650 milliGRAM(s) Oral every 6 hours  albuterol/ipratropium for Nebulization 3 milliLiter(s) Nebulizer every 6 hours  amLODIPine   Tablet 5 milliGRAM(s) Oral daily  atorvastatin 80 milliGRAM(s) Oral at bedtime  bacitracin   Ointment 1 Application(s) Topical two times a day  chlorhexidine 2% Cloths 1 Application(s) Topical <User Schedule>  dextrose 5% + sodium chloride 0.9%. 1000 milliLiter(s) (50 mL/Hr) IV Continuous <Continuous>  dicyclomine 20 milliGRAM(s) Oral four times a day before meals  DULoxetine 60 milliGRAM(s) Oral daily  enoxaparin Injectable 30 milliGRAM(s) SubCutaneous every 12 hours  folic acid 1 milliGRAM(s) Oral daily  furosemide   Injectable 20 milliGRAM(s) IV Push once  lidocaine   4% Patch 1 Patch Transdermal daily  melatonin 3 milliGRAM(s) Oral at bedtime  multivitamin 1 Tablet(s) Oral daily  nicotine -   7 mG/24Hr(s) Patch 1 Patch Transdermal daily  nystatin    Suspension 992691 Unit(s) Oral every 6 hours  pantoprazole    Tablet 40 milliGRAM(s) Oral before breakfast  piperacillin/tazobactam IVPB.. 3.375 Gram(s) IV Intermittent every 8 hours  propranolol 10 milliGRAM(s) Oral every 12 hours  sodium chloride 3%  Inhalation 4 milliLiter(s) Inhalation every 12 hours  tamsulosin 0.4 milliGRAM(s) Oral at bedtime  thiamine 100 milliGRAM(s) Oral daily    MEDICATIONS  (PRN):  meclizine 12.5 milliGRAM(s) Oral once PRN Dizziness/vertigo  traMADol 25 milliGRAM(s) Oral every 6 hours PRN Moderate Pain (4 - 6)    Pertinent Labs:  @ 00:47: Na 130<L>, BUN 9<L>, Cr 0.8, BG 95, K+ 4.3, Phos 2.7, Mg 1.7<L>, Alk Phos --, ALT/SGPT --, AST/SGOT --, HbA1c --   @ 17:45: Na 132<L>, BUN 9<L>, Cr 0.7, BG 97, K+ 3.6, Phos --, Mg --, Alk Phos --, ALT/SGPT --, AST/SGOT --, HbA1c --    Finger Sticks:    Physical Findings:  - Appearance: disoriented x 2  - GI function: last BM 6/3  - Tubes: n/a - no feeding tubes  - Oral/Mouth cavity: regular texture, thin liquids   - Skin: no pressure injuries documented  - Edema: 3+ edema - left and right leg      Nutrition Requirements:  Weight Used: 59 kg      Estimated Energy Needs    Continue []  Adjust [x]  1475 - 1770 kcal/day (25-30 kcal/kg)      Estimated Protein Needs    Continue []  Adjust [x]  59 - 71 gm/day (1 - 1.2 gm/kg)      Estimated Fluid Needs        Continue []  Adjust [x]  1 mL/kcal      Nutrient Intake: Poor PO intake 2/2 lethargy     [x] Previous Nutrition Diagnosis:  Inadequate Protein Energy Intake             [x] Ongoing          [] Resolved     Nutrition Intervention:  meals and snacks, medical food supplements, coordination of care      Goal/Expected Outcome:   PO intake >/=50% of meals within 3-5 days      Indicator/Monitoring:   energy intake, weight, labs, skin status, NFPE      Recommendation:  1) Continue current diet order  2) Recommend Ensure Compact 3x/day (220 kcal, 9 gm Protein each) to aid in optimizing kcal and protein intake   3) Encouragement and assistance appreciated with all meals, snacks, supplement  4) Monitor BM, GI s/s     Patient is at high nutrition risk, RD to f/u in 3-5 days or PRN  RD to remain available: Jessica Heck, MANN [phone] or via Teams

## 2024-06-03 NOTE — PROGRESS NOTE ADULT - ASSESSMENT
Assessment and Recommendation:   78y Female w/ multiple comorbidities presenting with multiple consecutive rib fractures s/p multiple falls due to lower extremities now admitted to SICU for hemodynamic monitoring.    NEURO/PSYCH:  #Acute pain  - acetaminophen Tablet .. 650 milliGRAM(s) Oral every 6 hours  - tramadol PRN  - lidocaine patch  #Bilateral lower extremity weakness; PMHx of stable chronic vertebral fractures; r/o impingment   - MRI lumbar spine pending  #PMHx of anxiety  - Continue home DULoxetine 60 milliGRAM(s) Oral daily  #B/L UE fasciculations  - neurology c/s - tizanidine prn, toxic metabolic w/u, MRI brain/c-spine  - holding zyprexa   - thiamine/folate/MVI  - propranolol 10mg q12    RESP:   #multiple acute consecutive posterior left 6-11th rib fractures  -pain control  #Respiratory failure  - increased respiratory support requirement, weaned to RA  - BiPAP overnight as needed  -course breath sounds/rhonchi, started chest PT/3%saline/duonebs  #bilateral pleural effusion, r/o pneumothorax  - bilateral pleural effusion with punctate air, concerning for possible pneumothorax  - CXR, no PTX evident  #Activity  - Activity - NWB RLE  #Current smoker, 1/2 PPD  - nicotine patch    CARDIAC:   #PMHx of HTN  - continue home amlodipine Tablet 5 milliGRAM(s) Oral daily  - HOLDING home olmesartan-hydrochlorothiazide 40 mg-25 mg oral tablet   #PMHx of HLD  - atorvastatin 80 milliGRAM(s) Oral at bedtime (home med: rosuvastatin 20mg QD)  #PMHx of CAD s/p CABG x 3  - follows with Dr. Brunson  #PMHx of atrial flutter s/p ablation (2022)  - EKG NSR  #Imaging:   - EKG: NSR  - TTE, 5/27 - EF 67%. Mild MR, Mild/mod TR, mild pHTN, simple atheroma in aortic arch    GI/NUTR:   #Diet: regular diet  - 1:1 feeds   - aspiration precautions, HOB 30  #GI Prophylaxis  #hx of PUD    -- pantoprazole Tablet: 40 mg (home rx)  #Bowel regimen   - Liquid BMs, holding bowel regimen    -last bowel movement 6/2  #Recent admission for infective colitis (4/24)  - Continue home dicyclomine 20 milliGRAM(s) Oral four times a day before meals  #dilated CBD, colonic wall, esophageal fluid  - Follow-up outpatient  - GI recs appreciated: possible ERCP    /RENAL:   #urine output in critically ill  - Mann placed for CT cystogram, 1.5L output on insertion  -Mann removed 6/2 overnight, replaced mann >1L on insertion   Monitor UO-mann in place  #acute right pubic rami fracture  - CT cystogram negative  #hyponatremia  - hold HCTZ  - monitor BMP  - avoid sodium-restricted diet      HEME/ONC:   #DVT prophylaxis    - SCDs    - enoxaparin 30 q12    ID:  #leukocytosis in setting of trauma vs UTI  afebrile   Antibiotics-piperacillin/tazobactam IVPB.. 3.375 every 8 hours  #UTI present on admission  -U/a- +leuk/nitrites/occasional bacteria   - on Rocephin x 3 days (completed)  #Pneumonia  -Zosyn   #left axillary lymphadenopathy seen on CT  - follow up outpatient    ENDO:  #Blood glucose monitoring  - Glucose goal 140-180. if above 180 start ISS      MSK:  #acute right suprapubic rami fracture, acute greater trochanteric fracture  - Ortho c/s --> Xrays complete, reviewed imaging now WBAT b/l    #PMHx of osteoarthritis  - follows with Dr. Dubon    DERM:  - DTI screen  - Preventative dressings in place    LINES/DRAINS:  PIV, Indwelling mann (removed 6/2 and reinserted 6/2-)      ADVANCED DIRECTIVES:      HCP/Emergency Contact - Carolyn Lobato (daughter) - [phone]    INDICATION FOR SICU: Multiple consecutive rib fractures    DISPO: SICU Assessment and Recommendation:   78y Female w/ multiple comorbidities presenting with multiple consecutive rib fractures s/p multiple falls due to lower extremities now admitted to SICU for hemodynamic monitoring.    Injuries:   -acute nondisplaced posterior left 6th, 7th rib fractures  -right greater trochanter fracture    NEURO/PSYCH:  #Acute pain  - acetaminophen Tablet 650 mg q6  - tramadol PRN  - lidocaine patch  #Bilateral lower extremity weakness; PMHx of stable chronic vertebral fractures; r/o impingement   - MRI lumbar spine pending  #PMHx of anxiety    -Duloxetine 60mg daily  #B/L UE fasciculations  - neurology c/s - tizanidine prn, toxic metabolic w/u, MRI brain/c-spine  - holding zyprexa   - thiamine/folate/MVI  - propranolol 10mg q12    RESP:   #multiple acute consecutive posterior left 6-11th rib fractures  -pain control  #Respiratory failure secondary to hypercapnia, requiring NIV support  - BiPAP overnight as needed    -chest PT/3%saline/duonebs  #bilateral pleural effusion w/ punctate air, no pneumothorax  #Activity     NWB RLE, Orthopedic input obtained  #Current smoker, 1/2 PPD  - nicotine patch    CARDIAC:   #PMHx of HTN  - amlodipine 5mg daily  - HOLDING  olmesartan-hydrochlorothiazide 40 mg-25 mg oral tablet   #PMHx of HLD  - atorvastatin 80 mg at bedtime (home rx rosuvastatin 20mg QD)  #PMHx of CAD s/p CABG x 3  #PMHx of atrial flutter s/p ablation (2022)  - follows with Dr. Brunson    - EKG NSR  #Imaging:   - EKG: NSR  - TTE, 5/27 - EF 67%. Mild MR, Mild/mod TR, mild pHTN, simple atheroma in aortic arch    GI/NUTR:   #Diet: regular diet  - 1:1 feeds   - aspiration precautions, HOB 30  #GI Prophylaxis  #hx of PUD    - pantoprazole Tablet: 40 mg (home rx)  #Bowel regimen   -HOLDING secondary to loose bowel movements    -last bowel movement 6/3  #Recent admission for infective colitis (4/24)  - Continue home dicyclomine 20 milliGRAM(s) Oral four times a day before meals  #dilated CBD, colonic wall, esophageal fluid  - GI recs appreciated: possible ERCP, follow up outpatient    /RENAL:   #urine output in critically ill  #urinary retention >1.5 L    - Flomax 0.4mg at bedtime    -attempt TOV in 48-72hrs  #acute right pubic rami fracture  - CT cystogram negative  #hyponatremia, asymptomatic   -SIADH vs infectious   - hold HCTZ  - avoid sodium-restricted diet      HEME/ONC:   #DVT prophylaxis    - SCDs    - enoxaparin 30 q12    Labs: Hb/Hct:  9.1/26.3  (06-02 @ 04:45)  -->,  9.7/28.3  (06-03 @ 00:47)  -->                      Plts:  335  -->,  397  -->       ID:  #leukocytosis in setting of trauma vs UTI  afebrile   Antibiotics-piperacillin/tazobactam IVPB.. 3.375 every 8 hours  #UTI present on admission  -U/a- +leuk/nitrites/occasional bacteria   - on Rocephin x 3 days (completed)  #Pneumonia  -Zosyn   #left axillary lymphadenopathy seen on CT  - follow up outpatient    ENDO:  #Blood glucose monitoring  - Glucose goal 140-180. if above 180 start ISS    MSK:  #acute right suprapubic rami fracture, acute greater trochanteric fracture  - Ortho c/s --> Xrays complete, reviewed imaging now WBAT b/l    #PMHx of osteoarthritis  - follows with Dr. Jagdish ROBLSE:  - DTI screen negative  - Preventative dressings in place    LINES/DRAINS:  PIV, Indwelling mann (removed 6/2 and reinserted 6/2-)    ADVANCED DIRECTIVES:      HCP/Emergency Contact - Carolyn Lobato (daughter) - [phone]    INDICATION FOR SICU: Multiple consecutive rib fractures    DISPO: SICU

## 2024-06-03 NOTE — PROGRESS NOTE ADULT - ASSESSMENT
78yFemale w/ PMHx of HTN, HLD, anxiety, CAD s/p CABG x3 (2009), Atrial flutter s/p ablation (2022), OA, recent admission for infectious colitis (4/26/24), seen as a TRAUMA CONSULT s/p multiple mechanical falls (-HT, -LOC, -AC). The following injuries were identified:     # Acute nondisplaced posterior left 6-7 rib fractures   # Acute mildly displaced left 8-11 rib fractures   # Acute nondisplaced fracture of the right superior pubic ramus    # Right greater trochanteric fracture  --> MRI right hip pending        PLAN:   - hemodynamic monitoring  - Discuss the need for MRI of , C-spine, L-spine, and Brain with neurology/neurosurgery  - WBAT per ortho  -Continue with mann  - DVT and GI ppx  - daily labs, replete electrolytes as needed   - multi modal pain control   - continue with pureed diet        #Antibiotics: nystatin    Suspension 231437 Unit(s) Oral every 6 hours, 05-30-24 @ 09:48  piperacillin/tazobactam IVPB.. 3.375 Gram(s) IV Intermittent every 8 hours, 05-30-24 @ 06:00   Day **  #DVT ppx: enoxaparin Injectable 30 milliGRAM(s) SubCutaneous every 12 hours    #GI ppx: pantoprazole    Tablet 40 milliGRAM(s) Oral before breakfast    Disposition:  SICU    Above plan to be discussed with Attending Surgeon Dr. Cornell  , patient, patient family, and Primary team    [id number]

## 2024-06-03 NOTE — PROGRESS NOTE ADULT - SUBJECTIVE AND OBJECTIVE BOX
JAYLYN AGARWAL   [id number]   [birth year]  78yF    Admit Date: 05-26-24  Indication for SICU: multiple consecutive rib fractures s/p multiple mechanical falls        ============================  HPI   78F current smoker with PMHx of HTN, HLD, anxiety, CAD s/p CABG x 3 (2009), atrial flutter s/p ablation (2022), OA, and recent admission for infectious colitis (4/26/24), presented to the ED s/p multiple mechanical falls (-HT/-LOC/-AC). Of note, pt presented to ED on 5/23/24 with CC of back pain (pt did not endorse fall at this time), and was discharged without admission. Today, pt's daughter endorsed that pt has experienced multiple mechanical falls over the past month, most recently 5/23/24 after returning home from ED. Pt endorsed weakness in her legs, causing her to fall. CTAP showed acute nondisplaced posterior left 6th, 7th rib fractures, `ramus, and right greater trochanter fracture, as well as several incidental findings (see below). CT head and neck were negative for acute pathology. Labs were significant for WBC 12, Na 124, K 3.4, and alk phos 130.     SICU Consult for hemodynamic monitoring in the setting of multiple consecutive rib fractures. Pt was examined in ED, stable, see Physical exam below.     Pertinent Imaging    < from: CT Abdomen and Pelvis w/ IV Cont (05.26.24 @ 11:21) >  IMPRESSION:  Acute nondisplaced posterior left 6th, 7th rib fractures. Acute mildly displaced posterior left 8th, 9th, 10th, and 11th ribs fractures.  Acute nondisplaced fracture of the right superior pubic ramus (4-397).  Stable chronic T6, T7, T8, T11, L2 vertebral body compression deformities.  Mild mural thickening extending from the cecum to the mid transverse colon likely reflecting colitis of infectious or inflammatory etiology..  New Trace bilateral pleural effusions with adjacent consolidative opacities.    Additional attending comments:  Additional right greater trochanter fracture, seen on image 177 of series 602  There is a punctate focus of air within the left pleural effusion on image 32 series 2. Although no pneumothorax is identified on this examination, a possible trace pneumothorax cannot be excluded.    Dilated common bile duct with intrahepatic biliary duct dilatation and a distended gallbladder.  Findings are suspicious for a possible central obstructing lesion within the distal common bile duct is not excluded. Further evaluation with ERCP is recommended.  Patulous, fluid-filled esophagus. Outpatient endoscopy is recommended.  Colonic wall thickening may be related to underdistention.  Left axillary lymphadenopathy require short-term interval follow-up.  < end of copied text >    < from: CT Cervical Spine No Cont (05.26.24 @ 11:14) >  IMPRESSION:  CT head: Age-indeterminate small infarct involving the left thalamus. No acute intracranial hemorrhage or mass effect.  CT cervical spine: No acute fracture or traumatic subluxation.   < end of copied text >       24 Hour Events  6/2  NIGHT  - PM rounds: a/ox3, pulling 750cc on IS, no focal deficits    DAY  -OOB  -follow up TOV @12pm > 500cc on bladder scan > straight cath    -regular diet  -Lasix 20mg IV x 1 today  -Per ortho  Gross- weight bearing as tolerated, no surgical intervention, follow up outpatient - No MRI needed  - d/c FS  -Straight cath 1L output, mann maintained   -f/u wound care consult for stage 2 on coccyx and dti on ischium area  -4pm BMP after lasix > 1.6L   -D/c bowel regimen for diarrhea   - 2 kriders      [X] A ten-point review of systems was otherwise negative except as noted above.  [  ] Due to altered mental status/intubation, subjective information was not attained from the patient. History was obtained, to the extent possible, from review of the chart and collateral sources of information.   JAYLYN AGARWAL   [id number]   [birth year]  78yF    Admit Date: 05-26-24  Indication for SICU: multiple consecutive rib fractures s/p multiple mechanical falls        ============================  HPI   78F current smoker with PMHx of HTN, HLD, anxiety, CAD s/p CABG x 3 (2009), atrial flutter s/p ablation (2022), OA, and recent admission for infectious colitis (4/26/24), presented to the ED s/p multiple mechanical falls (-HT/-LOC/-AC). Of note, pt presented to ED on 5/23/24 with CC of back pain (pt did not endorse fall at this time), and was discharged without admission. Today, pt's daughter endorsed that pt has experienced multiple mechanical falls over the past month, most recently 5/23/24 after returning home from ED. Pt endorsed weakness in her legs, causing her to fall. CTAP showed acute nondisplaced posterior left 6th, 7th rib fractures, `ramus, and right greater trochanter fracture, as well as several incidental findings (see below). CT head and neck were negative for acute pathology. Labs were significant for WBC 12, Na 124, K 3.4, and alk phos 130.     SICU Consult for hemodynamic monitoring in the setting of multiple consecutive rib fractures. Pt was examined in ED, stable, see Physical exam below.     CT Abdomen and Pelvis w/ IV Cont (05.26.24 @ 11:21) >  IMPRESSION:  Acute nondisplaced posterior left 6th, 7th rib fractures. Acute mildly displaced posterior left 8th, 9th, 10th, and 11th ribs fractures.  Acute nondisplaced fracture of the right superior pubic ramus (4-397).  Stable chronic T6, T7, T8, T11, L2 vertebral body compression deformities.  Mild mural thickening extending from the cecum to the mid transverse colon likely reflecting colitis of infectious or inflammatory etiology..  New Trace bilateral pleural effusions with adjacent consolidative opacities.    Additional attending comments:  Additional right greater trochanter fracture, seen on image 177 of series 602  There is a punctate focus of air within the left pleural effusion on image 32 series 2. Although no pneumothorax is identified on this examination, a possible trace pneumothorax cannot be excluded.    Dilated common bile duct with intrahepatic biliary duct dilatation and a distended gallbladder.  Findings are suspicious for a possible central obstructing lesion within the distal common bile duct is not excluded. Further evaluation with ERCP is recommended.  Patulous, fluid-filled esophagus. Outpatient endoscopy is recommended.  Colonic wall thickening may be related to underdistention.  Left axillary lymphadenopathy require short-term interval follow-up.  < end of copied text >    < from: CT Cervical Spine No Cont (05.26.24 @ 11:14) >  IMPRESSION:  CT head: Age-indeterminate small infarct involving the left thalamus. No acute intracranial hemorrhage or mass effect.  CT cervical spine: No acute fracture or traumatic subluxation.   < end of copied text >       24 Hour Events  6/2  NIGHT  - PM rounds: a/ox3, pulling 750cc on IS, no focal deficits    DAY  -OOB  -follow up TOV @12pm > 500cc on bladder scan > straight cath    -regular diet  -Lasix 20mg IV x 1 today  -Per ortho  Gross- weight bearing as tolerated, no surgical intervention, follow up outpatient - No MRI needed  - d/c FS  -Straight cath 1L output, mann maintained   -f/u wound care consult for stage 2 on coccyx and dti on ischium area  -4pm BMP after lasix > 1.6L   -D/c bowel regimen for diarrhea   - 2 kriders      [X] A ten-point review of systems was otherwise negative except as noted above.  [  ] Due to altered mental status/intubation, subjective information was not attained from the patient. History was obtained, to the extent possible, from review of the chart and collateral sources of information.     CURRENT MEDS:   Neurologic Medications  acetaminophen     Tablet .. 650 milliGRAM(s) Oral every 6 hours  DULoxetine 60 milliGRAM(s) Oral daily  meclizine 12.5 milliGRAM(s) Oral once PRN Dizziness/vertigo  melatonin 3 milliGRAM(s) Oral at bedtime  traMADol 25 milliGRAM(s) Oral every 6 hours PRN Moderate Pain (4 - 6)    Respiratory Medications  albuterol/ipratropium for Nebulization 3 milliLiter(s) Nebulizer every 6 hours  sodium chloride 3%  Inhalation 4 milliLiter(s) Inhalation every 12 hours    Cardiovascular Medications  amLODIPine   Tablet 5 milliGRAM(s) Oral daily  propranolol 10 milliGRAM(s) Oral every 12 hours    Gastrointestinal Medications  dextrose 5% + sodium chloride 0.9%. 1000 milliLiter(s) IV Continuous <Continuous>  dicyclomine 20 milliGRAM(s) Oral four times a day before meals  folic acid 1 milliGRAM(s) Oral daily  multivitamin 1 Tablet(s) Oral daily  pantoprazole    Tablet 40 milliGRAM(s) Oral before breakfast  thiamine 100 milliGRAM(s) Oral daily    Genitourinary Medications  tamsulosin 0.4 milliGRAM(s) Oral at bedtime    Hematologic/Oncologic Medications  enoxaparin Injectable 30 milliGRAM(s) SubCutaneous every 12 hours    Antimicrobial/Immunologic Medications  nystatin    Suspension 262003 Unit(s) Oral every 6 hours  piperacillin/tazobactam IVPB.. 3.375 Gram(s) IV Intermittent every 8 hours    Endocrine/Metabolic Medications  atorvastatin 80 milliGRAM(s) Oral at bedtime    Topical/Other Medications  chlorhexidine 2% Cloths 1 Application(s) Topical <User Schedule>  lidocaine   4% Patch 1 Patch Transdermal daily  nicotine -   7 mG/24Hr(s) Patch 1 Patch Transdermal daily    ICU Vital Signs Last 24 Hrs  T(C): 36.6 (03 Jun 2024 04:00), Max: 36.8 (02 Jun 2024 20:00)  T(F): 97.9 (03 Jun 2024 04:00), Max: 98.2 (02 Jun 2024 20:00)  HR: 123 (03 Jun 2024 06:00) (75 - 123)  BP: 96/80 (03 Jun 2024 05:00) (96/80 - 156/66)  BP(mean): 85 (03 Jun 2024 05:00) (72 - 99)  ABP: --  ABP(mean): --  RR: 42 (03 Jun 2024 06:00) (16 - 54)  SpO2: 96% (03 Jun 2024 06:00) (95% - 100%)    O2 Parameters below as of 02 Jun 2024 22:00  Patient On (Oxygen Delivery Method): room air              I&O's Summary    02 Jun 2024 07:01  -  03 Jun 2024 07:00  --------------------------------------------------------  IN: 2770 mL / OUT: 2515 mL / NET: 255 mL      I&O's Detail    02 Jun 2024 07:01  -  03 Jun 2024 07:00  --------------------------------------------------------  IN:    dextrose 5% + sodium chloride 0.9%: 1200 mL    IV PiggyBack: 200 mL    IV PiggyBack: 250 mL    IV PiggyBack: 250 mL    Oral Fluid: 870 mL  Total IN: 2770 mL    OUT:    Indwelling Catheter - Urethral (mL): 2515 mL    Voided (mL): 0 mL  Total OUT: 2515 mL    Total NET: 255 mL           PHYSICAL EXAM:      a&ox3  follows commands, ARGUETA  UE 4/5  LE 5/5  no acute distress  equal chest rise b/l, nasal cannula in place  abdomen soft  extremities soft  urinary cath in place

## 2024-06-03 NOTE — PROGRESS NOTE ADULT - SUBJECTIVE AND OBJECTIVE BOX
GENERAL SURGERY PROGRESS NOTE    Patient: JAYLYN AGARWAL , 78y ([birth year])Female   MRN: [id number]  Location: [place] [address] A  Visit: 05-26-24 Inpatient  Date: 06-03-24 @ 08:11    Hospital Day #: 9  Post-Op Day #:    Procedure/Dx/Injuries: Left 6-7 rib fx, left 8-11 rib fx, superior pubic rami fx, rt greater trochanter fx,    Events of past 24 hours: Ortho evaluated the patient- the patient no longer requires MRI of the hip. WBAT.  The patient is A+OX3, but has episodes of shaking and mumbling and is in restraints this morning. The patient failed TOV x 2, now has a mann in place.     PAST MEDICAL & SURGICAL HISTORY:  CAD (coronary artery disease)  Tobacco dependence  HTN (hypertension)  Anxiety  Atrial flutter  S/P CABG x 3    Vitals:   T(F): 97.9 (06-03-24 @ 04:00), Max: 98.2 (06-02-24 @ 20:00)  HR: 123 (06-03-24 @ 06:00)  BP: 96/80 (06-03-24 @ 05:00)  RR: 42 (06-03-24 @ 06:00)  SpO2: 96% (06-03-24 @ 06:00)      Diet, Regular      Fluids:     I & O's:    06-02-24 @ 07:01  -  06-03-24 @ 07:00  --------------------------------------------------------  IN:    dextrose 5% + sodium chloride 0.9%: 1200 mL    IV PiggyBack: 200 mL    IV PiggyBack: 250 mL    IV PiggyBack: 250 mL    Oral Fluid: 870 mL  Total IN: 2770 mL    OUT:    Indwelling Catheter - Urethral (mL): 2515 mL    Voided (mL): 0 mL  Total OUT: 2515 mL    Total NET: 255 mL    PHYSICAL EXAM:  GENERAL: NAD, in restraints, mumbling but A+Ox3  CHEST/LUNG: Equal chest rise bilaterally  HEART: Regular rate and rhythm  ABDOMEN: Soft, Nontender, Nondistended;    : Mann in place  EXTREMITIES:  No clubbing, cyanosis, or edema    MEDICATIONS  (STANDING):  acetaminophen     Tablet .. 650 milliGRAM(s) Oral every 6 hours  albuterol/ipratropium for Nebulization 3 milliLiter(s) Nebulizer every 6 hours  amLODIPine   Tablet 5 milliGRAM(s) Oral daily  atorvastatin 80 milliGRAM(s) Oral at bedtime  chlorhexidine 2% Cloths 1 Application(s) Topical <User Schedule>  dextrose 5% + sodium chloride 0.9%. 1000 milliLiter(s) (50 mL/Hr) IV Continuous <Continuous>  dicyclomine 20 milliGRAM(s) Oral four times a day before meals  DULoxetine 60 milliGRAM(s) Oral daily  enoxaparin Injectable 30 milliGRAM(s) SubCutaneous every 12 hours  folic acid 1 milliGRAM(s) Oral daily  lidocaine   4% Patch 1 Patch Transdermal daily  melatonin 3 milliGRAM(s) Oral at bedtime  multivitamin 1 Tablet(s) Oral daily  nicotine -   7 mG/24Hr(s) Patch 1 Patch Transdermal daily  nystatin    Suspension 624878 Unit(s) Oral every 6 hours  pantoprazole    Tablet 40 milliGRAM(s) Oral before breakfast  piperacillin/tazobactam IVPB.. 3.375 Gram(s) IV Intermittent every 8 hours  propranolol 10 milliGRAM(s) Oral every 12 hours  sodium chloride 3%  Inhalation 4 milliLiter(s) Inhalation every 12 hours  tamsulosin 0.4 milliGRAM(s) Oral at bedtime  thiamine 100 milliGRAM(s) Oral daily    MEDICATIONS  (PRN):  meclizine 12.5 milliGRAM(s) Oral once PRN Dizziness/vertigo  traMADol 25 milliGRAM(s) Oral every 6 hours PRN Moderate Pain (4 - 6)      DVT PROPHYLAXIS: enoxaparin Injectable 30 milliGRAM(s) SubCutaneous every 12 hours    GI PROPHYLAXIS: pantoprazole    Tablet 40 milliGRAM(s) Oral before breakfast    ANTICOAGULATION:   ANTIBIOTICS:  nystatin    Suspension 624229 Unit(s)  piperacillin/tazobactam IVPB.. 3.375 Gram(s)    LAB/STUDIES:  Labs:  CAPILLARY BLOOD GLUCOSE                        9.7    12.56 )-----------( 397      ( 03 Jun 2024 00:47 )             28.3       Auto Neutrophil %: 76.5 % (06-03-24 @ 00:47)  Auto Immature Granulocyte %: 0.8 % (06-03-24 @ 00:47)    06-03    130<L>  |  97<L>  |  9<L>  ----------------------------<  95  4.3   |  22  |  0.8      Calcium: 8.3 mg/dL (06-03-24 @ 00:47)      LFTs:     Blood Gas Arterial, Lactate: 1.0 mmol/L (06-01-24 @ 11:31)    ABG - ( 01 Jun 2024 11:31 )  pH: 7.40  /  pCO2: 37    /  pO2: 69    / HCO3: 23    / Base Excess: -1.6  /  SaO2: 96.3    ABG - ( 31 May 2024 03:12 )  pH: 7.34  /  pCO2: 43    /  pO2: 99    / HCO3: 23    / Base Excess: -2.6  /  SaO2: 100.0     ABG - ( 30 May 2024 13:27 )  pH: 7.32  /  pCO2: 46    /  pO2: 134   / HCO3: 24    / Base Excess: -2.3  /  SaO2: 100.0       Urinalysis Basic - ( 03 Jun 2024 00:47 )    Color: x / Appearance: x / SG: x / pH: x  Gluc: 95 mg/dL / Ketone: x  / Bili: x / Urobili: x   Blood: x / Protein: x / Nitrite: x   Leuk Esterase: x / RBC: x / WBC x   Sq Epi: x / Non Sq Epi: x / Bacteria: x      IMAGING:  n/a    ACCESS/ DEVICES:  [ x] Peripheral IV

## 2024-06-04 NOTE — CONSULT NOTE ADULT - ASSESSMENT
78y Female w/ multiple comorbidities presenting with multiple consecutive rib fractures s/p multiple falls due to lower extremities now admitted to SICU for hemodynamic monitoring.    #acute nondisplaced posterior left 6th, 7th rib fractures  #acute right suprapubic rami fracture, acute greater trochanteric fracture  - Orthopedics c/s -->  now WBAT b/l    #Metabolic encephalopathy   #B/L UE fasciculations  #leukocytosis?  #Anxiety  #sacral Ulcer  # Aspiration pneumonia?  #UTI on admission   - neurology c/s - tizanidine prn, toxic metabolic w/u, MRI brain/c-spine  - holding zyprexa   - thiamine/folate/MVI  - propranolol 10mg q12  - UA contaminated  - pt is on zosyn, change zosyn to cefepime and flagyl, add vanco   - check mrsa swab   - repeat Blood cultures   - MRI as per neuro   - ID consult given rising wbc and no improvement in mental status   - check eeg   - Palliative care consult for Goals of Care Conversation   - lactate stable 1.0 on 6/1  - consider RVP if approved by ID     #smoking hx  #resp failure   - wean off o2  - pulm outpt  - NIV prn   - nicotine patch      #CT finding of dilated CBD with distended GB- incidental asytomatic  #Patulous esophagus- no dysphagia  #Cecum thickening likely under distension  - EGD and EUS as per GI        CAD (coronary artery disease)  HTN (hypertension)  Atrial flutter  S/P CABG x 3  - continue meds     #Progress Note Handoff  Pending (specify):  as above  Disposition: dw SICU team  Decision to admit the pt is based on acuity as above

## 2024-06-04 NOTE — PROGRESS NOTE ADULT - ASSESSMENT
Assessment and Recommendation:   78y Female w/ multiple comorbidities presenting with multiple consecutive rib fractures s/p multiple falls due to lower extremities now admitted to SICU for hemodynamic monitoring.    Injuries:   -acute nondisplaced posterior left 6th, 7th rib fractures  -right greater trochanter fracture    NEURO/PSYCH:  #Acute pain  - acetaminophen Tablet 650 mg q6  - tramadol PRN  - lidocaine patch  #Bilateral lower extremity weakness; PMHx of stable chronic vertebral fractures; r/o impingement   - MRI lumbar spine pending  #PMHx of anxiety    -Duloxetine 60mg daily  #B/L UE fasciculations  - neurology c/s - tizanidine prn, toxic metabolic w/u, MRI brain/c-spine  - holding zyprexa   - thiamine/folate/MVI  - propranolol 10mg q12    RESP:   #multiple acute consecutive posterior left 6-11th rib fractures  -pain control  #Respiratory failure secondary to hypercapnia, requiring NIV support  - BiPAP overnight as needed    -chest PT/3%saline/duonebs  #bilateral pleural effusion w/ punctate air, no pneumothorax  #Activity     WBAT Orthopedic input obtained  #Current smoker, 1/2 PPD  - nicotine patch    CARDIAC:   #PMHx of HTN  - amlodipine 5mg daily  - HOLDING  olmesartan-hydrochlorothiazide 40 mg-25 mg oral tablet   #PMHx of HLD  - atorvastatin 80 mg at bedtime (home rx rosuvastatin 20mg QD)  #PMHx of CAD s/p CABG x 3  #PMHx of atrial flutter s/p ablation (2022)  - follows with Dr. Brunson    - EKG NSR  #Imaging:   - EKG: NSR  - TTE, 5/27 - EF 67%. Mild MR, Mild/mod TR, mild pHTN, simple atheroma in aortic arch    GI/NUTR:   #Diet: regular diet  - 1:1 feeds   - aspiration precautions, HOB 30  #GI Prophylaxis  #hx of PUD    - pantoprazole Tablet: 40 mg (home rx)  #Bowel regimen   -HOLDING secondary to loose bowel movements    -last bowel movement 6/3  #Recent admission for infective colitis (4/24)  - Continue home dicyclomine 20 milliGRAM(s) Oral four times a day before meals  #dilated CBD, colonic wall, esophageal fluid  - GI recs appreciated: possible ERCP, follow up outpatient    /RENAL:   #urine output in critically ill  #urinary retention >1.5 L    - Flomax 0.4mg at bedtime    -attempt TOV in 48-72hrs (6/5)  #acute right pubic rami fracture  - CT cystogram negative  #hyponatremia, asymptomatic   -SIADH vs infectious   - hold HCTZ    Labs:          BUN/Cr- 9/0.8  -->,  9/0.8  -->          Electrolytes-(06-04 @ 00:20)Na 131 // K 4.1 // Mg 1.8 // Phos 3.2   #Hypomagnesemia-repleted with 2gm magnesium sulfate      HEME/ONC:   #DVT prophylaxis    - SCDs    - enoxaparin 30 q12    Labs: Hb/Hct:  9.7/28.3  -->,  10.3/30.0  -->                      Plts:  397  -->,  375  -->                 PTT/INR:      ID:  #leukocytosis in setting of trauma vs UTI, afebrile   WBC- 9.39  --->>,  12.56  --->>,  13.02  --->>  Temp trend- 24hrs T(F): 98.6 (06-04 @ 00:00), Max: 98.7 (06-03 @ 08:00)  Abx: piperacillin/tazobactam IVPB.. 3.375 every 8 hours (05/30-06/06)         #UTI present on admission  -U/a- +leuk/nitrites/occasional bacteria   -completed Rocephin x 3 days  #Pneumonia  -Zosyn   #left axillary lymphadenopathy seen on CT  - follow up outpatient    ENDO:  #Blood glucose monitoring  - Glucose goal 140-180. if above 180 start ISS    MSK:  #acute right suprapubic rami fracture, acute greater trochanteric fracture  - Orthopedics c/s --> Xrays  reviewed imaging now WBAT b/l    #PMHx of osteoarthritis  - follows with Dr. Dubon    DERM:  #sacral DTI  -wound care consult pending  - Preventative dressings in place    LINES/DRAINS:  PIV, Indwelling mann (6/2-     ADVANCED DIRECTIVES:      HCP/Emergency Contact - Carolyn Lobato (daughter) - [phone]    INDICATION FOR SICU: Multiple consecutive rib fractures    DISPO: SICU

## 2024-06-04 NOTE — PROGRESS NOTE ADULT - SUBJECTIVE AND OBJECTIVE BOX
JAYLYN AGARWAL   [id number]   [birth year]  78yF    Admit Date: 05-26-24  Indication for SICU: multiple consecutive rib fractures s/p multiple mechanical falls        ============================  HPI   78F current smoker with PMHx of HTN, HLD, anxiety, CAD s/p CABG x 3 (2009), atrial flutter s/p ablation (2022), OA, and recent admission for infectious colitis (4/26/24), presented to the ED s/p multiple mechanical falls (-HT/-LOC/-AC). Of note, pt presented to ED on 5/23/24 with CC of back pain (pt did not endorse fall at this time), and was discharged without admission. Today, pt's daughter endorsed that pt has experienced multiple mechanical falls over the past month, most recently 5/23/24 after returning home from ED. Pt endorsed weakness in her legs, causing her to fall. CTAP showed acute nondisplaced posterior left 6th, 7th rib fractures, `ramus, and right greater trochanter fracture, as well as several incidental findings (see below). CT head and neck were negative for acute pathology. Labs were significant for WBC 12, Na 124, K 3.4, and alk phos 130.     SICU Consult for hemodynamic monitoring in the setting of multiple consecutive rib fractures. Pt was examined in ED, stable, see Physical exam below.     Pertinent Imaging    < from: CT Abdomen and Pelvis w/ IV Cont (05.26.24 @ 11:21) >  IMPRESSION:  Acute nondisplaced posterior left 6th, 7th rib fractures. Acute mildly displaced posterior left 8th, 9th, 10th, and 11th ribs fractures.  Acute nondisplaced fracture of the right superior pubic ramus (4-397).  Stable chronic T6, T7, T8, T11, L2 vertebral body compression deformities.  Mild mural thickening extending from the cecum to the mid transverse colon likely reflecting colitis of infectious or inflammatory etiology..  New Trace bilateral pleural effusions with adjacent consolidative opacities.    Additional attending comments:  Additional right greater trochanter fracture, seen on image 177 of series 602  There is a punctate focus of air within the left pleural effusion on image 32 series 2. Although no pneumothorax is identified on this examination, a possible trace pneumothorax cannot be excluded.    Dilated common bile duct with intrahepatic biliary duct dilatation and a distended gallbladder.  Findings are suspicious for a possible central obstructing lesion within the distal common bile duct is not excluded. Further evaluation with ERCP is recommended.  Patulous, fluid-filled esophagus. Outpatient endoscopy is recommended.  Colonic wall thickening may be related to underdistention.  Left axillary lymphadenopathy require short-term interval follow-up.  < end of copied text >    < from: CT Cervical Spine No Cont (05.26.24 @ 11:14) >  IMPRESSION:  CT head: Age-indeterminate small infarct involving the left thalamus. No acute intracranial hemorrhage or mass effect.  CT cervical spine: No acute fracture or traumatic subluxation.   < end of copied text >       24 Hour Events  6/3  NIGHT  -K 3.9--> 20meq K powder  PM Rounds: A&O x 3, saturating 92% on room air, 750 IS   ---> unable to get propranol on BiPAP; given 5mg metoprolol x 1  -2gm Mg x 1    DAY  -lasix 20mg IVP  -f/u wound care consult for sacral DTI  -downgrade to stepdown      [X] A ten-point review of systems was otherwise negative except as noted above.  [  ] Due to altered mental status/intubation, subjective information was not attained from the patient. History was obtained, to the extent possible, from review of the chart and collateral sources of information.       ====================================   JAYLYN AGARWAL   [id number]   [birth year]  78yF    Admit Date: 05-26-24  Indication for SICU: multiple consecutive rib fractures s/p multiple mechanical falls        ============================  HPI   78F current smoker with PMHx of HTN, HLD, anxiety, CAD s/p CABG x 3 (2009), atrial flutter s/p ablation (2022), OA, and recent admission for infectious colitis (4/26/24), presented to the ED s/p multiple mechanical falls (-HT/-LOC/-AC). Of note, pt presented to ED on 5/23/24 with CC of back pain (pt did not endorse fall at this time), and was discharged without admission. Today, pt's daughter endorsed that pt has experienced multiple mechanical falls over the past month, most recently 5/23/24 after returning home from ED. Pt endorsed weakness in her legs, causing her to fall. CTAP showed acute nondisplaced posterior left 6th, 7th rib fractures, `ramus, and right greater trochanter fracture, as well as several incidental findings (see below). CT head and neck were negative for acute pathology. Labs were significant for WBC 12, Na 124, K 3.4, and alk phos 130.     SICU Consult for hemodynamic monitoring in the setting of multiple consecutive rib fractures. Pt was examined in ED, stable, see Physical exam below.     Pertinent Imaging    < from: CT Abdomen and Pelvis w/ IV Cont (05.26.24 @ 11:21) >  IMPRESSION:  Acute nondisplaced posterior left 6th, 7th rib fractures. Acute mildly displaced posterior left 8th, 9th, 10th, and 11th ribs fractures.  Acute nondisplaced fracture of the right superior pubic ramus (4-397).  Stable chronic T6, T7, T8, T11, L2 vertebral body compression deformities.  Mild mural thickening extending from the cecum to the mid transverse colon likely reflecting colitis of infectious or inflammatory etiology..  New Trace bilateral pleural effusions with adjacent consolidative opacities.    Additional attending comments:  Additional right greater trochanter fracture, seen on image 177 of series 602  There is a punctate focus of air within the left pleural effusion on image 32 series 2. Although no pneumothorax is identified on this examination, a possible trace pneumothorax cannot be excluded.    Dilated common bile duct with intrahepatic biliary duct dilatation and a distended gallbladder.  Findings are suspicious for a possible central obstructing lesion within the distal common bile duct is not excluded. Further evaluation with ERCP is recommended.  Patulous, fluid-filled esophagus. Outpatient endoscopy is recommended.  Colonic wall thickening may be related to underdistention.  Left axillary lymphadenopathy require short-term interval follow-up.  < end of copied text >    < from: CT Cervical Spine No Cont (05.26.24 @ 11:14) >  IMPRESSION:  CT head: Age-indeterminate small infarct involving the left thalamus. No acute intracranial hemorrhage or mass effect.  CT cervical spine: No acute fracture or traumatic subluxation.   < end of copied text >       24 Hour Events  6/3  NIGHT  -K 3.9--> 20meq K powder  PM Rounds: A&O x 3, saturating 92% on room air, 750 IS   ---> unable to get propranol on BiPAP; given 5mg metoprolol x 1  -2gm Mg x 1    DAY  -lasix 20mg IVP  -f/u wound care consult for sacral DTI  -downgrade to stepdown      [X] A ten-point review of systems was otherwise negative except as noted above.  [  ] Due to altered mental status/intubation, subjective information was not attained from the patient. History was obtained, to the extent possible, from review of the chart and collateral sources of information.       ====================================     CURRENT MEDS:   Neurologic Medications  acetaminophen     Tablet .. 650 milliGRAM(s) Oral every 6 hours  DULoxetine 60 milliGRAM(s) Oral daily  meclizine 12.5 milliGRAM(s) Oral once PRN Dizziness/vertigo  melatonin 3 milliGRAM(s) Oral at bedtime    Respiratory Medications  albuterol/ipratropium for Nebulization 3 milliLiter(s) Nebulizer every 6 hours  sodium chloride 3%  Inhalation 4 milliLiter(s) Inhalation every 12 hours    Cardiovascular Medications  amLODIPine   Tablet 5 milliGRAM(s) Oral daily  propranolol 10 milliGRAM(s) Oral every 12 hours    Gastrointestinal Medications  dextrose 5% + sodium chloride 0.9%. 1000 milliLiter(s) IV Continuous <Continuous>  dicyclomine 20 milliGRAM(s) Oral four times a day before meals  folic acid 1 milliGRAM(s) Oral daily  multivitamin 1 Tablet(s) Oral daily  pantoprazole    Tablet 40 milliGRAM(s) Oral before breakfast  thiamine 100 milliGRAM(s) Oral daily    Genitourinary Medications  tamsulosin 0.4 milliGRAM(s) Oral at bedtime    Hematologic/Oncologic Medications  enoxaparin Injectable 30 milliGRAM(s) SubCutaneous every 12 hours    Antimicrobial/Immunologic Medications  nystatin    Suspension 291312 Unit(s) Oral every 6 hours  piperacillin/tazobactam IVPB.. 3.375 Gram(s) IV Intermittent every 8 hours    Endocrine/Metabolic Medications  atorvastatin 80 milliGRAM(s) Oral at bedtime    Topical/Other Medications  bacitracin   Ointment 1 Application(s) Topical two times a day  chlorhexidine 2% Cloths 1 Application(s) Topical <User Schedule>  lidocaine   4% Patch 1 Patch Transdermal daily  nicotine -   7 mG/24Hr(s) Patch 1 Patch Transdermal daily    ICU Vital Signs Last 24 Hrs  T(C): 36.7 (04 Jun 2024 16:00), Max: 37.1 (04 Jun 2024 08:00)  T(F): 98 (04 Jun 2024 16:00), Max: 98.7 (04 Jun 2024 08:00)  HR: 86 (04 Jun 2024 16:00) (76 - 118)  BP: 128/70 (04 Jun 2024 16:00) (102/67 - 138/67)  BP(mean): 93 (04 Jun 2024 16:00) (79 - 93)  ABP: --  ABP(mean): --  RR: 25 (04 Jun 2024 16:00) (14 - 43)  SpO2: 99% (04 Jun 2024 16:00) (91% - 100%)    O2 Parameters below as of 04 Jun 2024 04:00  Patient On (Oxygen Delivery Method): nasal cannula  O2 Flow (L/min): 4            I&O's Summary    03 Jun 2024 07:01  -  04 Jun 2024 07:00  --------------------------------------------------------  IN: 2315 mL / OUT: 2260 mL / NET: 55 mL    04 Jun 2024 07:01  -  04 Jun 2024 19:03  --------------------------------------------------------  IN: 600 mL / OUT: 625 mL / NET: -25 mL      I&O's Detail    03 Jun 2024 07:01  -  04 Jun 2024 07:00  --------------------------------------------------------  IN:    dextrose 5% + sodium chloride 0.9%: 1200 mL    IV PiggyBack: 125 mL    IV PiggyBack: 350 mL    Oral Fluid: 640 mL  Total IN: 2315 mL    OUT:    Indwelling Catheter - Urethral (mL): 2260 mL  Total OUT: 2260 mL    Total NET: 55 mL      04 Jun 2024 07:01  -  04 Jun 2024 19:03  --------------------------------------------------------  IN:    dextrose 5% + sodium chloride 0.9%: 600 mL  Total IN: 600 mL    OUT:    Indwelling Catheter - Urethral (mL): 625 mL  Total OUT: 625 mL    Total NET: -25 mL           PHYSICAL EXAM:      a&ox3  follows commands, weakness b/l UE/LE baseline  no acute distress  equal chest rise b/l  nasal cannula in place  abdomen soft  extremities soft

## 2024-06-04 NOTE — CONSULT NOTE ADULT - SUBJECTIVE AND OBJECTIVE BOX
78F current smoker with PMHx of HTN, HLD, anxiety, CAD s/p CABG x 3 (2009), atrial flutter s/p ablation (2022), OA, and recent admission for infectious colitis (4/26/24), presented to the ED s/p multiple mechanical falls (-HT/-LOC/-AC). Of note, pt presented to ED on 5/23/24 with CC of back pain (pt did not endorse fall at this time), and was discharged without admission. Today, pt's daughter endorsed that pt has experienced multiple mechanical falls over the past month, most recently 5/23/24 after returning home from ED. Pt endorsed weakness in her legs, causing her to fall. CTAP showed acute nondisplaced posterior left 6th, 7th rib fractures, `ramus, and right greater trochanter fracture, as well as several incidental findings (see below). CT head and neck were negative for acute pathology. Labs were significant for WBC 12, Na 124, K 3.4, and alk phos 130.      Medicine consulted for comanagement       Patient is a 78y old  Female who presents with a chief complaint of Multiple fractures of ribs, left side, initial encounter for closed fracture     (05-30-24)      Pt seen and examined at bedside. No CP or SOB.      PAST MEDICAL & SURGICAL HISTORY:  CAD (coronary artery disease)    Tobacco dependence    HTN (hypertension)    Anxiety    Atrial flutter    S/P CABG x 3        VITAL SIGNS (Last 24 hrs):  T(C): 37.1 (06-04-24 @ 08:00), Max: 37.1 (06-04-24 @ 08:00)  HR: 93 (06-04-24 @ 08:10) (76 - 118)  BP: 117/58 (06-04-24 @ 08:10) (96/52 - 138/67)  RR: 20 (06-04-24 @ 08:10) (14 - 43)  SpO2: 98% (06-04-24 @ 08:10) (91% - 100%)  Wt(kg): --  Daily     Daily     I&O's Summary    03 Jun 2024 07:01  -  04 Jun 2024 07:00  --------------------------------------------------------  IN: 2315 mL / OUT: 2260 mL / NET: 55 mL    04 Jun 2024 07:01  -  04 Jun 2024 12:28  --------------------------------------------------------  IN: 200 mL / OUT: 275 mL / NET: -75 mL        PHYSICAL EXAM:  GENERAL: NAD, elderly   HEAD:  Atraumatic, Normocephalic  EYES: EOMI, PERRLA, conjunctiva and sclera clear  NECK: Supple, No JVD  CHEST/LUNG: Clear to auscultation bilaterally; No wheeze  HEART: Regular rate and rhythm; No murmurs, rubs, or gallops  ABDOMEN: Soft, Nontender, Nondistended; Bowel sounds present  EXTREMITIES:  2+ Peripheral Pulses, No clubbing, cyanosis, or edema  PSYCH: AAOx1  NEUROLOGY: non-focal  SKIN: No rashes or lesions    Labs Reviewed  Spoke to patient in regards to abnormal labs.    CBC Full  -  ( 04 Jun 2024 00:20 )  WBC Count : 13.02 K/uL  Hemoglobin : 10.3 g/dL  Hematocrit : 30.0 %  Platelet Count - Automated : 375 K/uL  Mean Cell Volume : 92.6 fL  Mean Cell Hemoglobin : 31.8 pg  Mean Cell Hemoglobin Concentration : 34.3 g/dL  Auto Neutrophil # : 10.70 K/uL  Auto Lymphocyte # : 1.45 K/uL  Auto Monocyte # : 0.67 K/uL  Auto Eosinophil # : 0.10 K/uL  Auto Basophil # : 0.03 K/uL  Auto Neutrophil % : 82.3 %  Auto Lymphocyte % : 11.1 %  Auto Monocyte % : 5.1 %  Auto Eosinophil % : 0.8 %  Auto Basophil % : 0.2 %    BMP:    06-04 @ 00:20    Blood Urea Nitrogen - 9  Calcium - 8.6  Carbond Dioxide - 25  Chloride - 96  Creatinine - 0.8  Glucose - 94  Potassium - 4.1  Sodium - 131      Hemoglobin A1c -     Urine Culture:        COVID Labs  CRP:      D-Dimer:      Imaging reviewed independently and reviewed read  < from: Xray Chest 1 View- PORTABLE-Routine (Xray Chest 1 View- PORTABLE-Routine in AM.) (06.03.24 @ 06:39) >  IMPRESSION:    Stable bibasilar opacities/effusions.          MEDICATIONS  (STANDING):  acetaminophen     Tablet .. 650 milliGRAM(s) Oral every 6 hours  albuterol/ipratropium for Nebulization 3 milliLiter(s) Nebulizer every 6 hours  amLODIPine   Tablet 5 milliGRAM(s) Oral daily  atorvastatin 80 milliGRAM(s) Oral at bedtime  bacitracin   Ointment 1 Application(s) Topical two times a day  chlorhexidine 2% Cloths 1 Application(s) Topical <User Schedule>  dextrose 5% + sodium chloride 0.9%. 1000 milliLiter(s) (50 mL/Hr) IV Continuous <Continuous>  dicyclomine 20 milliGRAM(s) Oral four times a day before meals  DULoxetine 60 milliGRAM(s) Oral daily  enoxaparin Injectable 30 milliGRAM(s) SubCutaneous every 12 hours  folic acid 1 milliGRAM(s) Oral daily  lidocaine   4% Patch 1 Patch Transdermal daily  melatonin 3 milliGRAM(s) Oral at bedtime  multivitamin 1 Tablet(s) Oral daily  nicotine -   7 mG/24Hr(s) Patch 1 Patch Transdermal daily  nystatin    Suspension 768339 Unit(s) Oral every 6 hours  pantoprazole    Tablet 40 milliGRAM(s) Oral before breakfast  piperacillin/tazobactam IVPB.. 3.375 Gram(s) IV Intermittent every 8 hours  propranolol 10 milliGRAM(s) Oral every 12 hours  sodium chloride 3%  Inhalation 4 milliLiter(s) Inhalation every 12 hours  tamsulosin 0.4 milliGRAM(s) Oral at bedtime  thiamine 100 milliGRAM(s) Oral daily    MEDICATIONS  (PRN):  meclizine 12.5 milliGRAM(s) Oral once PRN Dizziness/vertigo

## 2024-06-04 NOTE — PROGRESS NOTE ADULT - SUBJECTIVE AND OBJECTIVE BOX
GENERAL SURGERY PROGRESS NOTE     JAYLYN AGARWAL  [address] [place] [address] day :10d    POD:  Procedure:   Surgical Attending: Rich Ruiz  Overnight events: Pt was tachycardic to 114 and received 5mg metoprolol push x1. Pt received 20meq K powder and 2g Mg for repletions.     T(F): 98.7 (06-04-24 @ 08:00), Max: 98.7 (06-04-24 @ 08:00)  HR: 93 (06-04-24 @ 08:10) (76 - 118)  BP: 117/58 (06-04-24 @ 08:10) (96/52 - 138/67)  ABP: --  ABP(mean): --  RR: 20 (06-04-24 @ 08:10) (14 - 43)  SpO2: 98% (06-04-24 @ 08:10) (91% - 100%)    IN'S / OUT's:    06-03-24 @ 07:01  -  06-04-24 @ 07:00  --------------------------------------------------------  IN:    dextrose 5% + sodium chloride 0.9%: 1200 mL    IV PiggyBack: 125 mL    IV PiggyBack: 350 mL    Oral Fluid: 640 mL  Total IN: 2315 mL    OUT:    Indwelling Catheter - Urethral (mL): 2260 mL  Total OUT: 2260 mL    Total NET: 55 mL      06-04-24 @ 07:01  -  06-04-24 @ 10:22  --------------------------------------------------------  IN:    dextrose 5% + sodium chloride 0.9%: 200 mL  Total IN: 200 mL    OUT:    Indwelling Catheter - Urethral (mL): 275 mL  Total OUT: 275 mL    Total NET: -75 mL          PHYSICAL EXAM:  GENERAL: NAD, mumbling but A&Ox3  CHEST/LUNG: equal chest rise b/l  HEART: Regular rate and rhythm  ABDOMEN: Soft, Nontender, Nondistended;   EXTREMITIES:  No clubbing, cyanosis, or edema      LABS  Labs:  CAPILLARY BLOOD GLUCOSE                              10.3   13.02 )-----------( 375      ( 04 Jun 2024 00:20 )             30.0       Auto Neutrophil %: 82.3 % (06-04-24 @ 00:20)  Auto Immature Granulocyte %: 0.5 % (06-04-24 @ 00:20)    06-04    131<L>  |  96<L>  |  9<L>  ----------------------------<  94  4.1   |  25  |  0.8      Calcium: 8.6 mg/dL (06-04-24 @ 00:20)      LFTs:     Blood Gas Arterial, Lactate: 1.0 mmol/L (06-01-24 @ 11:31)    ABG - ( 01 Jun 2024 11:31 )  pH: 7.40  /  pCO2: 37    /  pO2: 69    / HCO3: 23    / Base Excess: -1.6  /  SaO2: 96.3            ABG - ( 31 May 2024 03:12 )  pH: 7.34  /  pCO2: 43    /  pO2: 99    / HCO3: 23    / Base Excess: -2.6  /  SaO2: 100.0           ABG - ( 30 May 2024 13:27 )  pH: 7.32  /  pCO2: 46    /  pO2: 134   / HCO3: 24    / Base Excess: -2.3  /  SaO2: 100.0             Coags:            Urinalysis Basic - ( 04 Jun 2024 00:20 )    Color: x / Appearance: x / SG: x / pH: x  Gluc: 94 mg/dL / Ketone: x  / Bili: x / Urobili: x   Blood: x / Protein: x / Nitrite: x   Leuk Esterase: x / RBC: x / WBC x   Sq Epi: x / Non Sq Epi: x / Bacteria: x            RADIOLOGY & ADDITIONAL TESTS:      A/P:  JAYLYN AGARWAL is a 78yFemale w/ PMHx of HTN, HLD, anxiety, CAD s/p CABG x3 (2009), Atrial flutter s/p ablation (2022), OA, recent admission for infectious colitis (4/26/24), seen as a TRAUMA CONSULT s/p multiple mechanical falls (-HT, -LOC, -AC). The following injuries were identified:     # Acute nondisplaced posterior left 6-7 rib fractures   # Acute mildly displaced left 8-11 rib fractures   # Acute nondisplaced fracture of the right superior pubic ramus    # Right greater trochanteric fracture      PLAN:   - hemodynamic monitoring  - f/u Neurology if MRI of Brain and C-spine is required   - Strict I&O - monitor mann output  - daily labs, replete electrolytes as needed   - multi modal pain control  - continue with pureed diet  - continue with DVT and GI ppx  - continue with antibiotics  - encourage activity and IS as tolerated       #Antibiotics: nystatin    Suspension [id number] Unit(s) Oral every 6 hours, 05-30-24 @ 09:48  piperacillin/tazobactam IVPB.. 3.375 Gram(s) IV Intermittent every 8 hours, 05-30-24 @ 06:00   Day **  #DVT ppx: enoxaparin Injectable 30 milliGRAM(s) SubCutaneous every 12 hours    #GI ppx: pantoprazole    Tablet 40 milliGRAM(s) Oral before breakfast    #Bowel regimen:   ***Senna/Mirilax/Mineral oil/Dulcolax     Disposition:  ***    Above plan discussed with Attending Surgeon  ***  , patient, patient family, and Primary team    Blue Spectra [id number]  TAP (Trauma, Acute care, Pediatrics) Spectra [phone]  Green Spectra [phone]  Vascular [phone]

## 2024-06-04 NOTE — CONSULT NOTE ADULT - CONSULT REQUESTED DATE/TIME
04-Jun-2024 12:28
27-May-2024 12:16
27-May-2024 13:18
02-Jun-2024 09:42
26-May-2024 19:33
26-May-2024 16:05
28-May-2024 11:36

## 2024-06-04 NOTE — CONSULT NOTE ADULT - CONSULT REASON
CBD dilation
evaluate for weight bearing status
Right superior pubic rami and GT fractures
hand movements
multiple consecutive rib fractures
fall/ multiple trauma
Co-managment

## 2024-06-04 NOTE — CONSULT NOTE ADULT - TIME BILLING
as above
75 minutes spent on total encounter; more than 50% of the visit was spent counseling and / or coordinating care by the attending physician.  The necessity of the time spent during the encounter on this date of service was due to: Coordination of care.

## 2024-06-05 NOTE — PROGRESS NOTE ADULT - SUBJECTIVE AND OBJECTIVE BOX
JAYLYN AGARWAL   [id number]   [birth year]  78yF    Admit Date: 05-26-24  Indication for SICU: multiple consecutive rib fractures s/p multiple mechanical falls        ============================  HPI   78F current smoker with PMHx of HTN, HLD, anxiety, CAD s/p CABG x 3 (2009), atrial flutter s/p ablation (2022), OA, and recent admission for infectious colitis (4/26/24), presented to the ED s/p multiple mechanical falls (-HT/-LOC/-AC). Of note, pt presented to ED on 5/23/24 with CC of back pain (pt did not endorse fall at this time), and was discharged without admission. Today, pt's daughter endorsed that pt has experienced multiple mechanical falls over the past month, most recently 5/23/24 after returning home from ED. Pt endorsed weakness in her legs, causing her to fall. CTAP showed acute nondisplaced posterior left 6th, 7th rib fractures, `ramus, and right greater trochanter fracture, as well as several incidental findings (see below). CT head and neck were negative for acute pathology. Labs were significant for WBC 12, Na 124, K 3.4, and alk phos 130.     SICU Consult for hemodynamic monitoring in the setting of multiple consecutive rib fractures. Pt was examined in ED, stable, see Physical exam below.     Pertinent Imaging    < from: CT Abdomen and Pelvis w/ IV Cont (05.26.24 @ 11:21) >  IMPRESSION:  Acute nondisplaced posterior left 6th, 7th rib fractures. Acute mildly displaced posterior left 8th, 9th, 10th, and 11th ribs fractures.  Acute nondisplaced fracture of the right superior pubic ramus (4-397).  Stable chronic T6, T7, T8, T11, L2 vertebral body compression deformities.  Mild mural thickening extending from the cecum to the mid transverse colon likely reflecting colitis of infectious or inflammatory etiology..  New Trace bilateral pleural effusions with adjacent consolidative opacities.    Additional attending comments:  Additional right greater trochanter fracture, seen on image 177 of series 602  There is a punctate focus of air within the left pleural effusion on image 32 series 2. Although no pneumothorax is identified on this examination, a possible trace pneumothorax cannot be excluded.    Dilated common bile duct with intrahepatic biliary duct dilatation and a distended gallbladder.  Findings are suspicious for a possible central obstructing lesion within the distal common bile duct is not excluded. Further evaluation with ERCP is recommended.  Patulous, fluid-filled esophagus. Outpatient endoscopy is recommended.  Colonic wall thickening may be related to underdistention.  Left axillary lymphadenopathy require short-term interval follow-up.  < end of copied text >    < from: CT Cervical Spine No Cont (05.26.24 @ 11:14) >  IMPRESSION:  CT head: Age-indeterminate small infarct involving the left thalamus. No acute intracranial hemorrhage or mass effect.  CT cervical spine: No acute fracture or traumatic subluxation.   < end of copied text >       24 Hour Events  6/4  NIGHT  -A&O x 3, following commands, able to squeeze hands b/l   -dc mann at [place]  -2K riders   DAY  -dispo planning      [X] A ten-point review of systems was otherwise negative except as noted above.  [  ] Due to altered mental status/intubation, subjective information was not attained from the patient. History was obtained, to the extent possible, from review of the chart and collateral sources of information.       ====================================     JAYLYN AGARWAL   [id number]   [birth year]  78yF    Admit Date: 05-26-24  Indication for SICU: multiple consecutive rib fractures s/p multiple mechanical falls        ============================  HPI   78F current smoker with PMHx of HTN, HLD, anxiety, CAD s/p CABG x 3 (2009), atrial flutter s/p ablation (2022), OA, and recent admission for infectious colitis (4/26/24), presented to the ED s/p multiple mechanical falls (-HT/-LOC/-AC). Of note, pt presented to ED on 5/23/24 with CC of back pain (pt did not endorse fall at this time), and was discharged without admission. Today, pt's daughter endorsed that pt has experienced multiple mechanical falls over the past month, most recently 5/23/24 after returning home from ED. Pt endorsed weakness in her legs, causing her to fall. CTAP showed acute nondisplaced posterior left 6th, 7th rib fractures, `ramus, and right greater trochanter fracture, as well as several incidental findings (see below). CT head and neck were negative for acute pathology. Labs were significant for WBC 12, Na 124, K 3.4, and alk phos 130.     SICU Consult for hemodynamic monitoring in the setting of multiple consecutive rib fractures. Pt was examined in ED, stable, see Physical exam below.     Pertinent Imaging    < from: CT Abdomen and Pelvis w/ IV Cont (05.26.24 @ 11:21) >  IMPRESSION:  Acute nondisplaced posterior left 6th, 7th rib fractures. Acute mildly displaced posterior left 8th, 9th, 10th, and 11th ribs fractures.  Acute nondisplaced fracture of the right superior pubic ramus (4-397).  Stable chronic T6, T7, T8, T11, L2 vertebral body compression deformities.  Mild mural thickening extending from the cecum to the mid transverse colon likely reflecting colitis of infectious or inflammatory etiology..  New Trace bilateral pleural effusions with adjacent consolidative opacities.    Additional attending comments:  Additional right greater trochanter fracture, seen on image 177 of series 602  There is a punctate focus of air within the left pleural effusion on image 32 series 2. Although no pneumothorax is identified on this examination, a possible trace pneumothorax cannot be excluded.    Dilated common bile duct with intrahepatic biliary duct dilatation and a distended gallbladder.  Findings are suspicious for a possible central obstructing lesion within the distal common bile duct is not excluded. Further evaluation with ERCP is recommended.  Patulous, fluid-filled esophagus. Outpatient endoscopy is recommended.  Colonic wall thickening may be related to underdistention.  Left axillary lymphadenopathy require short-term interval follow-up.  < end of copied text >    < from: CT Cervical Spine No Cont (05.26.24 @ 11:14) >  IMPRESSION:  CT head: Age-indeterminate small infarct involving the left thalamus. No acute intracranial hemorrhage or mass effect.  CT cervical spine: No acute fracture or traumatic subluxation.   < end of copied text >       24 Hour Events  6/4  NIGHT  -A&O x 3, following commands, able to squeeze hands b/l   -dc mann at [place]  -2K riders   DAY  -dispo planning      [X] A ten-point review of systems was otherwise negative except as noted above.  [  ] Due to altered mental status/intubation, subjective information was not attained from the patient. History was obtained, to the extent possible, from review of the chart and collateral sources of information.       ====================================    Daily     Diet, Regular (06-03-24 @ 00:58)      CURRENT MEDS:  Neurologic Medications  acetaminophen     Tablet .. 650 milliGRAM(s) Oral every 6 hours  DULoxetine 60 milliGRAM(s) Oral daily  meclizine 12.5 milliGRAM(s) Oral once PRN Dizziness/vertigo  melatonin 3 milliGRAM(s) Oral at bedtime    Respiratory Medications  albuterol/ipratropium for Nebulization 3 milliLiter(s) Nebulizer every 6 hours  sodium chloride 3%  Inhalation 4 milliLiter(s) Inhalation every 12 hours    Cardiovascular Medications  amLODIPine   Tablet 5 milliGRAM(s) Oral daily  propranolol 10 milliGRAM(s) Oral every 12 hours    Gastrointestinal Medications  dextrose 5% + sodium chloride 0.9%. 1000 milliLiter(s) IV Continuous <Continuous>  dicyclomine 20 milliGRAM(s) Oral four times a day before meals  folic acid 1 milliGRAM(s) Oral daily  multivitamin 1 Tablet(s) Oral daily  pantoprazole    Tablet 40 milliGRAM(s) Oral before breakfast  thiamine 100 milliGRAM(s) Oral daily    Genitourinary Medications  tamsulosin 0.4 milliGRAM(s) Oral at bedtime    Hematologic/Oncologic Medications  enoxaparin Injectable 30 milliGRAM(s) SubCutaneous every 12 hours    Antimicrobial/Immunologic Medications  nystatin    Suspension [id number] Unit(s) Oral every 6 hours  piperacillin/tazobactam IVPB.. 3.375 Gram(s) IV Intermittent every 8 hours    Endocrine/Metabolic Medications  atorvastatin 80 milliGRAM(s) Oral at bedtime    Topical/Other Medications  bacitracin   Ointment 1 Application(s) Topical two times a day  chlorhexidine 2% Cloths 1 Application(s) Topical <User Schedule>  lidocaine   4% Patch 1 Patch Transdermal daily  nicotine -   7 mG/24Hr(s) Patch 1 Patch Transdermal daily      ICU Vital Signs Last 24 Hrs  T(C): 36.3 (05 Jun 2024 08:00), Max: 36.9 (04 Jun 2024 20:00)  T(F): 97.3 (05 Jun 2024 08:00), Max: 98.5 (04 Jun 2024 20:00)  HR: 88 (05 Jun 2024 08:00) (75 - 92)  BP: 170/78 (05 Jun 2024 08:00) (125/67 - 170/78)  BP(mean): 112 (05 Jun 2024 08:00) (85 - 112)  RR: 25 (05 Jun 2024 08:00) (22 - 52)  SpO2: 100% (05 Jun 2024 08:00) (94% - 100%)    O2 Parameters below as of 05 Jun 2024 08:00  Patient On (Oxygen Delivery Method): nasal cannula  O2 Flow (L/min): 2    I&O's Summary    04 Jun 2024 07:01  -  05 Jun 2024 07:00  --------------------------------------------------------  IN: 1600 mL / OUT: 2400 mL / NET: -800 mL      I&O's Detail    04 Jun 2024 07:01  -  05 Jun 2024 07:00  --------------------------------------------------------  IN:    dextrose 5% + sodium chloride 0.9%: 1200 mL    IV PiggyBack: 200 mL    IV PiggyBack: 200 mL  Total IN: 1600 mL    OUT:    Indwelling Catheter - Urethral (mL): 1750 mL    Intermittent Catheterization - Urethral (mL): 650 mL  Total OUT: 2400 mL    Total NET: -800 mL      PHYSICAL EXAM:    General/Neuro: GCS: 15, alert & oriented x 3, weakness in B/l upper and lower extremities (baseline).   Lungs: Normal expansion/effort. On 2L NC.    Cardiovascular:  Regular rate and rhythm.  No peripheral edema   GI: Abdomen soft, Non-tender, Non-distended.    Extremities: Extremities warm, pink, well-perfused.   Derm: Right ischium DTI.    : Primafit in place.    LABS:  CAPILLARY BLOOD GLUCOSE                              9.7    11.75 )-----------( 391      ( 04 Jun 2024 21:43 )             29.0       06-04    130<L>  |  96<L>  |  8<L>  ----------------------------<  112<H>  3.6   |  25  |  0.7    Ca    8.3<L>      04 Jun 2024 21:43  Phos  3.4     06-04  Mg     2.1     06-04    Urinalysis Basic - ( 04 Jun 2024 21:43 )    Color: x / Appearance: x / SG: x / pH: x  Gluc: 112 mg/dL / Ketone: x  / Bili: x / Urobili: x   Blood: x / Protein: x / Nitrite: x   Leuk Esterase: x / RBC: x / WBC x   Sq Epi: x / Non Sq Epi: x / Bacteria: x

## 2024-06-05 NOTE — PROGRESS NOTE ADULT - SUBJECTIVE AND OBJECTIVE BOX
GENERAL SURGERY PROGRESS NOTE    Patient: JAYLYN AGARWAL , 78y ([birth year])Female   MRN: [id number]  Location: [facility] [address] A  Visit: 05-26-24 Inpatient  Date: 06-05-24 @ 09:50    Hospital Day #: 11    Events of past 24 hours: on BIPAP at night. Intermittent straight catheterizations.    PAST MEDICAL & SURGICAL HISTORY:  CAD (coronary artery disease)  Tobacco dependence  HTN (hypertension)  Anxiety  Atrial flutter  S/P CABG x 3      Vitals:   T(F): 97.7 (06-05-24 @ 04:00), Max: 98.5 (06-04-24 @ 20:00)  HR: 80 (06-05-24 @ 05:40)  BP: 154/65 (06-05-24 @ 05:40)  RR: 52 (06-05-24 @ 05:40)  SpO2: 94% (06-05-24 @ 05:40)      Diet, Regular      Fluids:     I & O's:    06-04-24 @ 07:01  -  06-05-24 @ 07:00  --------------------------------------------------------  IN:    dextrose 5% + sodium chloride 0.9%: 1200 mL    IV PiggyBack: 200 mL    IV PiggyBack: 200 mL  Total IN: 1600 mL    OUT:    Indwelling Catheter - Urethral (mL): 1750 mL    Intermittent Catheterization - Urethral (mL): 650 mL  Total OUT: 2400 mL    Total NET: -800 mL    PHYSICAL EXAM:  GENERAL: NAD,   CHEST/LUNG: Equal chest rise bilaterally  HEART: Regular rate and rhythm  ABDOMEN: Soft, Nontender, Nondistended;   EXTREMITIES:  No clubbing, cyanosis, or edema      MEDICATIONS  (STANDING):  acetaminophen     Tablet .. 650 milliGRAM(s) Oral every 6 hours  albuterol/ipratropium for Nebulization 3 milliLiter(s) Nebulizer every 6 hours  amLODIPine   Tablet 5 milliGRAM(s) Oral daily  atorvastatin 80 milliGRAM(s) Oral at bedtime  bacitracin   Ointment 1 Application(s) Topical two times a day  chlorhexidine 2% Cloths 1 Application(s) Topical <User Schedule>  dextrose 5% + sodium chloride 0.9%. 1000 milliLiter(s) (50 mL/Hr) IV Continuous <Continuous>  dicyclomine 20 milliGRAM(s) Oral four times a day before meals  DULoxetine 60 milliGRAM(s) Oral daily  enoxaparin Injectable 30 milliGRAM(s) SubCutaneous every 12 hours  folic acid 1 milliGRAM(s) Oral daily  lidocaine   4% Patch 1 Patch Transdermal daily  melatonin 3 milliGRAM(s) Oral at bedtime  multivitamin 1 Tablet(s) Oral daily  nicotine -   7 mG/24Hr(s) Patch 1 Patch Transdermal daily  nystatin    Suspension [id number] Unit(s) Oral every 6 hours  pantoprazole    Tablet 40 milliGRAM(s) Oral before breakfast  piperacillin/tazobactam IVPB.. 3.375 Gram(s) IV Intermittent every 8 hours  propranolol 10 milliGRAM(s) Oral every 12 hours  sodium chloride 3%  Inhalation 4 milliLiter(s) Inhalation every 12 hours  tamsulosin 0.4 milliGRAM(s) Oral at bedtime  thiamine 100 milliGRAM(s) Oral daily    MEDICATIONS  (PRN):  meclizine 12.5 milliGRAM(s) Oral once PRN Dizziness/vertigo      DVT PROPHYLAXIS: enoxaparin Injectable 30 milliGRAM(s) SubCutaneous every 12 hours    GI PROPHYLAXIS: pantoprazole    Tablet 40 milliGRAM(s) Oral before breakfast    ANTICOAGULATION:   ANTIBIOTICS:  nystatin    Suspension [id number] Unit(s)  piperacillin/tazobactam IVPB.. 3.375 Gram(s)    LAB/STUDIES:  Labs:  CAPILLARY BLOOD GLUCOSE                       9.7    11.75 )-----------( 391      ( 04 Jun 2024 21:43 )             29.0         06-04    130<L>  |  96<L>  |  8<L>  ----------------------------<  112<H>  3.6   |  25  |  0.7      Calcium: 8.3 mg/dL (06-04-24 @ 21:43)    ABG - ( 01 Jun 2024 11:31 )  pH: 7.40  /  pCO2: 37    /  pO2: 69    / HCO3: 23    / Base Excess: -1.6  /  SaO2: 96.3      ABG - ( 31 May 2024 03:12 )  pH: 7.34  /  pCO2: 43    /  pO2: 99    / HCO3: 23    / Base Excess: -2.6  /  SaO2: 100.0     ABG - ( 30 May 2024 13:27 )  pH: 7.32  /  pCO2: 46    /  pO2: 134   / HCO3: 24    / Base Excess: -2.3  /  SaO2: 100.0       Urinalysis Basic - ( 04 Jun 2024 21:43 )    Color: x / Appearance: x / SG: x / pH: x  Gluc: 112 mg/dL / Ketone: x  / Bili: x / Urobili: x   Blood: x / Protein: x / Nitrite: x   Leuk Esterase: x / RBC: x / WBC x   Sq Epi: x / Non Sq Epi: x / Bacteria: x    IMAGING:  n/a    ACCESS/ DEVICES:  [x ] Peripheral IV

## 2024-06-05 NOTE — PROGRESS NOTE ADULT - ASSESSMENT
78y Female w/ multiple comorbidities presenting with multiple consecutive rib fractures s/p multiple falls due to lower extremities now admitted to SICU for hemodynamic monitoring.    #acute nondisplaced posterior left 6th, 7th rib fractures  - mgmt as per trauma    #acute right suprapubic rami fracture, acute greater trochanteric fracture  - ortho cleared for WBAT    #Metabolic encephalopathy   - likely 2/2 pna, improved    #B/L UE fasciculations  -Neuro f/u  - MRI when able    #leukocytosis  - improved on current abx    # Aspiration pneumonia  - no evidence of aspiration at this time    #UTI   - s/p abx course    #smoking hx  - cessation advised    #Cecum thickening  - EGD and EUS as per GI, can be done as outpt

## 2024-06-05 NOTE — PROGRESS NOTE ADULT - ASSESSMENT
Assessment and Recommendation:   78y Female w/ multiple comorbidities presenting with multiple consecutive rib fractures s/p multiple falls due to lower extremities now admitted to SICU for hemodynamic monitoring.    Injuries:   -acute nondisplaced posterior left 6th, 7th rib fractures  -right greater trochanter fracture    NEURO/PSYCH:  #Acute pain  - acetaminophen Tablet 650 mg q6  - tramadol PRN  - lidocaine patch  #Bilateral lower extremity weakness; PMHx of stable chronic vertebral fractures; r/o impingement   - MRI lumbar spine pending  #PMHx of anxiety    -Duloxetine 60mg daily  #B/L UE fasciculations  - neurology c/s - tizanidine prn, toxic metabolic w/u, MRI brain/c-spine  - holding zyprexa   - thiamine/folate/MVI  - propranolol 10mg q12    RESP:   #multiple acute consecutive posterior left 6-11th rib fractures  -pain control  #Respiratory failure secondary to hypercapnia, requiring NIV support  - BiPAP overnight as needed    -chest PT/3%saline/duonebs  #bilateral pleural effusion w/ punctate air, no pneumothorax  #Activity     [facility]AT Orthopedic input obtained  #Current smoker, 1/2 PPD  - nicotine patch    CARDIAC:   #PMHx of HTN  - amlodipine 5mg daily  - HOLDING  olmesartan-hydrochlorothiazide 40 mg-25 mg oral tablet   #PMHx of HLD  - atorvastatin 80 mg at bedtime (home rx rosuvastatin 20mg QD)  #PMHx of CAD s/p CABG x 3  #PMHx of atrial flutter s/p ablation (2022)  - follows with Dr. Brunson    - EKG NSR  #Imaging:   - EKG: NSR  - TTE, 5/27 - EF 67%. Mild MR, Mild/mod TR, mild pHTN, simple atheroma in aortic arch    GI/NUTR:   #Diet: regular diet  - 1:1 feeds   - aspiration precautions, HOB 30  #GI Prophylaxis  #hx of PUD    - pantoprazole Tablet: 40 mg (home rx)  #Bowel regimen   -HOLDING secondary to loose bowel movements    -last bowel movement 6/3  #Recent admission for infective colitis (4/24)  - Continue home dicyclomine 20 milliGRAM(s) Oral four times a day before meals  #dilated CBD, colonic wall, esophageal fluid  - GI recs appreciated: possible ERCP, follow up outpatient    /RENAL:   #urine output in critically ill  -mann discontinued; pending trial of void 6AM  #urinary retention >1.5 L    - Flomax 0.4mg at bedtime  #acute right pubic rami fracture  - CT cystogram negative  #hyponatremia, asymptomatic   -SIADH vs infectious   - hold HCTZ  - avoid sodium-restricted diet    Labs:          BUN/Cr- 9/0.8  -->,  8/0.7  -->          Electrolytes-(06-04 @ 21:43)Na 130 // K 3.6 // Mg 2.1 // Phos 3.4   #Hypokalemia-repleted with 40mEq potassium chloride      HEME/ONC:   #DVT prophylaxis    - SCDs    - enoxaparin 30 q12    Labs: Hb/Hct:  10.3/30.0  -->,  9.7/29.0  -->                      Plts:  375  -->,  391  -->                 PTT/INR:        ID:  #leukocytosis in setting of trauma vs UTI, afebrile   WBC- 12.56  --->>,  13.02  --->>,  11.75  --->>  Temp trend- 24hrs T(F): 98 (06-05 @ 00:00), Max: 98.7 (06-04 @ 08:00)  piperacillin/tazobactam IVPB.. 3.375 every 8 hours (05/30-06/06)  #UTI present on admission  -U/a- +leuk/nitrites/occasional bacteria   - on Rocephin x 3 days (completed)  #Pneumonia  -Zosyn   #left axillary lymphadenopathy seen on CT  - follow up outpatient    ENDO:  #Blood glucose monitoring  - Glucose goal 140-180. if above 180 start ISS    MSK:  #acute right suprapubic rami fracture, acute greater trochanteric fracture  - Orthopedics c/s --> Xrays  reviewed imaging now WBAT b/l    #PMHx of osteoarthritis  - follows with Dr. uDbon    DERM:  #sacral DTI  -wound care consult pending  - Preventative dressings in place    LINES/DRAINS:  PIV, Indwelling mann (6/2-     ADVANCED DIRECTIVES:      HCP/Emergency Contact - Carolyn Lobato (daughter) - [phone]    INDICATION FOR SICU: Multiple consecutive rib fractures    DISPO: SICU Assessment and Recommendation:   78y Female w/ multiple comorbidities presenting with multiple consecutive rib fractures s/p multiple falls due to lower extremities now admitted to SICU for hemodynamic monitoring.    Injuries:   -acute nondisplaced posterior left 6th, 7th rib fractures  -right greater trochanter fracture    NEURO/PSYCH:  #Acute pain  - acetaminophen Tablet 650 mg q6  - lidocaine patch  #Bilateral lower extremity weakness; PMHx of stable chronic vertebral fractures; r/o impingement   - MRI lumbar spine pending  #PMHx of anxiety    -Duloxetine 60mg daily  #B/L UE fasciculations  - neurology c/s - tizanidine prn, toxic metabolic w/u, MRI brain/c-spine  - holding zyprexa   - thiamine/folate/MVI  - propranolol 10mg q12    RESP:   #multiple acute consecutive posterior left 6-11th rib fractures  -pain control  #Respiratory failure secondary to hypercapnia, requiring NIV support  - BiPAP overnight as needed  - chest PT/3%saline/duonebs  #bilateral pleural effusion w/ punctate air, no pneumothorax  #Activity     WBAT Orthopedic input obtained  #Current smoker, 1/2 PPD  - nicotine patch    CARDIAC:   #PMHx of HTN  - amlodipine 5mg daily  - HOLDING  olmesartan-hydrochlorothiazide 40 mg-25 mg oral tablet   #PMHx of HLD  - atorvastatin 80 mg at bedtime (home rx rosuvastatin 20mg QD)  #PMHx of CAD s/p CABG x 3  #PMHx of atrial flutter s/p ablation (2022)  - follows with Dr. Brunson    - EKG NSR  #Imaging:   - EKG: NSR  - TTE, 5/27 - EF 67%. Mild MR, Mild/mod TR, mild pHTN, simple atheroma in aortic arch    GI/NUTR:   #Diet: regular diet  - 1:1 feeds   - aspiration precautions, HOB 30  #GI Prophylaxis  #hx of PUD    - pantoprazole Tablet: 40 mg (home rx)  #Bowel regimen   -HOLDING secondary to loose bowel movements    -last bowel movement 6/6  #Recent admission for infective colitis (4/24)  - Continue home dicyclomine 20 milliGRAM(s) Oral four times a day before meals  #dilated CBD, colonic wall, esophageal fluid  - GI recs appreciated: possible ERCP, follow up outpatient    /RENAL:   #urine output in critically ill  -mann discontinued; Bladder scan at 3rh238 mL, 650mL straight cath. Repeat TOV at 12pm   #urinary retention >1.5 L    - Flomax 0.4mg at bedtime  #acute right pubic rami fracture  - CT cystogram negative  #hyponatremia, asymptomatic   -SIADH vs infectious   - hold HCTZ  - avoid sodium-restricted diet    Labs:          BUN/Cr- 9/0.8  -->,  8/0.7  -->          Electrolytes-(06-04 @ 21:43)Na 130 // K 3.6 // Mg 2.1 // Phos 3.4   #Hypokalemia-repleted with 40mEq potassium chloride      HEME/ONC:   #DVT prophylaxis    - SCDs    - enoxaparin 30 q12    Labs: Hb/Hct:  10.3/30.0  -->,  9.7/29.0  -->                      Plts:  375  -->,  391  -->                 PTT/INR:        ID:  #leukocytosis in setting of trauma vs UTI, afebrile   WBC- 12.56  --->>,  13.02  --->>,  11.75  --->>  Temp trend- 24hrs T(F): 97.7 (05 Jun 2024 04:00), Max: 98.5 (04 Jun 2024 20:00)  piperacillin/tazobactam IVPB.. 3.375 every 8 hours (05/30-06/06)  #UTI present on admission  -U/a- +leuk/nitrites/occasional bacteria   - on Rocephin x 3 days (completed)  #Pneumonia  -Zosyn ends 6/6  #left axillary lymphadenopathy seen on CT  - follow up outpatient    ENDO:  #Blood glucose monitoring  - Glucose goal 140-180. if above 180 start ISS    MSK:  #acute right suprapubic rami fracture, acute greater trochanteric fracture  - Orthopedics c/s --> Xrays  reviewed imaging now WBAT b/l    #PMHx of osteoarthritis  - follows with Dr. Dubon    DERM:  #sacral DTI  -wound care consult: Cleanse wound with soap and water, pat dry. Apply Triad cream twice daily, PRN for soiling. Skin and incontinence care.Pressure Injury Prevention. Assess wound daily and inform primary provider of any changes.   - Preventative dressings in place    LINES/DRAINS:  PIV, Indwelling mann (6/2-     ADVANCED DIRECTIVES:      HCP/Emergency Contact - Carolyn Lobato (daughter) - [phone]    INDICATION FOR SICU: Multiple consecutive rib fractures    DISPO: [id number]

## 2024-06-05 NOTE — CHART NOTE - NSCHARTNOTEFT_GEN_A_CORE
SICU Transfer Note    Transfer from: SICU  Transfer to: Trauma Surgery    SICU COURSE:  78F current smoker with PMHx of HTN, HLD, anxiety, CAD s/p CABG x 3 (2009), atrial flutter s/p ablation (2022), OA, and recent admission for infectious colitis (4/26/24), presented to the ED s/p multiple mechanical falls (-HT/-LOC/-AC). Of note, pt presented to ED on 5/23/24 with CC of back pain (pt did not endorse fall at this time), and was discharged without admission. Today, pt's daughter endorsed that pt has experienced multiple mechanical falls over the past month, most recently 5/23/24 after returning home from ED. Pt endorsed weakness in her legs, causing her to fall. CTAP showed acute nondisplaced posterior left 6th, 7th rib fractures, acute mildly displaced posterior left 8th, 9th, 10th, and 11th ribs fractures, acute nondisplaced fracture of the right superior pubic ramus, and right greater trochanter fracture, as well as several incidental findings. CT head and neck were negative for acute pathology. Labs were significant for WBC 12, Na 124, K 3.4, and alk phos 130. SICU Consult for hemodynamic monitoring in the setting of multiple consecutive rib fractures. SICU course was complicated by urinary retention, requiring mann placement on 5/28, inability to complete MRIs requested by neurology d/t patient's inability to stay still/tolerate, tremors/ fasciculations (treated with propranolol) and altered mental status (5/28 - 5/30), for which toxic metabolic encephalopathy workup was negative. SICU course was also complicated by respiratory distress requiring HFNC and BiPAP as well as pneumonia (treated with zosyn 5/30-6/6) on 5/30. Respiratory status improved, and patient was weaned to NC on 6/1. Patient was initially non-weight bearing to RLE until MRI due to greater trochanteric and pelvic fracture, however per othopedics, MRI not necessary WBAT on 6/2. Patient's mental and respiratory status continued to improve and patient was downgraded to stepdown on 6/3. Patient has continued to do well and is now medically stable for downgrade to the floor.     ASSESSMENT & PLAN:   Assessment and Recommendation:   78y Female w/ multiple comorbidities presenting with multiple consecutive rib fractures s/p multiple falls due to lower extremities now admitted to SICU for hemodynamic monitoring.    Injuries:   -acute nondisplaced posterior left 6th, 7th rib fractures  -right greater trochanter fracture    NEURO/PSYCH:  #Acute pain  - acetaminophen Tablet 650 mg q6  - lidocaine patch  #Bilateral lower extremity weakness; PMHx of stable chronic vertebral fractures; r/o impingement   - MRI lumbar spine pending  #PMHx of anxiety    -Duloxetine 60mg daily  #B/L UE fasciculations  - neurology c/s - tizanidine prn, toxic metabolic w/u, MRI brain/c-spine  - holding zyprexa   - thiamine/folate/MVI  - propranolol 10mg q12    RESP:   #multiple acute consecutive posterior left 6-11th rib fractures  -pain control  #Respiratory failure secondary to hypercapnia, requiring NIV support  - BiPAP overnight as needed  - chest PT/3%saline/duonebs  #bilateral pleural effusion w/ punctate air, no pneumothorax  #Activity     WBAT Orthopedic input obtained  #Current smoker, 1/2 PPD  - nicotine patch    CARDIAC:   #PMHx of HTN  - amlodipine 5mg daily  - HOLDING  olmesartan-hydrochlorothiazide 40 mg-25 mg oral tablet   #PMHx of HLD  - atorvastatin 80 mg at bedtime (home rx rosuvastatin 20mg QD)  #PMHx of CAD s/p CABG x 3  #PMHx of atrial flutter s/p ablation (2022)  - follows with Dr. Brunson    - EKG NSR  #Imaging:   - EKG: NSR  - TTE, 5/27 - EF 67%. Mild MR, Mild/mod TR, mild pHTN, simple atheroma in aortic arch    GI/NUTR:   #Diet: regular diet  - 1:1 feeds   - aspiration precautions, HOB 30  #GI Prophylaxis  #hx of PUD    - pantoprazole Tablet: 40 mg (home rx)  #Bowel regimen   -HOLDING secondary to loose bowel movements    -last bowel movement 6/6  #Recent admission for infective colitis (4/24)  - Continue home dicyclomine 20 milliGRAM(s) Oral four times a day before meals  #dilated CBD, colonic wall, esophageal fluid  - GI recs appreciated: possible ERCP, follow up outpatient    /RENAL:   #urine output in critically ill  -mann discontinued; Bladder scan at 5dq217 mL, 650mL straight cath. Repeat TOV at 12pm   #urinary retention >1.5 L    - Flomax 0.4mg at bedtime  #acute right pubic rami fracture  - CT cystogram negative  #hyponatremia, asymptomatic   -SIADH vs infectious   - hold HCTZ  - avoid sodium-restricted diet    Labs:          BUN/Cr- 9/0.8  -->,  8/0.7  -->          Electrolytes-(06-04 @ 21:43)Na 130 // K 3.6 // Mg 2.1 // Phos 3.4   #Hypokalemia-repleted with 40mEq potassium chloride      HEME/ONC:   #DVT prophylaxis    - SCDs    - enoxaparin 30 q12    Labs: Hb/Hct:  10.3/30.0  -->,  9.7/29.0  -->                      Plts:  375  -->,  391  -->                 PTT/INR:        ID:  #leukocytosis in setting of trauma vs UTI, afebrile   WBC- 12.56  --->>,  13.02  --->>,  11.75  --->>  Temp trend- 24hrs T(F): 97.7 (05 Jun 2024 04:00), Max: 98.5 (04 Jun 2024 20:00)  piperacillin/tazobactam IVPB.. 3.375 every 8 hours (05/30-06/06)  #UTI present on admission  -U/a- +leuk/nitrites/occasional bacteria   - on Rocephin x 3 days (completed)  #Pneumonia  -Zosyn ends 6/6  #left axillary lymphadenopathy seen on CT  - follow up outpatient    ENDO:  #Blood glucose monitoring  - Glucose goal 140-180. if above 180 start ISS    MSK:  #acute right suprapubic rami fracture, acute greater trochanteric fracture  - Orthopedics c/s --> Xrays  reviewed imaging now WBAT b/l    #PMHx of osteoarthritis  - follows with Dr. Dubon    DERM:  #sacral DTI  -wound care consult: Cleanse wound with soap and water, pat dry. Apply Triad cream twice daily, PRN for soiling. Skin and incontinence care.Pressure Injury Prevention. Assess wound daily and inform primary provider of any changes.   - Preventative dressings in place    LINES/DRAINS:  PIV, Indwelling mann (6/2-     ADVANCED DIRECTIVES:      HCP/Emergency Contact - Carolyn Lobato (daughter) - [phone]    INDICATION FOR SICU: Multiple consecutive rib fractures    DISPO: 4C      For Follow-Up:  -8pm labs  -Neurology for B/L upper and lower extremity weakness   -PT consult  -TOV   -GI outpatient for EUS/ERCP    Vital Signs Last 24 Hrs  T(C): 36.3 (05 Jun 2024 08:00), Max: 36.9 (04 Jun 2024 20:00)  T(F): 97.3 (05 Jun 2024 08:00), Max: 98.5 (04 Jun 2024 20:00)  HR: 88 (05 Jun 2024 08:00) (75 - 92)  BP: 170/78 (05 Jun 2024 08:00) (128/70 - 170/78)  BP(mean): 112 (05 Jun 2024 08:00) (85 - 112)  RR: 25 (05 Jun 2024 08:00) (22 - 52)  SpO2: 100% (05 Jun 2024 08:00) (94% - 100%)    Parameters below as of 05 Jun 2024 08:00  Patient On (Oxygen Delivery Method): nasal cannula  O2 Flow (L/min): 2    I&O's Summary    04 Jun 2024 07:01  -  05 Jun 2024 07:00  --------------------------------------------------------  IN: 1600 mL / OUT: 2400 mL / NET: -800 mL      MEDICATIONS  (STANDING):  acetaminophen     Tablet .. 650 milliGRAM(s) Oral every 6 hours  albuterol/ipratropium for Nebulization 3 milliLiter(s) Nebulizer every 6 hours  amLODIPine   Tablet 5 milliGRAM(s) Oral daily  atorvastatin 80 milliGRAM(s) Oral at bedtime  bacitracin   Ointment 1 Application(s) Topical two times a day  chlorhexidine 2% Cloths 1 Application(s) Topical <User Schedule>  dextrose 5% + sodium chloride 0.9%. 1000 milliLiter(s) (50 mL/Hr) IV Continuous <Continuous>  dicyclomine 20 milliGRAM(s) Oral four times a day before meals  DULoxetine 60 milliGRAM(s) Oral daily  enoxaparin Injectable 30 milliGRAM(s) SubCutaneous every 12 hours  folic acid 1 milliGRAM(s) Oral daily  lidocaine   4% Patch 1 Patch Transdermal daily  melatonin 3 milliGRAM(s) Oral at bedtime  multivitamin 1 Tablet(s) Oral daily  nicotine -   7 mG/24Hr(s) Patch 1 Patch Transdermal daily  nystatin    Suspension 757842 Unit(s) Oral every 6 hours  pantoprazole    Tablet 40 milliGRAM(s) Oral before breakfast  piperacillin/tazobactam IVPB.. 3.375 Gram(s) IV Intermittent every 8 hours  propranolol 10 milliGRAM(s) Oral every 12 hours  sodium chloride 3%  Inhalation 4 milliLiter(s) Inhalation every 12 hours  tamsulosin 0.4 milliGRAM(s) Oral at bedtime  thiamine 100 milliGRAM(s) Oral daily    MEDICATIONS  (PRN):  meclizine 12.5 milliGRAM(s) Oral once PRN Dizziness/vertigo        LABS                                            9.7                   Neurophils% (auto):   x      (06-04 @ 21:43):    11.75)-----------(391          Lymphocytes% (auto):  x                                             29.0                   Eosinphils% (auto):   x        Manual%: Neutrophils x    ; Lymphocytes x    ; Eosinophils x    ; Bands%: x    ; Blasts x                                    130    |  96     |  8                   Calcium: 8.3   / iCa: x      (06-04 @ 21:43)    ----------------------------<  112       Magnesium: 2.1                              3.6     |  25     |  0.7              Phosphorous: 3.4            Signed out to *****  Date: 06/05/2024  Time: ******************** SICU Transfer Note    Transfer from: SICU  Transfer to: Trauma Surgery    SICU COURSE:  78F current smoker with PMHx of HTN, HLD, anxiety, CAD s/p CABG x 3 (2009), atrial flutter s/p ablation (2022), OA, and recent admission for infectious colitis (4/26/24), presented to the ED s/p multiple mechanical falls (-HT/-LOC/-AC). Of note, pt presented to ED on 5/23/24 with CC of back pain (pt did not endorse fall at this time), and was discharged without admission. Today, pt's daughter endorsed that pt has experienced multiple mechanical falls over the past month, most recently 5/23/24 after returning home from ED. Pt endorsed weakness in her legs, causing her to fall. CTAP showed acute nondisplaced posterior left 6th, 7th rib fractures, acute mildly displaced posterior left 8th, 9th, 10th, and 11th ribs fractures, acute nondisplaced fracture of the right superior pubic ramus, and right greater trochanter fracture, as well as several incidental findings. CT head and neck were negative for acute pathology. Labs were significant for WBC 12, Na 124, K 3.4, and alk phos 130. SICU Consult for hemodynamic monitoring in the setting of multiple consecutive rib fractures. SICU course was complicated by urinary retention, requiring mann placement on 5/28, inability to complete MRIs requested by neurology d/t patient's inability to stay still/tolerate, tremors/ fasciculations (treated with propranolol) and altered mental status (5/28 - 5/30), for which toxic metabolic encephalopathy workup was negative. SICU course was also complicated by respiratory distress requiring HFNC and BiPAP as well as pneumonia (treated with zosyn 5/30-6/6) on 5/30. Respiratory status improved, and patient was weaned to NC on 6/1. Patient was initially non-weight bearing to RLE until MRI due to greater trochanteric and pelvic fracture, however per orthopedics, MRI not necessary WBAT on 6/2. Patient's mental and respiratory status continued to improve and patient was downgraded to stepdown on 6/3. Patient has continued to do well and is now medically stable for downgrade to the floor.     ASSESSMENT & PLAN:   Assessment and Recommendation:   78y Female w/ multiple comorbidities presenting with multiple consecutive rib fractures s/p multiple falls due to lower extremities now admitted to SICU for hemodynamic monitoring.    Injuries:   -acute nondisplaced posterior left 6th, 7th rib fractures  -right greater trochanter fracture    NEURO/PSYCH:  #Acute pain  - acetaminophen Tablet 650 mg q6  - lidocaine patch  #Bilateral lower extremity weakness; PMHx of stable chronic vertebral fractures; r/o impingement   - MRI lumbar spine pending  #PMHx of anxiety    -Duloxetine 60mg daily  #B/L UE fasciculations  - neurology c/s - tizanidine prn, toxic metabolic w/u, MRI brain/c-spine  - holding zyprexa   - thiamine/folate/MVI  - propranolol 10mg q12    RESP:   #multiple acute consecutive posterior left 6-11th rib fractures  -pain control  #Respiratory failure secondary to hypercapnia, requiring NIV support  - BiPAP overnight as needed  - chest PT/3%saline/duonebs  #bilateral pleural effusion w/ punctate air, no pneumothorax  #Activity     WBAT Orthopedic input obtained  #Current smoker, 1/2 PPD  - nicotine patch    CARDIAC:   #PMHx of HTN  - amlodipine 5mg daily  - HOLDING  olmesartan-hydrochlorothiazide 40 mg-25 mg oral tablet   #PMHx of HLD  - atorvastatin 80 mg at bedtime (home rx rosuvastatin 20mg QD)  #PMHx of CAD s/p CABG x 3  #PMHx of atrial flutter s/p ablation (2022)  - follows with Dr. Brunson    - EKG NSR  #Imaging:   - EKG: NSR  - TTE, 5/27 - EF 67%. Mild MR, Mild/mod TR, mild pHTN, simple atheroma in aortic arch    GI/NUTR:   #Diet: regular diet  - 1:1 feeds   - aspiration precautions, HOB 30  #GI Prophylaxis  #hx of PUD    - pantoprazole Tablet: 40 mg (home rx)  #Bowel regimen   -HOLDING secondary to loose bowel movements    -last bowel movement 6/6  #Recent admission for infective colitis (4/24)  - Continue home dicyclomine 20 milliGRAM(s) Oral four times a day before meals  #dilated CBD, colonic wall, esophageal fluid  - GI recs appreciated: possible ERCP, follow up outpatient    /RENAL:   #urine output in critically ill  -mann discontinued; Bladder scan at 4mz357 mL, 650mL straight cath. Repeat TOV at 12pm   #urinary retention >1.5 L    - Flomax 0.4mg at bedtime  #acute right pubic rami fracture  - CT cystogram negative  #hyponatremia, asymptomatic   -SIADH vs infectious   - hold HCTZ  - avoid sodium-restricted diet    Labs:          BUN/Cr- 9/0.8  -->,  8/0.7  -->          Electrolytes-(06-04 @ 21:43)Na 130 // K 3.6 // Mg 2.1 // Phos 3.4   #Hypokalemia-repleted with 40mEq potassium chloride      HEME/ONC:   #DVT prophylaxis    - SCDs    - enoxaparin 30 q12    Labs: Hb/Hct:  10.3/30.0  -->,  9.7/29.0  -->                      Plts:  375  -->,  391  -->                 PTT/INR:        ID:  #leukocytosis in setting of trauma vs UTI, afebrile   WBC- 12.56  --->>,  13.02  --->>,  11.75  --->>  Temp trend- 24hrs T(F): 97.7 (05 Jun 2024 04:00), Max: 98.5 (04 Jun 2024 20:00)  piperacillin/tazobactam IVPB.. 3.375 every 8 hours (05/30-06/06)  #UTI present on admission  -U/a- +leuk/nitrites/occasional bacteria   - on Rocephin x 3 days (completed)  #Pneumonia  -Zosyn ends 6/6  #left axillary lymphadenopathy seen on CT  - follow up outpatient    ENDO:  #Blood glucose monitoring  - Glucose goal 140-180. if above 180 start ISS    MSK:  #acute right suprapubic rami fracture, acute greater trochanteric fracture  - Orthopedics c/s --> Xrays  reviewed imaging now WBAT b/l    #PMHx of osteoarthritis  - follows with Dr. Dubon    DERM:  #sacral DTI  -wound care consult: Cleanse wound with soap and water, pat dry. Apply Triad cream twice daily, PRN for soiling. Skin and incontinence care.Pressure Injury Prevention. Assess wound daily and inform primary provider of any changes.   - Preventative dressings in place    LINES/DRAINS:  PIV, Indwelling mann (6/2-     ADVANCED DIRECTIVES:      HCP/Emergency Contact - Cheryle, Carolyn (daughter) - [phone]    INDICATION FOR SICU: Multiple consecutive rib fractures    DISPO: 4C      For Follow-Up:  -8pm labs  -Neurology for B/L upper and lower extremity weakness   -PT consult  -TOV   -GI outpatient for EUS/ERCP    Vital Signs Last 24 Hrs  T(C): 36.3 (05 Jun 2024 08:00), Max: 36.9 (04 Jun 2024 20:00)  T(F): 97.3 (05 Jun 2024 08:00), Max: 98.5 (04 Jun 2024 20:00)  HR: 88 (05 Jun 2024 08:00) (75 - 92)  BP: 170/78 (05 Jun 2024 08:00) (128/70 - 170/78)  BP(mean): 112 (05 Jun 2024 08:00) (85 - 112)  RR: 25 (05 Jun 2024 08:00) (22 - 52)  SpO2: 100% (05 Jun 2024 08:00) (94% - 100%)    Parameters below as of 05 Jun 2024 08:00  Patient On (Oxygen Delivery Method): nasal cannula  O2 Flow (L/min): 2    I&O's Summary    04 Jun 2024 07:01  -  05 Jun 2024 07:00  --------------------------------------------------------  IN: 1600 mL / OUT: 2400 mL / NET: -800 mL      MEDICATIONS  (STANDING):  acetaminophen     Tablet .. 650 milliGRAM(s) Oral every 6 hours  albuterol/ipratropium for Nebulization 3 milliLiter(s) Nebulizer every 6 hours  amLODIPine   Tablet 5 milliGRAM(s) Oral daily  atorvastatin 80 milliGRAM(s) Oral at bedtime  bacitracin   Ointment 1 Application(s) Topical two times a day  chlorhexidine 2% Cloths 1 Application(s) Topical <User Schedule>  dextrose 5% + sodium chloride 0.9%. 1000 milliLiter(s) (50 mL/Hr) IV Continuous <Continuous>  dicyclomine 20 milliGRAM(s) Oral four times a day before meals  DULoxetine 60 milliGRAM(s) Oral daily  enoxaparin Injectable 30 milliGRAM(s) SubCutaneous every 12 hours  folic acid 1 milliGRAM(s) Oral daily  lidocaine   4% Patch 1 Patch Transdermal daily  melatonin 3 milliGRAM(s) Oral at bedtime  multivitamin 1 Tablet(s) Oral daily  nicotine -   7 mG/24Hr(s) Patch 1 Patch Transdermal daily  nystatin    Suspension 667306 Unit(s) Oral every 6 hours  pantoprazole    Tablet 40 milliGRAM(s) Oral before breakfast  piperacillin/tazobactam IVPB.. 3.375 Gram(s) IV Intermittent every 8 hours  propranolol 10 milliGRAM(s) Oral every 12 hours  sodium chloride 3%  Inhalation 4 milliLiter(s) Inhalation every 12 hours  tamsulosin 0.4 milliGRAM(s) Oral at bedtime  thiamine 100 milliGRAM(s) Oral daily    MEDICATIONS  (PRN):  meclizine 12.5 milliGRAM(s) Oral once PRN Dizziness/vertigo        LABS                                            9.7                   Neurophils% (auto):   x      (06-04 @ 21:43):    11.75)-----------(391          Lymphocytes% (auto):  x                                             29.0                   Eosinphils% (auto):   x        Manual%: Neutrophils x    ; Lymphocytes x    ; Eosinophils x    ; Bands%: x    ; Blasts x                                    130    |  96     |  8                   Calcium: 8.3   / iCa: x      (06-04 @ 21:43)    ----------------------------<  112       Magnesium: 2.1                              3.6     |  25     |  0.7              Phosphorous: 3.4            Signed out to *****  Date: 06/05/2024  Time: ******************** SICU Transfer Note    Transfer from: SICU  Transfer to: Trauma Surgery    SICU COURSE:  78F current smoker with PMHx of HTN, HLD, anxiety, CAD s/p CABG x 3 (2009), atrial flutter s/p ablation (2022), OA, and recent admission for infectious colitis (4/26/24), presented to the ED s/p multiple mechanical falls (-HT/-LOC/-AC). Of note, pt presented to ED on 5/23/24 with CC of back pain (pt did not endorse fall at this time), and was discharged without admission. Today, pt's daughter endorsed that pt has experienced multiple mechanical falls over the past month, most recently 5/23/24 after returning home from ED. Pt endorsed weakness in her legs, causing her to fall. CTAP showed acute nondisplaced posterior left 6th, 7th rib fractures, acute mildly displaced posterior left 8th, 9th, 10th, and 11th ribs fractures, acute nondisplaced fracture of the right superior pubic ramus, and right greater trochanter fracture, as well as several incidental findings. CT head and neck were negative for acute pathology. Labs were significant for WBC 12, Na 124, K 3.4, and alk phos 130. SICU Consult for hemodynamic monitoring in the setting of multiple consecutive rib fractures. SICU course was complicated by urinary retention, requiring mann placement on 5/28, inability to complete MRIs requested by neurology d/t patient's inability to stay still/tolerate, tremors/ fasciculations (treated with propranolol) and altered mental status (5/28 - 5/30), for which toxic metabolic encephalopathy workup was negative. SICU course was also complicated by respiratory distress requiring HFNC and BiPAP as well as pneumonia (treated with zosyn 5/30-6/6) on 5/30. Respiratory status improved, and patient was weaned to NC on 6/1. Patient was initially non-weight bearing to RLE until MRI due to greater trochanteric and pelvic fracture, however per orthopedics, MRI not necessary WBAT on 6/2. Patient's mental and respiratory status continued to improve and patient was downgraded to stepdown on 6/3. Patient failed TOV on 6/5, mann was replaced. Patient has continued to do well and is now medically stable for downgrade to the floor.     ASSESSMENT & PLAN:   Assessment and Recommendation:   78y Female w/ multiple comorbidities presenting with multiple consecutive rib fractures s/p multiple falls due to lower extremities now admitted to SICU for hemodynamic monitoring.    Injuries:   -acute nondisplaced posterior left 6th, 7th rib fractures  -right greater trochanter fracture    NEURO/PSYCH:  #Acute pain  - acetaminophen Tablet 650 mg q6  - lidocaine patch  #Bilateral lower extremity weakness; PMHx of stable chronic vertebral fractures; r/o impingement   - MRI lumbar spine pending  #PMHx of anxiety    -Duloxetine 60mg daily  #B/L UE fasciculations  - neurology c/s - tizanidine prn, toxic metabolic w/u, MRI brain/c-spine  - holding zyprexa   - thiamine/folate/MVI  - propranolol 10mg q12    RESP:   #multiple acute consecutive posterior left 6-11th rib fractures  -pain control  #Respiratory failure secondary to hypercapnia, requiring NIV support  - BiPAP overnight as needed  - chest PT/3%saline/duonebs  #bilateral pleural effusion w/ punctate air, no pneumothorax  #Activity     WBAT Orthopedic input obtained  #Current smoker, 1/2 PPD  - nicotine patch    CARDIAC:   #PMHx of HTN  - amlodipine 5mg daily  - HOLDING  olmesartan-hydrochlorothiazide 40 mg-25 mg oral tablet   #PMHx of HLD  - atorvastatin 80 mg at bedtime (home rx rosuvastatin 20mg QD)  #PMHx of CAD s/p CABG x 3  #PMHx of atrial flutter s/p ablation (2022)  - follows with Dr. Brunson    - EKG NSR  #Imaging:   - EKG: NSR  - TTE, 5/27 - EF 67%. Mild MR, Mild/mod TR, mild pHTN, simple atheroma in aortic arch    GI/NUTR:   #Diet: regular diet  - 1:1 feeds   - aspiration precautions, HOB 30  #GI Prophylaxis  #hx of PUD    - pantoprazole Tablet: 40 mg (home rx)  #Bowel regimen   -HOLDING secondary to loose bowel movements    -last bowel movement 6/6  #Recent admission for infective colitis (4/24)  - Continue home dicyclomine 20 milliGRAM(s) Oral four times a day before meals  #dilated CBD, colonic wall, esophageal fluid  - GI recs appreciated: possible ERCP, follow up outpatient    /RENAL:   #urine output in critically ill - Mann catheter  -mann discontinued 6/4; Bladder scan at 9qi667 mL, 650mL straight cath. TOV at 12pm, Blasser scan 600, straight cath with mann 800mL, mann kept.   #urinary retention >1.5 L    - Flomax 0.4mg at bedtime  #acute right pubic rami fracture  - CT cystogram negative  #hyponatremia, asymptomatic   -SIADH vs infectious   - hold HCTZ  - avoid sodium-restricted diet    Labs:          BUN/Cr- 9/0.8  -->,  8/0.7  -->          Electrolytes-(06-04 @ 21:43)Na 130 // K 3.6 // Mg 2.1 // Phos 3.4   #Hypokalemia-repleted with 40mEq potassium chloride      HEME/ONC:   #DVT prophylaxis    - SCDs    - enoxaparin 30 q12    Labs: Hb/Hct:  10.3/30.0  -->,  9.7/29.0  -->                      Plts:  375  -->,  391  -->                 PTT/INR:        ID:  #leukocytosis in setting of trauma vs UTI, afebrile   WBC- 12.56  --->>,  13.02  --->>,  11.75  --->>  Temp trend- 24hrs T(F): 97.7 (05 Jun 2024 04:00), Max: 98.5 (04 Jun 2024 20:00)  piperacillin/tazobactam IVPB.. 3.375 every 8 hours (05/30-06/06)  #UTI present on admission  -U/a- +leuk/nitrites/occasional bacteria   - on Rocephin x 3 days (completed)  #Pneumonia  -Zosyn ends 6/6  #left axillary lymphadenopathy seen on CT  - follow up outpatient    ENDO:  #Blood glucose monitoring  - Glucose goal 140-180. if above 180 start ISS    MSK:  #acute right suprapubic rami fracture, acute greater trochanteric fracture  - Orthopedics c/s --> Xrays  reviewed imaging now WBAT b/l    #PMHx of osteoarthritis  - follows with Dr. Dubon    DERM:  #sacral DTI  -wound care consult: Cleanse wound with soap and water, pat dry. Apply Triad cream twice daily, PRN for soiling. Skin and incontinence care.Pressure Injury Prevention. Assess wound daily and inform primary provider of any changes.   - Preventative dressings in place    LINES/DRAINS:  PIV, Indwelling mann (6/2-     ADVANCED DIRECTIVES:      HCP/Emergency Contact - Carolyn Lobato (stephanie) - [phone]    INDICATION FOR SICU: Multiple consecutive rib fractures    DISPO: 4C      For Follow-Up:  -8pm labs  -Neurology for B/L upper and lower extremity weakness   -PT consult  -GI when optimized/outpatient for EUS/ERCP    Vital Signs Last 24 Hrs  T(C): 36.3 (05 Jun 2024 08:00), Max: 36.9 (04 Jun 2024 20:00)  T(F): 97.3 (05 Jun 2024 08:00), Max: 98.5 (04 Jun 2024 20:00)  HR: 88 (05 Jun 2024 08:00) (75 - 92)  BP: 170/78 (05 Jun 2024 08:00) (128/70 - 170/78)  BP(mean): 112 (05 Jun 2024 08:00) (85 - 112)  RR: 25 (05 Jun 2024 08:00) (22 - 52)  SpO2: 100% (05 Jun 2024 08:00) (94% - 100%)    Parameters below as of 05 Jun 2024 08:00  Patient On (Oxygen Delivery Method): nasal cannula  O2 Flow (L/min): 2    I&O's Summary    04 Jun 2024 07:01  -  05 Jun 2024 07:00  --------------------------------------------------------  IN: 1600 mL / OUT: 2400 mL / NET: -800 mL      MEDICATIONS  (STANDING):  acetaminophen     Tablet .. 650 milliGRAM(s) Oral every 6 hours  albuterol/ipratropium for Nebulization 3 milliLiter(s) Nebulizer every 6 hours  amLODIPine   Tablet 5 milliGRAM(s) Oral daily  atorvastatin 80 milliGRAM(s) Oral at bedtime  bacitracin   Ointment 1 Application(s) Topical two times a day  chlorhexidine 2% Cloths 1 Application(s) Topical <User Schedule>  dextrose 5% + sodium chloride 0.9%. 1000 milliLiter(s) (50 mL/Hr) IV Continuous <Continuous>  dicyclomine 20 milliGRAM(s) Oral four times a day before meals  DULoxetine 60 milliGRAM(s) Oral daily  enoxaparin Injectable 30 milliGRAM(s) SubCutaneous every 12 hours  folic acid 1 milliGRAM(s) Oral daily  lidocaine   4% Patch 1 Patch Transdermal daily  melatonin 3 milliGRAM(s) Oral at bedtime  multivitamin 1 Tablet(s) Oral daily  nicotine -   7 mG/24Hr(s) Patch 1 Patch Transdermal daily  nystatin    Suspension 148525 Unit(s) Oral every 6 hours  pantoprazole    Tablet 40 milliGRAM(s) Oral before breakfast  piperacillin/tazobactam IVPB.. 3.375 Gram(s) IV Intermittent every 8 hours  propranolol 10 milliGRAM(s) Oral every 12 hours  sodium chloride 3%  Inhalation 4 milliLiter(s) Inhalation every 12 hours  tamsulosin 0.4 milliGRAM(s) Oral at bedtime  thiamine 100 milliGRAM(s) Oral daily    MEDICATIONS  (PRN):  meclizine 12.5 milliGRAM(s) Oral once PRN Dizziness/vertigo        LABS                                            9.7                   Neurophils% (auto):   x      (06-04 @ 21:43):    11.75)-----------(391          Lymphocytes% (auto):  x                                             29.0                   Eosinphils% (auto):   x        Manual%: Neutrophils x    ; Lymphocytes x    ; Eosinophils x    ; Bands%: x    ; Blasts x                                    130    |  96     |  8                   Calcium: 8.3   / iCa: x      (06-04 @ 21:43)    ----------------------------<  112       Magnesium: 2.1                              3.6     |  25     |  0.7              Phosphorous: 3.4            Signed out to *****  Date: 06/05/2024  Time: ******************** SICU Transfer Note    Transfer from: SICU  Transfer to: Trauma Surgery    SICU COURSE:  78F current smoker with PMHx of HTN, HLD, anxiety, CAD s/p CABG x 3 (2009), atrial flutter s/p ablation (2022), OA, and recent admission for infectious colitis (4/26/24), presented to the ED s/p multiple mechanical falls (-HT/-LOC/-AC). Of note, pt presented to ED on 5/23/24 with CC of back pain (pt did not endorse fall at this time), and was discharged without admission. Today, pt's daughter endorsed that pt has experienced multiple mechanical falls over the past month, most recently 5/23/24 after returning home from ED. Pt endorsed weakness in her legs, causing her to fall. CTAP showed acute nondisplaced posterior left 6th, 7th rib fractures, acute mildly displaced posterior left 8th, 9th, 10th, and 11th ribs fractures, acute nondisplaced fracture of the right superior pubic ramus, and right greater trochanter fracture, as well as several incidental findings. CT head and neck were negative for acute pathology. Labs were significant for WBC 12, Na 124, K 3.4, and alk phos 130. SICU Consult for hemodynamic monitoring in the setting of multiple consecutive rib fractures. SICU course was complicated by urinary retention, requiring mann placement on 5/28, inability to complete MRIs requested by neurology d/t patient's inability to stay still/tolerate, tremors/ fasciculations (treated with propranolol) and altered mental status (5/28 - 5/30), for which toxic metabolic encephalopathy workup was negative. SICU course was also complicated by respiratory distress requiring HFNC and BiPAP as well as pneumonia (treated with zosyn 5/30-6/6) on 5/30. Respiratory status improved, and patient was weaned to NC on 6/1. Patient was initially non-weight bearing to RLE until MRI due to greater trochanteric and pelvic fracture, however per orthopedics, MRI not necessary WBAT on 6/2. Patient's mental and respiratory status continued to improve and patient was downgraded to stepdown on 6/3. Patient failed TOV on 6/5, mann was replaced. Patient has continued to do well and is now medically stable for downgrade to the floor.     ASSESSMENT & PLAN:   Assessment and Recommendation:   78y Female w/ multiple comorbidities presenting with multiple consecutive rib fractures s/p multiple falls due to lower extremities now admitted to SICU for hemodynamic monitoring.    Injuries:   -acute nondisplaced posterior left 6th, 7th rib fractures  -right greater trochanter fracture    NEURO/PSYCH:  #Acute pain  - acetaminophen Tablet 650 mg q6  - lidocaine patch  #Bilateral lower extremity weakness; PMHx of stable chronic vertebral fractures; r/o impingement   - MRI lumbar spine pending  #PMHx of anxiety    -Duloxetine 60mg daily  #B/L UE fasciculations  - neurology c/s - tizanidine prn, toxic metabolic w/u, MRI brain/c-spine  - holding zyprexa   - thiamine/folate/MVI  - propranolol 10mg q12    RESP:   #multiple acute consecutive posterior left 6-11th rib fractures  -pain control  #Respiratory failure secondary to hypercapnia, requiring NIV support  - BiPAP overnight as needed  - chest PT/3%saline/duonebs  #bilateral pleural effusion w/ punctate air, no pneumothorax  #Activity     WBAT Orthopedic input obtained  #Current smoker, 1/2 PPD  - nicotine patch    CARDIAC:   #PMHx of HTN  - amlodipine 5mg daily  - HOLDING  olmesartan-hydrochlorothiazide 40 mg-25 mg oral tablet   #PMHx of HLD  - atorvastatin 80 mg at bedtime (home rx rosuvastatin 20mg QD)  #PMHx of CAD s/p CABG x 3  #PMHx of atrial flutter s/p ablation (2022)  - follows with Dr. Brunson    - EKG NSR  #Imaging:   - EKG: NSR  - TTE, 5/27 - EF 67%. Mild MR, Mild/mod TR, mild pHTN, simple atheroma in aortic arch    GI/NUTR:   #Diet: regular diet  - 1:1 feeds   - aspiration precautions, HOB 30  #GI Prophylaxis  #hx of PUD    - pantoprazole Tablet: 40 mg (home rx)  #Bowel regimen   -HOLDING secondary to loose bowel movements    -last bowel movement 6/6  #Recent admission for infective colitis (4/24)  - Continue home dicyclomine 20 milliGRAM(s) Oral four times a day before meals  #dilated CBD, colonic wall, esophageal fluid  - GI recs appreciated: possible ERCP, follow up outpatient    /RENAL:   #urine output in critically ill - Mann catheter  -mann discontinued 6/4; Bladder scan at 9ks666 mL, 650mL straight cath. TOV at 12pm, Blasser scan 600, straight cath with mann 800mL, mann kept.   #urinary retention >1.5 L    - Flomax 0.4mg at bedtime  #acute right pubic rami fracture  - CT cystogram negative  #hyponatremia, asymptomatic   -SIADH vs infectious   - hold HCTZ  - avoid sodium-restricted diet    Labs:          BUN/Cr- 9/0.8  -->,  8/0.7  -->          Electrolytes-(06-04 @ 21:43)Na 130 // K 3.6 // Mg 2.1 // Phos 3.4   #Hypokalemia-repleted with 40mEq potassium chloride      HEME/ONC:   #DVT prophylaxis    - SCDs    - enoxaparin 30 q12    Labs: Hb/Hct:  10.3/30.0  -->,  9.7/29.0  -->                      Plts:  375  -->,  391  -->                 PTT/INR:        ID:  #leukocytosis in setting of trauma vs UTI, afebrile   WBC- 12.56  --->>,  13.02  --->>,  11.75  --->>  Temp trend- 24hrs T(F): 97.7 (05 Jun 2024 04:00), Max: 98.5 (04 Jun 2024 20:00)  piperacillin/tazobactam IVPB.. 3.375 every 8 hours (05/30-06/06)  #UTI present on admission  -U/a- +leuk/nitrites/occasional bacteria   - on Rocephin x 3 days (completed)  #Pneumonia  -Zosyn ends 6/6  #left axillary lymphadenopathy seen on CT  - follow up outpatient    ENDO:  #Blood glucose monitoring  - Glucose goal 140-180. if above 180 start ISS    MSK:  #acute right suprapubic rami fracture, acute greater trochanteric fracture  - Orthopedics c/s --> Xrays  reviewed imaging now WBAT b/l    #PMHx of osteoarthritis  - follows with Dr. Dubon    DERM:  #sacral DTI  -wound care consult: Cleanse wound with soap and water, pat dry. Apply Triad cream twice daily, PRN for soiling. Skin and incontinence care. Pressure Injury Prevention. Assess wound daily and inform primary provider of any changes.   - Preventative dressings in place    LINES/DRAINS:  PIV, Indwelling mann (6/2-     ADVANCED DIRECTIVES:      HCP/Emergency Contact - Carolyn Lobato (stephanie) - [phone]    INDICATION FOR SICU: Multiple consecutive rib fractures    DISPO: 4C      For Follow-Up:  -8pm labs  -Neurology for B/L upper and lower extremity weakness   -PT consult  -GI when optimized/outpatient for EUS/ERCP  -Dispo    Vital Signs Last 24 Hrs  T(C): 36.3 (05 Jun 2024 08:00), Max: 36.9 (04 Jun 2024 20:00)  T(F): 97.3 (05 Jun 2024 08:00), Max: 98.5 (04 Jun 2024 20:00)  HR: 88 (05 Jun 2024 08:00) (75 - 92)  BP: 170/78 (05 Jun 2024 08:00) (128/70 - 170/78)  BP(mean): 112 (05 Jun 2024 08:00) (85 - 112)  RR: 25 (05 Jun 2024 08:00) (22 - 52)  SpO2: 100% (05 Jun 2024 08:00) (94% - 100%)    Parameters below as of 05 Jun 2024 08:00  Patient On (Oxygen Delivery Method): nasal cannula  O2 Flow (L/min): 2    I&O's Summary    04 Jun 2024 07:01  -  05 Jun 2024 07:00  --------------------------------------------------------  IN: 1600 mL / OUT: 2400 mL / NET: -800 mL      MEDICATIONS  (STANDING):  acetaminophen     Tablet .. 650 milliGRAM(s) Oral every 6 hours  albuterol/ipratropium for Nebulization 3 milliLiter(s) Nebulizer every 6 hours  amLODIPine   Tablet 5 milliGRAM(s) Oral daily  atorvastatin 80 milliGRAM(s) Oral at bedtime  bacitracin   Ointment 1 Application(s) Topical two times a day  chlorhexidine 2% Cloths 1 Application(s) Topical <User Schedule>  dextrose 5% + sodium chloride 0.9%. 1000 milliLiter(s) (50 mL/Hr) IV Continuous <Continuous>  dicyclomine 20 milliGRAM(s) Oral four times a day before meals  DULoxetine 60 milliGRAM(s) Oral daily  enoxaparin Injectable 30 milliGRAM(s) SubCutaneous every 12 hours  folic acid 1 milliGRAM(s) Oral daily  lidocaine   4% Patch 1 Patch Transdermal daily  melatonin 3 milliGRAM(s) Oral at bedtime  multivitamin 1 Tablet(s) Oral daily  nicotine -   7 mG/24Hr(s) Patch 1 Patch Transdermal daily  nystatin    Suspension 659235 Unit(s) Oral every 6 hours  pantoprazole    Tablet 40 milliGRAM(s) Oral before breakfast  piperacillin/tazobactam IVPB.. 3.375 Gram(s) IV Intermittent every 8 hours  propranolol 10 milliGRAM(s) Oral every 12 hours  sodium chloride 3%  Inhalation 4 milliLiter(s) Inhalation every 12 hours  tamsulosin 0.4 milliGRAM(s) Oral at bedtime  thiamine 100 milliGRAM(s) Oral daily    MEDICATIONS  (PRN):  meclizine 12.5 milliGRAM(s) Oral once PRN Dizziness/vertigo        LABS                                            9.7                   Neurophils% (auto):   x      (06-04 @ 21:43):    11.75)-----------(391          Lymphocytes% (auto):  x                                             29.0                   Eosinphils% (auto):   x        Manual%: Neutrophils x    ; Lymphocytes x    ; Eosinophils x    ; Bands%: x    ; Blasts x                                    130    |  96     |  8                   Calcium: 8.3   / iCa: x      (06-04 @ 21:43)    ----------------------------<  112       Magnesium: 2.1                              3.6     |  25     |  0.7              Phosphorous: 3.4            Signed out to Nadine Noyola PA-C  Date: 06/05/2024  Time: 13:24

## 2024-06-05 NOTE — PROGRESS NOTE ADULT - SUBJECTIVE AND OBJECTIVE BOX
Pt awake, states she feels much better. Denies pain    T(F): , Max: 98.2 (06-05-24 @ 12:00)  HR: 92 (06-05-24 @ 20:56) (80 - 92)  BP: 140/73 (06-05-24 @ 20:56)  RR: 30 (06-05-24 @ 20:56)  SpO2: 94% (06-05-24 @ 20:56)  IN: 1600 mL / OUT: 2400 mL / NET: -800 mL    IN: 800 mL / OUT: 1630 mL / NET: -830 mL      General: No apparent distress  Cardiovascular: S1, S2  Gastrointestinal: Soft, Non-tender, Non-distended  Respiratory: Good air entry bilaterally  Musculoskeletal: Moves all extremities  Lymphatic: No edema  Dermatologic: Skin dry                          10.2   10.37 )-----------( 362      ( 05 Jun 2024 21:45 )             30.2     06-05    130<L>  |  96<L>  |  8<L>  ----------------------------<  108<H>  4.2   |  23  |  0.7    Ca    8.3<L>      05 Jun 2024 21:45  Phos  2.8     06-05  Mg     1.8     06-05

## 2024-06-05 NOTE — PROGRESS NOTE ADULT - SUBJECTIVE AND OBJECTIVE BOX
Patient admitted 5/26/24 s/p multiple falls with multiple rib and pelvic fractures, and difficulty walking. She was reportedly ambulating independently with a device until recently. She was seen by ortho and neuro.  She has been seen by PT and OT and is dependent in bed mobility and unable to tolerate standing secondary to confusion and poor trunk control.   She is not a candidate for acute rehab. Consider Neuro f/u for her apparent worsening confusion and balance and severe decline in function from baseline.   She would be most appropriate for subacute rehab once medically cleared based on her current function.

## 2024-06-05 NOTE — PROGRESS NOTE ADULT - ASSESSMENT
78yFemale w/ PMHx of HTN, HLD, anxiety, CAD s/p CABG x3 (2009), Atrial flutter s/p ablation (2022), OA, recent admission for infectious colitis (4/26/24), seen as a TRAUMA CONSULT s/p multiple mechanical falls (-HT, -LOC, -AC). The following injuries were identified:     # Acute nondisplaced posterior left 6-7 rib fractures   # Acute mildly displaced left 8-11 rib fractures   # Acute nondisplaced fracture of the right superior pubic ramus    # Right greater trochanteric fracture      PLAN:   - hemodynamic monitoring  - f/u Neurology if MRI of Brain and C-spine is required   - Strict I&O - monitor mann output  - daily labs, replete electrolytes as needed   - multi modal pain control  - continue with pureed diet  - continue with DVT and GI ppx  - continue with antibiotics  - encourage activity and IS as tolerated       [id number]

## 2024-06-06 NOTE — PROGRESS NOTE ADULT - SUBJECTIVE AND OBJECTIVE BOX
Patient is a 78y old  Female who presents with a chief complaint of multiple trauma (06-05-24)      Pt seen and examined at bedside. No CP or SOB.      PAST MEDICAL & SURGICAL HISTORY:  CAD (coronary artery disease)    Tobacco dependence    HTN (hypertension)    Anxiety    Atrial flutter    S/P CABG x 3        VITAL SIGNS (Last 24 hrs):  T(C): 36.1 (06-06-24 @ 08:00), Max: 36.6 (06-06-24 @ 00:00)  HR: 87 (06-06-24 @ 08:00) (86 - 92)  BP: 107/50 (06-06-24 @ 08:00) (107/50 - 159/88)  RR: 40 (06-06-24 @ 08:00) (19 - 40)  SpO2: 95% (06-06-24 @ 08:00) (94% - 97%)  Wt(kg): --  Daily     Daily     I&O's Summary    05 Jun 2024 07:01  -  06 Jun 2024 07:00  --------------------------------------------------------  IN: 1200 mL / OUT: 2695 mL / NET: -1495 mL    06 Jun 2024 07:01  -  06 Jun 2024 12:18  --------------------------------------------------------  IN: 200 mL / OUT: 200 mL / NET: 0 mL        PHYSICAL EXAM:  GENERAL: NAD,elderly   HEAD:  Atraumatic, Normocephalic  EYES: EOMI, PERRLA, conjunctiva and sclera clear  NECK: Supple, No JVD  CHEST/LUNG: Clear to auscultation bilaterally; No wheeze  HEART: Regular rate and rhythm; No murmurs, rubs, or gallops  ABDOMEN: Soft, Nontender, Nondistended; Bowel sounds present  EXTREMITIES:  2+ Peripheral Pulses, No clubbing, cyanosis, or edema  PSYCH: AAOx3  NEUROLOGY: non-focal  SKIN: No rashes or lesions    Labs Reviewed  Spoke to patient in regards to abnormal labs.    CBC Full  -  ( 05 Jun 2024 21:45 )  WBC Count : 10.37 K/uL  Hemoglobin : 10.2 g/dL  Hematocrit : 30.2 %  Platelet Count - Automated : 362 K/uL  Mean Cell Volume : 95.3 fL  Mean Cell Hemoglobin : 32.2 pg  Mean Cell Hemoglobin Concentration : 33.8 g/dL  Auto Neutrophil # : x  Auto Lymphocyte # : x  Auto Monocyte # : x  Auto Eosinophil # : x  Auto Basophil # : x  Auto Neutrophil % : x  Auto Lymphocyte % : x  Auto Monocyte % : x  Auto Eosinophil % : x  Auto Basophil % : x    BMP:    06-05 @ 21:45    Blood Urea Nitrogen - 8  Calcium - 8.3  Carbond Dioxide - 23  Chloride - 96  Creatinine - 0.7  Glucose - 108  Potassium - 4.2  Sodium - 130      Hemoglobin A1c -     Urine Culture:        COVID Labs  CRP:      D-Dimer:      Imaging reviewed independently and reviewed read  < from: Xray Chest 1 View- PORTABLE-Routine (Xray Chest 1 View- PORTABLE-Routine in AM.) (06.03.24 @ 06:39) >    IMPRESSION:    Stable bibasilar opacities/effusions.    < end of copied text >    MEDICATIONS  (STANDING):  acetaminophen     Tablet .. 650 milliGRAM(s) Oral every 6 hours  albuterol/ipratropium for Nebulization 3 milliLiter(s) Nebulizer every 6 hours  amLODIPine   Tablet 5 milliGRAM(s) Oral daily  atorvastatin 80 milliGRAM(s) Oral at bedtime  bacitracin   Ointment 1 Application(s) Topical two times a day  chlorhexidine 2% Cloths 1 Application(s) Topical <User Schedule>  dextrose 5% + sodium chloride 0.9%. 1000 milliLiter(s) (50 mL/Hr) IV Continuous <Continuous>  dicyclomine 20 milliGRAM(s) Oral four times a day before meals  DULoxetine 60 milliGRAM(s) Oral daily  enoxaparin Injectable 30 milliGRAM(s) SubCutaneous every 12 hours  folic acid 1 milliGRAM(s) Oral daily  lidocaine   4% Patch 1 Patch Transdermal daily  melatonin 3 milliGRAM(s) Oral at bedtime  multivitamin 1 Tablet(s) Oral daily  nicotine -   7 mG/24Hr(s) Patch 1 Patch Transdermal daily  nystatin    Suspension [id number] Unit(s) Oral every 6 hours  pantoprazole    Tablet 40 milliGRAM(s) Oral before breakfast  propranolol 10 milliGRAM(s) Oral every 12 hours  sodium chloride 3%  Inhalation 4 milliLiter(s) Inhalation every 12 hours  tamsulosin 0.4 milliGRAM(s) Oral at bedtime  thiamine 100 milliGRAM(s) Oral daily    MEDICATIONS  (PRN):  meclizine 12.5 milliGRAM(s) Oral once PRN Dizziness/vertigo

## 2024-06-06 NOTE — PROGRESS NOTE ADULT - SUBJECTIVE AND OBJECTIVE BOX
JAYLYN AGARWAL   [id number]   [birth year]  78yF    Admit Date: 05-26-24  Indication for SICU: multiple consecutive rib fractures s/p multiple mechanical falls        ============================  HPI   78F current smoker with PMHx of HTN, HLD, anxiety, CAD s/p CABG x 3 (2009), atrial flutter s/p ablation (2022), OA, and recent admission for infectious colitis (4/26/24), presented to the ED s/p multiple mechanical falls (-HT/-LOC/-AC). Of note, pt presented to ED on 5/23/24 with CC of back pain (pt did not endorse fall at this time), and was discharged without admission. Today, pt's daughter endorsed that pt has experienced multiple mechanical falls over the past month, most recently 5/23/24 after returning home from ED. Pt endorsed weakness in her legs, causing her to fall. CTAP showed acute nondisplaced posterior left 6th, 7th rib fractures, `ramus, and right greater trochanter fracture, as well as several incidental findings (see below). CT head and neck were negative for acute pathology. Labs were significant for WBC 12, Na 124, K 3.4, and alk phos 130.     SICU Consult for hemodynamic monitoring in the setting of multiple consecutive rib fractures. Pt was examined in ED, stable, see Physical exam below.     Pertinent Imaging    < from: CT Abdomen and Pelvis w/ IV Cont (05.26.24 @ 11:21) >  IMPRESSION:  Acute nondisplaced posterior left 6th, 7th rib fractures. Acute mildly displaced posterior left 8th, 9th, 10th, and 11th ribs fractures.  Acute nondisplaced fracture of the right superior pubic ramus (4-397).  Stable chronic T6, T7, T8, T11, L2 vertebral body compression deformities.  Mild mural thickening extending from the cecum to the mid transverse colon likely reflecting colitis of infectious or inflammatory etiology..  New Trace bilateral pleural effusions with adjacent consolidative opacities.    Additional attending comments:  Additional right greater trochanter fracture, seen on image 177 of series 602  There is a punctate focus of air within the left pleural effusion on image 32 series 2. Although no pneumothorax is identified on this examination, a possible trace pneumothorax cannot be excluded.    Dilated common bile duct with intrahepatic biliary duct dilatation and a distended gallbladder.  Findings are suspicious for a possible central obstructing lesion within the distal common bile duct is not excluded. Further evaluation with ERCP is recommended.  Patulous, fluid-filled esophagus. Outpatient endoscopy is recommended.  Colonic wall thickening may be related to underdistention.  Left axillary lymphadenopathy require short-term interval follow-up.  < end of copied text >    < from: CT Cervical Spine No Cont (05.26.24 @ 11:14) >  IMPRESSION:  CT head: Age-indeterminate small infarct involving the left thalamus. No acute intracranial hemorrhage or mass effect.  CT cervical spine: No acute fracture or traumatic subluxation.   < end of copied text >       24 Hour Events  6/5  NIGHT   -PM rounds: A&O x 3, sensation intact, left upper extremity slightly weaker than right  -15NaPhos, 2gm Mg   -D/G     DAY  -TOV at 12pm- 690 on Bladder scan, 800 out, mann replaced  -neurology - what do they think re weakness  -Mobilize  -IV lock  -PT   -Downgrade      [X] A ten-point review of systems was otherwise negative except as noted above.  [  ] Due to altered mental status/intubation, subjective information was not attained from the patient. History was obtained, to the extent possible, from review of the chart and collateral sources of information.       ====================================     JAYLYN AGARWAL   [id number]   [birth year]  78yF    Admit Date: 05-26-24  Indication for SICU: multiple consecutive rib fractures s/p multiple mechanical falls        ============================  HPI   78F current smoker with PMHx of HTN, HLD, anxiety, CAD s/p CABG x 3 (2009), atrial flutter s/p ablation (2022), OA, and recent admission for infectious colitis (4/26/24), presented to the ED s/p multiple mechanical falls (-HT/-LOC/-AC). Of note, pt presented to ED on 5/23/24 with CC of back pain (pt did not endorse fall at this time), and was discharged without admission. Today, pt's daughter endorsed that pt has experienced multiple mechanical falls over the past month, most recently 5/23/24 after returning home from ED. Pt endorsed weakness in her legs, causing her to fall. CTAP showed acute nondisplaced posterior left 6th, 7th rib fractures, `ramus, and right greater trochanter fracture, as well as several incidental findings (see below). CT head and neck were negative for acute pathology. Labs were significant for WBC 12, Na 124, K 3.4, and alk phos 130.     SICU Consult for hemodynamic monitoring in the setting of multiple consecutive rib fractures. Pt was examined in ED, stable, see Physical exam below.     Pertinent Imaging    < from: CT Abdomen and Pelvis w/ IV Cont (05.26.24 @ 11:21) >  IMPRESSION:  Acute nondisplaced posterior left 6th, 7th rib fractures. Acute mildly displaced posterior left 8th, 9th, 10th, and 11th ribs fractures.  Acute nondisplaced fracture of the right superior pubic ramus (4-397).  Stable chronic T6, T7, T8, T11, L2 vertebral body compression deformities.  Mild mural thickening extending from the cecum to the mid transverse colon likely reflecting colitis of infectious or inflammatory etiology..  New Trace bilateral pleural effusions with adjacent consolidative opacities.    Additional attending comments:  Additional right greater trochanter fracture, seen on image 177 of series 602  There is a punctate focus of air within the left pleural effusion on image 32 series 2. Although no pneumothorax is identified on this examination, a possible trace pneumothorax cannot be excluded.    Dilated common bile duct with intrahepatic biliary duct dilatation and a distended gallbladder.  Findings are suspicious for a possible central obstructing lesion within the distal common bile duct is not excluded. Further evaluation with ERCP is recommended.  Patulous, fluid-filled esophagus. Outpatient endoscopy is recommended.  Colonic wall thickening may be related to underdistention.  Left axillary lymphadenopathy require short-term interval follow-up.  < end of copied text >    < from: CT Cervical Spine No Cont (05.26.24 @ 11:14) >  IMPRESSION:  CT head: Age-indeterminate small infarct involving the left thalamus. No acute intracranial hemorrhage or mass effect.  CT cervical spine: No acute fracture or traumatic subluxation.   < end of copied text >       24 Hour Events  6/5  NIGHT   -PM rounds: A&O x 3, sensation intact, left upper extremity slightly weaker than right  -15NaPhos, 2gm Mg   -D/G     DAY  -TOV at 12pm- 690 on Bladder scan, 800 out, mann replaced  -neurology - what do they think re weakness  -Mobilize  -IV lock  -PT   -Downgrade      [X] A ten-point review of systems was otherwise negative except as noted above.  [  ] Due to altered mental status/intubation, subjective information was not attained from the patient. History was obtained, to the extent possible, from review of the chart and collateral sources of information.       ====================================  Daily     Daily     Diet, Regular (06-03-24 @ 00:58)      CURRENT MEDS:  Neurologic Medications  acetaminophen     Tablet .. 650 milliGRAM(s) Oral every 6 hours  DULoxetine 60 milliGRAM(s) Oral daily  meclizine 12.5 milliGRAM(s) Oral once PRN Dizziness/vertigo  melatonin 3 milliGRAM(s) Oral at bedtime    Respiratory Medications  albuterol/ipratropium for Nebulization 3 milliLiter(s) Nebulizer every 6 hours  sodium chloride 3%  Inhalation 4 milliLiter(s) Inhalation every 12 hours    Cardiovascular Medications  amLODIPine   Tablet 5 milliGRAM(s) Oral daily  propranolol 10 milliGRAM(s) Oral every 12 hours    Gastrointestinal Medications  dextrose 5% + sodium chloride 0.9%. 1000 milliLiter(s) IV Continuous <Continuous>  dicyclomine 20 milliGRAM(s) Oral four times a day before meals  folic acid 1 milliGRAM(s) Oral daily  multivitamin 1 Tablet(s) Oral daily  pantoprazole    Tablet 40 milliGRAM(s) Oral before breakfast  thiamine 100 milliGRAM(s) Oral daily    Genitourinary Medications  tamsulosin 0.4 milliGRAM(s) Oral at bedtime    Hematologic/Oncologic Medications  enoxaparin Injectable 30 milliGRAM(s) SubCutaneous every 12 hours    Antimicrobial/Immunologic Medications  nystatin    Suspension 336643 Unit(s) Oral every 6 hours    Endocrine/Metabolic Medications  atorvastatin 80 milliGRAM(s) Oral at bedtime    Topical/Other Medications  bacitracin   Ointment 1 Application(s) Topical two times a day  chlorhexidine 2% Cloths 1 Application(s) Topical <User Schedule>  lidocaine   4% Patch 1 Patch Transdermal daily  nicotine -   7 mG/24Hr(s) Patch 1 Patch Transdermal daily      ICU Vital Signs Last 24 Hrs  T(C): 36.3 (06 Jun 2024 04:00), Max: 36.8 (05 Jun 2024 12:00)  T(F): 97.4 (06 Jun 2024 04:00), Max: 98.2 (05 Jun 2024 12:00)  HR: 89 (06 Jun 2024 04:00) (85 - 92)  BP: 147/82 (06 Jun 2024 04:00) (136/93 - 170/78)  BP(mean): 109 (06 Jun 2024 04:00) (99 - 117)  ABP: --  ABP(mean): --  RR: 24 (06 Jun 2024 04:00) (19 - 33)  SpO2: 97% (06 Jun 2024 04:00) (94% - 100%)    O2 Parameters below as of 06 Jun 2024 04:00  Patient On (Oxygen Delivery Method): nasal cannula  O2 Flow (L/min): 2          I&O's Summary    05 Jun 2024 07:01  -  06 Jun 2024 07:00  --------------------------------------------------------  IN: 1200 mL / OUT: 2695 mL / NET: -1495 mL      I&O's Detail    05 Jun 2024 07:01  -  06 Jun 2024 07:00  --------------------------------------------------------  IN:    dextrose 5% + sodium chloride 0.9%: 1100 mL    IV PiggyBack: 100 mL  Total IN: 1200 mL    OUT:    Indwelling Catheter - Urethral (mL): 1895 mL    Intermittent Catheterization - Urethral (mL): 800 mL  Total OUT: 2695 mL    Total NET: -1495 mL          PHYSICAL EXAM:    General/Neuro  RASS:             GCS:15     = E   / V   / M      Deficits:                             alert & oriented x 3, no focal deficits  Pupils:    Lungs:      clear to auscultation, Normal expansion/effort.     Cardiovascular : S1, S2.  Regular rate and rhythm.  No Peripheral edema   Cardiac Rhythm: Normal Sinus Rhythm    GI: Abdomen soft, Non-tender, Non-distended.      Extremities: Extremities warm, pink, well-perfused. Pulses: palpable dp b/l    Derm: Good skin turgor, no skin breakdown.      :       Mann catheter in place.      CXR:     LABS:  CAPILLARY BLOOD GLUCOSE                              10.2   10.37 )-----------( 362      ( 05 Jun 2024 21:45 )             30.2       06-05    130<L>  |  96<L>  |  8<L>  ----------------------------<  108<H>  4.2   |  23  |  0.7    Ca    8.3<L>      05 Jun 2024 21:45  Phos  2.8     06-05  Mg     1.8     06-05              Urinalysis Basic - ( 05 Jun 2024 21:45 )    Color: x / Appearance: x / SG: x / pH: x  Gluc: 108 mg/dL / Ketone: x  / Bili: x / Urobili: x   Blood: x / Protein: x / Nitrite: x   Leuk Esterase: x / RBC: x / WBC x   Sq Epi: x / Non Sq Epi: x / Bacteria: x

## 2024-06-06 NOTE — PROGRESS NOTE ADULT - SUBJECTIVE AND OBJECTIVE BOX
GENERAL SURGERY PROGRESS NOTE    Patient: JAYLYN AGARWAL , 78y ([birth year])Female   MRN: [id number]  Location: [place] [address] A  Visit: 05-26-24 Inpatient  Date: 06-06-24 @ 12:49    Hospital Day #: 12      Events of past 24 hours:  6/5  NIGHT   -PM rounds: A&O x 3, sensation intact, left upper extremity slightly weaker than right  -15NaPhos, 2gm Mg   -D/G     DAY  -TOV at 12pm- 690 on Bladder scan, 800 out, mann replaced  -neurology - what do they think re weakness  -Mobilize  -IV lock  -PT   -Downgrade      PAST MEDICAL & SURGICAL HISTORY:  CAD (coronary artery disease)      Tobacco dependence      HTN (hypertension)      Anxiety      Atrial flutter      S/P CABG x 3          Vitals:   T(F): 97 (06-06-24 @ 08:00), Max: 97.8 (06-06-24 @ 00:00)  HR: 87 (06-06-24 @ 08:00)  BP: 107/50 (06-06-24 @ 08:00)  RR: 40 (06-06-24 @ 08:00)  SpO2: 95% (06-06-24 @ 08:00)      Diet, Regular      Fluids:     I & O's:    06-05-24 @ 07:01  -  06-06-24 @ 07:00  --------------------------------------------------------  IN:    dextrose 5% + sodium chloride 0.9%: 1100 mL    IV PiggyBack: 100 mL  Total IN: 1200 mL    OUT:    Indwelling Catheter - Urethral (mL): 1895 mL    Intermittent Catheterization - Urethral (mL): 800 mL  Total OUT: 2695 mL    Total NET: -1495 mL        PHYSICAL EXAM:  GENERAL: NAD,   CHEST/LUNG: Equal chest rise bilaterally  HEART: Regular rate and rhythm  ABDOMEN: Soft, Nontender, Nondistended;   EXTREMITIES:  No clubbing, cyanosis, or tiny    MEDICATIONS  (STANDING):  acetaminophen     Tablet .. 650 milliGRAM(s) Oral every 6 hours  albuterol/ipratropium for Nebulization 3 milliLiter(s) Nebulizer every 6 hours  amLODIPine   Tablet 5 milliGRAM(s) Oral daily  atorvastatin 80 milliGRAM(s) Oral at bedtime  bacitracin   Ointment 1 Application(s) Topical two times a day  chlorhexidine 2% Cloths 1 Application(s) Topical <User Schedule>  dextrose 5% + sodium chloride 0.9%. 1000 milliLiter(s) (50 mL/Hr) IV Continuous <Continuous>  dicyclomine 20 milliGRAM(s) Oral four times a day before meals  DULoxetine 60 milliGRAM(s) Oral daily  enoxaparin Injectable 30 milliGRAM(s) SubCutaneous every 12 hours  folic acid 1 milliGRAM(s) Oral daily  lidocaine   4% Patch 1 Patch Transdermal daily  melatonin 3 milliGRAM(s) Oral at bedtime  multivitamin 1 Tablet(s) Oral daily  nicotine -   7 mG/24Hr(s) Patch 1 Patch Transdermal daily  nystatin    Suspension 413836 Unit(s) Oral every 6 hours  pantoprazole    Tablet 40 milliGRAM(s) Oral before breakfast  propranolol 10 milliGRAM(s) Oral every 12 hours  sodium chloride 3%  Inhalation 4 milliLiter(s) Inhalation every 12 hours  tamsulosin 0.4 milliGRAM(s) Oral at bedtime  thiamine 100 milliGRAM(s) Oral daily    MEDICATIONS  (PRN):  meclizine 12.5 milliGRAM(s) Oral once PRN Dizziness/vertigo      DVT PROPHYLAXIS: enoxaparin Injectable 30 milliGRAM(s) SubCutaneous every 12 hours    GI PROPHYLAXIS: pantoprazole    Tablet 40 milliGRAM(s) Oral before breakfast    ANTICOAGULATION:   ANTIBIOTICS:  nystatin    Suspension 556277 Unit(s)            LAB/STUDIES:  Labs:  CAPILLARY BLOOD GLUCOSE                              10.2   10.37 )-----------( 362      ( 05 Jun 2024 21:45 )             30.2         06-05    130<L>  |  96<L>  |  8<L>  ----------------------------<  108<H>  4.2   |  23  |  0.7      Calcium: 8.3 mg/dL (06-05-24 @ 21:45)      LFTs:       ABG - ( 01 Jun 2024 11:31 )  pH: 7.40  /  pCO2: 37    /  pO2: 69    / HCO3: 23    / Base Excess: -1.6  /  SaO2: 96.3            ABG - ( 31 May 2024 03:12 )  pH: 7.34  /  pCO2: 43    /  pO2: 99    / HCO3: 23    / Base Excess: -2.6  /  SaO2: 100.0           ABG - ( 30 May 2024 13:27 )  pH: 7.32  /  pCO2: 46    /  pO2: 134   / HCO3: 24    / Base Excess: -2.3  /  SaO2: 100.0         Urinalysis Basic - ( 05 Jun 2024 21:45 )    Color: x / Appearance: x / SG: x / pH: x  Gluc: 108 mg/dL / Ketone: x  / Bili: x / Urobili: x   Blood: x / Protein: x / Nitrite: x   Leuk Esterase: x / RBC: x / WBC x   Sq Epi: x / Non Sq Epi: x / Bacteria: x      Assessment:  78yFemale w/ PMHx of HTN, HLD, anxiety, CAD s/p CABG x3 (2009), Atrial flutter s/p ablation (2022), OA, recent admission for infectious colitis (4/26/24), seen as a TRAUMA CONSULT s/p multiple mechanical falls (-HT, -LOC, -AC). The following injuries were identified:     # Acute nondisplaced posterior left 6-7 rib fractures   # Acute mildly displaced left 8-11 rib fractures   # Acute nondisplaced fracture of the right superior pubic ramus    # Right greater trochanteric fracture    PLAN:   - hemodynamic monitoring  - F/U SICU CM team for disposition for SUMIT  - Strict I&O - monitor mann output  - daily labs, replete electrolytes as needed   - multi modal pain control  - continue with pureed diet  - continue with DVT and GI ppx  - continue with antibiotics  - encourage activity and IS as tolerated

## 2024-06-06 NOTE — PROGRESS NOTE ADULT - TIME-BASED
Pt BIB mom for c/c of chest tightness and SOB. hx of asthma, per patient took neb treatment prior to arrival w/o relief of symptoms. 50

## 2024-06-06 NOTE — PROGRESS NOTE ADULT - ASSESSMENT
78y Female w/ multiple comorbidities presenting with multiple consecutive rib fractures s/p multiple falls due to lower extremities now admitted to SICU for hemodynamic monitoring.    #acute nondisplaced posterior left 6th, 7th rib fractures  - mgmt as per trauma    #acute right suprapubic rami fracture, acute greater trochanteric fracture  - ortho cleared for WBAT'  - PT    #Metabolic encephalopathy   - likely 2/2 pna, improved  - s/p antibiotics     #B/L UE fasciculations  -Neuro f/u  - MRI when able    #leukocytosis  - improved on current antibiotics  - stable today     # Aspiration pneumonia  - no evidence of aspiration at this time    #UTI   - s/p abx course    #smoking hx  - cessation advised    #Cecum thickening  - EGD and EUS as per GI, can be done as outpt      medicine to follow on the floor after transfer

## 2024-06-06 NOTE — PROGRESS NOTE ADULT - ASSESSMENT
Assessment and Recommendation:   78y Female w/ multiple comorbidities presenting with multiple consecutive rib fractures s/p multiple falls due to lower extremities now admitted to SICU for hemodynamic monitoring.    Injuries:   -acute nondisplaced posterior left 6th, 7th rib fractures  -right greater trochanter fracture    NEURO/PSYCH:  #Acute pain  - acetaminophen Tablet 650 mg q6  - lidocaine patch  #Bilateral lower extremity weakness; PMHx of stable chronic vertebral fractures; r/o impingement   - MRI lumbar spine pending  #PMHx of anxiety  - Duloxetine 60mg daily  #B/L UE fasciculations  - neurology c/s - tizanidine prn, toxic metabolic w/u, MRI brain/c-spine  - holding zyprexa   - thiamine/folate/MVI  - propranolol 10mg q12    RESP:   #multiple acute consecutive posterior left 6-11th rib fractures  -pain control  #Respiratory failure secondary to hypercapnia, requiring NIV support  - BiPAP overnight as needed  - chest PT/3%saline/duonebs  #bilateral pleural effusion w/ punctate air, no pneumothorax  #Activity     WBAT Orthopedic input obtained  #Current smoker, 1/2 PPD  - nicotine patch    CARDIAC:   #PMHx of HTN  - amlodipine 5mg daily  - HOLDING  olmesartan-hydrochlorothiazide 40 mg-25 mg oral tablet   #PMHx of HLD  - atorvastatin 80 mg at bedtime (home rx rosuvastatin 20mg QD)  #PMHx of CAD s/p CABG x 3  #PMHx of atrial flutter s/p ablation (2022)  - follows with Dr. Brunson    - EKG NSR  #Imaging:   - EKG: NSR  - TTE, 5/27 - EF 67%. Mild MR, Mild/mod TR, mild pHTN, simple atheroma in aortic arch    GI/NUTR:   #Diet: regular diet  - 1:1 feeds   - aspiration precautions, HOB 30  #GI Prophylaxis  #hx of PUD    - pantoprazole Tablet: 40 mg (home rx)  #Bowel regimen   -HOLDING secondary to loose bowel movements    -last bowel movement 6/6  #Recent admission for infective colitis (4/24)  - Continue home dicyclomine 20 milliGRAM(s) Oral four times a day before meals  #dilated CBD, colonic wall, esophageal fluid  - GI recs appreciated: possible ERCP, follow up outpatient    /RENAL:   #urine output in critically ill  -mann discontinued, failed multiple TOV > mann replaced (6/5)   #urinary retention >1.5 L    - Flomax 0.4mg at bedtime  #acute right pubic rami fracture  - CT cystogram negative  #hyponatremia, asymptomatic   -SIADH vs infectious   - hold HCTZ  - avoid sodium-restricted diet  #urine output in critically ill    -indwelling mann (placed     Current Rx: tamsulosin 0.4 milliGRAM(s) at bedtime    Labs:          BUN/Cr- 8/0.7  -->,  8/0.7  -->          Electrolytes-(06-05 @ 21:45)Na 130 // K 4.2 // Mg 1.8 // Phos 2.8       HEME/ONC:   #DVT prophylaxis    - SCDs    - enoxaparin 30 q12    Labs: Hb/Hct:  9.7/29.0  -->,  10.2/30.2  -->                      Plts:  391  -->,  362  -->                 PTT/INR:        ID:  #leukocytosis in setting of trauma vs UTI, afebrile   WBC- 13.02  --->>,  11.75  --->>,  10.37  --->>  Temp trend- 24hrs T(F): 97.8 (06-06 @ 00:00), Max: 98.2 (06-05 @ 12:00)  #UTI present on admission  -U/a- +leuk/nitrites/occasional bacteria   - on Rocephin x 3 days (completed)  #Pneumonia  -Zosyn ends 6/6  #left axillary lymphadenopathy seen on CT  - follow up outpatient    ENDO:  #Blood glucose monitoring  - Glucose goal 140-180. if above 180 start ISS    MSK:  #acute right suprapubic rami fracture, acute greater trochanteric fracture  - Orthopedics c/s --> Xrays  reviewed imaging now WBAT b/l    #PMHx of osteoarthritis  - follows with Dr. Dubon    DERM:  #sacral DTI  -wound care consult: Cleanse wound with soap and water, pat dry. Apply Triad cream twice daily, PRN for soiling. Skin and incontinence care.Pressure Injury Prevention. Assess wound daily and inform primary provider of any changes.   - Preventative dressings in place    LINES/DRAINS:  PIV, Indwelling mann (6/2-     ADVANCED DIRECTIVES:      HCP/Emergency Contact - Carolyn Lobato (daughter) - [phone]    INDICATION FOR SICU: Multiple consecutive rib fractures    DISPO: 4C Assessment and Recommendation:   78y Female w/ multiple comorbidities presenting with multiple consecutive rib fractures s/p multiple falls due to lower extremities now admitted to SICU for hemodynamic monitoring.    Injuries:   -acute nondisplaced posterior left 6th, 7th rib fractures  -right greater trochanter fracture    NEURO/PSYCH:  #Acute pain  - acetaminophen Tablet 650 mg q6  - lidocaine patch  #Bilateral lower extremity weakness; PMHx of stable chronic vertebral fractures; r/o impingement   - MRI lumbar spine pending  #PMHx of anxiety  - Duloxetine 60mg daily  #B/L UE fasciculations  - neurology c/s - tizanidine prn, toxic metabolic w/u, MRI brain/c-spine  - holding zyprexa   - thiamine/folate/MVI  - propranolol 10mg q12    RESP:   #multiple acute consecutive posterior left 6-11th rib fractures  -pain control  #Respiratory failure secondary to hypercapnia, requiring NIV support  - BiPAP overnight as needed  - chest PT/3%saline/duonebs  #bilateral pleural effusion w/ punctate air, no pneumothorax  #Activity     WBAT Orthopedic input obtained  #Current smoker, 1/2 PPD  - nicotine patch    CARDIAC:   #PMHx of HTN  - amlodipine 5mg daily  - HOLDING  olmesartan-hydrochlorothiazide 40 mg-25 mg oral tablet   #PMHx of HLD  - atorvastatin 80 mg at bedtime (home rx rosuvastatin 20mg QD)  #PMHx of CAD s/p CABG x 3  #PMHx of atrial flutter s/p ablation (2022)  - follows with Dr. Brunson    - EKG NSR  #Imaging:   - EKG: NSR  - TTE, 5/27 - EF 67%. Mild MR, Mild/mod TR, mild pHTN, simple atheroma in aortic arch    GI/NUTR:   #Diet: regular diet  - 1:1 feeds   - aspiration precautions, HOB 30  #GI Prophylaxis  #hx of PUD    - pantoprazole Tablet: 40 mg (home rx)  #Bowel regimen   -HOLDING secondary to loose bowel movements    -last bowel movement 6/6  #Recent admission for infective colitis (4/24)  - Continue home dicyclomine 20 milliGRAM(s) Oral four times a day before meals  #dilated CBD, colonic wall, esophageal fluid  - GI recs appreciated: possible ERCP, follow up outpatient    /RENAL:   #urine output in critically ill  -mann discontinued, failed multiple TOV > mann replaced (6/5)   #urinary retention >1.5 L    - Flomax 0.4mg at bedtime  #acute right pubic rami fracture  - CT cystogram negative  #hyponatremia, asymptomatic   -SIADH vs infectious   - hold HCTZ  - avoid sodium-restricted diet  #urine output in critically ill    -indwelling mann (placed     Current Rx: tamsulosin 0.4 milliGRAM(s) at bedtime    Labs:          BUN/Cr- 8/0.7  -->,  8/0.7  -->          Electrolytes-(06-05 @ 21:45)Na 130 // K 4.2 // Mg 1.8 // Phos 2.8   #hypomagnesemia, hypophosphotemia repleted       HEME/ONC:   #DVT prophylaxis    - SCDs    - enoxaparin 30 q12    Labs: Hb/Hct:  9.7/29.0  -->,  10.2/30.2  -->                      Plts:  391  -->,  362  -->                 PTT/INR:        ID:  #leukocytosis in setting of trauma vs UTI, afebrile   WBC- 13.02  --->>,  11.75  --->>,  10.37  --->>  Temp trend- 24hrs T(F): 97.4 (06 Jun 2024 04:00), Max: 98.2 (05 Jun 2024 12:00)  #UTI present on admission  -U/a- +leuk/nitrites/occasional bacteria   - on Rocephin x 3 days (completed)  #Pneumonia  -Zosyn ends 6/6  #left axillary lymphadenopathy seen on CT  - follow up outpatient    ENDO:  #Blood glucose monitoring  - Glucose goal 140-180. if above 180 start ISS    MSK:  #acute right suprapubic rami fracture, acute greater trochanteric fracture  - Orthopedics c/s --> Xrays  reviewed imaging now WBAT b/l    #PMHx of osteoarthritis  - follows with Dr. Dubon    DERM:  #sacral DTI  -wound care consult: Cleanse wound with soap and water, pat dry. Apply Triad cream twice daily, PRN for soiling. Skin and incontinence care.Pressure Injury Prevention. Assess wound daily and inform primary provider of any changes.   - Preventative dressings in place    LINES/DRAINS:  PIV, Indwelling mann (6/2-     ADVANCED DIRECTIVES:      HCP/Emergency Contact - Carolyn Lobato (daughter) - [phone]    INDICATION FOR SICU: Multiple consecutive rib fractures    DISPO: 4C

## 2024-06-07 NOTE — PROGRESS NOTE ADULT - ASSESSMENT
78y Female w/ multiple comorbidities presenting with multiple consecutive rib fractures s/p multiple falls due to lower extremities now admitted to SICU for hemodynamic monitoring.    #acute nondisplaced posterior left 6th, 7th rib fractures  - mgmt as per trauma    #acute right suprapubic rami fracture, acute greater trochanteric fracture  - ortho cleared for WBAT  - PT    #Metabolic encephalopathy   - likely 2/2 pna, improved  - s/p antibiotics     #B/L UE fasciculations  -Neuro f/u  - MRI when able    #leukocytosis  - improved on current antibiotics  - stable today     # Aspiration pneumonia  - no evidence of aspiration at this time    #UTI   - s/p abx course    #smoking hx  - cessation advised    #Cecum thickening  - EGD and EUS as per GI, can be done as outpt

## 2024-06-07 NOTE — PROGRESS NOTE ADULT - ASSESSMENT
78yFemale w/ PMHx of HTN, HLD, anxiety, CAD s/p CABG x3 (2009), Atrial flutter s/p ablation (2022), OA, recent admission for infectious colitis (4/26/24), seen as a TRAUMA CONSULT s/p multiple mechanical falls (-HT, -LOC, -AC). The following injuries were identified:     # Acute nondisplaced posterior left 6-7 rib fractures   # Acute mildly displaced left 8-11 rib fractures   # Acute nondisplaced fracture of the right superior pubic ramus    # Right greater trochanteric fracture    PLAN:   - hemodynamic monitoring  - F/U CM team for disposition for SUMIT  - Strict I&O - monitor mann output  - daily labs, replete electrolytes as needed   - multi modal pain control  - continue with pureed diet  - continue with DVT and GI ppx  - continue with antibiotics  - encourage activity and IS as tolerated     Discussed with Trauma Surgery Team.     Pending attending attestation.        78yFemale w/ PMHx of HTN, HLD, anxiety, CAD s/p CABG x3 (2009), Atrial flutter s/p ablation (2022), OA, recent admission for infectious colitis (4/26/24), seen as a TRAUMA CONSULT s/p multiple mechanical falls (-HT, -LOC, -AC). The following injuries were identified:     # Acute nondisplaced posterior left 6-7 rib fractures   # Acute mildly displaced left 8-11 rib fractures   # Acute nondisplaced fracture of the right superior pubic ramus    # Right greater trochanteric fracture    PLAN:   - hemodynamic monitoring  - F/U CM team for disposition for SUMIT  - Strict I&O - monitor mann output  - daily labs, replete electrolytes as needed   - multi modal pain control  - NWB to RLE  - continue with pureed diet  - continue with DVT and GI ppx  - continue with antibiotics  - encourage activity and IS as tolerated   - Dispo planning     Discussed with Trauma Surgery Team.     Pending attending attestation.        78yFemale w/ PMHx of HTN, HLD, anxiety, CAD s/p CABG x3 (2009), Atrial flutter s/p ablation (2022), OA, recent admission for infectious colitis (4/26/24), seen as a TRAUMA CONSULT s/p multiple mechanical falls (-HT, -LOC, -AC). The following injuries were identified:     # Acute nondisplaced posterior left 6-7 rib fractures   # Acute mildly displaced left 8-11 rib fractures   # Acute nondisplaced fracture of the right superior pubic ramus    # Right greater trochanteric fracture    PLAN:   - hemodynamic monitoring  - F/U CM team for disposition for SUMIT  - Strict I&O - monitor mann output  - daily labs, replete electrolytes as needed   - multi modal pain control  - continue with pureed diet  - WBAT  - continue with DVT and GI ppx  - continue with antibiotics  - encourage activity and IS as tolerated   - Dispo planning     Discussed with Trauma Surgery Team.     Pending attending attestation.

## 2024-06-07 NOTE — PROGRESS NOTE ADULT - SUBJECTIVE AND OBJECTIVE BOX
JAYLYN AGARWAL  78y, Female  Allergy: No Known Allergies    Hospital Day: 12d    Patient seen and examined earlier today.  No acute events overnight.     PMH/PSH:  PAST MEDICAL & SURGICAL HISTORY:  CAD (coronary artery disease)      Tobacco dependence      HTN (hypertension)      Anxiety      Atrial flutter      S/P CABG x 3          LAST 24-Hr EVENTS:    VITALS:  T(F): 97.6 (06-07-24 @ 15:58), Max: 97.8 (06-07-24 @ 00:26)  HR: 81 (06-07-24 @ 15:58)  BP: 166/80 (06-07-24 @ 15:58) (147/75 - 166/80)  RR: 18 (06-07-24 @ 15:58)  SpO2: 97% (06-07-24 @ 15:58)          TESTS & MEASUREMENTS:  Weight/BMI      06-05-24 @ 07:01  -  06-06-24 @ 07:00  --------------------------------------------------------  IN: 1200 mL / OUT: 2695 mL / NET: -1495 mL    06-06-24 @ 07:01  -  06-07-24 @ 07:00  --------------------------------------------------------  IN: 730 mL / OUT: 1675 mL / NET: -945 mL    06-07-24 @ 07:01  -  06-07-24 @ 16:58  --------------------------------------------------------  IN: 470 mL / OUT: 2200 mL / NET: -1730 mL                            9.1    10.62 )-----------( 385      ( 06 Jun 2024 20:00 )             26.8         06-06    130<L>  |  95<L>  |  9<L>  ----------------------------<  103<H>  3.9   |  28  |  0.7    Ca    8.2<L>      06 Jun 2024 20:00  Phos  3.2     06-06  Mg     2.1     06-06              Urinalysis Basic - ( 06 Jun 2024 20:00 )    Color: x / Appearance: x / SG: x / pH: x  Gluc: 103 mg/dL / Ketone: x  / Bili: x / Urobili: x   Blood: x / Protein: x / Nitrite: x   Leuk Esterase: x / RBC: x / WBC x   Sq Epi: x / Non Sq Epi: x / Bacteria: x                        Indwelling Urethral Catheter:     Connect To:  Straight Drainage/[place]    Indication:  Urinary Retention / Obstruction (06-07-24 @ 10:52) (not performed)  Indwelling Urethral Catheter:     Connect To:  Straight Drainage/Gravity    Indication:  Urine Output Monitoring in Critically Ill (06-06-24 @ 04:35) (not performed)      RADIOLOGY, ECG, & ADDITIONAL TESTS:  12 Lead ECG:   Ventricular Rate 89 BPM    Atrial Rate 89 BPM    P-R Interval 146 ms    QRS Duration 86 ms    Q-T Interval 384 ms    QTC Calculation(Bazett) 467 ms    P Axis 61 degrees    R Axis -4 degrees    T Axis 57 degrees    Diagnosis Line Normal sinus rhythm  Minimal voltage criteria for LVH, may be normal variant ( R in aVL )  Borderline ECG    Confirmed by Zeyad Meek ([id number]) on 5/28/2024 10:21:37 AM (05-27-24 @ 08:59)      RECENT DIAGNOSTIC ORDERS:      MEDICATIONS:  MEDICATIONS  (STANDING):  acetaminophen     Tablet .. 650 milliGRAM(s) Oral every 6 hours  albuterol/ipratropium for Nebulization 3 milliLiter(s) Nebulizer every 6 hours  amLODIPine   Tablet 5 milliGRAM(s) Oral daily  atorvastatin 80 milliGRAM(s) Oral at bedtime  bacitracin   Ointment 1 Application(s) Topical two times a day  chlorhexidine 2% Cloths 1 Application(s) Topical <User Schedule>  dicyclomine 20 milliGRAM(s) Oral four times a day before meals  DULoxetine 60 milliGRAM(s) Oral daily  enoxaparin Injectable 30 milliGRAM(s) SubCutaneous every 12 hours  folic acid 1 milliGRAM(s) Oral daily  lidocaine   4% Patch 1 Patch Transdermal daily  melatonin 3 milliGRAM(s) Oral at bedtime  multivitamin 1 Tablet(s) Oral daily  nicotine -   7 mG/24Hr(s) Patch 1 Patch Transdermal daily  nystatin    Suspension 978767 Unit(s) Oral every 6 hours  pantoprazole    Tablet 40 milliGRAM(s) Oral before breakfast  propranolol 10 milliGRAM(s) Oral every 12 hours  sodium chloride 3%  Inhalation 4 milliLiter(s) Inhalation every 12 hours  tamsulosin 0.4 milliGRAM(s) Oral at bedtime  thiamine 100 milliGRAM(s) Oral daily    MEDICATIONS  (PRN):  meclizine 12.5 milliGRAM(s) Oral once PRN Dizziness/vertigo      HOME MEDICATIONS:  amLODIPine 5 mg oral tablet (04-26)  dicyclomine 10 mg oral capsule (04-26)  DULoxetine 60 mg oral delayed release capsule (04-26)  pantoprazole 40 mg oral granule, delayed release (04-26)  rosuvastatin 20 mg oral capsule (04-26)      PHYSICAL EXAM:  GENERAL: NAD,elderly   HEAD:  Atraumatic, Normocephalic  EYES: conjunctiva and sclera clear  NECK: Supple, No JVD  CHEST/LUNG: Clear to auscultation bilaterally; No wheeze  HEART: Regular rate and rhythm; No murmurs, rubs, or gallops  ABDOMEN: Soft, Nontender, Nondistended; Bowel sounds present  EXTREMITIES:  2+ Peripheral Pulses, No clubbing, cyanosis, or edema  PSYCH: AAOx3  NEUROLOGY: non-focal  SKIN: No rashes or lesions

## 2024-06-07 NOTE — PROGRESS NOTE ADULT - SUBJECTIVE AND OBJECTIVE BOX
GENERAL SURGERY PROGRESS NOTE     JAYLYN AGARWAL  78y  Female  Hospital day :12d    OVERNIGHT EVENTS:  NWB to RLE  Dispo planning  Failed TOV, mann  Holding home Hcltz/Lisinopril  Pulling 500-750 on IS    T(F): 97.8 (06-07-24 @ 00:26), Max: 97.8 (06-07-24 @ 00:26)  HR: 74 (06-07-24 @ 00:26) (74 - 89)  BP: 147/75 (06-07-24 @ 00:26) (107/50 - 158/85)  ABP: --  ABP(mean): --  RR: 20 (06-07-24 @ 00:26) (20 - 40)  SpO2: 98% (06-07-24 @ 00:26) (95% - 100%)      PMH  PAST MEDICAL & SURGICAL HISTORY:  CAD (coronary artery disease)  Tobacco dependence  HTN (hypertension)  Anxiety  Atrial flutter  S/P CABG x 3      Allergies  Allergies  No Known Allergies  Intolerances             DIET/FLUIDS: dextrose 5% + sodium chloride 0.9%. 1000 milliLiter(s) IV Continuous <Continuous>  folic acid 1 milliGRAM(s) Oral daily  multivitamin 1 Tablet(s) Oral daily  thiamine 100 milliGRAM(s) Oral daily      URINE:   06-05-24 @ 07:01  -  06-06-24 @ 07:00  --------------------------------------------------------  OUT: 2695 mL     Indwelling Urethral Catheter:     Connect To:  Straight Drainage/Gravity    Indication:  Urine Output Monitoring in Critically Ill (06-06-24 @ 04:35)    GI proph:  pantoprazole    Tablet 40 milliGRAM(s) Oral before breakfast    AC/ proph: enoxaparin Injectable 30 milliGRAM(s) SubCutaneous every 12 hours    ABx: nystatin    Suspension 307692 Unit(s) Oral every 6 hours            PHYSICAL EXAM:  GENERAL: NAD, well-appearing, AAOx3  CHEST/LUNG: Clear to auscultation bilaterally, Normal expansion/effort.   HEART: Regular rate and rhythm  ABDOMEN: Soft, Nontender, Nondistended;   :       Mann catheter in place.  EXTREMITIES:  No clubbing, cyanosis, or edema      LABS  Labs:  CAPILLARY BLOOD GLUCOSE                              9.1    10.62 )-----------( 385      ( 06 Jun 2024 20:00 )             26.8         06-06    130<L>  |  95<L>  |  9<L>  ----------------------------<  103<H>  3.9   |  28  |  0.7      Calcium: 8.2 mg/dL (06-06-24 @ 20:00)      LFTs:       ABG - ( 01 Jun 2024 11:31 )  pH: 7.40  /  pCO2: 37    /  pO2: 69    / HCO3: 23    / Base Excess: -1.6  /  SaO2: 96.3            ABG - ( 31 May 2024 03:12 )  pH: 7.34  /  pCO2: 43    /  pO2: 99    / HCO3: 23    / Base Excess: -2.6  /  SaO2: 100.0                 Urinalysis Basic - ( 06 Jun 2024 20:00 )    Color: x / Appearance: x / SG: x / pH: x  Gluc: 103 mg/dL / Ketone: x  / Bili: x / Urobili: x   Blood: x / Protein: x / Nitrite: x   Leuk Esterase: x / RBC: x / WBC x   Sq Epi: x / Non Sq Epi: x / Bacteria: x            RADIOLOGY & ADDITIONAL TESTS:  No new imaging         GENERAL SURGERY PROGRESS NOTE     JAYLYN AGARWAL  78y  Female  Hospital day :12d    OVERNIGHT EVENTS:  NWB to RLE  Dispo planning  Failed TOV, mann  Holding home Hcltz/Lisinopril  Pulling 500-750 on IS    T(F): 97.8 (06-07-24 @ 00:26), Max: 97.8 (06-07-24 @ 00:26)  HR: 74 (06-07-24 @ 00:26) (74 - 89)  BP: 147/75 (06-07-24 @ 00:26) (107/50 - 158/85)  ABP: --  ABP(mean): --  RR: 20 (06-07-24 @ 00:26) (20 - 40)  SpO2: 98% (06-07-24 @ 00:26) (95% - 100%)      PMH  PAST MEDICAL & SURGICAL HISTORY:  CAD (coronary artery disease)  Tobacco dependence  HTN (hypertension)  Anxiety  Atrial flutter  S/P CABG x 3      Allergies  Allergies  No Known Allergies  Intolerances      DIET/FLUIDS: dextrose 5% + sodium chloride 0.9%. 1000 milliLiter(s) IV Continuous <Continuous>  folic acid 1 milliGRAM(s) Oral daily  multivitamin 1 Tablet(s) Oral daily  thiamine 100 milliGRAM(s) Oral daily      URINE:   06-05-24 @ 07:01  -  06-06-24 @ 07:00  --------------------------------------------------------  OUT: 2695 mL     Indwelling Urethral Catheter:     Connect To:  Straight Drainage/Gravity    Indication:  Urine Output Monitoring in Critically Ill (06-06-24 @ 04:35)    GI proph:  pantoprazole    Tablet 40 milliGRAM(s) Oral before breakfast    AC/ proph: enoxaparin Injectable 30 milliGRAM(s) SubCutaneous every 12 hours    ABx: nystatin    Suspension 518308 Unit(s) Oral every 6 hours    PHYSICAL EXAM:  GENERAL: NAD, well-appearing, AAOx3  CHEST/LUNG: Clear to auscultation bilaterally, Normal expansion/effort.   HEART: Regular rate and rhythm  ABDOMEN: Soft, Nontender, Nondistended;   :       Mann catheter in place.  EXTREMITIES:  No clubbing, cyanosis, or edema      LABS  CAPILLARY BLOOD GLUCOSE                     9.1    10.62 )-----------( 385      ( 06 Jun 2024 20:00 )             26.8         06-06    130<L>  |  95<L>  |  9<L>  ----------------------------<  103<H>  3.9   |  28  |  0.7  Calcium: 8.2 mg/dL (06-06-24 @ 20:00)    ABG - ( 01 Jun 2024 11:31 )  pH: 7.40  /  pCO2: 37    /  pO2: 69    / HCO3: 23    / Base Excess: -1.6  /  SaO2: 96.3    ABG - ( 31 May 2024 03:12 )  pH: 7.34  /  pCO2: 43    /  pO2: 99    / HCO3: 23    / Base Excess: -2.6  /  SaO2: 100.0     Urinalysis Basic - ( 06 Jun 2024 20:00 )    Color: x / Appearance: x / SG: x / pH: x  Gluc: 103 mg/dL / Ketone: x  / Bili: x / Urobili: x   Blood: x / Protein: x / Nitrite: x   Leuk Esterase: x / RBC: x / WBC x   Sq Epi: x / Non Sq Epi: x / Bacteria: x    RADIOLOGY & ADDITIONAL TESTS:  No new imaging

## 2024-06-08 NOTE — DISCHARGE NOTE NURSING/CASE MANAGEMENT/SOCIAL WORK - PATIENT PORTAL LINK FT
You can access the FollowMyHealth Patient Portal offered by [facility] by registering at the following website: [email or web address]. By joining [employer]’s FollowMyHealth portal, you will also be able to view your health information using other applications (apps) compatible with our system.

## 2024-06-08 NOTE — DISCHARGE NOTE NURSING/CASE MANAGEMENT/SOCIAL WORK - NSDCPEFALRISK_GEN_ALL_CORE
For information on Fall & Injury Prevention, visit: [email or web address] OR  [email or web address] OR  https://www.cdc.gov/steadi/patient.html

## 2024-06-08 NOTE — PROGRESS NOTE ADULT - SUBJECTIVE AND OBJECTIVE BOX
GENERAL SURGERY PROGRESS NOTE     JAYLYN AGARWAL  [address] [place] [address] day :13d    POD:  Procedure:   Surgical Attending: Rich Ruiz  Overnight events:    no acute events   pending dispo to rehab        T(F): 97.8 (06-08-24 @ 00:10), Max: 97.8 (06-08-24 @ 00:10)  HR: 80 (06-08-24 @ 00:10) (77 - 81)  BP: 153/74 (06-08-24 @ 00:10) (153/74 - 166/80)  ABP: --  ABP(mean): --  RR: 18 (06-08-24 @ 00:10) (18 - 18)  SpO2: 99% (06-08-24 @ 00:10) (97% - 99%)    IN'S / OUT's:    06-06-24 @ 07:01  -  06-07-24 @ 07:00  --------------------------------------------------------  IN:    dextrose 5% + sodium chloride 0.9%: 500 mL    Oral Fluid: 230 mL  Total IN: 730 mL    OUT:    Indwelling Catheter - Urethral (mL): 1675 mL  Total OUT: 1675 mL    Total NET: -945 mL      06-07-24 @ 07:01  -  06-08-24 @ 02:14  --------------------------------------------------------  IN:    dextrose 5% + sodium chloride 0.9%: 200 mL    Oral Fluid: 810 mL  Total IN: 1010 mL    OUT:    Indwelling Catheter - Urethral (mL): 2200 mL    Voided (mL): 400 mL  Total OUT: 2600 mL    Total NET: -1590 mL          PHYSICAL EXAM:  GENERAL: NAD   CHEST/LUNG: Clear to auscultation bilaterally  HEART: Regular rate and rhythm  ABDOMEN: Soft, Nontender, Nondistended;   EXTREMITIES:  No clubbing, cyanosis, or edema. WBAT RLE   Neuro:  alert and awake, AAOx2-3 follows commands, b/l strength 3-4/5         LABS  Labs:  CAPILLARY BLOOD GLUCOSE                              9.1    10.62 )-----------( 385      ( 06 Jun 2024 20:00 )             26.8         06-06    130<L>  |  95<L>  |  9<L>  ----------------------------<  103<H>  3.9   |  28  |  0.7          LFTs:       ABG - ( 01 Jun 2024 11:31 )  pH: 7.40  /  pCO2: 37    /  pO2: 69    / HCO3: 23    / Base Excess: -1.6  /  SaO2: 96.3              Coags:            Urinalysis Basic - ( 06 Jun 2024 20:00 )    Color: x / Appearance: x / SG: x / pH: x  Gluc: 103 mg/dL / Ketone: x  / Bili: x / Urobili: x   Blood: x / Protein: x / Nitrite: x   Leuk Esterase: x / RBC: x / WBC x   Sq Epi: x / Non Sq Epi: x / Bacteria: x            RADIOLOGY & ADDITIONAL TESTS:    < from: CT Abdomen and Pelvis w/ IV Cont (05.26.24 @ 11:21) >  IMPRESSION:    Acute nondisplaced posterior left 6th, 7th rib fractures.  Acute mildly displaced posterior left 8th, 9th, 10th, and 11th ribs   fractures.    Acute nondisplaced fracture of the right superior pubic ramus (4-397).    Stable chronic T6, T7, T8, T11, L2 vertebral body compression deformities.    Mild mural thickening extending from the cecum to the mid transverse   colon likely reflecting colitis of infectious or inflammatory etiology..    New Trace bilateral pleural effusions with adjacent consolidative   opacities.        Additional attending comments:    Additional right greater trochanter fracture, seen on image 177 of series   602    There is a punctate focus of air within the left pleural effusion on   image 32 series 2. Although no pneumothorax is identified on this   examination, a possible trace pneumothorax cannot be excluded.    Dilated common bile duct with intrahepatic biliary duct dilatation and a   distended gallbladder.    Findings are suspicious for a possible central obstructing lesion within   the distal common bile duct is not excluded.    Further evaluation with ERCP is recommended.    Patulous, fluid-filled esophagus. Outpatient endoscopy is recommended.    Colonic wall thickening may be related to underdistention.    Left axillary lymphadenopathy require short-term interval follow-up.      < end of copied text >    A/P:  JAYLYN AGARWAL is a 78yFemale w/ PMHx of HTN, HLD, anxiety, CAD s/p CABG x3 (2009), Atrial flutter s/p ablation (2022), OA, recent admission for infectious colitis (4/26/24), seen as a TRAUMA CONSULT s/p multiple mechanical falls (-HT, -LOC, -AC). The following injuries were identified:     # Acute nondisplaced posterior left 6-7 rib fractures   # Acute mildly displaced left 8-11 rib fractures   # Acute nondisplaced fracture of the right superior pubic ramus    # Right greater trochanteric fracture    PLAN:   - hemodynamic monitoring  - Strict I&O - monitor mann output  - daily labs, replete electrolytes as needed   - multi modal pain control  - regular diet   - WBAT to RLE   - continue with DVT and GI ppx  - encourage activity and IS as tolerated   - c/w nicotine patch   - PT eval encourage OOBTC   - F/U Ortho final recommendations: 6/2 will benefit from outpatient total hip if patient can be medically optimized.    Disposition: F/U CM team for disposition for [facility]. outpatient follow up ERCP for incidental CBD dilation w/ intrahepatic ductal dilation     Above plan to be discussed with Attending Surgeon Dr. Davis     [id number]

## 2024-06-08 NOTE — PROVIDER CONTACT NOTE (OTHER) - ASSESSMENT
pt assessed and noted her birthday , year, and month but pt states "shes very tired ", and when asked where she is pt states "in group home". pt appears lethargic but arousable to repeated stimuli.

## 2024-06-09 NOTE — PROGRESS NOTE ADULT - SUBJECTIVE AND OBJECTIVE BOX
JAYLYN AGARWAL  78y, Female  Allergy: No Known Allergies    Hospital Day: 14d    Patient seen and examined earlier today.  No acute events overnight.     PMH/PSH:  PAST MEDICAL & SURGICAL HISTORY:  CAD (coronary artery disease)      Tobacco dependence      HTN (hypertension)      Anxiety      Atrial flutter      S/P CABG x 3          LAST 24-Hr EVENTS:    VITALS:  T(F): 97.8 (06-09-24 @ 08:54), Max: 98 (06-09-24 @ 01:52)  HR: 99 (06-09-24 @ 08:54)  BP: 110/65 (06-09-24 @ 08:54) (110/65 - 150/70)  RR: 18 (06-09-24 @ 08:54)  SpO2: 98% (06-09-24 @ 08:54)          TESTS & MEASUREMENTS:  Weight/BMI      06-07-24 @ 07:01  -  06-08-24 @ 07:00  --------------------------------------------------------  IN: 1010 mL / OUT: 3050 mL / NET: -2040 mL    06-08-24 @ 07:01  -  06-09-24 @ 07:00  --------------------------------------------------------  IN: 0 mL / OUT: 2700 mL / NET: -2700 mL                                              Indwelling Urethral Catheter:     Connect To:  Straight Drainage/[place]    Indication:  Urinary Retention / Obstruction (06-07-24 @ 10:52) (not performed)  Indwelling Urethral Catheter:     Connect To:  Straight Drainage/Gravity    Indication:  Urine Output Monitoring in Critically Ill (06-06-24 @ 04:35) (not performed)      RADIOLOGY, ECG, & ADDITIONAL TESTS:  12 Lead ECG:   Ventricular Rate 89 BPM    Atrial Rate 89 BPM    P-R Interval 146 ms    QRS Duration 86 ms    Q-T Interval 384 ms    QTC Calculation(Bazett) 467 ms    P Axis 61 degrees    R Axis -4 degrees    T Axis 57 degrees    Diagnosis Line Normal sinus rhythm  Minimal voltage criteria for LVH, may be normal variant ( R in aVL )  Borderline ECG    Confirmed by Zeyad Meek ([id number]) on 5/28/2024 10:21:37 AM (05-27-24 @ 08:59)      RECENT DIAGNOSTIC ORDERS:      MEDICATIONS:  MEDICATIONS  (STANDING):  acetaminophen     Tablet .. 650 milliGRAM(s) Oral every 6 hours  albuterol/ipratropium for Nebulization 3 milliLiter(s) Nebulizer every 6 hours  amLODIPine   Tablet 5 milliGRAM(s) Oral daily  atorvastatin 80 milliGRAM(s) Oral at bedtime  bacitracin   Ointment 1 Application(s) Topical two times a day  chlorhexidine 2% Cloths 1 Application(s) Topical <User Schedule>  dicyclomine 20 milliGRAM(s) Oral four times a day before meals  DULoxetine 60 milliGRAM(s) Oral daily  enoxaparin Injectable 30 milliGRAM(s) SubCutaneous every 12 hours  folic acid 1 milliGRAM(s) Oral daily  lidocaine   4% Patch 1 Patch Transdermal daily  melatonin 3 milliGRAM(s) Oral at bedtime  multivitamin 1 Tablet(s) Oral daily  nicotine -   7 mG/24Hr(s) Patch 1 Patch Transdermal daily  nystatin    Suspension 788079 Unit(s) Oral every 6 hours  pantoprazole    Tablet 40 milliGRAM(s) Oral before breakfast  propranolol 10 milliGRAM(s) Oral every 12 hours  sodium chloride 3%  Inhalation 4 milliLiter(s) Inhalation every 12 hours  tamsulosin 0.4 milliGRAM(s) Oral at bedtime  thiamine 100 milliGRAM(s) Oral daily    MEDICATIONS  (PRN):  meclizine 12.5 milliGRAM(s) Oral once PRN Dizziness/vertigo      HOME MEDICATIONS:  amLODIPine 5 mg oral tablet (04-26)  dicyclomine 10 mg oral capsule (04-26)  DULoxetine 60 mg oral delayed release capsule (04-26)  pantoprazole 40 mg oral granule, delayed release (04-26)  rosuvastatin 20 mg oral capsule (04-26)      PHYSICAL EXAM:  GENERAL: NAD,elderly   HEAD:  Atraumatic, Normocephalic  EYES: conjunctiva and sclera clear  NECK: Supple, No JVD  CHEST/LUNG: Clear to auscultation bilaterally; No wheeze  HEART: Regular rate and rhythm; No murmurs, rubs, or gallops  ABDOMEN: Soft, Nontender, Nondistended; Bowel sounds present  EXTREMITIES:  2+ Peripheral Pulses, No clubbing, cyanosis, or edema  PSYCH: AAOx3  NEUROLOGY: non-focal  SKIN: No rashes or lesions

## 2024-06-09 NOTE — PROGRESS NOTE ADULT - SUBJECTIVE AND OBJECTIVE BOX
GENERAL SURGERY PROGRESS NOTE     JAYLYN AGARWAL  [address] [place] [address] day :15d    POD:  Procedure:   Surgical Attending: Rich Ruiz  Overnight events: No acute events overnight. Pt family requesting pt be sent back to [facility]. However, [place] does not have a bed available. Several other facilities have bed available but family declining to pick alternate facility. Pt is medically cleared for discharge at this time    T(F): 98 (06-09-24 @ 01:52), Max: 98 (06-09-24 @ 01:52)  HR: 96 (06-09-24 @ 01:52) (86 - 96)  BP: 150/710 (06-09-24 @ 01:52) (150/710 - 178/76)  ABP: --  ABP(mean): --  RR: 18 (06-09-24 @ 01:52) (18 - 20)  SpO2: 98% (06-09-24 @ 01:52) (98% - 99%)    IN'S / OUT's:    06-07-24 @ 07:01  -  06-08-24 @ 07:00  --------------------------------------------------------  IN:    dextrose 5% + sodium chloride 0.9%: 200 mL    Oral Fluid: 810 mL  Total IN: 1010 mL    OUT:    Indwelling Catheter - Urethral (mL): 3050 mL  Total OUT: 3050 mL    Total NET: -2040 mL      06-08-24 @ 07:01  -  06-09-24 @ 01:56  --------------------------------------------------------  IN:  Total IN: 0 mL    OUT:    Indwelling Catheter - Urethral (mL): 1500 mL  Total OUT: 1500 mL    Total NET: -1500 mL          PHYSICAL EXAM:  GENERAL: NAD  CHEST/LUNG: equal chest rise b/l  HEART: Regular rate and rhythm  ABDOMEN: Soft, Nontender, Nondistended;   EXTREMITIES:  No clubbing, cyanosis, or edema  Neuro:  alert and awake, AAOx2-3 follows commands, b/l strength 3-4/5       LABS  Labs:  CAPILLARY BLOOD GLUCOSE                      LFTs:         Coags:                    RADIOLOGY & ADDITIONAL TESTS:      A/P:  JAYLYN AGARWAL is a 78yFemale w/ PMHx of HTN, HLD, anxiety, CAD s/p CABG x3 (2009), Atrial flutter s/p ablation (2022), OA, recent admission for infectious colitis (4/26/24), seen as a TRAUMA CONSULT s/p multiple mechanical falls (-HT, -LOC, -AC). The following injuries were identified:     # Acute nondisplaced posterior left 6-7 rib fractures   # Acute mildly displaced left 8-11 rib fractures   # Acute nondisplaced fracture of the right superior pubic ramus    # Right greater trochanteric fracture      PLAN:   - strict I&O - monitor mann output  - regular diet  - DVT and GI ppx  - encourage ambulation and IS as tolerated   - hemodynamic monitoring  - multi modal pain control  - WBAT on RLE  -Continue with nicotine patch  - f/u CM/SW for dispo planning        #Antibiotics: nystatin    Suspension [id number] Unit(s) Oral every 6 hours, 05-30-24 @ 09:48   Day **  #DVT ppx: enoxaparin Injectable 30 milliGRAM(s) SubCutaneous every 12 hours    #GI ppx: pantoprazole    Tablet 40 milliGRAM(s) Oral before breakfast    Disposition:  4C    Above plan to be discussed with Attending Surgeon Dr. Ruiz  , patient, patient family, and rest of health care team    TAP (Trauma, Acute care, Pediatrics) Spectra [phone]

## 2024-06-09 NOTE — PROGRESS NOTE ADULT - REASON FOR ADMISSION
Patient is a 78y old  Female who presents with a chief complaint of multiple trauma (05 Jun 2024 16:48)
multiple trauma
Patient is a 78y old  Female who presents with a chief complaint of Patient is a 78y old  Female who presents with a chief complaint of multiple trauma (05 Jun 2024 16:48) (07 Jun 2024 16:58)

## 2024-06-10 NOTE — PROGRESS NOTE ADULT - SUBJECTIVE AND OBJECTIVE BOX
GENERAL SURGERY PROGRESS NOTE    Patient: JAYLYN AGARWAL , 78y ([birth year])Female   MRN: [id number]  Location: [address]  Visit: 05-26-24 Inpatient  Date: 06-10-24 @ 07:00    Hospital Day #: 16    Procedure/Dx/Injuries:  1) ACUTE NONDISPLACED POSTERIOR L RIB FX 6 AND 7  2) ACUTE MILDLY DISPLACED L RIB FX 8-11  3) ACUTE NONDISPLACED FX RIGHT SUPERIOR PUBIC RAMUS  4) RIGHT GREATER TROCHANTERIC FX    PAST MEDICAL & SURGICAL HISTORY:  CAD (coronary artery disease)      Tobacco dependence      HTN (hypertension)      Anxiety      Atrial flutter      S/P CABG x 3          Vitals:   T(F): 98.5 (06-10-24 @ 04:53), Max: 98.5 (06-10-24 @ 04:53)  HR: 96 (06-10-24 @ 04:53)  BP: 152/78 (06-10-24 @ 04:53)  RR: 18 (06-10-24 @ 04:53)  SpO2: 95% (06-10-24 @ 04:53)      Diet, Regular      Fluids:     I & O's:    06-09-24 @ 07:01  -  06-10-24 @ 07:00  --------------------------------------------------------  IN:  Total IN: 0 mL    OUT:    Voided (mL): 600 mL  Total OUT: 600 mL    Total NET: -600 mL        PHYSICAL EXAM:  GENERAL: NAD  CHEST/LUNG: equal chest rise b/l  HEART: Regular rate and rhythm  ABDOMEN: Soft, Nontender, Nondistended;   EXTREMITIES:  No clubbing, cyanosis, or edema  Neuro:  alert and awake, AAOx2-3 follows commands, b/l strength 3-4/5       MEDICATIONS  (STANDING):  acetaminophen     Tablet .. 650 milliGRAM(s) Oral every 6 hours  albuterol/ipratropium for Nebulization 3 milliLiter(s) Nebulizer every 6 hours  amLODIPine   Tablet 5 milliGRAM(s) Oral daily  atorvastatin 80 milliGRAM(s) Oral at bedtime  bacitracin   Ointment 1 Application(s) Topical two times a day  chlorhexidine 2% Cloths 1 Application(s) Topical <User Schedule>  dicyclomine 20 milliGRAM(s) Oral four times a day before meals  DULoxetine 60 milliGRAM(s) Oral daily  enoxaparin Injectable 30 milliGRAM(s) SubCutaneous every 12 hours  folic acid 1 milliGRAM(s) Oral daily  lidocaine   4% Patch 1 Patch Transdermal daily  melatonin 3 milliGRAM(s) Oral at bedtime  multivitamin 1 Tablet(s) Oral daily  nicotine -   7 mG/24Hr(s) Patch 1 Patch Transdermal daily  nystatin    Suspension [id number] Unit(s) Oral every 6 hours  pantoprazole    Tablet 40 milliGRAM(s) Oral before breakfast  propranolol 10 milliGRAM(s) Oral every 12 hours  sodium chloride 3%  Inhalation 4 milliLiter(s) Inhalation every 12 hours  tamsulosin 0.4 milliGRAM(s) Oral at bedtime  thiamine 100 milliGRAM(s) Oral daily    MEDICATIONS  (PRN):  meclizine 12.5 milliGRAM(s) Oral once PRN Dizziness/vertigo      DVT PROPHYLAXIS: enoxaparin Injectable 30 milliGRAM(s) SubCutaneous every 12 hours    GI PROPHYLAXIS: pantoprazole    Tablet 40 milliGRAM(s) Oral before breakfast    ANTICOAGULATION:   ANTIBIOTICS:  nystatin    Suspension [id number] Unit(s)

## 2024-06-10 NOTE — PROGRESS NOTE ADULT - ASSESSMENT
A/P:  JAYLYN AGARWAL is a 78yFemale w/ PMHx of HTN, HLD, anxiety, CAD s/p CABG x3 (2009), Atrial flutter s/p ablation (2022), OA, recent admission for infectious colitis (4/26/24), seen as a TRAUMA CONSULT s/p multiple mechanical falls (-HT, -LOC, -AC). The following injuries were identified:     # Acute nondisplaced posterior left 6-7 rib fractures   # Acute mildly displaced left 8-11 rib fractures   # Acute nondisplaced fracture of the right superior pubic ramus    # Right greater trochanteric fracture      PLAN:   - strict I&O - monitor mann output  - regular diet  - DVT and GI ppx  - encourage ambulation and IS as tolerated   - hemodynamic monitoring  - multi modal pain control  - WBAT on RLE  -Continue with nicotine patch  - f/u CM/SW for dispo planning-family appealing    [phone]

## 2024-06-11 NOTE — PROGRESS NOTE ADULT - PROVIDER SPECIALTY LIST ADULT
Hospitalist
Hospitalist
Internal Medicine
SICU
Trauma Surgery
Rehab Medicine
SICU
Trauma Surgery
Internal Medicine
SICU
Trauma Surgery

## 2024-06-11 NOTE — DISCHARGE NOTE PROVIDER - NSDCCPCAREPLAN_GEN_ALL_CORE_FT
PRINCIPAL DISCHARGE DIAGNOSIS  Diagnosis: Multiple closed fractures of ribs of left side  Assessment and Plan of Treatment: SURGERY DISCHARGE INSTRUCTIONS  FOLLOW-UP - with Trauma Clinic in 2 weeks. Call the office to make an appointment or if you have any questions/concerns.  Follow up with Dr. Conley the Orthopedic surgeon outpatient in 2 weeks.  Follow up with Dr. Logan Tamayo the Advanced Gastroenterologist outpatient in 2 weeks.   DIET - regular.   ACTIVITY- WBAT to RLE, advance activity as tolerated per Rehab PT recs.  PAIN MEDS -Take over the counter extra strength tylenol 500mg and/or ibprofen 400mg with food every 6 hours for pain.  Do not take tylenol in addition to your prescription pain med if your prescription pain med already has tylenol in it. No more than 4g of tylenol in 24hrs or 1g in 4 hrs. You will be prescrived a lidocaine patch which you can place on the affect area as needed.   OTHER MEDS - If you have any questions about your other regular home medications please call your primary care physician or the physician who prescribed those medications to you.   If you develop fever, dizziness, chest pain, trouble breathing, nausea, vomiting, increasing abdominal pain, inability to pass bowel movements, redness/pain/discharge from incisions. Please call the office or go to the emergency room immediately.        SECONDARY DISCHARGE DIAGNOSES  Diagnosis: Multiple closed fractures of ribs of right side  Assessment and Plan of Treatment:     Diagnosis: Fall from standing  Assessment and Plan of Treatment:     Diagnosis: Fracture of single pubic ramus  Assessment and Plan of Treatment:     Diagnosis: Back pain  Assessment and Plan of Treatment:

## 2024-06-11 NOTE — PROGRESS NOTE ADULT - ATTENDING COMMENTS
Patient looks more lethargic today.  Follows commands.  Afebrile.    ASSESSMENT:  77 y/o female, S/P Fall.  Closed Left 6-11th Rib Fractures. Tiny Left Pneumothorax.  Closed Right Superior Pubic rami Fracture.  Right Greater Trochanteric Femur Fracture.  Hyponatremia.  Atelectasis.   At risk for respiratory complications.  Acute urinary retention.    PLAN:  - incentive spirometer  - keep normotensive  - on regular diet 1:1 assist  - aspiration precautions at all time  - on Flomax o.4 mg  - d/c Randall; use straight catheter as needed  - ID - off antibiotics at this time  - DVT prophylaxis
Trauma/ACS Attending  Note Attestation    Patient is examined and evaluated at the bedside with the residents/PAs. Treatment plan discussed with the team, nurses, and consulting physicians and consulting teams. Medications, radiological studies and all other relevant studies reviewed.     JAYLYN AGARWAL Patient is a 78y old  Female who presents with a chief complaint of fall resulted in multiple traumatic injuries included but not limited to multiple left side ribs fractures, right pubic rami fractures, right greater trochanteric fracture.  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 (continues to smoke at this time about 1PPD), HTN, CAD, Anxiety    Patient is alert and awake, AAOx2-3 follows commands, continues to have uncontrolled movements facial and upper extremities  Continues to have weakness bilateral lower extremities power 3/5  Tolerates diet  Improving with BiPAP/HFNCC treatment and Nicotine patch  Discussed Goals of Care with Family -> patient will remains full code     Diagnosis: fall resulted in multiple traumatic injuries                  left side ribs fractures,                   right pubic rami fractures,                   right greater trochanteric fracture                  COPD with hypercarbia and hypoxia                  Altered mental status -> ICU delirium                  post traumatic pain    Plan:	  - supportive care  - pain management  - incentive spirometer  - continue to re-orient patient  - continue Nicotine patch  - continue BiPAP/HFNC -> wean BiPAP as tolerated   - DVT prophylaxis       [x ] SCDs [x ] Lovenox SQ [ ] Heparins SQ  [ ] None  - GI prophylaxis  - follow up consults -> ortho, neurology  - continue diet  - repeat studies as needed  - PT evaluation and follow up  - out of bed to chair  - replace electrolytes  - case management evaluation     Effie Cornell MD, FACS  Trauma/ACS/Surgical Critical Care Attending
Critical Care: [id number]   This patient has a high probability of sudden, clinically significant deterioration, which requires the highest level of physician preparedness to intervene urgently. I managed/supervised life or organ supporting interventions that required frequent physician assessment. I devoted my full attention in the ICU to the direct care of this patient for the period of time indicated below. Time I spent with the family or surrogate(s) is included only if the patient was incapable of providing the necessary information or participating in medical decision making. Time devoted to teaching and to any procedures I billed separately is not included.     JAYLYN AGARWAL 78y Female admitted to [ x] SICU /[ ] SDU with multiple ribs fractures, pelvic fractures, greater trochanteric fractures due to fall. High risk for hemodynamic instability, airway at risk for obstruction, electrolytes abnormalities -> hyponatremia. Awaiting MRI for hip fracture evaluation as per ortho, COPD with worsening respiratory status, RENÉ.  Mental status change 0n 5/28/24 delirium vs withdrawal. Change in respiratory status on 5/29/24 with increase oxygen requirement.     Patient is examined and evaluated at the bedside with SICU team. Treatment plan discussed with SICU team, nurses and primary team.   Chest X-ray and all relevant studies reviewed during rounds.  Will continue hemodynamic monitoring as per protocol in SICU.  Neuro:  GCS [ 14] AAOx2 bilateral lower extremities power 3/5 [ x]  Neurovascular checks as per SICU protocol, neurosurgery recommending MRI on hold at this time due to unable to tolerate                 [ ] 3% NaCl     [ ] 2% NaCl                [ ] Keppra  [ ] Lamictal  [ ] Depakote  [ ] Dilantin [ ] None                Pharmacological Paralysis [ ] Yes  [x ]  No      Sedated/Pain control with                 [ ] Dilaudid drip, [ ]  Ketamine drip, [ ] Fentanyl drip, [ ] Propofol, [ ] Precedex, [ ] Versed drip, [ ] Ativan drip,                           [ ] OxyContin standing,  [ ] OxyContin PRN, [ ] Dilaudid PRN pushes, [ ] Fentanyl PRN pushes, [ ] PCA,                [x ] Tylenol IV/PO, [ x] Gabapentin, [ ]  Ketorolac, [x ] Tramadol,  [x ] Lidoderm Patch       Other Medications               [ ] Seroquel, [ ] Zyprexa, [ ] Haldol,  [ ] Clonazepam [ ] Xanax, [ ] Versed/Ativan PRN, [ ] Valium [ ] Trazadone [x ] Cymbalta [ ] None               [ ] Robaxin   [ ] Baclofen  [ ] Flexeril               [ ] CIWA (Ativan/Valium/ Librium)  CV: continue to monitor, continue home medication for hypertension and HLD,  EKG and ECHO EF 67% mild pulmonary HTN  On pressors [ ]  Yes  [ x]  No          [ ]  Levophed, [ ] Jaden-Synephrine, [ ] Vasopressin, [ ]  Epinephrine          [ ] Dobutamine, [ ] Milrinone, [ ]  Midodrine,  [ ] Others    Other Cardiac Meds          [ ] Amiodarone IV/PO, [ ] Digoxin, [ ] Cardizem drip, [ ] Cleviprex drip, [ ] Esmolol drip, [ ] Cardene drip  Respiratory: Acute respiratory insufficiency -> continue monitoring, CXR ->bilateral infiltrates left>right with bilateral pleural effusions                         None Invasive Support  [ x] Incentive Spirometer not coperative                                 [ x] BiPAP trial  [ ] CPAP/NIV   [x ] HFNC 40L/50%   [ ] NR Face Mask  [ ] NC  [ ] Trach Caller [ ] Room Air                        Ventilatory support  [ ] Yes [ x] No                            [ ] SBT                                  [ ] PC    [ ] VC   [ ] AC/PRVC   [ ] BiVent/APRV   [ ]CPAP                                  ABG 7.29/49/103/24  LA 0.6  GI  [x ]  bowel regiment  [x ] BM 5/26/24 [x ] Flatus + [ ] Ostomy [ ] NG tube [x] GI consult for CBD dilation -> EUS/ERCP pending        Prophylaxis [x ] PPI  [ ] H2 Blockers  [ ] Others  Nutrition: continue   [ ] Diet NPO  [ ] TPN/PPN   [ ] calories count   [ x]  Tube Feeds @20ml/hr when off BiPAP    Renal: Continue I&Os monitoring, Randall catheter  [ x] Yes  [ ]  No  [ ] Consideration for discontinuation [x ] Taxes cath/PrimaFit  [ ] TOV                                                               [ ] HD/CVVH   [x ] Urine output 985ml/24hrs  [x] Creatinine 1.1       Lytes/Acid-base: replete hypokalemia, hypomagnesemia, hypocalcemia, hypophosphatemia, hyponatremia  123->127->133       IV Fluids   [ ] LR  [ ] D5/NS@50ml/hr  [ ] D5W1/2NS [ ] Bicarbonate Drip  [ ] Albumin [ ] Lasix drip/push  [ ] Bumex drip/push  ID: leukocytosis -> continue to monitor: WBC 13.6->14.3->12.46        IV Abx [x ] Yes-> Zosyn, [ ] No;  ID consulted [ ] Yes, [ x] No        Cultures send  [ ] Respiratory   [ ] Blood   [x ] Urine + [ ] Fluids  [ ] Tissue  [ ]  None  Lines:   [ ] Right   [ ] Left  [ ] Bilateral                     [ ] Subclavian TLC        [ ]  Internal Jugular TLC     [ ]  Femoral TLC                     [ ] Subclavian Cordis    [ ] Internal Jugular Cordis   [ ] Femoral Cordis                     [ ] HD catheter    [ ] PICC     [ ]  Midline   [ x] Peripheral IVs                                                 [ ] Right   [ ] Left   [x ] None                     [ ] Radial A-Line    [ ] Femoral A-Line   [ ] Axillary A-Line               Heme: continue to evaluate for acute blood loss anemia- trend Hg/Hct                     AC Reversal Indicated [ ] Yes  [x ] No                                      [ ] Kcentra,  [ ] 3Factors concentrate, [ ] Andexxa                     Transfused  indicated  [ ] Yes, [ x] No    [ ] PRBCs   [ ] Platelets   [ ] FFPs   [ ] Cryoprecipitate                    Should be started on or continued with  following  [ ] Yes,  [x ] No                               [ ] Lovenox  [ ] Coumadin  [ ] Heparin drip  [ ] DOVACs  [ ] ASA  [ ] Antiplatelets   Endocrine: Prevent and treat hyperglycemia with insulin as needed,                                [ x] Sliding scale,  [ ] Lantus/Regular Insuline                              Insuline drip for hyperglycemia [ ] Yes, [x ] No   PV: follow pulse exam  Skin: decub precautions  DVT Prophylaxis:  [ x] SCDs  [ ] Heparin SQ  [x ] Lovenox  SQ  [ ] ASA  Stress Gastritis Prophylaxis: PPI/H2 Blockers if indicated  Mobility: patient is evaluated at the bedside with mobility team and the goals for today are discussed with PT [x ] bed rest for now  PATIENT/FAMILY/SURROGATE CONFERENCE:  [x ] Yes with patient at the bedside. [ ] No  PURPOSE: To obtain necessary information, To discuss treatment options under consideration today.  I saw and evaluated the patient personally. I have reviewed and agree with note above. Treatment plan discussed with SICU team, nurses and primary team at the time of the multidisciplinary rounds. The above note is NOT written at the time of rounds and will reflect all changes throughout management of the patient for the day note is written for.    Effie Cornell MD, FACS  Trauma/ACS/SCC Attending
Trauma/ACS Attending  Note Attestation    Patient is examined and evaluated at the bedside with the residents/PAs. Treatment plan discussed with the team, nurses, and consulting physicians and consulting teams. Medications, radiological studies and all other relevant studies reviewed.     JAYLYN AGARWAL Patient is a 78y old  Female who presents with a chief complaint of fall resulted in multiple traumatic injuries included but not limited to multiple left side ribs fractures, right pubic rami fractures, right greater trochanteric fracture.  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 (continues to smoke at this time about 1PPD), HTN, CAD, Anxiety    Patient is alert and awake, AAOx2-3 follows commands, continues to have uncontrolled movements facial and upper extremities  Continues to have weakness bilateral lower extremities power 3/5  Tolerates diet  Improving with BiPAP/HFNCC treatment and Nicotine patch     Diagnosis: fall resulted in multiple traumatic injuries                  left side ribs fractures,                   right pubic rami fractures,                   right greater trochanteric fracture                  COPD with hypercarbia and hypoxia                  Altered mental status -> ICU delirium                  post traumatic pain    Plan:	  - supportive care  - pain management  - incentive spirometer  - continue Nicotine patch  - continue BiPAP/HFNC   - DVT prophylaxis       [x ] SCDs [x ] Lovenox SQ [ ] Heparins SQ  [ ] None  - GI prophylaxis  - follow up consults -> ortho, neurology  - continue diet  - repeat studies as needed  - PT evaluation and follow up  - out of bed to chair  - replace electrolytes  - case management evaluation     Effie Cornell MD, FACS  Trauma/ACS/Surgical Critical Care Attending
Trauma/ACS Attending  Note Attestation    Patient is examined and evaluated at the bedside with the residents/PAs. Treatment plan discussed with the team, nurses, and consulting physicians and consulting teams. Medications, radiological studies and all other relevant studies reviewed.     JAYLYN AGARWAL Patient is a 78y old  Female who presents with a chief complaint of fall resulted in multiple traumatic injuries included but not limited to multiple left side ribs fractures, right pubic rami fractures, right greater trochanteric fracture.  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 (continues to smoke at this time about 1PPD), HTN, CAD, Anxiety    Patient is alert and awake, AAOx2-3 follows commands, continues to have uncontrolled movements facial and upper extremities  Continues to have weakness bilateral lower extremities power 3/5  Tolerates diet  Improving with BiPAP/HFNCC treatment and Nicotine patch  Discussed Goals of Care with Family -> patient will remains full code     Diagnosis: fall resulted in multiple traumatic injuries                  left side ribs fractures,                   right pubic rami fractures,                   right greater trochanteric fracture                  COPD with hypercarbia and hypoxia                  Altered mental status -> ICU delirium                  post traumatic pain    Plan:	  - supportive care  - pain management  - incentive spirometer  - continue Nicotine patch  - continue BiPAP/HFNC -> wean BiPAP as tolerated   - DVT prophylaxis       [x ] SCDs [x ] Lovenox SQ [ ] Heparins SQ  [ ] None  - GI prophylaxis  - follow up consults -> ortho, neurology  - continue diet  - repeat studies as needed  - PT evaluation and follow up  - out of bed to chair  - replace electrolytes  - case management evaluation     Effie Cornell MD, FACS  Trauma/ACS/Surgical Critical Care Attending
Trauma/ACS Attending  Note Attestation    Patient is examined and evaluated at the bedside with the residents/PAs. Treatment plan discussed with the team, nurses, and consulting physicians and consulting teams. Medications, radiological studies and all other relevant studies reviewed.     JAYLYN AGARWAL Patient is a 78y old  Female who presents with a chief complaint of fall resulted in multiple traumatic injuries included but not limited to multiple left side ribs fractures, right pubic rami fractures, right greater trochanteric fracture.  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 (continues to smoke at this time about 1PPD), HTN, CAD, Anxiety    Patient is alert and awake, AAOx2-3 follows commands, continues to have uncontrolled movements facial and upper extremities  Continues to have weakness bilateral lower extremities power 3/5  Tolerates diet  Improving with BiPAP/HFNCC treatment and Nicotine patch  Discussed Goals of Care with Family -> patient will remains full code     Vital Signs Last 24 Hrs  T(C): 36.1 (05 Jun 2024 16:00), Max: 36.9 (04 Jun 2024 20:00)  T(F): 96.9 (05 Jun 2024 16:00), Max: 98.5 (04 Jun 2024 20:00)  HR: 86 (05 Jun 2024 16:00) (75 - 92)  BP: 138/66 (05 Jun 2024 16:00) (136/93 - 170/78)  BP(mean): 109 (05 Jun 2024 12:00) (91 - 112)  RR: 19 (05 Jun 2024 16:00) (19 - 52)  SpO2: 94% (05 Jun 2024 16:00) (94% - 100%)    Parameters below as of 05 Jun 2024 08:00  Patient On (Oxygen Delivery Method): nasal cannula  O2 Flow (L/min): 2L                        9.7    11.75 )-----------( 391      ( 04 Jun 2024 21:43 )             29.0   06-04    130<L>  |  96<L>  |  8<L>  ----------------------------<  112<H>  3.6   |  25  |  0.7    Ca    8.3<L>      04 Jun 2024 21:43  Phos  3.4     06-04  Mg     2.1     06-04    Diagnosis: fall resulted in multiple traumatic injuries                  left side ribs fractures,                   right pubic rami fractures,                   right greater trochanteric fracture                  COPD with hypercarbia and hypoxia                  Altered mental status -> ICU delirium                  post traumatic pain                  Leukocytosis    Plan:	  - supportive care  - pain management  - incentive spirometer  - continue IV ABx  - continue to re-orient patient  - continue Nicotine patch  - continue BiPAP/HFNC -> wean BiPAP as tolerated   - DVT prophylaxis       [x ] SCDs [x ] Lovenox SQ [ ] Heparins SQ  [ ] None  - GI prophylaxis  - follow up consults -> ortho, neurology  - continue diet  - repeat studies as needed  - PT evaluation and follow up  - out of bed to chair  - replace electrolytes  - case management evaluation     Effie Cornell MD, FACS  Trauma/ACS/Surgical Critical Care Attending
Trauma/ACS Attending  Note Attestation    Patient is examined and evaluated at the bedside with the residents/PAs. Treatment plan discussed with the team, nurses, and consulting physicians and consulting teams. Medications, radiological studies and all other relevant studies reviewed.     JAYLYN AGARWAL Patient is a 78y old  Female who presents with a chief complaint of fall resulted in multiple traumatic injuries included but not limited to multiple left side ribs fractures, right pubic rami fractures, right greater trochanteric fracture.  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 (continues to smoke at this time about 1PPD), HTN, CAD, Anxiety    Patient is alert and awake, AAOx2-3 follows commands, continues to have uncontrolled movements facial and upper extremities  Continues to have weakness bilateral lower extremities power 3/5  Tolerates diet  Improving with BiPAP/HFNCC treatment and Nicotine patch  Discussed Goals of Care with Family -> patient will remains full code     Vital Signs Last 24 Hrs  T(C): 36.4 (06 Jun 2024 13:53), Max: 36.6 (06 Jun 2024 00:00)  T(F): 97.5 (06 Jun 2024 13:53), Max: 97.8 (06 Jun 2024 00:00)  HR: 78 (06 Jun 2024 18:00) (74 - 92)  BP: 154/65 (06 Jun 2024 18:00) (107/50 - 159/88)  BP(mean): 89 (06 Jun 2024 13:17) (68 - 117)  RR: 21 (06 Jun 2024 18:00) (20 - 40)  SpO2: 97% (06 Jun 2024 18:00) (94% - 100%)    Parameters below as of 06 Jun 2024 18:00  Patient On (Oxygen Delivery Method): nasal cannula  O2 Flow (L/min): 2                          10.2   10.37 )-----------( 362      ( 05 Jun 2024 21:45 )             30.2   06-05    130<L>  |  96<L>  |  8<L>  ----------------------------<  108<H>  4.2   |  23  |  0.7    Ca    8.3<L>      05 Jun 2024 21:45  Phos  2.8     06-05  Mg     1.8     06-05      Diagnosis: fall resulted in multiple traumatic injuries                  left side ribs fractures,                   right pubic rami fractures,                   right greater trochanteric fracture                  COPD with hypercarbia and hypoxia                  Altered mental status -> ICU delirium                  post traumatic pain                  Leukocytosis    Plan:	  - supportive care  - pain management  - incentive spirometer  - continue IV ABx  - continue to re-orient patient  - continue Nicotine patch  - continue BiPAP/HFNC -> wean BiPAP as tolerated   - DVT prophylaxis       [x ] SCDs [x ] Lovenox SQ [ ] Heparins SQ  [ ] None  - GI prophylaxis  - follow up consults -> ortho, neurology  - continue diet  - repeat studies as needed  - PT evaluation and follow up for discharge planning  - out of bed to chair  - replace electrolytes  - case management evaluation     Effie Cornell MD, FACS  Trauma/ACS/Surgical Critical Care Attending
Trauma/Acute Care Surgery Attending Note Attestation    Patient was seen and examined bedside.  I reviewed the resident/PA note and agreed with above assessment and plan with following additions and corrections.    No acute events overnight. Tolerating diet. No complaints this AM.    T(C): 36.6 (06-10-24 @ 12:24), Max: 36.9 (06-09-24 @ 16:52)  HR: 69 (06-10-24 @ 12:24) (69 - 100)  BP: 138/74 (06-10-24 @ 12:24) (122/72 - 152/78)  RR: 18 (06-10-24 @ 12:24) (16 - 18)  SpO2: 98% (06-10-24 @ 12:24) (95% - 98%)      06-09-24 @ 07:01  -  06-10-24 @ 07:00  --------------------------------------------------------  IN:  Total IN: 0 mL    OUT:    Voided (mL): 600 mL  Total OUT: 600 mL    Total NET: -600 mL    I independently performed a medically appropriate exam. The exam was notable for soft, not tender, not distended abdomen. Breath sounds equal but faint bilaterally on auscultation.    Assessment/Plan:  78y Female PMH HTN, HLD, anxiety, CAD s/p CABG, atrial fibrillation s/p mechanical fall with injuries as above  - L 6-11th rib fractures - continue IS, encourage deep breathing and cough, multimodal pain control  - Right greater trochanter fracture/R superior pubic ramus fracture - WBAT, multimodal pain control  - Urinary retention - Randall to remain in place, outpatient urology follow up, continue Flomax 0.4mg QHS  - CAD - continue atorvastatin 80mg QHS  - Anxiety - continue cymbalta 60mg daily  - HTN - continue amlodipine 5mg daily  - B/L UE fasciculations - propranolol 10mg Q12H  - GERD - continue protonix 50mg QAM  - Physical deconditioning - ongoing PT evaluation, recommending rehab  - DVT ppx    Tank Dumont MD  Trauma/Acute Care Surgery/Surgical Critical Care Attending
Critical Care: [id number]   This patient has a high probability of sudden, clinically significant deterioration, which requires the highest level of physician preparedness to intervene urgently. I managed/supervised life or organ supporting interventions that required frequent physician assessment. I devoted my full attention in the ICU to the direct care of this patient for the period of time indicated below. Time I spent with the family or surrogate(s) is included only if the patient was incapable of providing the necessary information or participating in medical decision making. Time devoted to teaching and to any procedures I billed separately is not included.     JAYLYN AGARWAL 78y Female admitted to [ x] SICU /[ ] SDU with multiple ribs fractures, pelvic fractures, greater trochanteric fractures due to fall. High risk for hemodynamic instability, airway at risk for obstruction, electrolytes abnormalities -> hyponatremia. Awaiting MRI for hip fracture evaluation as per ortho, COPD with worsening respiratory status, RENÉ.    Patient is examined and evaluated at the bedside with SICU team. Treatment plan discussed with SICU team, nurses and primary team.   Chest X-ray and all relevant studies reviewed during rounds.  Will continue hemodynamic monitoring as per protocol in SICU.    Neuro:  GCS [ 15] AAOx3 bilateral lower extremities power 3/5 [ x]  Neurovascular checks as per SICU protocol, recall neurosurgery                 [ ] 3% NaCl     [ ] 2% NaCl                [ ] Keppra  [ ] Lamictal  [ ] Depakote  [ ] Dilantin [ ] None                Pharmacological Paralysis [ ] Yes  [ ]  No      Sedated/Pain control with                 [ ] Dilaudid drip, [ ]  Ketamine drip, [ ] Fentanyl drip, [ ] Propofol, [ ] Precedex, [ ] Versed drip, [ ] Ativan drip,                           [ ] OxyContin standing,  [x ] OxyContin PRN ->D/C, [ ] Dilaudid PRN pushes, [ ] Fentanyl PRN pushes, [ ] PCA,                [x ] Tylenol IV/PO, [ x] Gabapentin -> to be started, [ ]  Ketorolac, [x ] Tramadol-> to be started,  [ ] Lidoderm Patch       Other Medications               [ ] Seroquel, [ ] Zyprexa, [ ] Haldol,  [ ] Clonazepam [ ] Xanax, [ ] Versed/Ativan PRN, [ ] Valium [ ] Trazadone [x ] Cymbalta [ ] None               [x ] Robaxin   [ ] Baclofen  [ ] Flexeril               [ ] CIWA (Ativan/Valium/ Librium)  CV: continue to monitor, continue home medication for hypertension and HLD, repeat EKG and ECHO  On pressors [ ]  Yes  [ x]  No          [ ]  Levophed, [ ] Jaden-Synephrine, [ ] Vasopressin, [ ]  Epinephrine          [ ] Dobutamine, [ ] Milrinone, [ ]  Midodrine,  [ ] Others    Other Cardiac Meds          [ ] Amiodarone IV/PO, [ ] Digoxin, [ ] Cardizem drip, [ ] Cleviprex drip, [ ] Esmolol drip, [ ] Cardene drip  Respiratory: Acute respiratory insufficiency -> continue monitoring, CXR ->bilateral patchy infiltrates with bilateral pleural effusions                         Patient declined Nicotine patch                        None Invasive Support  [ x] Incentive Spirometer 1000ml                                 [ ] BiPAP   [ ] CPAP/NIV   [ ] HFNC   [ ] NR Face Mask  [x ] NC@2L  [ ] Trach Caller [ ] Room Air                        Ventilatory support  [ ] Yes [ x] No                            [ ] SBT                                  [ ] PC    [ ] VC   [ ] AC/PRVC   [ ] BiVent/APRV   [ ]CPAP   GI  [x ]  bowel regiment  [x ] BM none [x ] Flatus +  [ ] Ostomy   [ ] NG tube  [x] GI consult for CBD dilation, colonic thickening and esophageal dilation        Prophylaxis [x ] PPI  [ ] H2 Blockers  [ ] Others  Nutrition: continue   [ x] Diet change to regular diet  [ ] TPN/PPN   [ ] calories count   [ ]  Tube Feeds     Renal: Continue I&Os monitoring, Randall catheter  [ ] Yes  [x ]  No  [ ] Consideration for discontinuation [x ] Taxes cath/PrimaFit  [ ] TOV                                                               [ ] HD/CVVH   [ ] Urine output       Lytes/Acid-base: replete hypokalemia, hypomagnesemia, hypocalcemia, hypophosphatemia, hyponatremia         IV Fluids   [ ] LR  [x ] NS@50ml/hr  [ ] D5W1/2NS [ ] Bicarbonate Drip  [ ] Albumin [ ] Lasix drip/push  [ ] Bumex drip/push  ID: leukocytosis -> continue to monitor: WBC 13.6        IV Abx [x ] Yes, [ ] No;  ID consulted [ ] Yes, [ x] No        Cultures send  [ ] Respiratory   [ ] Blood   [x ] Urine + [ ] Fluids  [ ] Tissue  [ ]  None  Lines:   [ ] Right   [ ] Left  [ ] Bilateral                     [ ] Subclavian TLC        [ ]  Internal Jugular TLC     [ ]  Femoral TLC                     [ ] Subclavian Cordis    [ ] Internal Jugular Cordis   [ ] Femoral Cordis                     [ ] HD catheter    [ ] PICC     [ ]  Midline   [ x] Peripheral IVs                                                 [ ] Right   [ ] Left   [x ] None                     [ ] Radial A-Line    [ ] Femoral A-Line   [ ] Axillary A-Line               Heme: continue to evaluate for acute blood loss anemia- trend Hg/Hct                     AC Reversal Indicated [ ] Yes  [x ] No                                      [ ] Kcentra,  [ ] 3Factors concentrate, [ ] Andexxa                     Transfused  indicated  [ ] Yes, [ x] No    [ ] PRBCs   [ ] Platelets   [ ] FFPs   [ ] Cryoprecipitate                    Should be started on or continued with  following  [ ] Yes,  [x ] No                               [ ] Lovenox  [ ] Coumadin  [ ] Heparin drip  [ ] DOVACs  [ ] ASA  [ ] Antiplatelets   Endocrine: Prevent and treat hyperglycemia with insulin as needed,                                [ x] Sliding scale,  [ ] Lantus/Regular Insuline                              Insuline drip for hyperglycemia [ ] Yes, [x ] No   PV: follow pulse exam  Skin: decub precautions  DVT Prophylaxis:  [ x] SCDs  [x ] Heparin SQ -> change to [x ] Lovenox  SQ  [ ] ASA  Stress Gastritis Prophylaxis: PPI/H2 Blockers if indicated  Mobility: patient is evaluated at the bedside with mobility team and the goals for today are discussed with PT [x ] bed rest for now    PATIENT/FAMILY/SURROGATE CONFERENCE:  [x ] Yes with patient at the bedside. [ ] No  PURPOSE: To obtain necessary information, To discuss treatment options under consideration today.    I saw and evaluated the patient personally. I have reviewed and agree with note above. Treatment plan discussed with SICU team, nurses and primary team at the time of the multidisciplinary rounds. The above note is NOT written at the time of rounds and will reflect all changes throughout management of the patient for the day note is written for.    Effie Cornell MD, FACS  Trauma/ACS/SCC Attending
Critical Care: [id number]   This patient has a high probability of sudden, clinically significant deterioration, which requires the highest level of physician preparedness to intervene urgently. I managed/supervised life or organ supporting interventions that required frequent physician assessment. I devoted my full attention in the ICU to the direct care of this patient for the period of time indicated below. Time I spent with the family or surrogate(s) is included only if the patient was incapable of providing the necessary information or participating in medical decision making. Time devoted to teaching and to any procedures I billed separately is not included.     JAYLYN AGARWAL 78y Female admitted to [ x] SICU /[ ] SDU with multiple ribs fractures, pelvic fractures, greater trochanteric fractures due to fall. High risk for hemodynamic instability, airway at risk for obstruction, electrolytes abnormalities -> hyponatremia. Awaiting MRI for hip fracture evaluation as per ortho, COPD with worsening respiratory status, RENÉ.  Mental status change 0n 5/28/24 delirium vs withdrawal. Change in respiratory status on 5/29/24 with increase oxygen requirement.     Patient is examined and evaluated at the bedside with SICU team. Treatment plan discussed with SICU team, nurses and primary team.   Chest X-ray and all relevant studies reviewed during rounds.  Will continue hemodynamic monitoring as per protocol in SICU.  Neuro:  GCS [ 15] AAOx3 bilateral lower extremities power 3/5 [ x]  Neurovascular checks as per SICU protocol, neurosurgery recommending MRI on hold at this time due to unable to tolerate                 [ ] 3% NaCl     [ ] 2% NaCl                [ ] Keppra  [ ] Lamictal  [ ] Depakote  [ ] Dilantin [ ] None                Pharmacological Paralysis [ ] Yes  [x ]  No      Sedated/Pain control with                 [ ] Dilaudid drip, [ ]  Ketamine drip, [ ] Fentanyl drip, [ ] Propofol, [ ] Precedex, [ ] Versed drip, [ ] Ativan drip,                           [ ] OxyContin standing,  [ ] OxyContin PRN, [ ] Dilaudid PRN pushes, [ ] Fentanyl PRN pushes, [ ] PCA,                [x ] Tylenol IV/PO, [ ] Gabapentin, [ ]  Ketorolac, [x ] Tramadol,  [x ] Lidoderm Patch       Other Medications               [ ] Seroquel, [ ] Zyprexa, [ ] Haldol,  [ ] Clonazepam [ ] Xanax, [ ] Versed/Ativan PRN, [ ] Valium [ ] Trazadone [x ] Cymbalta [ ] None               [ ] Robaxin   [ ] Baclofen  [ ] Flexeril               [ ] CIWA (Ativan/Valium/ Librium)  CV: continue to monitor, continue home medication for hypertension and HLD,  EKG and ECHO EF 67% mild pulmonary HTN  On pressors [ ]  Yes  [ x]  No          [ ]  Levophed, [ ] Jaden-Synephrine, [ ] Vasopressin, [ ]  Epinephrine          [ ] Dobutamine, [ ] Milrinone, [ ]  Midodrine,  [ ] Others    Other Cardiac Meds          [ ] Amiodarone IV/PO, [ ] Digoxin, [ ] Cardizem drip, [ ] Cleviprex drip, [ ] Esmolol drip, [ ] Cardene drip  Respiratory: Acute respiratory insufficiency -> continue monitoring, CXR ->bilateral infiltrates left>right with bilateral pleural effusions                         None Invasive Support  [ x] Incentive Spirometer 600ml                                 [ x] BiPAP trial  [ ] CPAP/NIV   [x ] HFNC 40L/50%   [ ] NR Face Mask  [ ] NC  [ ] Trach Caller [ ] Room Air                        Ventilatory support  [ ] Yes [ x] No                            [ ] SBT                                  [ ] PC    [ ] VC   [ ] AC/PRVC   [ ] BiVent/APRV   [ ]CPAP                                  ABG 7.34/43/99/23  LA 0.5  GI  [x ]  bowel regiment  [x ] BM 5/31/24 [x ] Flatus + [ ] Ostomy [ ] NG tube [x] GI consult for CBD dilation -> EUS/ERCP pending        Prophylaxis [x ] PPI  [ ] H2 Blockers  [ ] Others  Nutrition: continue   [x ] Diet pure   [ ] TPN/PPN   [ ] calories count   [ ]  Tube Feeds   Renal: Continue I&Os monitoring, Randall catheter  [ x] Yes  [ ]  No  [ ] Consideration for discontinuation [ ] Taxes cath/PrimaFit  [ ] TOV                                                               [ ] HD/CVVH   [x ] Urine output 1660ml/24hrs  [x] Creatinine 1.1->1.1       Lytes/Acid-base: replete hypokalemia, hypomagnesemia, hypocalcemia, hypophosphatemia, hyponatremia  123->127->133->131       IV Fluids   [ ] LR  [ ] D5/NS@50ml/hr  [ ] D5W1/2NS [ ] Bicarbonate Drip  [ ] Albumin [ ] Lasix drip/push  [ ] Bumex drip/push  ID: leukocytosis -> continue to monitor: WBC 13.6->14.3->12.46->9.65        IV Abx [x ] Yes-> Zosyn, [ ] No;  ID consulted [ ] Yes, [ x] No        Cultures send  [x ] Respiratory   [ ] Blood   [x ] Urine + [ ] Fluids  [ ] Tissue  [ ]  None  Lines:   [ ] Right   [ ] Left  [ ] Bilateral                     [ ] Subclavian TLC        [ ]  Internal Jugular TLC     [ ]  Femoral TLC                     [ ] Subclavian Cordis    [ ] Internal Jugular Cordis   [ ] Femoral Cordis                     [ ] HD catheter    [ ] PICC     [ ]  Midline   [ x] Peripheral IVs                                                 [ ] Right   [ ] Left   [x ] None                     [ ] Radial A-Line    [ ] Femoral A-Line   [ ] Axillary A-Line               Heme: continue to evaluate for acute blood loss anemia- trend Hg/Hct                     AC Reversal Indicated [ ] Yes  [x ] No                                      [ ] Kcentra,  [ ] 3Factors concentrate, [ ] Andexxa                     Transfused  indicated  [ ] Yes, [ x] No    [ ] PRBCs   [ ] Platelets   [ ] FFPs   [ ] Cryoprecipitate                    Should be started on or continued with  following  [ ] Yes,  [x ] No                               [ ] Lovenox  [ ] Coumadin  [ ] Heparin drip  [ ] DOVACs  [ ] ASA  [ ] Antiplatelets   Endocrine: Prevent and treat hyperglycemia with insulin as needed,                                [ x] Sliding scale,  [ ] Lantus/Regular Insuline                              Insuline drip for hyperglycemia [ ] Yes, [x ] No   PV: follow pulse exam  Skin: decub precautions  DVT Prophylaxis:  [ x] SCDs  [ ] Heparin SQ  [x ] Lovenox  SQ  [ ] ASA  Stress Gastritis Prophylaxis: PPI/H2 Blockers if indicated  Mobility: patient is evaluated at the bedside with mobility team and the goals for today are discussed with PT [x ] bed rest for now  PATIENT/FAMILY/SURROGATE CONFERENCE:  [x ] Yes with patient and family at the bedside. [ ] No  PURPOSE: To obtain necessary information, To discuss treatment options under consideration today.  I saw and evaluated the patient personally. I have reviewed and agree with note above. Treatment plan discussed with SICU team, nurses and primary team at the time of the multidisciplinary rounds. The above note is NOT written at the time of rounds and will reflect all changes throughout management of the patient for the day note is written for.    Effie Cornell MD, FACS  Trauma/ACS/SCC Attending
I independently performed a medically appropriate exam. The exam was notable for soft, not tender, not distended abdomen. Breath sounds equal but faint bilaterally on auscultation.    Assessment/Plan:  78y Female PMH HTN, HLD, anxiety, CAD s/p CABG, atrial fibrillation s/p mechanical fall with injuries as above  - L 6-11th rib fractures - continue IS, encourage deep breathing and cough, multimodal pain control  - Right greater trochanter fracture/R superior pubic ramus fracture - WBAT, multimodal pain control  - Urinary retention - Randall to remain in place, outpatient urology follow up, continue Flomax 0.4mg QHS  - CAD - continue atorvastatin 80mg QHS  - Anxiety - continue cymbalta 60mg daily  - HTN - continue amlodipine 5mg daily  - B/L UE fasciculations - propranolol 10mg Q12H  - GERD - continue protonix 50mg QAM  - Physical deconditioning - ongoing PT evaluation, recommending rehab  - DVT ppx    Discussed follow up with orthopedics for orthopedic injuries and GI for biliary duct dilation with patient and daughter.    Tank Dumont MD  Trauma/Acute Care Surgery/Surgical Critical Care Attending
Patient is awake, answers questions adequately.  Still looks weak.  Patient requires BiPAP at night.  Now on NC 2L O2.  Has decreased breath sounds in bilateral lung bases.    ASSESSMENT:  79 y/o female, S/P Fall.  Closed Left 6-11th Rib Fractures. Tiny Left Pneumothorax.  Closed Right Superior Pubic rami Fracture.  Right Greater Trochanteric Femur Fracture.  Hypomagnesemia.  Atelectasis.   At risk for respiratory complications.    PLAN:  - pain control - on Tramadol, Lidocain patch, Tylenol   - on Duloxetine 60mg daily for anxiety  - Neurology follow up needed  - encourage incentive spirometer  - keep normotensive; euvolemic at this time  - on regular diet  - aspiration precautions at all time  - ID - complete Zosyn tomorrow   - DVT prophylaxis  SDU care
Trauma/ACS Attending  Note Attestation    Patient is examined and evaluated at the bedside with the residents/PAs. Treatment plan discussed with the team, nurses, and consulting physicians and consulting teams. Medications, radiological studies and all other relevant studies reviewed.     JAYLYN AGARWAL Patient is a 78y old  Female who presents with a chief complaint of fall resulted in multiple traumatic injuries included but not limited to multiple left side ribs fractures, right pubic rami fractures, right greater trochanteric fracture.  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 (continues to smoke at this time about 1PPD), HTN, CAD, Anxiety    Patient is alert and awake, AAOx2-3 follows commands, continues to have uncontrolled movements facial and upper extremities  Continues to have weakness bilateral lower extremities power 3/5  Tolerates diet  Improving with BiPAP/HFNCC treatment and Nicotine patch  Discussed Goals of Care with Family -> patient will remains full code     Vital Signs Last 24 Hrs  T(C): 35.7 (08 Jun 2024 08:11), Max: 36.6 (08 Jun 2024 00:10)  T(F): 96.2 (08 Jun 2024 08:11), Max: 97.8 (08 Jun 2024 00:10)  HR: 94 (08 Jun 2024 08:11) (80 - 94)  BP: 152/81 (08 Jun 2024 08:11) (152/81 - 178/76)  BP(mean): --  RR: 18 (08 Jun 2024 08:11) (18 - 18)  SpO2: 99% (08 Jun 2024 08:11) (97% - 99%)    Parameters below as of 08 Jun 2024 08:11  Patient On (Oxygen Delivery Method): room air                            9.1    10.62 )-----------( 385      ( 06 Jun 2024 20:00 )             26.8   06-06    130<L>  |  95<L>  |  9<L>  ----------------------------<  103<H>  3.9   |  28  |  0.7    Ca    8.2<L>      06 Jun 2024 20:00  Phos  3.2     06-06  Mg     2.1     06-06      Diagnosis: fall resulted in multiple traumatic injuries                  left side ribs fractures,                   right pubic rami fractures,                   right greater trochanteric fracture                  COPD with hypercarbia and hypoxia                  Altered mental status -> ICU delirium                  post traumatic pain                  Leukocytosis    Plan:	  - supportive care  - pain management  - incentive spirometer  - continue IV ABx  - continue to re-orient patient  - continue Nicotine patch  - continue NC  - DVT prophylaxis       [x ] SCDs [x ] Lovenox SQ [ ] Heparins SQ  [ ] None  - GI prophylaxis  - follow up consults -> ortho, neurology  - continue diet  - repeat studies as needed  - PT evaluation and follow up for discharge planning  - out of bed to chair  - replace electrolytes  - case management evaluation     Effie Cornell MD, FACS  Trauma/ACS/Surgical Critical Care Attending
Critical Care: [id number]   This patient has a high probability of sudden, clinically significant deterioration, which requires the highest level of physician preparedness to intervene urgently. I managed/supervised life or organ supporting interventions that required frequent physician assessment. I devoted my full attention in the ICU to the direct care of this patient for the period of time indicated below. Time I spent with the family or surrogate(s) is included only if the patient was incapable of providing the necessary information or participating in medical decision making. Time devoted to teaching and to any procedures I billed separately is not included.     JAYLYN AGARWAL 78y Female admitted to [ x] SICU /[ ] SDU with multiple ribs fractures, pelvic fractures, greater trochanteric fractures due to fall. High risk for hemodynamic instability, airway at risk for obstruction, electrolytes abnormalities -> hyponatremia. Awaiting MRI for hip fracture evaluation as per ortho, COPD with worsening respiratory status, RENÉ.  Mental status change 0n 5/28/24 delirium vs withdrawal. Change in respiratory status on 5/29/24 with increase oxygen requirement.     Patient is examined and evaluated at the bedside with SICU team. Treatment plan discussed with SICU team, nurses and primary team.   Chest X-ray and all relevant studies reviewed during rounds.  Will continue hemodynamic monitoring as per protocol in SICU.  Neuro:  GCS [15] AAOx3 bilateral lower extremities power 3/5 [ x]  Neurovascular checks as per SICU protocol, neurosurgery recommending MRI on hold at this time due to unable to tolerate                 [ ] 3% NaCl     [ ] 2% NaCl                [ ] Keppra  [ ] Lamictal  [ ] Depakote  [ ] Dilantin [ ] None                Pharmacological Paralysis [ ] Yes  [x ]  No      Sedated/Pain control with                 [ ] Dilaudid drip, [ ]  Ketamine drip, [ ] Fentanyl drip, [ ] Propofol, [ ] Precedex, [ ] Versed drip, [ ] Ativan drip,                           [ ] OxyContin standing,  [ ] OxyContin PRN, [ ] Dilaudid PRN pushes, [ ] Fentanyl PRN pushes, [ ] PCA,                [x ] Tylenol IV/PO, [ ] Gabapentin, [ ]  Ketorolac, [x ] Tramadol,  [x ] Lidoderm Patch       Other Medications               [ ] Seroquel, [ ] Zyprexa, [ ] Haldol,  [ ] Clonazepam [ ] Xanax, [ ] Versed/Ativan PRN, [ ] Valium [ ] Trazadone [x ] Cymbalta [ ] None               [ ] Robaxin   [ ] Baclofen  [ ] Flexeril               [ ] CIWA (Ativan/Valium/ Librium)  CV: continue to monitor, continue home medication for hypertension and HLD,  EKG and ECHO EF 67% mild pulmonary HTN  On pressors [ ]  Yes  [ x]  No          [ ]  Levophed, [ ] Jaden-Synephrine, [ ] Vasopressin, [ ]  Epinephrine          [ ] Dobutamine, [ ] Milrinone, [ ]  Midodrine,  [ ] Others    Other Cardiac Meds          [ ] Amiodarone IV/PO, [ ] Digoxin, [ ] Cardizem drip, [ ] Cleviprex drip, [ ] Esmolol drip, [ ] Cardene drip  Respiratory: Acute respiratory insufficiency -> continue monitoring, CXR ->bilateral infiltrates left>right with bilateral pleural effusions                         None Invasive Support  [ x] Incentive Spirometer 600ml                                 [ x] BiPAP trial  [ ] CPAP/NIV   [ ] HFNC    [ ] NR Face Mask  [ x] NC@2L  [ ] Trach Caller [ ] Room Air                        Ventilatory support  [ ] Yes [ x] No                            [ ] SBT                                  [ ] PC    [ ] VC   [ ] AC/PRVC   [ ] BiVent/APRV   [ ]CPAP                                  ABG 7.34/43/99/23  LA 0.5  5/31/24  GI  [x ]  bowel regiment  [x ] BM 5/31/24 [x ] Flatus + [ ] Ostomy [ ] NG tube [x] GI consult for CBD dilation -> EUS/ERCP pending        Prophylaxis [x ] PPI  [ ] H2 Blockers  [ ] Others  Nutrition: continue   [x ] Diet pure   [ ] TPN/PPN   [ ] calories count   [ ]  Tube Feeds   Renal: Continue I&Os monitoring, Randall catheter  [ x] Yes  [ ]  No  [ ] Consideration for discontinuation [ ] Taxes cath/PrimaFit  [ ] TOV                                                               [ ] HD/CVVH   [x ] Urine output 1825ml/24hrs  [x] Creatinine 1.1->1.1->0.9       Lytes/Acid-base: replete hypokalemia, hypomagnesemia, hypocalcemia, hypophosphatemia, hyponatremia  123->127->133->131->134       IV Fluids   [ ] LR  [ ] D5/NS@50ml/hr  [ ] D5W1/2NS [ ] Bicarbonate Drip  [ ] Albumin [ ] Lasix drip/push  [ ] Bumex drip/push  ID: leukocytosis -> continue to monitor: WBC 13.6->14.3->12.46->9.65->10.8        IV Abx [x ] Yes-> Zosyn, [ ] No;  ID consulted [ ] Yes, [ x] No        Cultures send  [x ] Respiratory   [ ] Blood   [x ] Urine + [ ] Fluids  [ ] Tissue  [ ]  None  Lines:   [ ] Right   [ ] Left  [ ] Bilateral                     [ ] Subclavian TLC        [ ]  Internal Jugular TLC     [ ]  Femoral TLC                     [ ] Subclavian Cordis    [ ] Internal Jugular Cordis   [ ] Femoral Cordis                     [ ] HD catheter    [ ] PICC     [ ]  Midline   [ x] Peripheral IVs                                                 [ ] Right   [ ] Left   [x ] None                     [ ] Radial A-Line    [ ] Femoral A-Line   [ ] Axillary A-Line               Heme: continue to evaluate for acute blood loss anemia- trend Hg/Hct                     AC Reversal Indicated [ ] Yes  [x ] No                                      [ ] Kcentra,  [ ] 3Factors concentrate, [ ] Andexxa                     Transfused  indicated  [ ] Yes, [ x] No    [ ] PRBCs   [ ] Platelets   [ ] FFPs   [ ] Cryoprecipitate                    Should be started on or continued with  following  [ ] Yes,  [x ] No                               [ ] Lovenox  [ ] Coumadin  [ ] Heparin drip  [ ] DOVACs  [ ] ASA  [ ] Antiplatelets   Endocrine: Prevent and treat hyperglycemia with insulin as needed,                                [ x] Sliding scale,  [ ] Lantus/Regular Insuline                              Insuline drip for hyperglycemia [ ] Yes, [x ] No   PV: follow pulse exam  Skin: decub precautions  DVT Prophylaxis:  [ x] SCDs  [ ] Heparin SQ  [x ] Lovenox  SQ  [ ] ASA  Stress Gastritis Prophylaxis: PPI/H2 Blockers if indicated  Mobility: patient is evaluated at the bedside with mobility team and the goals for today are discussed with PT [x ] bed rest for now  PATIENT/FAMILY/SURROGATE CONFERENCE:  [x ] Yes with patient and family at the bedside. [ ] No  PURPOSE: To obtain necessary information, To discuss treatment options under consideration today.  I saw and evaluated the patient personally. I have reviewed and agree with note above. Treatment plan discussed with SICU team, nurses and primary team at the time of the multidisciplinary rounds. The above note is NOT written at the time of rounds and will reflect all changes throughout management of the patient for the day note is written for.    Effie Cornell MD, FACS  Trauma/ACS/SCC Attending
Critical Care: [id number]   This patient has a high probability of sudden, clinically significant deterioration, which requires the highest level of physician preparedness to intervene urgently. I managed/supervised life or organ supporting interventions that required frequent physician assessment. I devoted my full attention in the ICU to the direct care of this patient for the period of time indicated below. Time I spent with the family or surrogate(s) is included only if the patient was incapable of providing the necessary information or participating in medical decision making. Time devoted to teaching and to any procedures I billed separately is not included.     JAYLYN AGARWAL 78y Female admitted to [ x] SICU /[ ] SDU with multiple ribs fractures, pelvic fractures, greater trochanteric fractures due to fall. High risk for hemodynamic instability, airway at risk for obstruction, electrolytes abnormalities -> hyponatremia. Awaiting MRI for hip fracture evaluation as per ortho, COPD with worsening respiratory status, RENÉ.  Mental status change 0n 5/28/24 delirium vs withdrawal. Change in respiratory status on 5/29/24 with increase oxygen requirement -> now improving  Patient is examined and evaluated at the bedside with SICU team. Treatment plan discussed with SICU team, nurses and primary team.   Chest X-ray and all relevant studies reviewed during rounds.  Will continue hemodynamic monitoring as per protocol in SICU.  Neuro:  GCS [15] AAOx3 bilateral lower extremities power 3/5 [ x]  Neurovascular checks as per SICU protocol, neurosurgery recommending MRI on hold at this time due to unable to tolerate                 [ ] 3% NaCl     [ ] 2% NaCl                [ ] Keppra  [ ] Lamictal  [ ] Depakote  [ ] Dilantin [ ] None                Pharmacological Paralysis [ ] Yes  [x ]  No      Sedated/Pain control with                 [ ] Dilaudid drip, [ ]  Ketamine drip, [ ] Fentanyl drip, [ ] Propofol, [ ] Precedex, [ ] Versed drip, [ ] Ativan drip,                           [ ] OxyContin standing,  [ ] OxyContin PRN, [ ] Dilaudid PRN pushes, [ ] Fentanyl PRN pushes, [ ] PCA,                [x ] Tylenol IV/PO, [ ] Gabapentin, [ ]  Ketorolac, [x ] Tramadol,  [x ] Lidoderm Patch       Other Medications               [ ] Seroquel, [ ] Zyprexa, [ ] Haldol,  [ ] Clonazepam [ ] Xanax, [ ] Versed/Ativan PRN, [ ] Valium [ ] Trazadone [x ] Cymbalta [ ] None               [ ] Robaxin   [ ] Baclofen  [ ] Flexeril               [ ] CIWA (Ativan/Valium/ Librium)  CV: continue to monitor, continue home medication for hypertension and HLD,  EKG and ECHO EF 67% mild pulmonary HTN  On pressors [ ]  Yes  [ x]  No          [ ]  Levophed, [ ] Jaden-Synephrine, [ ] Vasopressin, [ ]  Epinephrine          [ ] Dobutamine, [ ] Milrinone, [ ]  Midodrine,  [ ] Others    Other Cardiac Meds          [ ] Amiodarone IV/PO, [ ] Digoxin, [ ] Cardizem drip, [ ] Cleviprex drip, [ ] Esmolol drip, [ ] Cardene drip  Respiratory: Acute respiratory insufficiency -> continue monitoring, CXR ->bilateral infiltrates left>right with bilateral pleural effusions                         None Invasive Support  [ x] Incentive Spirometer 750ml                                 [ x] BiPAP trial  [ ] CPAP/NIV   [ ] HFNC    [ ] NR Face Mask  [ x] NC@2L  [ ] Trach Caller [ ] Room Air                        Ventilatory support  [ ] Yes [ x] No                            [ ] SBT                                  [ ] PC    [ ] VC   [ ] AC/PRVC   [ ] BiVent/APRV   [ ]CPAP                                  ABG 7.40/37/69/23  LA 1.0  6/1/24  GI  [x ]  bowel regiment  [x ] BM 5/31/24 [x ] Flatus + [ ] Ostomy [ ] NG tube [x] GI consult for CBD dilation -> EUS/ERCP pending        Prophylaxis [x ] PPI  [ ] H2 Blockers  [ ] Others  Nutrition: continue   [x ] Diet soft and bite size   [ ] TPN/PPN   [ ] calories count   [ ]  Tube Feeds   Renal: Continue I&Os monitoring, Randall catheter  [ ] Yes  [x ]  No  [ ] Consideration for discontinuation [x ] Taxes cath/PrimaFit  [ ] TOV                                                               [ ] HD/CVVH   [x ] Urine output 2200ml/24hrs  [x] Creatinine 1.1->1.1->0.9->0.8       Lytes/Acid-base: replete hypokalemia, hypomagnesemia, hypocalcemia, hypophosphatemia, hyponatremia  123->127->133->131->134       IV Fluids   [ ] LR  [ ] D5/NS@50ml/hr  [ ] D5W1/2NS [ ] Bicarbonate Drip  [ ] Albumin [ x] Lasix 20mg push x1 dose [ ] Bumex drip/push  ID: leukocytosis -> continue to monitor: WBC 13.6->14.3->12.46->9.65->10.8->9.3        IV Abx [x ] Yes-> Zosyn, [ ] No;  ID consulted [ ] Yes, [ x] No        Cultures send  [x ] Respiratory   [ ] Blood   [x ] Urine + [ ] Fluids  [ ] Tissue  [ ]  None  Lines:   [ ] Right   [ ] Left  [ ] Bilateral                     [ ] Subclavian TLC        [ ]  Internal Jugular TLC     [ ]  Femoral TLC                     [ ] Subclavian Cordis    [ ] Internal Jugular Cordis   [ ] Femoral Cordis                     [ ] HD catheter    [ ] PICC     [ ]  Midline   [ x] Peripheral IVs                                                 [ ] Right   [ ] Left   [x ] None                     [ ] Radial A-Line    [ ] Femoral A-Line   [ ] Axillary A-Line               Heme: continue to evaluate for acute blood loss anemia- trend Hg/Hct                     AC Reversal Indicated [ ] Yes  [x ] No                                      [ ] Kcentra,  [ ] 3Factors concentrate, [ ] Andexxa                     Transfused  indicated  [ ] Yes, [ x] No    [ ] PRBCs   [ ] Platelets   [ ] FFPs   [ ] Cryoprecipitate                    Should be started on or continued with  following  [ ] Yes,  [x ] No                               [ ] Lovenox  [ ] Coumadin  [ ] Heparin drip  [ ] DOVACs  [ ] ASA  [ ] Antiplatelets   Endocrine: Prevent and treat hyperglycemia with insulin as needed,                                [ x] Sliding scale,  [ ] Lantus/Regular Insuline                              Insuline drip for hyperglycemia [ ] Yes, [x ] No   PV: follow pulse exam  Skin: decub precautions  DVT Prophylaxis:  [ x] SCDs  [ ] Heparin SQ  [x ] Lovenox  SQ  [ ] ASA  Stress Gastritis Prophylaxis: PPI/H2 Blockers if indicated  Mobility: patient is evaluated at the bedside with mobility team and the goals for today are discussed with PT [x ] bed rest for now  PATIENT/FAMILY/SURROGATE CONFERENCE:  [x ] Yes with patient and family at the bedside. [ ] No  PURPOSE: To obtain necessary information, To discuss treatment options under consideration today.  I saw and evaluated the patient personally. I have reviewed and agree with note above. Treatment plan discussed with SICU team, nurses and primary team at the time of the multidisciplinary rounds. The above note is NOT written at the time of rounds and will reflect all changes throughout management of the patient for the day note is written for.    Effie Cornell MD, FACS  Trauma/ACS/SCC Attending
Patient able to do 500 ml on IS.  This am is OOB to chair.  Looks more awake and alert.  Weakness of all 4 extremities at baseline.  Had Randall reinserted due to retention.    ASSESSMENT:  77 y/o female, S/P Fall.  Closed Left 6-11th Rib Fractures. Tiny Left Pneumothorax.  Closed Right Superior Pubic rami Fracture.  Right Greater Trochanteric Femur Fracture.  Hyponatremia.  Atelectasis.   At risk for respiratory complications.  Acute urinary retention.    PLAN:  - encourage incentive spirometer  - keep normotensive; euvolemic; on Amlodipine   - on regular diet 1:1 assist  - aspiration precautions at all time  - on Flomax o.4 mg  - d/c Randall; use straight catheter as needed  - ID - off antibiotics at this time  - DVT prophylaxis  SDU care
Patient is AAO x3  Using IS, able to do 750 ml  Requires 1:1 feeding    ASSESSMENT:  77 y/o female, S/P Fall.  Closed Left 6-11th Rib Fractures. Tiny Left Pneumothorax.  Closed Right Superior Pubic rami Fracture.  Right Greater Trochanteric Femur Fracture.  Hypomagnesemia.  Atelectasis.   At risk for respiratory complications.    PLAN:  - Neurology follow up   - encourage incentive spirometer  - keep normotensive; euvolemic; on Amlodipine   - on regular diet 1:1 assist  - aspiration precautions at all time  - ID - complete Zosyn   - DVT prophylaxis  SDU care
Trauma/ACS Attending  Note Attestation    Patient is examined and evaluated at the bedside with the residents/PAs. Treatment plan discussed with the team, nurses, and consulting physicians and consulting teams. Medications, radiological studies and all other relevant studies reviewed.     JAYLYN AGARWAL Patient is a 78y old  Female who presents with a chief complaint of fall resulted in multiple traumatic injuries included but not limited to multiple left side ribs fractures, right pubic rami fractures, right greater trochanteric fracture.  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 (continues to smoke at this time about 1PPD), HTN, CAD, Anxiety    Patient is alert and awake, AAOx2-3 follows commands, continues to have uncontrolled movements facial and upper extremities  Continues to have weakness bilateral lower extremities power 3/5  Tolerates diet  Improving with BiPAP/HFNCC treatment and Nicotine patch  Discussed Goals of Care with Family -> patient will remains full code     Vital Signs Last 24 Hrs  T(C): 36.4 (07 Jun 2024 15:58), Max: 36.6 (07 Jun 2024 00:26)  T(F): 97.6 (07 Jun 2024 15:58), Max: 97.8 (07 Jun 2024 00:26)  HR: 81 (07 Jun 2024 15:58) (74 - 81)  BP: 166/80 (07 Jun 2024 15:58) (147/75 - 166/80)  BP(mean): --  RR: 18 (07 Jun 2024 15:58) (18 - 20)  SpO2: 97% (07 Jun 2024 15:58) (97% - 98%)    Parameters below as of 07 Jun 2024 00:26  Patient On (Oxygen Delivery Method): nasal cannula                     9.1    10.62 )-----------( 385      ( 06 Jun 2024 20:00 )             26.8   06-06    130<L>  |  95<L>  |  9<L>  ----------------------------<  103<H>  3.9   |  28  |  0.7    Ca    8.2<L>      06 Jun 2024 20:00  Phos  3.2     06-06  Mg     2.1     06-06      Diagnosis: fall resulted in multiple traumatic injuries                  left side ribs fractures,                   right pubic rami fractures,                   right greater trochanteric fracture                  COPD with hypercarbia and hypoxia                  Altered mental status -> ICU delirium                  post traumatic pain                  Leukocytosis    Plan:	  - supportive care  - pain management  - incentive spirometer  - continue IV ABx  - continue to re-orient patient  - continue Nicotine patch  - continue NC  - DVT prophylaxis       [x ] SCDs [x ] Lovenox SQ [ ] Heparins SQ  [ ] None  - GI prophylaxis  - follow up consults -> ortho, neurology  - continue diet  - repeat studies as needed  - PT evaluation and follow up for discharge planning  - out of bed to chair  - replace electrolytes  - case management evaluation     Effie Cornell MD, FACS  Trauma/ACS/Surgical Critical Care Attending
Patient is awake, follows commands.  Still weak, but able to do 1L on IS.  CXR with bilateral atelectasis.  Was empirically started on Zosyn for possible lung infection, better now.  Had Lasix 20 mg yesterday with good response.    ASSESSMENT:  79 y/o female, S/P Fall.  Closed Left 6-11th Rib Fractures. Tiny Left Pneumothorax.  Closed Right Superior Pubic rami Fracture.  Right Greater Trochanteric Femur Fracture.  Hypomagnesemia.  Atelectasis.   At risk for respiratory complications.    PLAN:  - pain control - on Tramadol, Lidocain patch, Tylenol as needed  - on Duloxetine 60mg daily for anxiety  - on Propranolol 10 mg q8h  - encourage incentive spirometer  - chest PT  - keep normotensive; give Lasix 20 mg iv x1 today  - on regular diet  - aspiration precautions at all time  - ID - on Zosyn till 6/06/24  - DVT prophylaxis  - PT for OOB
Patient is more lethargic today.  Answers simple questions.  Tremor is less today.  Was started on Propranolol 10 mg.  Na 129 today.  CXR with bilateral atelectasis, L>R.  Has decreased breath sounds in bilateral lung bases.    ASSESSMENT:  77 y/o female, S/P Fall.  Closed Left 6-11th Rib Fractures. Tiny Left Pneumothorax.  Closed Right Superior Pubic rami Fracture.  Right Greater Trochanteric Femur Fracture.  Hyponatremia.  At risk for respiratory complications.  Delirium.  Dilated CBD.  Dysphagia.  UTI present on admission.    PLAN:  - pain control - use Tylenol as needed; avoid opioids  - intermittent neuro checks  - needs frequently to be reoriented  - Neurology f/u - on tinazidine prn  - patient not stable to go for MRIs at this time  - needs aggressive chest PT; use Duoneb nebulizer treatments q6h; consider HFNC  - keep normotensive  - NPO, start trickle feeds at 10 ml/h today  - aspiration precautions at all time  - follow serum electrolytes and UOP  - ID - on Rocephin for UTI on admission  - DVT prophylaxis    I discussed patient's condition with her son. All questions were answered.
Critical Care: [id number]   This patient has a high probability of sudden, clinically significant deterioration, which requires the highest level of physician preparedness to intervene urgently. I managed/supervised life or organ supporting interventions that required frequent physician assessment. I devoted my full attention in the ICU to the direct care of this patient for the period of time indicated below. Time I spent with the family or surrogate(s) is included only if the patient was incapable of providing the necessary information or participating in medical decision making. Time devoted to teaching and to any procedures I billed separately is not included.     JAYLYN AGARWAL 78y Female admitted to [ x] SICU /[ ] SDU with multiple ribs fractures, pelvic fractures, greater trochanteric fractures due to fall. High risk for hemodynamic instability, airway at risk for obstruction, electrolytes abnormalities -> hyponatremia. Awaiting MRI for hip fracture evaluation as per ortho, COPD with worsening respiratory status, ERNÉ.  Mental status change 0n 5/28/24 delirium vs withdrawal   Patient is examined and evaluated at the bedside with SICU team. Treatment plan discussed with SICU team, nurses and primary team.   Chest X-ray and all relevant studies reviewed during rounds.  Will continue hemodynamic monitoring as per protocol in SICU.    Neuro:  GCS [ 15] AAOx2 bilateral lower extremities power 3/5 [ x]  Neurovascular checks as per SICU protocol, neurosurgery recommending MRI                [ ] 3% NaCl     [ ] 2% NaCl                [ ] Keppra  [ ] Lamictal  [ ] Depakote  [ ] Dilantin [ ] None                Pharmacological Paralysis [ ] Yes  [x ]  No      Sedated/Pain control with                 [ ] Dilaudid drip, [ ]  Ketamine drip, [ ] Fentanyl drip, [ ] Propofol, [ ] Precedex, [ ] Versed drip, [ ] Ativan drip,                           [ ] OxyContin standing,  [x ] OxyContin PRN ->D/C, [ ] Dilaudid PRN pushes, [ ] Fentanyl PRN pushes, [ ] PCA,                [x ] Tylenol IV/PO, [ x] Gabapentin -> to be started, [ ]  Ketorolac, [x ] Tramadol-> to be started,  [ ] Lidoderm Patch       Other Medications               [x ] Seroquel x1 dose, [ ] Zyprexa, [ ] Haldol,  [ ] Clonazepam [ ] Xanax, [ ] Versed/Ativan PRN, [ ] Valium [ ] Trazadone [x ] Cymbalta [ ] None               [ ] Robaxin   [ ] Baclofen  [ ] Flexeril               [ ] CIWA (Ativan/Valium/ Librium)  CV: continue to monitor, continue home medication for hypertension and HLD,  EKG and ECHO EF 67% mild pulmonary HTN  On pressors [ ]  Yes  [ x]  No          [ ]  Levophed, [ ] Jaden-Synephrine, [ ] Vasopressin, [ ]  Epinephrine          [ ] Dobutamine, [ ] Milrinone, [ ]  Midodrine,  [ ] Others    Other Cardiac Meds          [ ] Amiodarone IV/PO, [ ] Digoxin, [ ] Cardizem drip, [ ] Cleviprex drip, [ ] Esmolol drip, [ ] Cardene drip  Respiratory: Acute respiratory insufficiency -> continue monitoring, CXR ->bilateral patchy infiltrates with bilateral pleural effusions                         Patient declined Nicotine patch                        None Invasive Support  [ x] Incentive Spirometer not coperative                                 [ ] BiPAP   [ ] CPAP/NIV   [ ] HFNC   [ ] NR Face Mask  [x ] NC@2L  [ ] Trach Caller [ ] Room Air                        Ventilatory support  [ ] Yes [ x] No                            [ ] SBT                                  [ ] PC    [ ] VC   [ ] AC/PRVC   [ ] BiVent/APRV   [ ]CPAP   GI  [x ]  bowel regiment  [x ] BM none [x ] Flatus +  [ ] Ostomy   [ ] NG tube  [x] GI consult for CBD dilation, colonic thickening and esophageal dilation        Prophylaxis [x ] PPI  [ ] H2 Blockers  [ ] Others  Nutrition: continue   [ x] Diet regular diet  [ ] TPN/PPN   [ ] calories count   [ ]  Tube Feeds     Renal: Continue I&Os monitoring, Randall catheter  [ x] Yes  [ ]  No  [ ] Consideration for discontinuation [x ] Taxes cath/PrimaFit  [ ] TOV                                                               [ ] HD/CVVH   [x ] Urine output 2700ml/24hrs       Lytes/Acid-base: replete hypokalemia, hypomagnesemia, hypocalcemia, hypophosphatemia, hyponatremia  123->127       IV Fluids   [ ] LR  [x ] 2%NS@50ml/hr  [ ] D5W1/2NS [ ] Bicarbonate Drip  [ ] Albumin [ ] Lasix drip/push  [ ] Bumex drip/push  ID: leukocytosis -> continue to monitor: WBC 13.6->14.3        IV Abx [x ] Yes-> Rocephin, [ ] No;  ID consulted [ ] Yes, [ x] No        Cultures send  [ ] Respiratory   [ ] Blood   [x ] Urine + [ ] Fluids  [ ] Tissue  [ ]  None  Lines:   [ ] Right   [ ] Left  [ ] Bilateral                     [ ] Subclavian TLC        [ ]  Internal Jugular TLC     [ ]  Femoral TLC                     [ ] Subclavian Cordis    [ ] Internal Jugular Cordis   [ ] Femoral Cordis                     [ ] HD catheter    [ ] PICC     [ ]  Midline   [ x] Peripheral IVs                                                 [ ] Right   [ ] Left   [x ] None                     [ ] Radial A-Line    [ ] Femoral A-Line   [ ] Axillary A-Line               Heme: continue to evaluate for acute blood loss anemia- trend Hg/Hct                     AC Reversal Indicated [ ] Yes  [x ] No                                      [ ] Kcentra,  [ ] 3Factors concentrate, [ ] Andexxa                     Transfused  indicated  [ ] Yes, [ x] No    [ ] PRBCs   [ ] Platelets   [ ] FFPs   [ ] Cryoprecipitate                    Should be started on or continued with  following  [ ] Yes,  [x ] No                               [ ] Lovenox  [ ] Coumadin  [ ] Heparin drip  [ ] DOVACs  [ ] ASA  [ ] Antiplatelets   Endocrine: Prevent and treat hyperglycemia with insulin as needed,                                [ x] Sliding scale,  [ ] Lantus/Regular Insuline                              Insuline drip for hyperglycemia [ ] Yes, [x ] No   PV: follow pulse exam  Skin: decub precautions  DVT Prophylaxis:  [ x] SCDs  [ ] Heparin SQ  [x ] Lovenox  SQ  [ ] ASA  Stress Gastritis Prophylaxis: PPI/H2 Blockers if indicated  Mobility: patient is evaluated at the bedside with mobility team and the goals for today are discussed with PT [x ] bed rest for now    PATIENT/FAMILY/SURROGATE CONFERENCE:  [x ] Yes with patient at the bedside. [ ] No  PURPOSE: To obtain necessary information, To discuss treatment options under consideration today.    I saw and evaluated the patient personally. I have reviewed and agree with note above. Treatment plan discussed with SICU team, nurses and primary team at the time of the multidisciplinary rounds. The above note is NOT written at the time of rounds and will reflect all changes throughout management of the patient for the day note is written for.    Effie Cornell MD, FACS  Trauma/ACS/SCC Attending

## 2024-06-11 NOTE — PROGRESS NOTE ADULT - SUBJECTIVE AND OBJECTIVE BOX
GENERAL SURGERY PROGRESS NOTE     JAYLYN AGARWAL  78y  Female  Hospital day :16d  Procedure/Dx/Injuries:  1) ACUTE NONDISPLACED POSTERIOR L RIB FX 6 AND 7  2) ACUTE MILDLY DISPLACED L RIB FX 8-11  3) ACUTE NONDISPLACED FX RIGHT SUPERIOR PUBIC RAMUS  4) RIGHT GREATER TROCHANTERIC FX    OVERNIGHT EVENTS:  Using mann (chronically)  Bipap @ night  Pending Nursing Home placement, dispo    T(F): 97.7 (06-11-24 @ 00:37), Max: 98.5 (06-10-24 @ 04:53)  HR: 107 (06-11-24 @ 00:37) (69 - 127)  BP: 148/67 (06-11-24 @ 00:37) (134/66 - 152/78)  ABP: --  ABP(mean): --  RR: 18 (06-11-24 @ 00:37) (16 - 18)  SpO2: 99% (06-11-24 @ 00:37) (95% - 99%)      PMH  PAST MEDICAL & SURGICAL HISTORY:  CAD (coronary artery disease)      Tobacco dependence      HTN (hypertension)      Anxiety      Atrial flutter      S/P CABG x 3         Allergies  Allergies    No Known Allergies    Intolerances             DIET/FLUIDS: folic acid 1 milliGRAM(s) Oral daily  multivitamin 1 Tablet(s) Oral daily  thiamine 100 milliGRAM(s) Oral daily      URINE:    Indwelling Urethral Catheter:     Connect To:  Straight Drainage/[place]    Indication:  Urinary Retention / Obstruction (06-10-24 @ 13:11)    GI proph:  pantoprazole    Tablet 40 milliGRAM(s) Oral before breakfast    AC/ proph: enoxaparin Injectable 30 milliGRAM(s) SubCutaneous every 12 hours    ABx: nystatin    Suspension 226703 Unit(s) Oral every 6 hours      PHYSICAL EXAM:  GENERAL: NAD  CHEST/LUNG: equal chest rise b/l  HEART: Regular rate and rhythm  ABDOMEN: Soft, Nontender, Nondistended;   EXTREMITIES:  No clubbing, cyanosis, or edema  Neuro:  alert and awake, AAOx2-3 follows commands, b/l strength 3-4/5     LABS  Labs:  CAPILLARY BLOOD GLUCOSE                        RADIOLOGY & ADDITIONAL TESTS:  No new imaging

## 2024-06-11 NOTE — DISCHARGE NOTE PROVIDER - ATTENDING DISCHARGE PHYSICAL EXAMINATION:
I performed a medically appropriate exam. Exam notable for soft, not tender, not distended abdomen. Extremities neurovascularly intact distally. Neurologic exam notable for lethargy but arousable.

## 2024-06-11 NOTE — DISCHARGE NOTE PROVIDER - NSFOLLOWUPCLINICS_GEN_ALL_ED_FT
[facility]  Trauma Surgery  [address] [place] [address]  Phone: [phone]  Fax:   Follow Up Time: 2 weeks

## 2024-06-11 NOTE — ADVANCED PRACTICE NURSE CONSULT - ASSESSMENT
78yF w/ PMHx of CAD, tobacco dependence, HTN, anxiety, atrial flutter, seen as Trauma Consult s/p multiple falls, -HT, -LOC, -AC.  Trauma assessment in ED: ABCs intact , GCS 15 , AAOx3. Family at bedside reports that patient has been experiencing multiple falls for the past 2 weeks due to weakness, most recent fall on Thursday. Patient was recently discharged from ED 3 days ago and presents today for severe back pain. CT scan reveals multiple left sided rib fractures from 5-11 ribs and pelvic fracture. At bedside examiantion, patient has left chest wall tenderness. Pulling 1L on IS.  Allergies and Intolerances:        Allergies:  	No Known Allergies:     Home Medications:   * Patient Currently Takes Medications as of 23-May-2024 14:17 documented in Structured Notes  •?	amLODIPine 5 mg oral tablet: 1 tab(s) orally once a day  •?	dicyclomine 10 mg oral capsule: 2 cap(s) orally 4 times a day  •?	pantoprazole 40 mg oral granule, delayed release: 1 each orally once a day  •?	rosuvastatin 20 mg oral capsule: 1 cap(s) orally once a day  •?	DULoxetine 60 mg oral delayed release capsule: 1 cap(s) orally once a    Patient received in bed, limited mobility, high risk for pressure injury development or progression.    Wound  Type & Location: Right Ischium Deep Tissue Injury   Size: ~[id number]  Tissue Description: purple  Wound Exudate: None   Wound Edge: intact  Serena wound Condition: intact    Wound  Type & Location: Sacrococcygeal area with extension to bilateral buttock evolving deep tissue injury  Size: ~[id number]  Tissue Description: Deep purple center with denuded areas of pink skin and light brown   Wound Exudate: Scant, serosanguinous    Wound Edge: irregular. intact  Serena wound Condition: intact      Left ischium has dry brown hyperpigmented skin.      
History of Present Illness:   78yF w/ PMHx of CAD, tobacco dependence, HTN, anxiety, atrial flutter, seen as Trauma Consult s/p multiple falls, -HT, -LOC, -AC.  Trauma assessment in ED: ABCs intact , GCS 15 , AAOx3. Family at bedside reports that patient has been experiencing multiple falls for the past 2 weeks due to weakness, most recent fall on Thursday. Patient was recently discharged from ED 3 days ago and presents today for severe back pain. CT scan reveals multiple left sided rib fractures from 5-11 ribs and pelvic fracture.     Allergies and Intolerances:        Allergies:  	No Known Allergies:     Home Medications:   * Patient Currently Takes Medications as of 23-May-2024 14:17 documented in Structured Notes  · 	amLODIPine 5 mg oral tablet: 1 tab(s) orally once a day  · 	dicyclomine 10 mg oral capsule: 2 cap(s) orally 4 times a day  · 	pantoprazole 40 mg oral granule, delayed release: 1 each orally once a day  · 	rosuvastatin 20 mg oral capsule: 1 cap(s) orally once a day  · 	DULoxetine 60 mg oral delayed release capsule: 1 cap(s) orally once a day    Patient History:    Past Medical, Past Surgical, and Family History:  PAST MEDICAL HISTORY:  Anxiety   Atrial flutter   CAD (coronary artery disease)   HTN (hypertension)   Tobacco dependence.     PAST SURGICAL HISTORY:  S/P CABG x 3.     FAMILY HISTORY:  FH: heart disease, son, mother, & father.    Patient received lying in bed. Alert and awake. Limited mobility. Incontinent of stool. Randall in place. High risk for pressure injury development and progression.    Wound #1  Type of Wound: Healing Deep Tissue Injury  Location: Sacrococcygeal region extending to bilateral buttocks  Tunneling/ Undermining: No  Wound bed: Dry light pink tissue with scant  yellow tissue to coccyx  Wound edges: Intact  Periwound: Intact  Wound exudate: None  Wound odor: None  Induration, erythema, warmth: No  Wound pain: No    Wound #2  Type of Wound: Healing Deep Tissue Injury  Location: Right ischium  Tunneling/ Undermining: No  Wound bed: Dry light pink tissue  Wound edges: Intact  Periwound: Intact  Wound exudate: None  Wound odor: No  Induration, erythema, warmth: No  Wound pain: No

## 2024-06-11 NOTE — DISCHARGE NOTE PROVIDER - HOSPITAL COURSE
78yF w/ PMHx of CAD, tobacco dependence, HTN, anxiety, atrial flutter, seen as Trauma Consult s/p multiple falls, -HT, -LOC, -AC.  Trauma assessment in ED: ABCs intact , GCS 15 , AAOx3. Family at bedside reports that patient has been experiencing multiple falls for the past 2 weeks due to weakness, most recent fall on Thursday prior to admission. CT scan reveals multiple left sided rib fractures from 5-11 ribs and pelvic fracture as well as a incidental finding of dilated CBD. At this time patient was complaining of Left sided chest pain and was Pulling 1L on IS. Pt was evaluated and admitted to the SICU to monitor her respiratory status in the setting of multiple rib fractures. Pt was evaluated by Orthopedic surgery who initially recommended MRI of the Hip and Non-weight bearing on RLE. GI was consulted and recommended outpatient EUS/ERCP for CBD dilation that was asymptomatic. Physiatry was consulted and said possible candidate for 4A pending progression. On 5/28 it was reported by family and nursing that the patient was having a change in mental status and her hands were shaking.  Overnight patient was agitated and received 1 dose of Zyprexa. Patient evaluated at bedside by neuro, she was grossly oriented although unable to sustain a meaningful conversation. During this time the patient was requiring intermittent BIPAP and nasal canula. At this time she was not stable to undergo an MRI. The patient had an NGT placed and trickle feeds were started. Over the next several days the patient continued to progress and respiratory status improved but still required BIPAP overnight and HFNC during the day time. She was seen by PT and OT at this time who both recommended dispo to Rehab facility. On 6/2 ortho was recalled to determine need for Hip MRI. Thre recommendations were that she could be WBAT and could work with PT, no longer needed hip MRI but would benefit from total hip replacement once medically optimized. On 6/4 Wound care RN consulted for hyperpigmented lesion on Sacrococcygeal area with extension to bilateral buttock evolving deep tissue injury and proper recommendations were followed. On 6/5 the patient was downgraded from the SICU to the floor. While on the floor she was evaluated by Physiatry who recommended a Sub acute rehab once medically optimized. While on the floor the patient has been ready for discharge. CM had several discussions with the family about picking a facility. The family chose [facility] however they did not have a bed and other facilities in the area were willing to accept the patient. The family continued to avoid picking a different facility and the patient was discharged so the family could appeal. The Family lost the appeal and chose [facility]. Pt received a bed at [facility] on 6/11. She has been hemodynamically stable and ready to be discharged to [facility].

## 2024-06-11 NOTE — PROGRESS NOTE ADULT - ASSESSMENT
JAYLYN AGARWAL is a 78yFemale w/ PMHx of HTN, HLD, anxiety, CAD s/p CABG x3 (2009), Atrial flutter s/p ablation (2022), OA, recent admission for infectious colitis (4/26/24), seen as a TRAUMA CONSULT s/p multiple mechanical falls (-HT, -LOC, -AC). The following injuries were identified:     # Acute nondisplaced posterior left 6-7 rib fractures   # Acute mildly displaced left 8-11 rib fractures   # Acute nondisplaced fracture of the right superior pubic ramus    # Right greater trochanteric fracture      PLAN:   - strict I&O - monitor mann output  - regular diet  - DVT and GI ppx  - encourage ambulation and IS as tolerated   - hemodynamic monitoring  - multi modal pain control  - WBAT on RLE  - Continue with nicotine patch  - f/u CM/SW for dispo planning-family appealing    [phone]

## 2024-06-11 NOTE — DISCHARGE NOTE PROVIDER - PROVIDER TOKENS
PROVIDER:[TOKEN:[[id number]],FOLLOWUP:[2 weeks]],PROVIDER:[TOKEN:[[id number]],FOLLOWUP:[2 weeks]]

## 2024-06-11 NOTE — DISCHARGE NOTE PROVIDER - CARE PROVIDER_API CALL
Pernell Conley  Orthopaedic Surgery  [address] [place] [address]  Phone: [phone]  Fax: [phone]  Follow Up Time: 2 weeks    Asif Anderson  Gastroenterology  [address] [place] [address]  Phone: [phone]  Fax: [phone]  Follow Up Time: 2 weeks

## 2024-06-11 NOTE — ADVANCED PRACTICE NURSE CONSULT - REASON FOR CONSULT
Pressure Injury Assessment and Treatment Recommendations 
Follow Up Pressure Injury Assessment and Treatment Recommendation

## 2024-06-11 NOTE — DISCHARGE NOTE PROVIDER - NSDCMRMEDTOKEN_GEN_ALL_CORE_FT
acetaminophen 325 mg oral tablet: 2 tab(s) orally every 6 hours  amLODIPine 5 mg oral tablet: 1 tab(s) orally once a day  bacitracin 500 units/g topical ointment: 1 Apply topically to affected area 2 times a day  dicyclomine 10 mg oral capsule: 2 cap(s) orally 4 times a day  DULoxetine 60 mg oral delayed release capsule: 1 cap(s) orally once a day  enoxaparin: 30 milligram(s) subcutaneous once a day DVT ppx due to decreased mobility  folic acid 1 mg oral tablet: 1 tab(s) orally once a day  ipratropium-albuterol 0.5 mg-2.5 mg/3 mL inhalation solution: 3 milliliter(s) inhaled every 6 hours  lidocaine 4% topical film: Apply topically to affected area every 12 hours as needed for  moderate pain  melatonin 3 mg oral tablet: 1 tab(s) orally once a day (at bedtime)  Multiple Vitamins oral tablet: 1 tab(s) orally once a day  pantoprazole 40 mg oral granule, delayed release: 1 each orally once a day  propranolol 10 mg oral tablet: 1 tab(s) orally every 12 hours  rosuvastatin 20 mg oral capsule: 1 cap(s) orally once a day  tamsulosin 0.4 mg oral capsule: 1 cap(s) orally once a day (at bedtime)  thiamine 100 mg oral tablet: 1 tab(s) orally once a day

## 2024-06-11 NOTE — PROGRESS NOTE ADULT - ATTENDING SUPERVISION STATEMENT
Resident
Resident/Fellow
Resident/Fellow
Resident
Resident/Fellow
Resident/Fellow
Resident
Resident/Fellow
Resident/Fellow
Resident

## 2024-06-11 NOTE — ADVANCED PRACTICE NURSE CONSULT - RECOMMEDATIONS
Continue with current treatment recommendations:    Cleanse wound with soap and water.   Pat dry apply triad twice a day and prn for soiling.   Maintain pressure injury prevention.   Case discussed with primary RN  Keep skin clean.   Maintain incontinence care.   Monitor skin for changes and notify provider 
  Cleanse wound with soap and water, pat dry.  Apply Triad cream twice daily, PRN for soiling.   Skin and incontinence care.  Pressure Injury Prevention.  Assess wound daily and inform primary provider of any changes.   Case discussed with primary RN.  Wound/ ostomy specialist to f/u as needed.   Other recommendations per Primary Team.

## 2024-06-12 NOTE — CONSULT NOTE ADULT - ASSESSMENT
ASSESSMENT/PLAN:    78y Female w/ PMH ***. HCT showed ***. CTA showed ***. Given *** tenecetplase was ***.         Recommendations:  - workup hypoxia   -  ASSESSMENT/PLAN:  78yF w/ PMHx of CAD, tobacco dependence, HTN, anxiety, atrial flutter (not on AC), recently admitted for fall / rib fracture, discharged yesterday who presented to the ED today [facility]IBBARRY from [facility] for respiratory distress, stroke code called due to less movement of right side of body noticed by ED team. When stroke team arrived, patient was in the process of being intubated due to respiratory status and concern for protecting her airway. CTH with redemonstration of small left thalamic infarct which was seen on previous CTH on 5/26. CTA with moderate stenosis of right innominate artery and severe stenosis at the origin of the left common carotid artery and left subclavian artery.   NIHSS 23 - when stroke team arrived had trace movement of bilateral UE and LE, no notable neglect/focal weakness. Recommend workup of respiratory distress.     Recommendations:  - workup hypoxia   - metabolic/infectious workup  - admit to MICU  - may consider aspirin 81mg daily for stenosis on CTA if medically safe    Discussed with stroke attending, Dr. Miles  ASSESSMENT/PLAN:  78yF w/ PMHx of CAD, tobacco dependence, HTN, anxiety, atrial flutter (not on AC), recently admitted for fall / rib fracture, discharged yesterday who presented to the ED today [facility] from [facility] for respiratory distress, stroke code called due to less movement of right side of body noticed by ED team. When stroke team arrived, patient was in the process of being intubated due to respiratory status and concern for protecting her airway. CTH with redemonstration of small left thalamic infarct which was seen on previous CTH on 5/26. CTA with moderate stenosis of right innominate artery and severe stenosis at the origin of the left common carotid artery and left subclavian artery.   NIHSS 23 - when stroke team arrived had trace movement of bilateral UE and LE, no notable neglect/focal weakness. Recommend workup of respiratory distress.     Recommendations:  - metabolic/infectious workup as per primary team  - admit to MICU  - Recommend MRI brain without contrast, if unable to obtain may repeat CT head in 24-48 hours. may also consider rEEG.   - consider aspirin 81mg daily for stenosis on CTA if medically safe    Discussed with stroke attending, Dr. Miles

## 2024-06-12 NOTE — H&P ADULT - NSHPPHYSICALEXAM_GEN_ALL_CORE
GEN: intubated and sedated in bed, opens eyes on commands, calm, ill appearing   PULM: b/l rhonchi, intubated, synchronous respirations w/ ventilator, no wheezing/rales, appropriate b/l chest wall expansion   CVS: Sinus tachycardia, S1-S2 present, no rubs/murmurs/gallops, no JVD  ABD: Soft, non-tender, non-distended, no guarding, BS +, no organomegaly  EXT: No edema/cyanosis, extremities warm/dry, peripheral pulses palpable, mann catheter in place draining clear urine   NEURO: RASS -1, opens eyes to stimulus, does not follow commands

## 2024-06-12 NOTE — ED PROCEDURE NOTE - CPROC ED POST RADIOGRAPHY1
post-procedure radiography performed/no pneumothorax/central line located in the superior vena cava subclavian/post-procedure radiography performed/no pneumothorax/central line located in the

## 2024-06-12 NOTE — ED PROVIDER NOTE - OBJECTIVE STATEMENT
78-year-old female, past medical history of a flutter, CAD, hypertension presents emergency department status post discharged yesterday for rib fractures, EMS called for acute altered mental status.  Per nursing home last seen normal 8 AM, on arrival patient hypotensive unresponsive apneic breathing placed on nonrebreather, EMS placed IV and started low-dose peripheral Levophed.  Patient is full code.  Patient intubated for airway protection and stroke code called.

## 2024-06-12 NOTE — ED PROVIDER NOTE - CARE PLAN
Principal Discharge DX:	Aspiration pneumonia  Secondary Diagnosis:	Acute respiratory failure with hypercapnia  Secondary Diagnosis:	Altered mental status   1

## 2024-06-12 NOTE — ED PROCEDURE NOTE - NS ED ATTENDING STATEMENT MOD
I have personally provided the amount of critical care time documented below excluding time spent on separate procedures.

## 2024-06-12 NOTE — ED PROVIDER NOTE - CLINICAL SUMMARY MEDICAL DECISION MAKING FREE TEXT BOX
78  yr f that presents with change in mental status. Pt called as a stroke notification, intubated due to concern for airway protection. labs, ivf, iv antibiotics, imaging, neuro at bedside. on imaging chronic infarct noted however not acute pathology on ct head. on ct chest concern for possible pna. Due to concern for sepsis pt given ivf, iv antibiotics. pt also noted to have elevated pc02, intubated due to concern for airway protection. Labs and EKG were ordered and reviewed.  Imaging was ordered and reviewed by me.  Appropriate medications for patient's presenting complaints were ordered and effects were reassessed.  Patient's records (prior hospital, ED visit, and/or nursing home notes if available) were reviewed.  Additional history was obtained from EMS, family, and/or PCP (where available).  Escalation to admission/observation was considered.   Patient requires inpatient hospitalization -ICU

## 2024-06-12 NOTE — ED PROVIDER NOTE - PROGRESS NOTE DETAILS
I was directly involved in the care of this patient. PA Fellow Annmarie note/plan reviewed and agreed. Dr. Laura Urbano, DO: Placed a LIJ central line. Xray shows the line is somewhere in the subclavian. Removed the LIJ. Communicated to the ICU resident that the LIJ line needed to come out. RIJ had a clot.

## 2024-06-12 NOTE — ED PROCEDURE NOTE - CPROC ED POST PROC CARE GUIDE1
Verbal/written post procedure instructions were given to patient/caregiver.
Verbal/written post procedure instructions were given to patient/caregiver./Instructed patient/caregiver to follow-up with primary care physician./Instructed patient/caregiver regarding signs and symptoms of infection./Keep the cast/splint/dressing clean and dry.
Verbal/written post procedure instructions were given to patient/caregiver./Instructed patient/caregiver to follow-up with primary care physician./Instructed patient/caregiver regarding signs and symptoms of infection./Keep the cast/splint/dressing clean and dry.

## 2024-06-12 NOTE — ED PROVIDER NOTE - CRITICAL CARE ATTENDING CONTRIBUTION TO CARE
I personally saw the patient. JOSE CARLOS Sheffield and I provided critical care for a total of  45 minutes. I provided a substantive portion of the care and the majority of the critical care time.      78-year-old female with a past medical history significant for CAD COPD hypertension a flutter who presents with change in mental status.  As per EMS they were called this morning to the nursing home at approximately 8 AM due to hypoxia.  At that point patient denied any medical complaints and refused care.  EMS was called again by nursing home due to hypoxia and change in mental status.  As per family when they arrived at approximately 2 and 4 PM, patient's was in and out of consciousness and not at her baseline.  When patient arrived patient was not responding to questions with an initial GCS of 7.  Patient noted to be hypotensive.  POCUS performed on patient due to recent finding of rib fractures (pt was discharged yesterday for multiple rib fxs). Pt intubated due to concern for airway protection. Pt also called as a stroke notification.     VITAL SIGNS: I have reviewed nursing notes and confirm.  CONSTITUTIONAL: not responding to verbal commands  SKIN: no rash, no petechiae.  EYES: EOMI, pink conjunctiva, anicteric  ENT: tongue midline, no exudates, MMM  NECK: Supple; no meningismus, no JVD  CARD: RRR, no murmurs, equal radial pulses bilaterally 2+  RESP: CTAB, no respiratory distress  ABD: Soft, non-tender, non-distended, no peritoneal signs, no HSM, no CVA tenderness  EXT: Normal ROM x4. No edema. No calves tenderness  NEURO: GCS 7, pt not responding to verbal commands, only responding to pain.     78  yr f that presents with change in mental status. Pt called as a stroke notification, intubated due to concern for airway protection. labs, ivf, iv antibiotics, imaging, neuro at bedside.

## 2024-06-12 NOTE — ED PROVIDER NOTE - PHYSICAL EXAMINATION
Physical Exam    Vital Signs: I have reviewed the initial vital signs.  Constitutional: Ill-appearing, nonresponsive.  Eyes: Conjunctiva pink, Sclera clear, PERRLA, Left fixed gaze.  Cardiovascular: S1 and S2, regular rate, regular rhythm, well-perfused extremities, radial pulses equal and 2+, pedal pulses 2+ and equal  Respiratory: Decreased respiratory effort, clear bilaterally.  Gastrointestinal: soft, non-tender abdomen, no pulsatile mass, normal bowl sounds  Musculoskeletal: no lower extremity edema  Integumentary: warm, dry, no rash  Neurologic: Altered, not moving extremities, opens eyes to verbal command, does not move extremities, GCS 5.

## 2024-06-12 NOTE — H&P ADULT - ATTENDING COMMENTS
events noted, presented with AMS, hypoxia sp stroke code, intubation, pneumonia/ aspiration, thia am levophed 0.3, noemy 6, vaso, fentanyl  Neuro sat daily, Neuro fup, EEG  PUL: 16/400/ 60/8 7.24 42/ 115/ 100 LA 2.7, Increase RR, monitor airway pressors, HC  ABX  repeat CBC. cmp stat  assess volume status/ IVF  LE doppler  echo  very poor prognosis  GOC  R IJ/. A line/ mann

## 2024-06-12 NOTE — ED ADULT TRIAGE NOTE - CHIEF COMPLAINT QUOTE
Pt Prenote BIBA from [facility] c/o of Hypotension, altered mental status and hypoxia. Code stroke called [id number]

## 2024-06-12 NOTE — CONSULT NOTE ADULT - NS ATTEND AMEND GEN_ALL_CORE FT
Pt seen, examined and discussed with neurology ACP. Assessment and plan updated to reflect current recommendations.

## 2024-06-12 NOTE — CONSULT NOTE ADULT - SUBJECTIVE AND OBJECTIVE BOX
**STROKE CODE CONSULT NOTE**    Last known well time/Time of onset of symptoms:    HPI: 78y Female with PMHx of     T(C): 36.4 (06-12-24 @ 17:21), Max: 36.4 (06-12-24 @ 17:21)  HR: 143 (06-12-24 @ 17:40) (85 - 143)  BP: 179/91 (06-12-24 @ 17:40) (174/91 - 179/91)  RR: 19 (06-12-24 @ 17:40) (14 - 19)  SpO2: 100% (06-12-24 @ 17:40) (100% - 100%)    PAST MEDICAL & SURGICAL HISTORY:  CAD (coronary artery disease)      Tobacco dependence      HTN (hypertension)      Anxiety      Atrial flutter      S/P CABG x 3          FAMILY HISTORY:  FH: heart disease  son, mother, & father        SOCIAL HISTORY:  Denies smoking, drinking, or drug use    ROS:  unable to obtain from patient     MEDICATIONS  (STANDING):  cefepime   IVPB 2000 milliGRAM(s) IV Intermittent once  fentaNYL   Infusion. 0.5 MICROgram(s)/kG/Hr (3.5 mL/Hr) IV Continuous <Continuous>  lactated ringers Bolus 2000 milliLiter(s) IV Bolus once  vancomycin  IVPB. 1000 milliGRAM(s) IV Intermittent once    MEDICATIONS  (PRN):    Home Medications:  acetaminophen 325 mg oral tablet: 2 tab(s) orally every 6 hours (11 Jun 2024 13:40)  bacitracin 500 units/g topical ointment: 1 Apply topically to affected area 2 times a day (11 Jun 2024 13:40)  dicyclomine 10 mg oral capsule: 2 cap(s) orally 4 times a day (26 Apr 2024 22:04)  DULoxetine 60 mg oral delayed release capsule: 1 cap(s) orally once a day (26 Apr 2024 13:58)  enoxaparin: 30 milligram(s) subcutaneous once a day DVT ppx due to decreased mobility (11 Jun 2024 13:40)  folic acid 1 mg oral tablet: 1 tab(s) orally once a day (11 Jun 2024 13:40)  ipratropium-albuterol 0.5 mg-2.5 mg/3 mL inhalation solution: 3 milliliter(s) inhaled every 6 hours (11 Jun 2024 13:40)  lidocaine 4% topical film: Apply topically to affected area every 12 hours as needed for  moderate pain (11 Jun 2024 13:40)  melatonin 3 mg oral tablet: 1 tab(s) orally once a day (at bedtime) (11 Jun 2024 13:40)  Multiple Vitamins oral tablet: 1 tab(s) orally once a day (11 Jun 2024 13:40)  pantoprazole 40 mg oral granule, delayed release: 1 each orally once a day (26 Apr 2024 22:04)  propranolol 10 mg oral tablet: 1 tab(s) orally every 12 hours (11 Jun 2024 13:40)  rosuvastatin 20 mg oral capsule: 1 cap(s) orally once a day (26 Apr 2024 22:04)  tamsulosin 0.4 mg oral capsule: 1 cap(s) orally once a day (at bedtime) (11 Jun 2024 13:40)  thiamine 100 mg oral tablet: 1 tab(s) orally once a day (11 Jun 2024 13:40)      Allergies    No Known Allergies    Intolerances      Vital Signs Last 24 Hrs  T(C): 36.4 (12 Jun 2024 17:21), Max: 36.4 (12 Jun 2024 17:21)  T(F): 97.5 (12 Jun 2024 17:21), Max: 97.5 (12 Jun 2024 17:21)  HR: 143 (12 Jun 2024 17:40) (85 - 143)  BP: 179/91 (12 Jun 2024 17:40) (162/71 - 179/91)  BP(mean): --  RR: 19 (12 Jun 2024 17:40) (14 - 19)  SpO2: 100% (12 Jun 2024 17:40) (100% - 100%)    Parameters below as of 12 Jun 2024 17:40  Patient On (Oxygen Delivery Method): ventilator    O2 Concentration (%): 100    Physical exam:  General: No acute distress, awake and alert  Cardiovascular: Regular rate and rhythm, no murmurs, rubs, or gallops. No bruits  Pulmonary: Anterior breath sounds clear bilaterally, no crackles or wheezing. No use of accessory muscles  Extremities: Radial and DP pulses +2, no edema    Neurologic:  -Mental status: Awake, alert, oriented to person, place, and time. Speech is fluent with intact naming, repetition, and comprehension, no dysarthria. Recent and remote memory intact. Follows commands. Attention/concentration intact. Fund of knowledge appropriate.  -Cranial nerves:   II: Visual fields are full to confrontation.  III, IV, VI: Extraocular movements are intact without nystagmus. Pupils equally round and reactive to light  V:  Facial sensation V1-V3 equal and intact   VII: Face is symmetric with normal eye closure and smile  VIII: Hearing is bilaterally intact to finger rub  IX, X: Uvula is midline and soft palate rises symmetrically  XI: Head turning and shoulder shrug are intact.  XII: Tongue protrudes midline  Motor: Normal bulk and tone. No pronator drift. Strength bilateral upper extremity 5/5, bilateral lower extremities 5/5.  Rapid alternating movements intact and symmetric  Sensation: Intact to light touch bilaterally. No neglect or extinction on double simultaneous testing.  Coordination: No dysmetria on finger-to-nose and heel-to-shin bilaterally  Reflexes: Downgoing toes bilaterally   Gait: Narrow gait and steady    NIHSS: ****  ICH: *****  GCS: *****  ASPECTS Score: ****    Fingerstick Blood Glucose: CAPILLARY BLOOD GLUCOSE  108 (12 Jun 2024 17:45)      POCT Blood Glucose.: 108 mg/dL (12 Jun 2024 17:22)    LABS:                    RADIOLOGY & ADDITIONAL STUDIES:    HCT:    CTA:    CTP:      -----------------------------------------------------------------------------------------------------------------  IV-tenecetplase(Y/N):    ***                              Bolus time:  Reason IV-tenecetplase not given: ***  Alteplase dosing verified with RN _______      ASSESSMENT/PLAN:    78y Female w/ PMH ***. HCT showed ***. CTA showed ***. Given *** tenecetplase was ***.         **STROKE CODE CONSULT NOTE**    Last known well time: 8am this morning     HPI: 78yF w/ PMHx of CAD, tobacco dependence, HTN, anxiety, atrial flutter (not on AC), recently admitted for fall / rib fracture, discharged yesterday who presented to the ED today BIBEMS from [facility] for respiratory distress, stroke code called due to less movement of right side of body noticed by ED team. When stroke team arrived, patient was in the process of being intubated due to respiratory status and concern for protecting her airway. As per ED team, they felt that the patient was preferring the left when they were speaking to her and was not moving the right side as much. [facility] called EMS this morning around 8am for concern for hypoxia, however, patient was A&Ox3 and adamantly refusing going to the hospital as she stated she was fine. [facility] called back EMS this evening due to altered mental status and concern for hypoxia. As per family, the patient stopped using her right hand about a week ago when she was in the hospital which was after she had mittens on for agitation. not on AC. no history of stroke as per family.     T(C): 36.4 (06-12-24 @ 17:21), Max: 36.4 (06-12-24 @ 17:21)  HR: 143 (06-12-24 @ 17:40) (85 - 143)  BP: 179/91 (06-12-24 @ 17:40) (174/91 - 179/91)  RR: 19 (06-12-24 @ 17:40) (14 - 19)  SpO2: 100% (06-12-24 @ 17:40) (100% - 100%)    PAST MEDICAL & SURGICAL HISTORY:  CAD (coronary artery disease)      Tobacco dependence      HTN (hypertension)      Anxiety      Atrial flutter      S/P CABG x 3          FAMILY HISTORY:  FH: heart disease  son, mother, & father        SOCIAL HISTORY:  unable to obtain from patient     ROS:  unable to obtain from patient     MEDICATIONS  (STANDING):  cefepime   IVPB 2000 milliGRAM(s) IV Intermittent once  fentaNYL   Infusion. 0.5 MICROgram(s)/kG/Hr (3.5 mL/Hr) IV Continuous <Continuous>  lactated ringers Bolus 2000 milliLiter(s) IV Bolus once  vancomycin  IVPB. 1000 milliGRAM(s) IV Intermittent once    MEDICATIONS  (PRN):    Home Medications:  acetaminophen 325 mg oral tablet: 2 tab(s) orally every 6 hours (11 Jun 2024 13:40)  bacitracin 500 units/g topical ointment: 1 Apply topically to affected area 2 times a day (11 Jun 2024 13:40)  dicyclomine 10 mg oral capsule: 2 cap(s) orally 4 times a day (26 Apr 2024 22:04)  DULoxetine 60 mg oral delayed release capsule: 1 cap(s) orally once a day (26 Apr 2024 13:58)  enoxaparin: 30 milligram(s) subcutaneous once a day DVT ppx due to decreased mobility (11 Jun 2024 13:40)  folic acid 1 mg oral tablet: 1 tab(s) orally once a day (11 Jun 2024 13:40)  ipratropium-albuterol 0.5 mg-2.5 mg/3 mL inhalation solution: 3 milliliter(s) inhaled every 6 hours (11 Jun 2024 13:40)  lidocaine 4% topical film: Apply topically to affected area every 12 hours as needed for  moderate pain (11 Jun 2024 13:40)  melatonin 3 mg oral tablet: 1 tab(s) orally once a day (at bedtime) (11 Jun 2024 13:40)  Multiple Vitamins oral tablet: 1 tab(s) orally once a day (11 Jun 2024 13:40)  pantoprazole 40 mg oral granule, delayed release: 1 each orally once a day (26 Apr 2024 22:04)  propranolol 10 mg oral tablet: 1 tab(s) orally every 12 hours (11 Jun 2024 13:40)  rosuvastatin 20 mg oral capsule: 1 cap(s) orally once a day (26 Apr 2024 22:04)  tamsulosin 0.4 mg oral capsule: 1 cap(s) orally once a day (at bedtime) (11 Jun 2024 13:40)  thiamine 100 mg oral tablet: 1 tab(s) orally once a day (11 Jun 2024 13:40)      Allergies    No Known Allergies    Intolerances      Vital Signs Last 24 Hrs  T(C): 36.4 (12 Jun 2024 17:21), Max: 36.4 (12 Jun 2024 17:21)  T(F): 97.5 (12 Jun 2024 17:21), Max: 97.5 (12 Jun 2024 17:21)  HR: 143 (12 Jun 2024 17:40) (85 - 143)  BP: 179/91 (12 Jun 2024 17:40) (162/71 - 179/91)  BP(mean): --  RR: 19 (12 Jun 2024 17:40) (14 - 19)  SpO2: 100% (12 Jun 2024 17:40) (100% - 100%)    Parameters below as of 12 Jun 2024 17:40  Patient On (Oxygen Delivery Method): ventilator    O2 Concentration (%): 100    Physical exam:  General: comatose, not following commands     Neurologic:  -Mental status: comatose, eyes open,  not following commands, nonverbal (in the process of being intubated/sedated)  -Cranial nerves:   II: Visual fields are full to confrontation.  III, IV, VI: eyes midlne.  Pupils equally round and reactive to light  VII: no obvious facial droop appreciated, however diffcult to assess as patient was being intubated  Motor/sensation: some trace movement of hands bilaterally - does not lift antigravity. Slight withdrawal of bilateral upper and lower extremities   Coordination: unable to assess    NIHSS: 23    Fingerstick Blood Glucose: CAPILLARY BLOOD GLUCOSE  108 (12 Jun 2024 17:45)      POCT Blood Glucose.: 108 mg/dL (12 Jun 2024 17:22)    LABS:                    RADIOLOGY & ADDITIONAL STUDIES:    HCT:    CTA:    CTP:      -----------------------------------------------------------------------------------------------------------------  IV-tenecetplase(Y/N):   no                        Reason IV-tenecetplase not given: out of the window          **STROKE CODE CONSULT NOTE**    Last known well time: 8am this morning     HPI: 78yF w/ PMHx of CAD, tobacco dependence, HTN, anxiety, atrial flutter (not on AC), recently admitted for fall / rib fracture, discharged yesterday who presented to the ED today BIBEMS from [facility] for respiratory distress, stroke code called due to less movement of right side of body noticed by ED team. When stroke team arrived, patient was in the process of being intubated due to respiratory status and concern for protecting her airway. As per ED team, they felt that the patient was preferring the left when they were speaking to her and was not moving the right side as much. [facility] called EMS this morning around 8am for concern for hypoxia, however, patient was A&Ox3 and adamantly refusing going to the hospital as she stated she was fine. [facility] called back EMS this evening due to altered mental status and concern for hypoxia. As per family, the patient stopped using her right hand about a week ago when she was in the hospital which was after she had mittens on for agitation. not on AC. no history of stroke as per family.     T(C): 36.4 (06-12-24 @ 17:21), Max: 36.4 (06-12-24 @ 17:21)  HR: 143 (06-12-24 @ 17:40) (85 - 143)  BP: 179/91 (06-12-24 @ 17:40) (174/91 - 179/91)  RR: 19 (06-12-24 @ 17:40) (14 - 19)  SpO2: 100% (06-12-24 @ 17:40) (100% - 100%)    PAST MEDICAL & SURGICAL HISTORY:  CAD (coronary artery disease)      Tobacco dependence      HTN (hypertension)      Anxiety      Atrial flutter      S/P CABG x 3          FAMILY HISTORY:  FH: heart disease  son, mother, & father        SOCIAL HISTORY:  unable to obtain from patient     ROS:  unable to obtain from patient     MEDICATIONS  (STANDING):  cefepime   IVPB 2000 milliGRAM(s) IV Intermittent once  fentaNYL   Infusion. 0.5 MICROgram(s)/kG/Hr (3.5 mL/Hr) IV Continuous <Continuous>  lactated ringers Bolus 2000 milliLiter(s) IV Bolus once  vancomycin  IVPB. 1000 milliGRAM(s) IV Intermittent once    MEDICATIONS  (PRN):    Home Medications:  acetaminophen 325 mg oral tablet: 2 tab(s) orally every 6 hours (11 Jun 2024 13:40)  bacitracin 500 units/g topical ointment: 1 Apply topically to affected area 2 times a day (11 Jun 2024 13:40)  dicyclomine 10 mg oral capsule: 2 cap(s) orally 4 times a day (26 Apr 2024 22:04)  DULoxetine 60 mg oral delayed release capsule: 1 cap(s) orally once a day (26 Apr 2024 13:58)  enoxaparin: 30 milligram(s) subcutaneous once a day DVT ppx due to decreased mobility (11 Jun 2024 13:40)  folic acid 1 mg oral tablet: 1 tab(s) orally once a day (11 Jun 2024 13:40)  ipratropium-albuterol 0.5 mg-2.5 mg/3 mL inhalation solution: 3 milliliter(s) inhaled every 6 hours (11 Jun 2024 13:40)  lidocaine 4% topical film: Apply topically to affected area every 12 hours as needed for  moderate pain (11 Jun 2024 13:40)  melatonin 3 mg oral tablet: 1 tab(s) orally once a day (at bedtime) (11 Jun 2024 13:40)  Multiple Vitamins oral tablet: 1 tab(s) orally once a day (11 Jun 2024 13:40)  pantoprazole 40 mg oral granule, delayed release: 1 each orally once a day (26 Apr 2024 22:04)  propranolol 10 mg oral tablet: 1 tab(s) orally every 12 hours (11 Jun 2024 13:40)  rosuvastatin 20 mg oral capsule: 1 cap(s) orally once a day (26 Apr 2024 22:04)  tamsulosin 0.4 mg oral capsule: 1 cap(s) orally once a day (at bedtime) (11 Jun 2024 13:40)  thiamine 100 mg oral tablet: 1 tab(s) orally once a day (11 Jun 2024 13:40)      Allergies    No Known Allergies    Intolerances      Vital Signs Last 24 Hrs  T(C): 36.4 (12 Jun 2024 17:21), Max: 36.4 (12 Jun 2024 17:21)  T(F): 97.5 (12 Jun 2024 17:21), Max: 97.5 (12 Jun 2024 17:21)  HR: 143 (12 Jun 2024 17:40) (85 - 143)  BP: 179/91 (12 Jun 2024 17:40) (162/71 - 179/91)  BP(mean): --  RR: 19 (12 Jun 2024 17:40) (14 - 19)  SpO2: 100% (12 Jun 2024 17:40) (100% - 100%)    Parameters below as of 12 Jun 2024 17:40  Patient On (Oxygen Delivery Method): ventilator    O2 Concentration (%): 100    Physical exam:  General: comatose, not following commands     Neurologic:  -Mental status: comatose, eyes open,  not following commands, nonverbal (in the process of being intubated/sedated)  -Cranial nerves:   II: Visual fields are full to confrontation.  III, IV, VI: eyes midlne.  Pupils equally round and reactive to light  VII: no obvious facial droop appreciated, however diffcult to assess as patient was being intubated  Motor/sensation: some trace movement of hands bilaterally - does not lift antigravity. Slight withdrawal of bilateral upper and lower extremities   Coordination: unable to assess    NIHSS: 23    Fingerstick Blood Glucose: CAPILLARY BLOOD GLUCOSE  108 (12 Jun 2024 17:45)      POCT Blood Glucose.: 108 mg/dL (12 Jun 2024 17:22)    LABS:                    RADIOLOGY & ADDITIONAL STUDIES:    HCT:  negative as rads read, called neurology - pending official report    CTA:    CTP:      -----------------------------------------------------------------------------------------------------------------  IV-tenecetplase(Y/N):   no                        Reason IV-tenecetplase not given: out of the window          **STROKE CODE CONSULT NOTE**    Last known well time: 8am this morning     HPI: 78yF w/ PMHx of CAD, tobacco dependence, HTN, anxiety, atrial flutter (not on AC), recently admitted for fall / rib fracture, discharged yesterday who presented to the ED today BIBEMS from [facility] for respiratory distress, stroke code called due to less movement of right side of body noticed by ED team. When stroke team arrived, patient was in the process of being intubated due to respiratory status and concern for protecting her airway. As per ED team, they felt that the patient was preferring the left when they were speaking to her and was not moving the right side as much. [facility] called EMS this morning around 8am for concern for hypoxia, however, patient was A&Ox3 and adamantly refusing going to the hospital as she stated she was fine. [facility] called back EMS this evening due to altered mental status and concern for hypoxia. As per family, the patient stopped using her right hand about a week ago when she was in the hospital which was after she had mittens on for agitation. not on AC. no history of stroke as per family.     T(C): 36.4 (06-12-24 @ 17:21), Max: 36.4 (06-12-24 @ 17:21)  HR: 143 (06-12-24 @ 17:40) (85 - 143)  BP: 179/91 (06-12-24 @ 17:40) (174/91 - 179/91)  RR: 19 (06-12-24 @ 17:40) (14 - 19)  SpO2: 100% (06-12-24 @ 17:40) (100% - 100%)    PAST MEDICAL & SURGICAL HISTORY:  CAD (coronary artery disease)      Tobacco dependence      HTN (hypertension)      Anxiety      Atrial flutter      S/P CABG x 3          FAMILY HISTORY:  FH: heart disease  son, mother, & father        SOCIAL HISTORY:  unable to obtain from patient     ROS:  unable to obtain from patient     MEDICATIONS  (STANDING):  cefepime   IVPB 2000 milliGRAM(s) IV Intermittent once  fentaNYL   Infusion. 0.5 MICROgram(s)/kG/Hr (3.5 mL/Hr) IV Continuous <Continuous>  lactated ringers Bolus 2000 milliLiter(s) IV Bolus once  vancomycin  IVPB. 1000 milliGRAM(s) IV Intermittent once    MEDICATIONS  (PRN):    Home Medications:  acetaminophen 325 mg oral tablet: 2 tab(s) orally every 6 hours (11 Jun 2024 13:40)  bacitracin 500 units/g topical ointment: 1 Apply topically to affected area 2 times a day (11 Jun 2024 13:40)  dicyclomine 10 mg oral capsule: 2 cap(s) orally 4 times a day (26 Apr 2024 22:04)  DULoxetine 60 mg oral delayed release capsule: 1 cap(s) orally once a day (26 Apr 2024 13:58)  enoxaparin: 30 milligram(s) subcutaneous once a day DVT ppx due to decreased mobility (11 Jun 2024 13:40)  folic acid 1 mg oral tablet: 1 tab(s) orally once a day (11 Jun 2024 13:40)  ipratropium-albuterol 0.5 mg-2.5 mg/3 mL inhalation solution: 3 milliliter(s) inhaled every 6 hours (11 Jun 2024 13:40)  lidocaine 4% topical film: Apply topically to affected area every 12 hours as needed for  moderate pain (11 Jun 2024 13:40)  melatonin 3 mg oral tablet: 1 tab(s) orally once a day (at bedtime) (11 Jun 2024 13:40)  Multiple Vitamins oral tablet: 1 tab(s) orally once a day (11 Jun 2024 13:40)  pantoprazole 40 mg oral granule, delayed release: 1 each orally once a day (26 Apr 2024 22:04)  propranolol 10 mg oral tablet: 1 tab(s) orally every 12 hours (11 Jun 2024 13:40)  rosuvastatin 20 mg oral capsule: 1 cap(s) orally once a day (26 Apr 2024 22:04)  tamsulosin 0.4 mg oral capsule: 1 cap(s) orally once a day (at bedtime) (11 Jun 2024 13:40)  thiamine 100 mg oral tablet: 1 tab(s) orally once a day (11 Jun 2024 13:40)      Allergies    No Known Allergies    Intolerances      Vital Signs Last 24 Hrs  T(C): 36.4 (12 Jun 2024 17:21), Max: 36.4 (12 Jun 2024 17:21)  T(F): 97.5 (12 Jun 2024 17:21), Max: 97.5 (12 Jun 2024 17:21)  HR: 143 (12 Jun 2024 17:40) (85 - 143)  BP: 179/91 (12 Jun 2024 17:40) (162/71 - 179/91)  BP(mean): --  RR: 19 (12 Jun 2024 17:40) (14 - 19)  SpO2: 100% (12 Jun 2024 17:40) (100% - 100%)    Parameters below as of 12 Jun 2024 17:40  Patient On (Oxygen Delivery Method): ventilator    O2 Concentration (%): 100    Physical exam:  General: comatose, not following commands     Neurologic:  -Mental status: comatose, eyes open,  not following commands, nonverbal (in the process of being intubated/sedated)  -Cranial nerves:   II: Visual fields are full to confrontation.  III, IV, VI: eyes midlne.  Pupils equally round and reactive to light  VII: no obvious facial droop appreciated, however diffcult to assess as patient was being intubated  Motor/sensation: some trace movement of hands bilaterally - does not lift antigravity. Slight withdrawal of bilateral upper and lower extremities   Coordination: unable to assess    NIHSS: 23    Fingerstick Blood Glucose: CAPILLARY BLOOD GLUCOSE  108 (12 Jun 2024 17:45)      POCT Blood Glucose.: 108 mg/dL (12 Jun 2024 17:22)    LABS:        RADIOLOGY & ADDITIONAL STUDIES:    HCT:  negative as rads read, called neurology - pending official report  < from: CT Brain Stroke Protocol (06.12.24 @ 18:08) >      IMPRESSION:    No large territorial infarct or intracranial hemorrhage.    Redemonstration of a small leftthalamic infarct.      < from: CT Angio Neck Stroke Protocol w/ IV Cont (06.12.24 @ 18:21) >    CT PERFUSION:  No perfusion deficits to suggest areas of completed infarction or at risk   territory.    The cerebral blood volume and time to peak images demonstrate no   significant evidence of fixed or mismatched focal perfusion deficit or   surrounding ischemic penumbra to suggest acute cerebral ischemia or   infarct.    CT Angio Neck Stroke Protocol w/ IV Cont (06.12.24 @ 18:21) >    CTA HEAD/NECK:    There are calcific atherosclerotic plaques with moderate stenosis at the   origin of the right innominate artery, and severe stenosis at the origins   of the left common carotid artery and left subclavian artery.      No large vessel occlusion, aneurysm, or vascular malformation.      -----------------------------------------------------------------------------------------------------------------  IV-tenecetplase(Y/N):   no                        Reason IV-tenecetplase not given: out of the window

## 2024-06-12 NOTE — H&P ADULT - ASSESSMENT
IMPRESSION:    #Acute hypoxemic respiratory failure  #Toxic metabolic/infectious encephalopathy  #Septic shock on admission   #Aspiration pneumonitis   #Hyponatremia  #Hx CAD s/p CABG  #Hx Aflutter s/p ablation   #Hx PUD       PLAN:    CNS:   - CTH and CTA noted - chronic L thalamic infarct and L CCA and L SCL severe stenosis   - Target RASS -2-3, wean sedation when appropriate  - Daily SAT   - continue w/ home antidepressants   - neurology following     HEENT:   - Oral care. Aspiration precautions   - SLP eval when extubated     PULMONARY:   - MV 16, PEEP 8 ,, FIO2 50  - wean FIO2 as tolerated, keep SpO2 >93%  - SBT in AM if tolerating   - HOB @ 45.  - duonebs prn   - abg noted    CARDIOVASCULAR:   - hold home BBs and CCBs, wean levophed as tolerated  - MAP goal >65  - Daily Weight. Strict I&Os. Keep I < O    GI:   - GI prophylaxis. OG feeds  - cw home dicyclomine   - cw folate/thiamine     RENAL:   - cw flomax  - urine lytes   - Avoid nephrotoxic agents   - remove mann when appropriate     INFECTIOUS DISEASE:  - sputum cx, Bcx, UCx  - procal, strep/legionella urine ag   - MRSA nares  - DC vancomycin/cefepime - start zosyn     HEMATOLOGICAL:   - DVT prophylaxis    ENDOCRINE:   - Monitor FS  - cw lipitor     MUSCULOSKELETAL:   - WBAT iso pelvic fx     CODE STATUS: Reported full although prior MOLST in [facility] - will need to confirm past MOLST/GOC w/ family     DISPOSITION: ICU    IMPRESSION:    # Acute hypoxemic/ hypercapnic respiratory failure  #Toxic metabolic/infectious encephalopathy  #Septic shock on admission   #Aspiration pneumonitis   #Hyponatremia  #Hx CAD s/p CABG  #Hx Aflutter s/p ablation   #Hx PUD       PLAN:    CNS:     - CTH and CTA noted - chronic L thalamic infarct and L CCA and L SCL severe stenosis   - Target RASS -2-3, wean sedation when appropriate  - Daily SAT   - continue w/ home antidepressants   - neurology following     HEENT:   - Oral care. Aspiration precautions   - SLP eval when extubated     PULMONARY:   - MV 16, PEEP 8 ,, FIO2 50  - wean FIO2 as tolerated, keep SpO2 >93%  - SBT in AM if tolerating   - HOB @ 45.  - duonebs prn   - abg noted    CARDIOVASCULAR:   - hold home BBs and CCBs, wean levophed as tolerated  - MAP goal >65  - Daily Weight. Strict I&Os. Keep I < O    GI:   - GI prophylaxis. OG feeds  - cw home dicyclomine   - cw folate/thiamine     RENAL:   - cw flomax  - urine lytes   - Avoid nephrotoxic agents   - remove mann when appropriate     INFECTIOUS DISEASE:  - sputum cx, Bcx, UCx  - procal, strep/legionella urine ag   - MRSA nares  - DC vancomycin/cefepime - start zosyn     HEMATOLOGICAL:   - DVT prophylaxis    ENDOCRINE:   - Monitor FS  - cw lipitor     MUSCULOSKELETAL:   - WBAT iso pelvic fx     CODE STATUS: Reported full although prior MOLST in [facility] - will need to confirm past MOLST/GOC w/ family     DISPOSITION: ICU

## 2024-06-12 NOTE — H&P ADULT - NSHPLABSRESULTS_GEN_ALL_CORE
10.2   13.54 )-----------( 352      ( 12 Jun 2024 19:05 )             30.9       06-12    127<L>  |  95<L>  |  18  ----------------------------<  117<H>  4.4   |  21  |  0.9    Ca    8.3<L>      12 Jun 2024 19:05    TPro  5.1<L>  /  Alb  2.7<L>  /  TBili  0.4  /  DBili  x   /  AST  26  /  ALT  14  /  AlkPhos  89  06-12          ABG - ( 12 Jun 2024 19:50 )  pH, Arterial: 7.32  pH, Blood: x     /  pCO2: 45    /  pO2: 61    / HCO3: 23    / Base Excess: -3.0  /  SaO2: 92.6                Urinalysis Basic - ( 12 Jun 2024 19:05 )    Color: x / Appearance: x / SG: x / pH: x  Gluc: 117 mg/dL / Ketone: x  / Bili: x / Urobili: x   Blood: x / Protein: x / Nitrite: x   Leuk Esterase: x / RBC: x / WBC x   Sq Epi: x / Non Sq Epi: x / Bacteria: x        PT/INR - ( 12 Jun 2024 19:05 )   PT: 11.00 sec;   INR: 0.97 ratio         PTT - ( 12 Jun 2024 19:05 )  PTT:26.9 sec    Lactate Trend  06-12 @ 17:38 Lactate:TNP             CAPILLARY BLOOD GLUCOSE  108 (12 Jun 2024 17:45)      POCT Blood Glucose.: 108 mg/dL (12 Jun 2024 17:22)        Culture Results:   >=3 organisms. Probable collection contamination. (05-26 @ 10:45)

## 2024-06-12 NOTE — ED PROVIDER NOTE - TOBACCO USE
Never smoker Ketoconazole Counseling:   Patient counseled regarding improving absorption with orange juice.  Adverse effects include but are not limited to breast enlargement, headache, diarrhea, nausea, upset stomach, liver function test abnormalities, taste disturbance, and stomach pain.  There is a rare possibility of liver failure that can occur when taking ketoconazole. The patient understands that monitoring of LFTs may be required, especially at baseline. The patient verbalized understanding of the proper use and possible adverse effects of ketoconazole.  All of the patient's questions and concerns were addressed.

## 2024-06-12 NOTE — H&P ADULT - HISTORY OF PRESENT ILLNESS
78y F [facility]    pw altered mental status. Pt discharged yesterday from [facility] after being admitted for mechanical fall and rib fractures. Patient returned to nursing home. Today at NH patient had reported altered mental status, was lethargic, confused and not following commands. On arrival to the ED patient was unresponsive and apneic. Initially placed on NRB and started peripheral Levophed. Patient was intubated for GSC 7 and airway protection in the ED. Stroke code initiated. ROS unable to be obtained at time of exam.     Vitals in the ED  T 95.7  HR max 130   BP 83/35  RR  16 SpO2 100  PEEP 8 on MV     Significant Labs  wbc  13.54 hgb 10.2 plt 352  Na  127 K 4.4 BUN/Cr 18/0.9  Albumin 2.7  Lactate 0.6  ABG 7.32/45/61/23/92       Patient received   in the ED. Admitted to medicine for management.     78y F pmh CAD sp CABG, aflutter sp ablation, preDM, PUD, tobacco use disorder, HTN, anxiety pw altered mental status. Pt discharged yesterday from [facility] after being admitted for mechanical fall and rib fractures. Patient has had multiple falls over the last 2 weeks due to weakness with multiple L sided ribs 5-11 fractures and pelvic fracture. During this admission she had episodes of worsening mental status, agitation and respiratory distress requiring BIPAP and HFNC. Patient improved and was dc to [facility] per discharge documentation.Today at NH patient had reported altered mental status, was lethargic, confused and not following commands. On arrival to the ED patient was unresponsive and apneic. Initially placed on NRB and started peripheral Levophed. Patient was intubated for GSC 7 and airway protection in the ED. Stroke code initiated. ROS unable to be obtained at time of exam.     Vitals in the ED  T 95.7  HR max 130   BP 83/35  RR  16 SpO2 100  PEEP 8 on MV     Significant Labs  wbc  13.54 hgb 10.2 plt 352  Na  127 K 4.4 BUN/Cr 18/0.9  Albumin 2.7  Lactate 0.6  ABG 7.32/45/61/23/92     Patient received cefepime, vancomycin, 3L LR, magnesium, levophed  in the ED. Admitted to medicine for management.

## 2024-06-12 NOTE — ED PROCEDURE NOTE - CPROC ED TIME OUT STATEMENT1
“Patient's name, , procedure and correct site were confirmed during the [place] Timeout.”
“Patient's name, , procedure and correct site were confirmed during the [place] Timeout.”
“Patient's name, , procedure and correct site were confirmed during the [place] Timeout.”

## 2024-06-13 NOTE — CONSULT NOTE ADULT - ASSESSMENT
78y F PMH CAD sp CABG, aflutter sp ablation, pre-DM, PUD, tobacco use disorder, HTN, anxiety pw altered mental status.    # Acidosis lactic/ hyperkalemia   # resp failure / PNA ? aspiration / shock on 3 pressors   # TOBIAS oliguric / ATN  # hyponatremia     - on 3 pressors keep MAP > 65   - at high risk for CRRT / pressors requirements and hypotension / case discussed with family at bedside no CRRT   - broad spectrum antibx / follow cultures dose to Cr Cl < 15 ml/min  - repeat K / can use lokelma 10 g q8h if K > 5.5   - check Uosm U lytes   - follow BMP ABG   - prognosis poor/ will follow

## 2024-06-13 NOTE — ADVANCED PRACTICE NURSE CONSULT - RECOMMEDATIONS
Cleanse wounds to sacrococcygeal region, right ischium and left posterior thigh with soap and water.   Pat dry apply triad and Allevyn twice a day and prn for soiling.     Maintain pressure injury prevention.   Keep skin clean.   Maintain incontinence care.   Monitor skin for changes and notify provider   Case discussed with covering RN

## 2024-06-13 NOTE — PROCEDURE NOTE - NSINDICATIONS_GEN_A_CORE
dialysis/CRRT
arterial puncture to obtain ABG's/critical patient/monitoring purposes
critical illness

## 2024-06-13 NOTE — PROCEDURE NOTE - NSSITEPREP_SKIN_A_CORE
chlorhexidine
alcohol/chlorhexidine/Adherence to aseptic technique: hand hygiene prior to donning barriers (gown, gloves), don cap and mask, sterile drape over patient
chlorhexidine

## 2024-06-13 NOTE — DIETITIAN INITIAL EVALUATION ADULT - OTHER CALCULATIONS
Using ABW: ENERGY: PSE 1309.89 kcal/day vs. 3167-8246 kcal/day (25-30 kcal/kg); PROTEIN: 64-81 g/day (1.2-1.5 g/kg); FLUID: 1mL/kcal -- with consideration for age, BMI, DTI

## 2024-06-13 NOTE — PROCEDURE NOTE - NSCVLATTEMPTSITEVASC_A_CORE
right/internal jugular
left/femoral vein
Cyclosporine Counseling:  I discussed with the patient the risks of cyclosporine including but not limited to hypertension, gingival hyperplasia,myelosuppression, immunosuppression, liver damage, kidney damage, neurotoxicity, lymphoma, and serious infections. The patient understands that monitoring is required including baseline blood pressure, CBC, CMP, lipid panel and uric acid, and then 1-2 times monthly CMP and blood pressure.

## 2024-06-13 NOTE — PHARMACOTHERAPY INTERVENTION NOTE - COMMENTS
pt on fentanyl drip, recommended adding bowel regimen w/ Miralax & senna
metronidazole IV shortage, d/w team, pt has enteral access (OG) recommended changing metronidazole IV to tab via og
recommended holding duloxetine while intubated (no administration via og)  -hold dicyclomine while NPO  -hold tamsulosin (on 3 pressor agents, hold substituting w/ doxazosin for now)  -change vasopressin to 0.04 units/min, no titration  -change phenylephrine 40mg/250 ml to 160mg/500 ml (pt on 5mcg/kg/min)
SCr 1.1, CrCl~36.8, recommended cefepime 2g IV q12h  -pt received vanco 1g (6/12), precise PK calc, recommended vanco 1.25g IV x1 @ 1800 6/13, start 1g IV q24h @ 1800 (614), predicted AUC ~550

## 2024-06-13 NOTE — ADVANCED PRACTICE NURSE CONSULT - ASSESSMENT
History of Present Illness:   78y F pmh CAD sp CABG, aflutter sp ablation, preDM, PUD, tobacco use disorder, HTN, anxiety pw altered mental status. Pt discharged yesterday from [facility] after being admitted for mechanical fall and rib fractures. Patient has had multiple falls over the last 2 weeks due to weakness with multiple L sided ribs 5-11 fractures and pelvic fracture. During this admission she had episodes of worsening mental status, agitation and respiratory distress requiring BIPAP and HFNC. Patient improved and was dc to [facility] per discharge documentation.Today at NH patient had reported altered mental status, was lethargic, confused and not following commands. On arrival to the ED patient was unresponsive and apneic. Initially placed on NRB and started peripheral Levophed. Patient was intubated for GSC 7 and airway protection in the ED. Stroke code initiated. ROS unable to be obtained at time of exam. Patient received cefepime, vancomycin, 3L LR, magnesium, levophed  in the ED. Admitted to medicine for management.      Allergies and Intolerances:        Allergies:  	No Known Allergies:     Home Medications:   * Patient Currently Takes Medications as of 11-Jun-2024 13:40 documented in Structured Notes  · 	thiamine 100 mg oral tablet: 1 tab(s) orally once a day  · 	folic acid 1 mg oral tablet: 1 tab(s) orally once a day  · 	Multiple Vitamins oral tablet: 1 tab(s) orally once a day  · 	bacitracin 500 units/g topical ointment: 1 Apply topically to affected area 2 times a day  · 	enoxaparin: 30 milligram(s) subcutaneous once a day DVT ppx due to decreased mobility  · 	lidocaine 4% topical film: Apply topically to affected area every 12 hours as needed for  moderate pain  · 	ipratropium-albuterol 0.5 mg-2.5 mg/3 mL inhalation solution: 3 milliliter(s) inhaled every 6 hours  · 	propranolol 10 mg oral tablet: 1 tab(s) orally every 12 hours  · 	melatonin 3 mg oral tablet: 1 tab(s) orally once a day (at bedtime)  · 	acetaminophen 325 mg oral tablet: 2 tab(s) orally every 6 hours  · 	tamsulosin 0.4 mg oral capsule: 1 cap(s) orally once a day (at bedtime)  · 	amLODIPine 5 mg oral tablet: 1 tab(s) orally once a day  · 	dicyclomine 10 mg oral capsule: 2 cap(s) orally 4 times a day  · 	pantoprazole 40 mg oral granule, delayed release: 1 each orally once a day  · 	rosuvastatin 20 mg oral capsule: 1 cap(s) orally once a day  · 	DULoxetine 60 mg oral delayed release capsule: 1 cap(s) orally once a day    Patient History:    Past Medical, Past Surgical, and Family History:  PAST MEDICAL HISTORY:  Anxiety   Atrial flutter   CAD (coronary artery disease)   HTN (hypertension)   Tobacco dependence.     PAST SURGICAL HISTORY:  S/P CABG x 3.     FAMILY HISTORY:  FH: heart disease, son, mother, & father.     Social History:  · Substance use	Yes  · Tobacco use	active smoker  · Social History (marital status, living situation, occupation, and sexual history)	from [facility]    Patient received lying in bed. Intubated and sedated. On pressors. Limited mobility. Incontinent of stool. Randall in place. High risk for pressure injury development and progression.  Wound #1  Type of Wound: Evolving Deep Tissue Injury (seen previous admission)   Location: Sacrococcygeal region  Tunneling/ Undermining: No  Wound bed: Pink/yellow with dark pigmented devitalized tissue to coccyx  Wound edges: Irregular  Periwound: Intact  Wound exudate: None  Wound odor: No  Induration, erythema, warmth: No  Wound pain: No    Wound #2  Type of Wound: Healing Deep Tissue Injury (seen previous admission)  Location: Right ischium  Tunneling/ Undermining: No  Wound bed: Light pink   Wound edges: Intact  Periwound: Intact  Wound exudate: None  Wound odor: No  Induration, erythema, warmth: No  Wound pain: No    Wound #3  Pink partial thickness skin loss to left posterior upper thigh likely due to friction. No exudate, no odor.    Skin to bilateral heels intact at time of assessment.

## 2024-06-13 NOTE — CONSULT NOTE ADULT - SUBJECTIVE AND OBJECTIVE BOX
NEPHROLOGY CONSULTATION NOTE    THIS CONSULT IS INCOMPLETE / FULL CONSULT TO FOLLOW    Patient is a 78y Female whom presented to the hospital with     PAST MEDICAL & SURGICAL HISTORY:  CAD (coronary artery disease)      Tobacco dependence      HTN (hypertension)      Anxiety      Atrial flutter      S/P CABG x 3        Allergies:  No Known Allergies    Home Medications Reviewed  Hospital Medications:   MEDICATIONS  (STANDING):  atorvastatin 80 milliGRAM(s) Oral at bedtime  cefepime   IVPB      chlorhexidine 0.12% Liquid 15 milliLiter(s) Oral Mucosa two times a day  chlorhexidine 2% Cloths 1 Application(s) Topical <User Schedule>  dicyclomine 20 milliGRAM(s) Oral four times a day before meals  DULoxetine 60 milliGRAM(s) Oral daily  enoxaparin Injectable 40 milliGRAM(s) SubCutaneous every 24 hours  fentaNYL   Infusion 0.5 MICROgram(s)/kG/Hr (2.69 mL/Hr) IV Continuous <Continuous>  folic acid 1 milliGRAM(s) Oral daily  hydrocortisone sodium succinate Injectable 50 milliGRAM(s) IV Push every 6 hours  lactated ringers. 500 milliLiter(s) (30 mL/Hr) IV Continuous <Continuous>  metroNIDAZOLE  IVPB      norepinephrine Infusion 0.05 MICROgram(s)/kG/Min (6.56 mL/Hr) IV Continuous <Continuous>  pantoprazole   Suspension 40 milliGRAM(s) Oral daily  phenylephrine    Infusion 0.1 MICROgram(s)/kG/Min (2.01 mL/Hr) IV Continuous <Continuous>  sodium bicarbonate  Infusion 0.419 mEq/kG/Hr (150 mL/Hr) IV Continuous <Continuous>  tamsulosin 0.4 milliGRAM(s) Oral at bedtime  thiamine 100 milliGRAM(s) Oral daily  vancomycin  IVPB 2000 milliGRAM(s) IV Intermittent every 12 hours  vasopressin Infusion 0.02 Unit(s)/Min (3 mL/Hr) IV Continuous <Continuous>      SOCIAL HISTORY:  Denies ETOH,Smoking,   FAMILY HISTORY:  FH: heart disease  son, mother, & father          REVIEW OF SYSTEMS:  CONSTITUTIONAL: No weakness, fevers or chills  EYES/ENT: No visual changes;  No vertigo or throat pain   NECK: No pain or stiffness  RESPIRATORY: No cough, wheezing, hemoptysis; No shortness of breath  CARDIOVASCULAR: No chest pain or palpitations.  GASTROINTESTINAL: No abdominal or epigastric pain. No nausea, vomiting, or hematemesis; No diarrhea or constipation. No melena or hematochezia.  GENITOURINARY: No dysuria, frequency, foamy urine, urinary urgency, incontinence or hematuria  NEUROLOGICAL: No numbness or weakness  SKIN: No itching, burning, rashes, or lesions   VASCULAR: No bilateral lower extremity edema.   All other review of systems is negative unless indicated above.    VITALS:  T(F): 100.9 (06-13-24 @ 04:00), Max: 100.9 (06-13-24 @ 00:15)  HR: 116 (06-13-24 @ 08:45)  BP: 80/60 (06-13-24 @ 08:45)  RR: 12 (06-13-24 @ 08:45)  SpO2: 73% (06-13-24 @ 08:45)    06-12 @ 07:01  -  06-13 @ 07:00  --------------------------------------------------------  IN: 2645.2 mL / OUT: 285 mL / NET: 2360.2 mL    06-13 @ 07:01  -  06-13 @ 11:33  --------------------------------------------------------  IN: 148 mL / OUT: 15 mL / NET: 133 mL      Height (cm): 165.1 (06-13 @ 00:15)  Weight (kg): 53.7 (06-13 @ 00:15)  BMI (kg/m2): 19.7 (06-13 @ 00:15)  BSA (m2): 1.58 (06-13 @ 00:15)    06-12-24 @ 07:01  -  06-13-24 @ 07:00  --------------------------------------------------------  IN: 0 mL / OUT: 285 mL / NET: -285 mL    06-13-24 @ 07:01  -  06-13-24 @ 11:33  --------------------------------------------------------  IN: 0 mL / OUT: 15 mL / NET: -15 mL      I&O's Detail    12 Jun 2024 07:01 - 13 Jun 2024 07:00  --------------------------------------------------------  IN:    FentaNYL: 112.7 mL    IV PiggyBack: 300 mL    Lactated Ringers Bolus: 2000 mL    Norepinephrine: 113.8 mL    Phenylephrine: 100.7 mL    Vasopressin: 18 mL  Total IN: 2645.2 mL    OUT:    Indwelling Catheter - Urethral (mL): 285 mL  Total OUT: 285 mL    Total NET: 2360.2 mL      13 Jun 2024 07:01  -  13 Jun 2024 11:33  --------------------------------------------------------  IN:    FentaNYL: 16.1 mL    Norepinephrine: 25.2 mL    Phenylephrine: 100.7 mL    Vasopressin: 6 mL  Total IN: 148 mL    OUT:    Indwelling Catheter - Urethral (mL): 15 mL  Total OUT: 15 mL    Total NET: 133 mL            PHYSICAL EXAM:  Constitutional: NAD  HEENT: anicteric sclera, oropharynx clear, MMM  Neck: No JVD  Respiratory: CTAB, no wheezes, rales or rhonchi  Cardiovascular: S1, S2, RRR  Gastrointestinal: BS+, soft, NT/ND  Extremities: No cyanosis or clubbing. No peripheral edema  Neurological: A/O x 3, no focal deficits  Psychiatric: Normal mood, normal affect  : No CVA tenderness. No mann.   Skin: No rashes  Vascular Access:    LABS:  06-13    129<L>  |  100  |  20  ----------------------------<  115<H>  6.6<HH>   |  11<L>  |  1.1    Ca    8.4      13 Jun 2024 07:52  Mg     2.5     06-13    TPro  4.8<L>  /  Alb  2.5<L>  /  TBili  0.5  /  DBili      /  AST  56<H>  /  ALT  38  /  AlkPhos  119<H>  06-13    Creatinine Trend: 1.1 <--, 0.9 <--, 0.7 <--, 0.7 <--, 0.7 <--, 0.8 <--, 0.8 <--, 0.8 <--, 0.7 <--, 0.8 <--, 0.8 <--, 0.9 <--, 1.1 <--, 1.1 <--, 1.0 <--, 1.1 <--, 1.1 <--, 1.0 <--, 0.9 <--, 0.9 <--, 0.9 <--, 1.1 <--, 1.2 <--                        14.8   21.79 )-----------( 244      ( 13 Jun 2024 07:52 )             45.9     Urine Studies:  Urinalysis Basic - ( 13 Jun 2024 07:52 )    Color:  / Appearance:  / SG:  / pH:   Gluc: 115 mg/dL / Ketone:   / Bili:  / Urobili:    Blood:  / Protein:  / Nitrite:    Leuk Esterase:  / RBC:  / WBC    Sq Epi:  / Non Sq Epi:  / Bacteria:       Sodium, Random Urine: <20.0 mmoL/L (06-13 @ 01:30)  Creatinine, Random Urine: 44 mg/dL (06-13 @ 01:30)  Protein/Creatinine Ratio Calculation: 1.1 Ratio (06-13 @ 01:30)  Osmolality, Random Urine: 428 mos/kg (06-13 @ 01:30)  Potassium, Random Urine: 16 mmol/L (06-13 @ 01:30)                RADIOLOGY & ADDITIONAL STUDIES:                 NEPHROLOGY CONSULTATION NOTE    78y F PMH CAD sp CABG, aflutter sp ablation, pre-DM, PUD, tobacco use disorder, HTN, anxiety pw altered mental status. Pt discharged 2 days ago  from [facility] after being admitted for mechanical fall and rib fractures. Patient has had multiple falls over the last 2 weeks due to weakness with multiple L sided ribs 5-11 fractures and pelvic fracture. During this admission she had episodes of worsening mental status, agitation and respiratory distress requiring BIPAP and HFNC. Patient improved and was dc to [facility] per discharge documentation. Today at NH patient had reported altered mental status, was lethargic, confused and not following commands. On arrival to the ED patient was unresponsive and apneic. Initially placed on NRB and started peripheral Levophed. Patient was intubated for GSC 7 and airway protection in the ED. Stroke code initiated. ROS unable to be obtained at time of exam.   Renal called today for lactic acidosis / hyperkalemia   Patient in shock on multiple pressors       PAST MEDICAL & SURGICAL HISTORY:    CAD (coronary artery disease)  Tobacco dependence  HTN (hypertension)  Anxiety  Atrial flutter  S/P CABG x 3        Allergies:  No Known Allergies    Home Medications Reviewed    Hospital Medications:     MEDICATIONS  (STANDING):  atorvastatin 80 milliGRAM(s) Oral at bedtime    dicyclomine 20 milliGRAM(s) Oral four times a day before meals  DULoxetine 60 milliGRAM(s) Oral daily  enoxaparin Injectable 40 milliGRAM(s) SubCutaneous every 24 hours  fentaNYL   Infusion 0.5 MICROgram(s)/kG/Hr (2.69 mL/Hr) IV Continuous <Continuous>  folic acid 1 milliGRAM(s) Oral daily  hydrocortisone sodium succinate Injectable 50 milliGRAM(s) IV Push every 6 hours  lactated ringers. 500 milliLiter(s) (30 mL/Hr) IV Continuous <Continuous>  metroNIDAZOLE  IVPB      norepinephrine Infusion 0.05 MICROgram(s)/kG/Min (6.56 mL/Hr) IV Continuous <Continuous>  pantoprazole   Suspension 40 milliGRAM(s) Oral daily  phenylephrine    Infusion 0.1 MICROgram(s)/kG/Min (2.01 mL/Hr) IV Continuous <Continuous>  sodium bicarbonate  Infusion 0.419 mEq/kG/Hr (150 mL/Hr) IV Continuous <Continuous>  tamsulosin 0.4 milliGRAM(s) Oral at bedtime  thiamine 100 milliGRAM(s) Oral daily  vancomycin  IVPB 2000 milliGRAM(s) IV Intermittent every 12 hours  vasopressin Infusion 0.02 Unit(s)/Min (3 mL/Hr) IV Continuous <Continuous>      SOCIAL HISTORY:  Denies ETOH,Smoking,   FAMILY HISTORY:  FH: heart disease  son, mother, & father          REVIEW OF SYSTEMS:  All other review of systems is negative unless indicated above.    VITALS:  T(F): 100.9 (06-13-24 @ 04:00), Max: 100.9 (06-13-24 @ 00:15)  HR: 116 (06-13-24 @ 08:45)  BP: 80/60 (06-13-24 @ 08:45)  RR: 12 (06-13-24 @ 08:45)  SpO2: 73% (06-13-24 @ 08:45)    06-12 @ 07:01  -  06-13 @ 07:00  --------------------------------------------------------  IN: 2645.2 mL / OUT: 285 mL / NET: 2360.2 mL    06-13 @ 07:01  -  06-13 @ 11:33  --------------------------------------------------------  IN: 148 mL / OUT: 15 mL / NET: 133 mL      Height (cm): 165.1 (06-13 @ 00:15)  Weight (kg): 53.7 (06-13 @ 00:15)  BMI (kg/m2): 19.7 (06-13 @ 00:15)  BSA (m2): 1.58 (06-13 @ 00:15)    06-12-24 @ 07:01  -  06-13-24 @ 07:00  --------------------------------------------------------  IN: 0 mL / OUT: 285 mL / NET: -285 mL    06-13-24 @ 07:01  -  06-13-24 @ 11:33  --------------------------------------------------------  IN: 0 mL / OUT: 15 mL / NET: -15 mL      I&O's Detail    12 Jun 2024 07:01 - 13 Jun 2024 07:00  --------------------------------------------------------  IN:    FentaNYL: 112.7 mL    IV PiggyBack: 300 mL    Lactated Ringers Bolus: 2000 mL    Norepinephrine: 113.8 mL    Phenylephrine: 100.7 mL    Vasopressin: 18 mL  Total IN: 2645.2 mL    OUT:    Indwelling Catheter - Urethral (mL): 285 mL  Total OUT: 285 mL    Total NET: 2360.2 mL      13 Jun 2024 07:01  -  13 Jun 2024 11:33  --------------------------------------------------------  IN:    FentaNYL: 16.1 mL    Norepinephrine: 25.2 mL    Phenylephrine: 100.7 mL    Vasopressin: 6 mL  Total IN: 148 mL    OUT:    Indwelling Catheter - Urethral (mL): 15 mL  Total OUT: 15 mL    Total NET: 133 mL            PHYSICAL EXAM:  Constitutional: Intubated on MV   Respiratory: CTAB,   Cardiovascular: S1, S2, RRR  Gastrointestinal: BS+, soft, NT/ND  Extremities: No cyanosis or clubbing. No peripheral edema  :  mann.   Skin: No rashes  Vascular Access:    LABS:  06-13    129<L>  |  100  |  20  ----------------------------<  115<H>  6.6<HH>   |  11<L>  |  1.1    Ca    8.4      13 Jun 2024 07:52  Mg     2.5     06-13    TPro  4.8<L>  /  Alb  2.5<L>  /  TBili  0.5  /  DBili      /  AST  56<H>  /  ALT  38  /  AlkPhos  119<H>  06-13    Creatinine Trend: 1.1 <--, 0.9 <--, 0.7 <--, 0.7 <--, 0.7 <--, 0.8 <--, 0.8 <--, 0.8 <--, 0.7 <--, 0.8 <--, 0.8 <--, 0.9 <--, 1.1 <--, 1.1 <--, 1.0 <--, 1.1 <--, 1.1 <--, 1.0 <--, 0.9 <--, 0.9 <--, 0.9 <--, 1.1 <--, 1.2 <--                        14.8   21.79 )-----------( 244      ( 13 Jun 2024 07:52 )             45.9     Lactate, Blood: 9.3: TYPE:(C=Critical, N=Notification, A=Abnormal) C  TESTS: LACTATE  DATE/TIME CALLED: 06/13/2024 10:38:55 EDT  CALLED TO: MD VALERY  READ BACK (2 Patient Identifiers)(Y/N): Y  READ BACK VALUES (Y/N): Y  CALLED BY: TM mmol/L (06.13.24 @ 09:35)      Urine Studies:  Urinalysis Basic - ( 13 Jun 2024 07:52 )    Color:  / Appearance:  / SG:  / pH:   Gluc: 115 mg/dL / Ketone:   / Bili:  / Urobili:    Blood:  / Protein:  / Nitrite:    Leuk Esterase:  / RBC:  / WBC    Sq Epi:  / Non Sq Epi:  / Bacteria:       Sodium, Random Urine: <20.0 mmoL/L (06-13 @ 01:30)  Creatinine, Random Urine: 44 mg/dL (06-13 @ 01:30)  Protein/Creatinine Ratio Calculation: 1.1 Ratio (06-13 @ 01:30)  Osmolality, Random Urine: 428 mos/kg (06-13 @ 01:30)  Potassium, Random Urine: 16 mmol/L (06-13 @ 01:30)      RADIOLOGY & ADDITIONAL STUDIES:    < from: CT Abdomen and Pelvis w/ IV Cont (06.12.24 @ 18:30) >  CHEST:    Debris within the left main bronchus with atelectasis of the left lower   lobe. Findings may reflect aspiration/mucous plugging.    Multiple left posterior subacute rib fractures. No pneumothorax.    Multiple chronic thoracic spine compression deformities.    ABDOMEN/PELVIS:    Stable infrarenal aortic aneurysm measuring up to 2.5 cm.    Chronic deformities of the pubic symphysis.    < end of copied text >

## 2024-06-13 NOTE — DIETITIAN INITIAL EVALUATION ADULT - PERTINENT LABORATORY DATA
06-13    129<L>  |  100  |  20  ----------------------------<  115<H>  6.6<HH>   |  11<L>  |  1.1    Ca    8.4      13 Jun 2024 07:52  Mg     2.5     06-13    TPro  4.8<L>  /  Alb  2.5<L>  /  TBili  0.5  /  DBili  x   /  AST  56<H>  /  ALT  38  /  AlkPhos  119<H>  06-13  POCT Blood Glucose.: 83 mg/dL (06-13-24 @ 09:40)

## 2024-06-13 NOTE — PROGRESS NOTE ADULT - ASSESSMENT
IMPRESSION:    # Acute hypoxemic/ hypercapnic respiratory failure  #Toxic metabolic/infectious encephalopathy  #Septic shock on admission   #Aspiration pneumonitis   #Hyponatremia  #Hx CAD s/p CABG  #Hx Aflutter s/p ablation   #Hx PUD       PLAN:    CNS:     - CTH and CTA noted - chronic L thalamic infarct and L CCA and L SCL severe stenosis   - Target RASS -2-3, wean sedation when appropriate  - Daily SAT   - continue w/ home antidepressants   - neurology following     HEENT:   - Oral care. Aspiration precautions   - SLP eval when extubated     PULMONARY:   - MV 16, PEEP 8 ,, FIO2 50  - wean FIO2 as tolerated, keep SpO2 >93%  - SBT in AM if tolerating   - HOB @ 45.  - duonebs prn   - abg noted    CARDIOVASCULAR:   - hold home BBs and CCBs  - MAP goal >65  - Daily Weight. Strict I&Os. Keep I < O  - on vaso,levo and noemy    GI:   - GI prophylaxis. OG feeds  - cw home dicyclomine   - cw folate/thiamine     RENAL:   - discontinued flomax  - urine lytes   - Avoid nephrotoxic agents   - family refusing urgent dialysis at this time    INFECTIOUS DISEASE:  - sputum cx, Bcx, UCx  - procal, strep/legionella urine ag   - MRSA nares  - DC vancomycin/cefepime - start zosyn     HEMATOLOGICAL:   - DVT prophylaxis    ENDOCRINE:   - Monitor FS  - cw lipitor     MUSCULOSKELETAL:   - WBAT iso pelvic fx     CODE STATUS: Full code (ongoing GOC)    DISPOSITION: ICU

## 2024-06-13 NOTE — DIETITIAN INITIAL EVALUATION ADULT - OTHER INFO
Pertinent Medical Information: Per H&P, pt is a 79 y/o female w/ PMHx of CAD sp CABG, aflutter sp ablation, preDM, PUD, tobacco use disorder, HTN, anxiety pw altered mental status. s/p Tracheal Intubation 6/12. Ve 6.4, Tmax 38.3 C, not on propofol.     Weight hx: UBW unknown. Current dosing weight is 53.7 KG. Previous admission weight was 59 KG on 5/26/24. 8.9% unintentional weight loss in over 2 weeks compared to current dosing weight. Pt meets weight criteria for malnutrition at this time.

## 2024-06-13 NOTE — DIETITIAN INITIAL EVALUATION ADULT - NAME AND PHONE
Alejandra [phone] or TEAMS    Nutrition Interventions: TF regimen; Nutrition Monitoring: Diet order, weights, labs, NFPF, body composition, BM and tolerance to TF regimen

## 2024-06-13 NOTE — PATIENT PROFILE ADULT - FALL HARM RISK - HARM RISK INTERVENTIONS
Assistance with ambulation/Assistance OOB with selected safe patient handling equipment/Communicate Risk of Fall with Harm to all staff/Discuss with provider need for PT consult/Monitor gait and stability/Reinforce activity limits and safety measures with patient and family/Tailored Fall Risk Interventions/Visual Cue: Yellow wristband and red socks/Bed in lowest position, wheels locked, appropriate side rails in place/Call bell, personal items and telephone in reach/Instruct patient to call for assistance before getting out of bed or chair/Non-slip footwear when patient is out of bed/[place] to call system/Physically safe environment - no spills, clutter or unnecessary equipment/Purposeful Proactive Rounding/Room/bathroom lighting operational, light cord in reach

## 2024-06-13 NOTE — DIETITIAN INITIAL EVALUATION ADULT - PERTINENT MEDS FT
MEDICATIONS  (STANDING):  atorvastatin 80 milliGRAM(s) Oral at bedtime  cefepime   IVPB      cefepime   IVPB 2000 milliGRAM(s) IV Intermittent once  cefepime   IVPB 2000 milliGRAM(s) IV Intermittent every 12 hours  chlorhexidine 0.12% Liquid 15 milliLiter(s) Oral Mucosa two times a day  chlorhexidine 2% Cloths 1 Application(s) Topical <User Schedule>  enoxaparin Injectable 40 milliGRAM(s) SubCutaneous every 24 hours  fentaNYL   Infusion 0.5 MICROgram(s)/kG/Hr (2.69 mL/Hr) IV Continuous <Continuous>  folic acid 1 milliGRAM(s) Oral daily  hydrocortisone sodium succinate Injectable 50 milliGRAM(s) IV Push every 6 hours  lactated ringers. 500 milliLiter(s) (30 mL/Hr) IV Continuous <Continuous>  metroNIDAZOLE    Tablet 500 milliGRAM(s) Oral every 8 hours  norepinephrine Infusion 0.05 MICROgram(s)/kG/Min (6.56 mL/Hr) IV Continuous <Continuous>  pantoprazole   Suspension 40 milliGRAM(s) Oral daily  phenylephrine    Infusion 0.5 MICROgram(s)/kG/Min (5.03 mL/Hr) IV Continuous <Continuous>  polyethylene glycol 3350 17 Gram(s) Oral daily  senna 2 Tablet(s) Oral at bedtime  sodium bicarbonate  Infusion 0.419 mEq/kG/Hr (150 mL/Hr) IV Continuous <Continuous>  thiamine 100 milliGRAM(s) Oral daily  vancomycin  IVPB 1250 milliGRAM(s) IV Intermittent once  vasopressin Infusion 0.04 Unit(s)/Min (6 mL/Hr) IV Continuous <Continuous>    MEDICATIONS  (PRN):  ondansetron Injectable 4 milliGRAM(s) IV Push every 8 hours PRN Nausea and/or Vomiting

## 2024-06-13 NOTE — DIETITIAN INITIAL EVALUATION ADULT - ADD RECOMMEND
1. Once medically feasible to advance diet order, recommend TF via OGT if within GOC: Osmolite 1.5, continuous 18hr feeds, total volume: 1080 mL, start rate at 25 mL/hr and increase as tolerated until goal rate of 60 mL/hr is met. TF regimen to provide -- 1620 kcal, 68g pro, 823 mL free water + additional water flushes of 50 mL q4hrs -- team to adjust water flushes prn.   2. Monitor Phos and magnesium labs    High nutrition risk; will f/u in 3-5 days or prn.

## 2024-06-13 NOTE — PROGRESS NOTE ADULT - SUBJECTIVE AND OBJECTIVE BOX
24H events:    Patient is a 78y old Female who presents with a chief complaint of Pneumonia due to infectious organism     (13 Jun 2024 12:08)    Primary diagnosis of Aspiration pneumonia  Today is hospital day 1d. This morning patient was seen and examined at bedside, resting comfortably in bed.    No acute or major events overnight.    Code Status: Full code    PAST MEDICAL & SURGICAL HISTORY  CAD (coronary artery disease)    Tobacco dependence    HTN (hypertension)    Anxiety    Atrial flutter    S/P CABG x 3      SOCIAL HISTORY:  Social History:  from [facility] (12 Jun 2024 22:04)      ALLERGIES:  No Known Allergies    MEDICATIONS:  STANDING MEDICATIONS  atorvastatin 80 milliGRAM(s) Oral at bedtime  cefepime   IVPB      cefepime   IVPB 2000 milliGRAM(s) IV Intermittent once  cefepime   IVPB 2000 milliGRAM(s) IV Intermittent every 12 hours  chlorhexidine 0.12% Liquid 15 milliLiter(s) Oral Mucosa two times a day  chlorhexidine 2% Cloths 1 Application(s) Topical <User Schedule>  enoxaparin Injectable 40 milliGRAM(s) SubCutaneous every 24 hours  fentaNYL   Infusion 0.5 MICROgram(s)/kG/Hr IV Continuous <Continuous>  folic acid 1 milliGRAM(s) Oral daily  hydrocortisone sodium succinate Injectable 50 milliGRAM(s) IV Push every 6 hours  lactated ringers Bolus 1000 milliLiter(s) IV Bolus once  metroNIDAZOLE    Tablet 500 milliGRAM(s) Oral every 8 hours  norepinephrine Infusion 0.05 MICROgram(s)/kG/Min IV Continuous <Continuous>  pantoprazole   Suspension 40 milliGRAM(s) Oral daily  phenylephrine    Infusion 0.5 MICROgram(s)/kG/Min IV Continuous <Continuous>  polyethylene glycol 3350 17 Gram(s) Oral daily  senna 2 Tablet(s) Oral at bedtime  sodium bicarbonate  Infusion 0.419 mEq/kG/Hr IV Continuous <Continuous>  thiamine 100 milliGRAM(s) Oral daily  vancomycin  IVPB 1250 milliGRAM(s) IV Intermittent once  vasopressin Infusion 0.04 Unit(s)/Min IV Continuous <Continuous>    PRN MEDICATIONS  ondansetron Injectable 4 milliGRAM(s) IV Push every 8 hours PRN    VITALS:   T(F): 100.9  HR: 116  BP: 80/60  RR: 12  SpO2: 73%    PHYSICAL EXAM:   GENERAL: NAD, lying in bed comfortably  HEAD:  Atraumatic, Normocephalic  EYES: EOMI, sclera clear  ENT: Moist mucous membranes  NECK: Supple, trachea midline  CHEST/LUNG: Clear to auscultation anteriorly bilaterally; No rales, rhonchi, wheezing, or rubs. Unlabored respirations  HEART: Regular rate and rhythm; No appreciable murmurs, rubs, or gallops  ABDOMEN: (+)BS; Soft, nontender, nondistended  EXTREMITIES:  2+ Radial Pulses, brisk capillary refill. No clubbing, cyanosis, or edema  NERVOUS SYSTEM:  A&Ox3, no noted facial droop. Moving all extremities w/o difficulty.   SKIN: No rashes or lesions to b/l forearm, face.     (  ) Indwelling Randall Catheter:   Date insterted:    Reason (  ) Critical illness     (  ) urinary retention    (  ) Accurate Ins/Outs Monitoring     (  ) CMO patient    (  ) Central Line:   Date inserted:  Location: (  ) Right IJ     (  ) Left IJ     (  ) Right Fem     (  ) Left Fem    (  ) SPC        (  ) pigtail       (  ) PEG tube       (  ) colostomy       (  ) jejunostomy  (  ) U-Dall    LABS:                        14.8   21.79 )-----------( 244      ( 13 Jun 2024 07:52 )             45.9     06-13    134<L>  |  98  |  20  ----------------------------<  161<H>  4.7   |  15<L>  |  1.2    Ca    7.6<L>      13 Jun 2024 11:38  Mg     2.5     06-13    TPro  3.6<L>  /  Alb  1.8<L>  /  TBili  0.4  /  DBili  x   /  AST  2123<H>  /  ALT  2089<H>  /  AlkPhos  114  06-13    PT/INR - ( 13 Jun 2024 07:52 )   PT: 12.70 sec;   INR: 1.11 ratio         PTT - ( 13 Jun 2024 07:52 )  PTT:35.5 sec  Urinalysis Basic - ( 13 Jun 2024 11:38 )    Color: x / Appearance: x / SG: x / pH: x  Gluc: 161 mg/dL / Ketone: x  / Bili: x / Urobili: x   Blood: x / Protein: x / Nitrite: x   Leuk Esterase: x / RBC: x / WBC x   Sq Epi: x / Non Sq Epi: x / Bacteria: x      ABG - ( 13 Jun 2024 09:34 )  pH, Arterial: 7.00  pH, Blood: x     /  pCO2: 37    /  pO2: 111   / HCO3: 9     / Base Excess: -21.6 /  SaO2: 99.1              Lactate, Blood: 9.3 mmol/L *HH* (06-13-24 @ 09:35)  Lactate, Blood: 5.3 mmol/L *HH* (06-13-24 @ 06:45)  Lactate, Blood: TNP mmol/L (06-12-24 @ 17:38)      Urinalysis with Rflx Culture (collected 12 Jun 2024 19:01)          RADIOLOGY:

## 2024-06-13 NOTE — DIETITIAN INITIAL EVALUATION ADULT - NSFNSPHYEXAMSKINFT_GEN_A_CORE
Pressure injuries noted per flow sheet:  Pressure Injury 1: sacrum, Stage IV  Pressure Injury 2: Left:, Buttock, Stage III  Pressure Injury 3: Right:, Buttock, Stage III  Pressure Injury 4: Left:, heel, Stage I  Pressure Injury 5: Right:, heel, Stage I    Per wound care RN note on 6/13:  Patient received lying in bed. Intubated and sedated. On pressors. Limited mobility. Incontinent of stool. Randall in place. High risk for pressure injury development and progression.  Wound #1  Type of Wound: Evolving Deep Tissue Injury (seen previous admission)   Location: Sacrococcygeal region  Wound #2  Type of Wound: Healing Deep Tissue Injury (seen previous admission)  Location: Right ischium  Wound #3  Pink partial thickness skin loss to left posterior upper thigh likely due to friction. No exudate, no odor.

## 2024-06-13 NOTE — DIETITIAN INITIAL EVALUATION ADULT - ORAL INTAKE PTA/DIET HISTORY
Pt unable to participate in nutrition assessment interview at time of visit; intubated. Hx limited to medical chart.

## 2024-06-13 NOTE — PATIENT PROFILE ADULT - DO YOU EVER NEED HELP READING HOSPITAL MATERIALS?
Implemented All Fall with Harm Risk Interventions:  [place] to call system. Call bell, personal items and telephone within reach. Instruct patient to call for assistance. Room bathroom lighting operational. Non-slip footwear when patient is off stretcher. Physically safe environment: no spills, clutter or unnecessary equipment. Stretcher in lowest position, wheels locked, appropriate side rails in place. Provide visual cue, wrist band, yellow gown, etc. Monitor gait and stability. Monitor for mental status changes and reorient to person, place, and time. Review medications for side effects contributing to fall risk. Reinforce activity limits and safety measures with patient and family. Provide visual clues: red socks. no

## 2024-06-13 NOTE — DIETITIAN INITIAL EVALUATION ADULT - NSICDXPASTMEDICALHX_GEN_ALL_CORE_FT
PAST MEDICAL HISTORY:  Anxiety     Atrial flutter     CAD (coronary artery disease)     HTN (hypertension)     Tobacco dependence      No

## 2024-06-14 NOTE — PROGRESS NOTE ADULT - SUBJECTIVE AND OBJECTIVE BOX
Nephrology progress note    THIS IS AN INCOMPLETE NOTE . FULL NOTE TO FOLLOW SHORTLY    Patient is seen and examined, events over the last 24 h noted .    Allergies:  No Known Allergies    Hospital Medications:   MEDICATIONS  (STANDING):  atorvastatin 80 milliGRAM(s) Oral at bedtime  cefepime   IVPB 2000 milliGRAM(s) IV Intermittent every 12 hours  cefepime   IVPB      chlorhexidine 0.12% Liquid 15 milliLiter(s) Oral Mucosa two times a day  chlorhexidine 2% Cloths 1 Application(s) Topical <User Schedule>  CRRT Treatment    <Continuous>  enoxaparin Injectable 53 milliGRAM(s) SubCutaneous every 12 hours  fentaNYL   Infusion 0.5 MICROgram(s)/kG/Hr (2.69 mL/Hr) IV Continuous <Continuous>  folic acid 1 milliGRAM(s) Oral daily  hydrocortisone sodium succinate Injectable 50 milliGRAM(s) IV Push every 6 hours  metroNIDAZOLE    Tablet 500 milliGRAM(s) Oral every 8 hours  norepinephrine Infusion 0.05 MICROgram(s)/kG/Min (2.52 mL/Hr) IV Continuous <Continuous>  pantoprazole   Suspension 40 milliGRAM(s) Oral daily  petrolatum Ophthalmic Ointment 1 Application(s) Both EYES every 6 hours  phenylephrine    Infusion 0.5 MICROgram(s)/kG/Min (5.03 mL/Hr) IV Continuous <Continuous>  polyethylene glycol 3350 17 Gram(s) Oral daily  PureFlow Dialysate RFP-400 (K 2 / Ca 3) 5000 milliLiter(s) (2000 mL/Hr) CRRT <Continuous>  senna 2 Tablet(s) Oral at bedtime  sodium bicarbonate  Infusion 0.419 mEq/kG/Hr (150 mL/Hr) IV Continuous <Continuous>  thiamine 100 milliGRAM(s) Oral daily  vancomycin  IVPB 1000 milliGRAM(s) IV Intermittent every 24 hours  vasopressin Infusion 0.04 Unit(s)/Min (6 mL/Hr) IV Continuous <Continuous>        VITALS:  T(F): 90.9 (06-14-24 @ 04:00), Max: 97.6 (06-13-24 @ 08:00)  HR: 105 (06-14-24 @ 07:00)  BP: 90/57 (06-13-24 @ 16:30)  RR: 20 (06-14-24 @ 07:00)  SpO2: 97% (06-14-24 @ 03:32)  Wt(kg): --    06-12 @ 07:01  -  06-13 @ 07:00  --------------------------------------------------------  IN: 2645.2 mL / OUT: 285 mL / NET: 2360.2 mL    06-13 @ 07:01  -  06-14 @ 07:00  --------------------------------------------------------  IN: 9119.2 mL / OUT: 3932 mL / NET: 5187.2 mL          PHYSICAL EXAM:  Constitutional: NAD  HEENT: anicteric sclera, oropharynx clear, MMM  Neck: No JVD  Respiratory: CTAB, no wheezes, rales or rhonchi  Cardiovascular: S1, S2, RRR  Gastrointestinal: BS+, soft, NT/ND  Extremities: No cyanosis or clubbing. No peripheral edema  :  No mann.   Skin: No rashes    LABS:  06-13    134<L>  |  98  |  20  ----------------------------<  161<H>  4.7   |  15<L>  |  1.2    Ca    7.6<L>      13 Jun 2024 11:38  Mg     2.5     06-13    TPro  3.6<L>  /  Alb  1.8<L>  /  TBili  0.4  /  DBili      /  AST  2123<H>  /  ALT  2089<H>  /  AlkPhos  114  06-13                          14.8   21.79 )-----------( 244      ( 13 Jun 2024 07:52 )             45.9       Urine Studies:  Urinalysis Basic - ( 13 Jun 2024 11:38 )    Color:  / Appearance:  / SG:  / pH:   Gluc: 161 mg/dL / Ketone:   / Bili:  / Urobili:    Blood:  / Protein:  / Nitrite:    Leuk Esterase:  / RBC:  / WBC    Sq Epi:  / Non Sq Epi:  / Bacteria:       Sodium, Random Urine: <20.0 mmoL/L (06-13 @ 01:30)  Creatinine, Random Urine: 44 mg/dL (06-13 @ 01:30)  Protein/Creatinine Ratio Calculation: 1.1 Ratio (06-13 @ 01:30)  Osmolality, Random Urine: 428 mos/kg (06-13 @ 01:30)  Potassium, Random Urine: 16 mmol/L (06-13 @ 01:30)              RADIOLOGY & ADDITIONAL STUDIES:   Nephrology progress note    Patient is seen and examined, events over the last 24 h noted .  Lying in bed   could not tolerate CVVHD     Allergies:  No Known Allergies    Hospital Medications:   MEDICATIONS  (STANDING):  atorvastatin 80 milliGRAM(s) Oral at bedtime  cefepime   IVPB 2000 milliGRAM(s) IV Intermittent every 12 hours  CRRT Treatment    <Continuous>  enoxaparin Injectable 53 milliGRAM(s) SubCutaneous every 12 hours  fentaNYL   Infusion 0.5 MICROgram(s)/kG/Hr (2.69 mL/Hr) IV Continuous <Continuous>  folic acid 1 milliGRAM(s) Oral daily  hydrocortisone sodium succinate Injectable 50 milliGRAM(s) IV Push every 6 hours  metroNIDAZOLE    Tablet 500 milliGRAM(s) Oral every 8 hours  norepinephrine Infusion 0.05 MICROgram(s)/kG/Min (2.52 mL/Hr) IV Continuous <Continuous>  pantoprazole   Suspension 40 milliGRAM(s) Oral daily  petrolatum Ophthalmic Ointment 1 Application(s) Both EYES every 6 hours  phenylephrine    Infusion 0.5 MICROgram(s)/kG/Min (5.03 mL/Hr) IV Continuous <Continuous>  polyethylene glycol 3350 17 Gram(s) Oral daily  PureFlow Dialysate RFP-400 (K 2 / Ca 3) 5000 milliLiter(s) (2000 mL/Hr) CRRT <Continuous>  senna 2 Tablet(s) Oral at bedtime  sodium bicarbonate  Infusion 0.419 mEq/kG/Hr (150 mL/Hr) IV Continuous <Continuous>  thiamine 100 milliGRAM(s) Oral daily  vancomycin  IVPB 1000 milliGRAM(s) IV Intermittent every 24 hours  vasopressin Infusion 0.04 Unit(s)/Min (6 mL/Hr) IV Continuous <Continuous>        VITALS:  T(F): 90.9 (06-14-24 @ 04:00), Max: 97.6 (06-13-24 @ 08:00)  HR: 105 (06-14-24 @ 07:00)  BP: 90/57 (06-13-24 @ 16:30)  RR: 20 (06-14-24 @ 07:00)  SpO2: 97% (06-14-24 @ 03:32)      06-12 @ 07:01  -  06-13 @ 07:00  --------------------------------------------------------  IN: 2645.2 mL / OUT: 285 mL / NET: 2360.2 mL    06-13 @ 07:01  -  06-14 @ 07:00  --------------------------------------------------------  IN: 9119.2 mL / OUT: 3932 mL / NET: 5187.2 mL          PHYSICAL EXAM:  Constitutional: intubated on MV   Respiratory: CTAB,   Cardiovascular: S1, S2, RRR  Gastrointestinal: BS+, soft, NT/ND  Extremities: No cyanosis or clubbing. No peripheral edema  :   mann.   Skin: No rashes    LABS:    06-13    134<L>  |  98  |  20  ----------------------------<  161<H>  4.7   |  15<L>  |  1.2    Ca    7.6<L>      13 Jun 2024 11:38  Mg     2.5     06-13    TPro  3.6<L>  /  Alb  1.8<L>  /  TBili  0.4  /  DBili      /  AST  2123<H>  /  ALT  2089<H>  /  AlkPhos  114  06-13                          14.8   21.79 )-----------( 244      ( 13 Jun 2024 07:52 )             45.9       Urine Studies:  Urinalysis Basic - ( 13 Jun 2024 11:38 )    Color:  / Appearance:  / SG:  / pH:   Gluc: 161 mg/dL / Ketone:   / Bili:  / Urobili:    Blood:  / Protein:  / Nitrite:    Leuk Esterase:  / RBC:  / WBC    Sq Epi:  / Non Sq Epi:  / Bacteria:       Sodium, Random Urine: <20.0 mmoL/L (06-13 @ 01:30)  Creatinine, Random Urine: 44 mg/dL (06-13 @ 01:30)  Protein/Creatinine Ratio Calculation: 1.1 Ratio (06-13 @ 01:30)  Osmolality, Random Urine: 428 mos/kg (06-13 @ 01:30)  Potassium, Random Urine: 16 mmol/L (06-13 @ 01:30)              RADIOLOGY & ADDITIONAL STUDIES:

## 2024-06-14 NOTE — CONSULT NOTE ADULT - CONVERSATION DETAILS
Spoke with patient's daughter Carolyn at bedside. Palliative care introduced. She was able to provide a medical history and hospital course.  She stated she understood the patient's poor overall prognosis. We discussed GOC. She and her brother had made the patient DNR previously. We discussed comfort measures only and palliative extubation at length given the patient's poor prognosis. She wished to continue management for now, but would speak further with her brother. All questions answered. 05-May-2024 00:36

## 2024-06-14 NOTE — CONSULT NOTE ADULT - ASSESSMENT
78y F pmh CAD sp CABG, aflutter sp ablation, preDM, PUD, tobacco use disorder, HTN, anxiety pw altered mental status. Patient with hospital course complicated by respiratory failure requiring mechanical ventilation, septic shock, TOBIAS. Palliative care consulted for GOC.    Spoke with patient's daughter Carolyn at bedside. Palliative care introduced. She was able to provide a medical history and hospital course.  She stated she understood the patient's poor overall prognosis. We discussed GOC. She and her brother had made the patient DNR previously. We discussed comfort measures only and palliative extubation at length given the patient's poor prognosis. She wished to continue management for now, but would speak further with her brother. All questions answered.       MEDD (morphine equivalent daily dose):    Education about palliative care provided to patient/family.  See Recs below.    Please call [phone] with questions or concerns 24/7.   We will continue to follow.

## 2024-06-14 NOTE — PROGRESS NOTE ADULT - ASSESSMENT
78y F PMH CAD sp CABG, aflutter sp ablation, pre-DM, PUD, tobacco use disorder, HTN, anxiety pw altered mental status.    # Acidosis lactic/ hyperkalemia   # resp failure / PNA ? aspiration / shock on 3 pressors   # TOBIAS oliguric / ATN  # hyponatremia resolving     - on 3 pressors keep MAP > 65   - at high risk for CRRT / pressors requirements and hypotension tried CVVHD overnight but could not tolerate  / case discussed with family at bedside no CRRT today   - broad spectrum antibx / follow cultures dose to Cr Cl < 15 ml/min/ follow vanco trough   - repeat K / can use lokelma 10 g q8h if K > 5.5   - hypocalcemia corrected to Alb ok / check iCa  - follow BMP ABG   - prognosis poor/ will follow

## 2024-06-14 NOTE — PROGRESS NOTE ADULT - SUBJECTIVE AND OBJECTIVE BOX
Over Night Events: events noted, remain critically ill, still intubated, ventilated, on vaso/ noemy/ levop/ fentanyl, CVVH  PHYSICAL EXAM    ICU Vital Signs Last 24 Hrs  T(C): 32.2 (14 Jun 2024 08:00), Max: 36.4 (13 Jun 2024 12:00)  T(F): 89.9 (14 Jun 2024 08:00), Max: 97.6 (13 Jun 2024 12:00)  HR: 104 (14 Jun 2024 08:45) (80 - 151)  BP: 90/57 (13 Jun 2024 16:30) (90/57 - 144/82)  BP(mean): 68 (13 Jun 2024 16:30) (63 - 105)  ABP: 154/99 (14 Jun 2024 08:45) (9/9 - 188/75)  ABP(mean): 124 (14 Jun 2024 08:45) (9 - 124)  RR: 20 (14 Jun 2024 08:45) (3 - 27)  SpO2: 97% (14 Jun 2024 03:32) (82% - 100%)    O2 Parameters below as of 14 Jun 2024 08:00  Patient On (Oxygen Delivery Method): ventilator    O2 Concentration (%): 40        General: ILL looking  ETT  Lungs: Bilateral rhonchi  Cardiovascular: Regular   Abdomen: Soft, Positive BS  Extremities: No clubbing   not following commands       06-13-24 @ 07:01  -  06-14-24 @ 07:00  --------------------------------------------------------  IN:    FentaNYL: 290.4 mL    IV PiggyBack: 350 mL    Norepinephrine: 477.9 mL    Norepinephrine: 231 mL    Norepinephrine: 2399 mL    Oral Fluid: 40 mL    Phenylephrine: 402.6 mL    Phenylephrine: 1490.3 mL    Sodium Bicarbonate: 3300 mL    Vasopressin: 18 mL    Vasopressin: 120 mL  Total IN: 9119.2 mL    OUT:    Indwelling Catheter - Urethral (mL): 120 mL    Other (mL): 3812 mL  Total OUT: 3932 mL    Total NET: 5187.2 mL      06-14-24 @ 07:01  -  06-14-24 @ 08:49  --------------------------------------------------------  IN:    FentaNYL: 10.7 mL    Norepinephrine: 252 mL    Phenylephrine: 101 mL    Sodium Bicarbonate: 150 mL    Vasopressin: 6 mL  Total IN: 519.7 mL    OUT:    Indwelling Catheter - Urethral (mL): 0 mL  Total OUT: 0 mL    Total NET: 519.7 mL          LABS:                          14.8   21.79 )-----------( 244      ( 13 Jun 2024 07:52 )             45.9                                               06-14    137  |  101  |  10  ----------------------------<  184<H>  4.4   |  15<L>  |  0.6<L>    Ca    6.5<L>      14 Jun 2024 06:26  Phos  4.6     06-14  Mg     1.8     06-14    TPro  3.2<L>  /  Alb  1.6<L>  /  TBili  0.8  /  DBili  0.5<H>  /  AST  >7000<H>  /  ALT  >7000<H>  /  AlkPhos  232<H>  06-14      PT/INR - ( 13 Jun 2024 07:52 )   PT: 12.70 sec;   INR: 1.11 ratio         PTT - ( 13 Jun 2024 07:52 )  PTT:35.5 sec                                       Urinalysis Basic - ( 14 Jun 2024 06:26 )    Color: x / Appearance: x / SG: x / pH: x  Gluc: 184 mg/dL / Ketone: x  / Bili: x / Urobili: x   Blood: x / Protein: x / Nitrite: x   Leuk Esterase: x / RBC: x / WBC x   Sq Epi: x / Non Sq Epi: x / Bacteria: x                                                  LIVER FUNCTIONS - ( 14 Jun 2024 06:26 )  Alb: 1.6 g/dL / Pro: 3.2 g/dL / ALK PHOS: 232 U/L / ALT: >7000 U/L / AST: >7000 U/L / GGT: x                                                  Culture - Blood (collected 12 Jun 2024 19:01)  Source: .Blood Blood-Peripheral  Preliminary Report (13 Jun 2024 23:01):    No growth at 24 hours    Culture - Blood (collected 12 Jun 2024 19:01)  Source: .Blood Blood-Peripheral  Preliminary Report (13 Jun 2024 23:01):    No growth at 24 hours    Urinalysis with Rflx Culture (collected 12 Jun 2024 19:01)                                                   Mode: AC/ CMV (Assist Control/ Continuous Mandatory Ventilation)  RR (machine): 20  TV (machine): 400  FiO2: 60  PEEP: 8  ITime: 1  MAP: 13  PIP: 25                                      ABG - ( 14 Jun 2024 03:51 )  pH, Arterial: 7.12  pH, Blood: x     /  pCO2: 39    /  pO2: 168   / HCO3: 13    / Base Excess: -16.0 /  SaO2: 97.8      LA 14          MEDICATIONS  (STANDING):  atorvastatin 80 milliGRAM(s) Oral at bedtime  cefepime   IVPB 2000 milliGRAM(s) IV Intermittent every 12 hours  cefepime   IVPB      chlorhexidine 0.12% Liquid 15 milliLiter(s) Oral Mucosa two times a day  chlorhexidine 2% Cloths 1 Application(s) Topical <User Schedule>  CRRT Treatment    <Continuous>  enoxaparin Injectable 53 milliGRAM(s) SubCutaneous every 12 hours  fentaNYL   Infusion 0.5 MICROgram(s)/kG/Hr (2.69 mL/Hr) IV Continuous <Continuous>  folic acid 1 milliGRAM(s) Oral daily  hydrocortisone sodium succinate Injectable 50 milliGRAM(s) IV Push every 6 hours  metroNIDAZOLE    Tablet 500 milliGRAM(s) Oral every 8 hours  norepinephrine Infusion 0.05 MICROgram(s)/kG/Min (2.52 mL/Hr) IV Continuous <Continuous>  pantoprazole   Suspension 40 milliGRAM(s) Oral daily  petrolatum Ophthalmic Ointment 1 Application(s) Both EYES every 6 hours  phenylephrine    Infusion 0.5 MICROgram(s)/kG/Min (5.03 mL/Hr) IV Continuous <Continuous>  polyethylene glycol 3350 17 Gram(s) Oral daily  PureFlow Dialysate RFP-400 (K 2 / Ca 3) 5000 milliLiter(s) (2000 mL/Hr) CRRT <Continuous>  senna 2 Tablet(s) Oral at bedtime  sodium bicarbonate  Infusion 0.419 mEq/kG/Hr (150 mL/Hr) IV Continuous <Continuous>  thiamine 100 milliGRAM(s) Oral daily  vancomycin  IVPB 1000 milliGRAM(s) IV Intermittent every 24 hours  vasopressin Infusion 0.04 Unit(s)/Min (6 mL/Hr) IV Continuous <Continuous>    MEDICATIONS  (PRN):  ondansetron Injectable 4 milliGRAM(s) IV Push every 8 hours PRN Nausea and/or Vomiting  CXR noted   Over Night Events: events noted, remain critically ill, still intubated, ventilated, on vaso/ noemy/ levop/ fentanyl, CVVH    PHYSICAL EXAM    ICU Vital Signs Last 24 Hrs  T(C): 32.2 (14 Jun 2024 08:00), Max: 36.4 (13 Jun 2024 12:00)  T(F): 89.9 (14 Jun 2024 08:00), Max: 97.6 (13 Jun 2024 12:00)  HR: 104 (14 Jun 2024 08:45) (80 - 151)  BP: 90/57 (13 Jun 2024 16:30) (90/57 - 144/82)  BP(mean): 68 (13 Jun 2024 16:30) (63 - 105)  ABP: 154/99 (14 Jun 2024 08:45) (9/9 - 188/75)  ABP(mean): 124 (14 Jun 2024 08:45) (9 - 124)  RR: 20 (14 Jun 2024 08:45) (3 - 27)  SpO2: 97% (14 Jun 2024 03:32) (82% - 100%)    O2 Parameters below as of 14 Jun 2024 08:00  Patient On (Oxygen Delivery Method): ventilator    O2 Concentration (%): 40        General: ILL looking  ETT  Lungs: Bilateral rhonchi  Cardiovascular: Regular   Abdomen: Soft, Positive BS  Extremities: No clubbing   not following commands       06-13-24 @ 07:01  -  06-14-24 @ 07:00  --------------------------------------------------------  IN:    FentaNYL: 290.4 mL    IV PiggyBack: 350 mL    Norepinephrine: 477.9 mL    Norepinephrine: 231 mL    Norepinephrine: 2399 mL    Oral Fluid: 40 mL    Phenylephrine: 402.6 mL    Phenylephrine: 1490.3 mL    Sodium Bicarbonate: 3300 mL    Vasopressin: 18 mL    Vasopressin: 120 mL  Total IN: 9119.2 mL    OUT:    Indwelling Catheter - Urethral (mL): 120 mL    Other (mL): 3812 mL  Total OUT: 3932 mL    Total NET: 5187.2 mL      06-14-24 @ 07:01  -  06-14-24 @ 08:49  --------------------------------------------------------  IN:    FentaNYL: 10.7 mL    Norepinephrine: 252 mL    Phenylephrine: 101 mL    Sodium Bicarbonate: 150 mL    Vasopressin: 6 mL  Total IN: 519.7 mL    OUT:    Indwelling Catheter - Urethral (mL): 0 mL  Total OUT: 0 mL    Total NET: 519.7 mL          LABS:                          14.8   21.79 )-----------( 244      ( 13 Jun 2024 07:52 )             45.9                                               06-14    137  |  101  |  10  ----------------------------<  184<H>  4.4   |  15<L>  |  0.6<L>    Ca    6.5<L>      14 Jun 2024 06:26  Phos  4.6     06-14  Mg     1.8     06-14    TPro  3.2<L>  /  Alb  1.6<L>  /  TBili  0.8  /  DBili  0.5<H>  /  AST  >7000<H>  /  ALT  >7000<H>  /  AlkPhos  232<H>  06-14      PT/INR - ( 13 Jun 2024 07:52 )   PT: 12.70 sec;   INR: 1.11 ratio         PTT - ( 13 Jun 2024 07:52 )  PTT:35.5 sec                                       Urinalysis Basic - ( 14 Jun 2024 06:26 )    Color: x / Appearance: x / SG: x / pH: x  Gluc: 184 mg/dL / Ketone: x  / Bili: x / Urobili: x   Blood: x / Protein: x / Nitrite: x   Leuk Esterase: x / RBC: x / WBC x   Sq Epi: x / Non Sq Epi: x / Bacteria: x                                                  LIVER FUNCTIONS - ( 14 Jun 2024 06:26 )  Alb: 1.6 g/dL / Pro: 3.2 g/dL / ALK PHOS: 232 U/L / ALT: >7000 U/L / AST: >7000 U/L / GGT: x                                                  Culture - Blood (collected 12 Jun 2024 19:01)  Source: .Blood Blood-Peripheral  Preliminary Report (13 Jun 2024 23:01):    No growth at 24 hours    Culture - Blood (collected 12 Jun 2024 19:01)  Source: .Blood Blood-Peripheral  Preliminary Report (13 Jun 2024 23:01):    No growth at 24 hours    Urinalysis with Rflx Culture (collected 12 Jun 2024 19:01)                                                   Mode: AC/ CMV (Assist Control/ Continuous Mandatory Ventilation)  RR (machine): 20  TV (machine): 400  FiO2: 60  PEEP: 8  ITime: 1  MAP: 13  PIP: 25                                      ABG - ( 14 Jun 2024 03:51 )  pH, Arterial: 7.12  pH, Blood: x     /  pCO2: 39    /  pO2: 168   / HCO3: 13    / Base Excess: -16.0 /  SaO2: 97.8      LA 14          MEDICATIONS  (STANDING):  atorvastatin 80 milliGRAM(s) Oral at bedtime  cefepime   IVPB 2000 milliGRAM(s) IV Intermittent every 12 hours  cefepime   IVPB      chlorhexidine 0.12% Liquid 15 milliLiter(s) Oral Mucosa two times a day  chlorhexidine 2% Cloths 1 Application(s) Topical <User Schedule>  CRRT Treatment    <Continuous>  enoxaparin Injectable 53 milliGRAM(s) SubCutaneous every 12 hours  fentaNYL   Infusion 0.5 MICROgram(s)/kG/Hr (2.69 mL/Hr) IV Continuous <Continuous>  folic acid 1 milliGRAM(s) Oral daily  hydrocortisone sodium succinate Injectable 50 milliGRAM(s) IV Push every 6 hours  metroNIDAZOLE    Tablet 500 milliGRAM(s) Oral every 8 hours  norepinephrine Infusion 0.05 MICROgram(s)/kG/Min (2.52 mL/Hr) IV Continuous <Continuous>  pantoprazole   Suspension 40 milliGRAM(s) Oral daily  petrolatum Ophthalmic Ointment 1 Application(s) Both EYES every 6 hours  phenylephrine    Infusion 0.5 MICROgram(s)/kG/Min (5.03 mL/Hr) IV Continuous <Continuous>  polyethylene glycol 3350 17 Gram(s) Oral daily  PureFlow Dialysate RFP-400 (K 2 / Ca 3) 5000 milliLiter(s) (2000 mL/Hr) CRRT <Continuous>  senna 2 Tablet(s) Oral at bedtime  sodium bicarbonate  Infusion 0.419 mEq/kG/Hr (150 mL/Hr) IV Continuous <Continuous>  thiamine 100 milliGRAM(s) Oral daily  vancomycin  IVPB 1000 milliGRAM(s) IV Intermittent every 24 hours  vasopressin Infusion 0.04 Unit(s)/Min (6 mL/Hr) IV Continuous <Continuous>    MEDICATIONS  (PRN):  ondansetron Injectable 4 milliGRAM(s) IV Push every 8 hours PRN Nausea and/or Vomiting  CXR noted

## 2024-06-14 NOTE — PROGRESS NOTE ADULT - SUBJECTIVE AND OBJECTIVE BOX
24H events:    Patient is a 78y old Female who presents with a chief complaint of Altered Mental Status/Hypoxia (14 Jun 2024 08:49)    Primary diagnosis of Aspiration pneumonia    Today is hospital day 2d.     Code Status: DNR/intubate    PAST MEDICAL & SURGICAL HISTORY  CAD (coronary artery disease)    Tobacco dependence    HTN (hypertension)    Anxiety    Atrial flutter    S/P CABG x 3      SOCIAL HISTORY:  Social History:  from [facility] (12 Jun 2024 22:04)      ALLERGIES:  No Known Allergies    MEDICATIONS:  STANDING MEDICATIONS  atorvastatin 80 milliGRAM(s) Oral at bedtime  cefepime   IVPB 2000 milliGRAM(s) IV Intermittent every 12 hours  cefepime   IVPB      chlorhexidine 0.12% Liquid 15 milliLiter(s) Oral Mucosa two times a day  chlorhexidine 2% Cloths 1 Application(s) Topical <User Schedule>  CRRT Treatment    <Continuous>  fentaNYL   Infusion 0.5 MICROgram(s)/kG/Hr IV Continuous <Continuous>  folic acid 1 milliGRAM(s) Oral daily  heparin  Infusion.  Unit(s)/Hr IV Continuous <Continuous>  hydrocortisone sodium succinate Injectable 50 milliGRAM(s) IV Push every 6 hours  metroNIDAZOLE    Tablet 500 milliGRAM(s) Oral every 8 hours  norepinephrine Infusion 0.05 MICROgram(s)/kG/Min IV Continuous <Continuous>  pantoprazole   Suspension 40 milliGRAM(s) Oral daily  petrolatum Ophthalmic Ointment 1 Application(s) Both EYES every 6 hours  phenylephrine    Infusion 0.5 MICROgram(s)/kG/Min IV Continuous <Continuous>  polyethylene glycol 3350 17 Gram(s) Oral daily  PureFlow Dialysate RFP-400 (K 2 / Ca 3) 5000 milliLiter(s) CRRT <Continuous>  senna 2 Tablet(s) Oral at bedtime  sodium bicarbonate  Infusion 0.419 mEq/kG/Hr IV Continuous <Continuous>  thiamine 100 milliGRAM(s) Oral daily  vancomycin  IVPB 1000 milliGRAM(s) IV Intermittent every 24 hours  vasopressin Infusion 0.04 Unit(s)/Min IV Continuous <Continuous>    PRN MEDICATIONS  ondansetron Injectable 4 milliGRAM(s) IV Push every 8 hours PRN    VITALS:   T(F): 89.9  HR: 104  BP: 90/57  RR: 20  SpO2: 100%    PHYSICAL EXAM:   GENERAL: NAD, lying in bed comfortably  HEAD:  Atraumatic, Normocephalic  CHEST/LUNG: Mechanical breath sounds  HEART: tachycardic, s1 s2  ABDOMEN: Soft, nontender, nondistended  EXTREMITIES: No clubbing, cyanosis, or edema  NERVOUS SYSTEM:  sedated, not following commands  SKIN: No rashes or lesions to b/l forearm, face.     (  ) Indwelling Randall Catheter:   Date insterted:    Reason (  ) Critical illness     (  ) urinary retention    (  ) Accurate Ins/Outs Monitoring     (  ) CMO patient    (  ) Central Line:   Date inserted:  Location: (  ) Right IJ     (  ) Left IJ     (  ) Right Fem     (  ) Left Fem    (  ) SPC        (  ) pigtail       (  ) PEG tube       (  ) colostomy       (  ) jejunostomy  (  ) U-Dall    LABS:                        14.8   21.79 )-----------( 244      ( 13 Jun 2024 07:52 )             45.9     06-14    137  |  101  |  10  ----------------------------<  184<H>  4.4   |  15<L>  |  0.6<L>    Ca    6.5<L>      14 Jun 2024 06:26  Phos  4.6     06-14  Mg     1.8     06-14    TPro  3.2<L>  /  Alb  1.6<L>  /  TBili  0.8  /  DBili  0.5<H>  /  AST  >7000<H>  /  ALT  >7000<H>  /  AlkPhos  232<H>  06-14    PT/INR - ( 13 Jun 2024 07:52 )   PT: 12.70 sec;   INR: 1.11 ratio         PTT - ( 13 Jun 2024 07:52 )  PTT:35.5 sec  Urinalysis Basic - ( 14 Jun 2024 06:26 )    Color: x / Appearance: x / SG: x / pH: x  Gluc: 184 mg/dL / Ketone: x  / Bili: x / Urobili: x   Blood: x / Protein: x / Nitrite: x   Leuk Esterase: x / RBC: x / WBC x   Sq Epi: x / Non Sq Epi: x / Bacteria: x      ABG - ( 14 Jun 2024 03:51 )  pH, Arterial: 7.12  pH, Blood: x     /  pCO2: 39    /  pO2: 168   / HCO3: 13    / Base Excess: -16.0 /  SaO2: 97.8              Lactate, Blood: 9.3 mmol/L *HH* (06-13-24 @ 09:35)      Culture - Blood (collected 12 Jun 2024 19:01)  Source: .Blood Blood-Peripheral  Preliminary Report (13 Jun 2024 23:01):    No growth at 24 hours    Culture - Blood (collected 12 Jun 2024 19:01)  Source: .Blood Blood-Peripheral  Preliminary Report (13 Jun 2024 23:01):    No growth at 24 hours    Urinalysis with Rflx Culture (collected 12 Jun 2024 19:01)

## 2024-06-14 NOTE — CONSULT NOTE ADULT - SUBJECTIVE AND OBJECTIVE BOX
CC: AMS    HPI:  78y F pmh CAD sp CABG, aflutter sp ablation, preDM, PUD, tobacco use disorder, HTN, anxiety pw altered mental status. Pt discharged yesterday from [facility] after being admitted for mechanical fall and rib fractures. Patient has had multiple falls over the last 2 weeks due to weakness with multiple L sided ribs 5-11 fractures and pelvic fracture. During this admission she had episodes of worsening mental status, agitation and respiratory distress requiring BIPAP and HFNC. Patient improved and was dc to [facility] per discharge documentation.Today at NH patient had reported altered mental status, was lethargic, confused and not following commands. On arrival to the ED patient was unresponsive and apneic. Initially placed on NRB and started peripheral Levophed. Patient was intubated for GSC 7 and airway protection in the ED. Stroke code initiated. ROS unable to be obtained at time of exam.     Vitals in the ED  T 95.7  HR max 130   BP 83/35  RR  16 SpO2 100  PEEP 8 on MV     Significant Labs  wbc  13.54 hgb 10.2 plt 352  Na  127 K 4.4 BUN/Cr 18/0.9  Albumin 2.7  Lactate 0.6  ABG 7.32/45/61/23/92     Patient received cefepime, vancomycin, 3L LR, magnesium, levophed  in the ED. Admitted to medicine for management.     (12 Jun 2024 22:04)    PERTINENT PM/SXH:   CAD (coronary artery disease)    Tobacco dependence    HTN (hypertension)    Anxiety    Atrial flutter      S/P CABG x 3      FAMILY HISTORY:  FH: heart disease  son, mother, & father      None pertinent    ITEMS NOT CHECKED ARE NOT PRESENT    SOCIAL HISTORY:   Significant other/partner[ ]  Children[ ]  Scientologist/Spirituality:  Substance hx:  [ ]   Tobacco hx:  [ ]   Alcohol hx: [ ]   Living Situation: [ x]Home  [ ]Long term care  [ ]Rehab [ ]Other  Home Services: [ ] HHA [ ] Visting RN [ ] Hospice  Occupation:  Home Opioid hx:  [ ] Y [ ] N [x ] I-Stop Reference No:    Reference #: [id number]      Patient Demographic Information (PDI)       PDI	First Name	Last Name	Birth Date	Gender	Street Address	[facility]	Zip Code  TIARA Lobato	1945	Female	[address] [facility]	[address]    Prescription Information      PDI Filter:    PDI	Current Rx	Drug Type	Rx Written	Rx Dispensed	Drug	Quantity	Days Supply	Prescriber Name	Prescriber LALITA #	Payment Method	Dispenser  A	N	O	05/16/2024	05/17/2024	acetaminophen-cod #3 tablet	30	15	RandallaznelidaJaylyn	[id number]	Medicare	[id number]  A	N	O	05/08/2024	05/08/2024	tramadol hcl 50 mg tablet	14	7	BakariJaylyn	[id number]	Medicare	[id number]       ADVANCE DIRECTIVES:     [ ] Full Code [x ] DNR  MOLST  [ ]  Living Will  [ ]   DECISION MAKER(s):  [ ] Health Care Proxy(s)  [x ] Surrogate(s)  [ ] Guardian           Name(s): Phone Number(s):  Children      BASELINE (I)ADL(s) (prior to admission):    [place]: [ ]Total  [ ] Moderate [ ]Dependent  Palliative Performance Status Version 2:         %    http://[facility].org/files/news/palliative_performance_scale_ppsv2.pdf    Allergies    No Known Allergies    Intolerances    MEDICATIONS  (STANDING):  atorvastatin 80 milliGRAM(s) Oral at bedtime  cefepime   IVPB 2000 milliGRAM(s) IV Intermittent every 12 hours  cefepime   IVPB      chlorhexidine 0.12% Liquid 15 milliLiter(s) Oral Mucosa two times a day  chlorhexidine 2% Cloths 1 Application(s) Topical <User Schedule>  CRRT Treatment    <Continuous>  fentaNYL   Infusion 0.5 MICROgram(s)/kG/Hr (2.69 mL/Hr) IV Continuous <Continuous>  folic acid 1 milliGRAM(s) Oral daily  heparin  Infusion.  Unit(s)/Hr (10 mL/Hr) IV Continuous <Continuous>  hydrocortisone sodium succinate Injectable 50 milliGRAM(s) IV Push every 6 hours  metroNIDAZOLE    Tablet 500 milliGRAM(s) Oral every 8 hours  norepinephrine Infusion 0.05 MICROgram(s)/kG/Min (2.52 mL/Hr) IV Continuous <Continuous>  pantoprazole   Suspension 40 milliGRAM(s) Oral daily  petrolatum Ophthalmic Ointment 1 Application(s) Both EYES every 6 hours  phenylephrine    Infusion 0.5 MICROgram(s)/kG/Min (5.03 mL/Hr) IV Continuous <Continuous>  polyethylene glycol 3350 17 Gram(s) Oral daily  PureFlow Dialysate RFP-400 (K 2 / Ca 3) 5000 milliLiter(s) (2000 mL/Hr) CRRT <Continuous>  senna 2 Tablet(s) Oral at bedtime  sodium bicarbonate  Infusion 0.419 mEq/kG/Hr (150 mL/Hr) IV Continuous <Continuous>  thiamine 100 milliGRAM(s) Oral daily  vancomycin  IVPB 1000 milliGRAM(s) IV Intermittent every 24 hours  vasopressin Infusion 0.04 Unit(s)/Min (6 mL/Hr) IV Continuous <Continuous>    MEDICATIONS  (PRN):  ondansetron Injectable 4 milliGRAM(s) IV Push every 8 hours PRN Nausea and/or Vomiting    PRESENT SYMPTOMS: [ ]Unable to obtain due to poor mentation   Source if other than patient:  [ ]Family   [ ]Team     Pain: [ ]yes [ ]no  ALL PAIN ASSESSMENT COMPONENTS WERE ASKED ABOUT UNLESS OTHERWISE NOTED  QOL impact -   Location -                    Aggravating factors -  Quality -  Radiation -  Timing-  Severity (0-10 scale):  Minimal acceptable level (0-10 scale):     CPOT:    https://www.[facility].org/getattachment/[email or web address]/Critical-Care-Pain-Observation-Tool-(CPOT)    PAIN AD Score:   http://geriatrictoolkit.[place].[facility]/cog/painad.pdf (press ctrl +  left click to view)    Dyspnea:                           [ ]None[ ]Mild [ ]Moderate [ ]Severe     Respiratory Distress Observation Scale (RDOS):   A score of 0 to 2 signifies little or no respiratory distress, 3 signifies mild distress, scores 4 to 6 indicate moderate distress, and scores greater than 7 signify severe distress  https://www.[facility].ca/sites/default/files/PDFS/000613-dbzcenoptvh-zkawiemi-wfirngmtvos-yfsee.pdf    Anxiety:                             [ ]None[ ]Mild [ ]Moderate [ ]Severe   Fatigue:                             [ ]None[ ]Mild [ ]Moderate [ ]Severe   Nausea:                             [ ]None[ ]Mild [ ]Moderate [ ]Severe   Loss of appetite:              [ ]None[ ]Mild [ ]Moderate [ ]Severe   Constipation:                    [ ]None[ ]Mild [ ]Moderate [ ]Severe    Other Symptoms:  [ ]All other review of systems negative     Palliative Performance Status Version 2:         %    http://npcrc.org/files/news/palliative_performance_scale_ppsv2.pdf    PHYSICAL EXAM:  Vital Signs Last 24 Hrs  T(C): 31.7 (14 Jun 2024 10:00), Max: 36.4 (13 Jun 2024 12:00)  T(F): 89 (14 Jun 2024 10:00), Max: 97.6 (13 Jun 2024 12:00)  HR: 107 (14 Jun 2024 10:00) (80 - 151)  BP: 90/57 (13 Jun 2024 16:30) (90/57 - 144/82)  BP(mean): 68 (13 Jun 2024 16:30) (68 - 105)  RR: 20 (14 Jun 2024 10:00) (3 - 27)  SpO2: 100% (14 Jun 2024 08:57) (86% - 100%)    Parameters below as of 14 Jun 2024 10:00  Patient On (Oxygen Delivery Method): ventilator    O2 Concentration (%): 40 I&O's Summary    13 Jun 2024 07:01  -  14 Jun 2024 07:00  --------------------------------------------------------  IN: 9119.2 mL / OUT: 3932 mL / NET: 5187.2 mL    14 Jun 2024 07:01  -  14 Jun 2024 11:13  --------------------------------------------------------  IN: 1401 mL / OUT: 0 mL / NET: 1401 mL        GENERAL:  [ ] No acute distress [ ]Lethargic  [ ]Unarousable  [ ]Verbal  [ ]Non-Verbal [ ]Cachexia    BEHAVIORAL/PSYCH:  [ ]Alert and Oriented x  [ ] Anxiety [ ] Delirium [ ] Agitation [ ] Calm   EYES: [ ] No scleral icterus [ ] Scleral icterus [ ] Closed  ENMT:  [ ]Dry mouth  [ ]No external oral lesions [ ] No external ear or nose lesions  CARDIOVASCULAR:  [ ]Regular [ ]Irregular [ ]Tachy [ ]Not Tachy  [ ]Cristiano [ ] Edema [ ] No edema  PULMONARY:  [ ]Tachypnea  [ ]Audible excessive secretions [ ] No labored breathing [ ] labored breathing  GASTROINTESTINAL: [ ]Soft  [ ]Distended  [ ]Not distended [ ]Non tender [ ]Tender  MUSCULOSKELETAL: [ ]No clubbing [ ] clubbing  [ ] No cyanosis [ ] cyanosis  NEUROLOGIC: [ ]No focal deficits  [ ]Follows commands  [ ]Does not follow commands  [ ]Cognitive impairment  [ ]Dysphagia  [ ]Dysarthria  [ ]Paresis   SKIN: [ ] Jaundiced [ ] Non-jaundiced [ ]Rash [ ]No Rash [ ] Warm [ ] Dry  MISC/LINES: [ ] ET tube [ ] Trach [ ]NGT/OGT [ ]PEG [ ]Randall    LABS: reviewed by me                        14.8   21.79 )-----------( 244      ( 13 Jun 2024 07:52 )             45.9   06-14    137  |  101  |  10  ----------------------------<  184<H>  4.4   |  15<L>  |  0.6<L>    Ca    6.5<L>      14 Jun 2024 06:26  Phos  4.6     06-14  Mg     1.8     06-14    TPro  3.2<L>  /  Alb  1.6<L>  /  TBili  0.8  /  DBili  0.5<H>  /  AST  >7000<H>  /  ALT  >7000<H>  /  AlkPhos  232<H>  06-14  PT/INR - ( 13 Jun 2024 07:52 )   PT: 12.70 sec;   INR: 1.11 ratio         PTT - ( 13 Jun 2024 07:52 )  PTT:35.5 sec    Urinalysis Basic - ( 14 Jun 2024 06:26 )    Color: x / Appearance: x / SG: x / pH: x  Gluc: 184 mg/dL / Ketone: x  / Bili: x / Urobili: x   Blood: x / Protein: x / Nitrite: x   Leuk Esterase: x / RBC: x / WBC x   Sq Epi: x / Non Sq Epi: x / Bacteria: x      RADIOLOGY & ADDITIONAL STUDIES: reviewed by me    EKG: reviewed by me      PROTEIN CALORIE MALNUTRITION PRESENT: [ ]mild [ ]moderate [ ]severe [ ]underweight [ ]morbid obesity  https://www.andeal.org/vault/2440/web/files/ONC/Table_Clinical%20Characteristics%20to%20Document%20Malnutrition-White%20JV%20et%20al%202012.pdf    Height (cm): 165.1 (06-13-24 @ 00:15), 157.5 (05-30-24 @ 04:53)  Weight (kg): 53.7 (06-13-24 @ 00:15), 59 (05-26-24 @ 09:28), 59 (04-26-24 @ 13:03)  BMI (kg/m2): 19.7 (06-13-24 @ 00:15), 23.8 (05-30-24 @ 04:53)  [ ]PPSV2 < or = to 30% [ ]significant weight loss  [ ]poor nutritional intake  [ ]anasarca      [ ]Artificial Nutrition      Palliative Care Spiritual/Emotional Screening Tool Question  Severity (0-4):                    OR                    [ x] Unable to determine. Will assess at later time if appropriate.  Score of 2 or greater indicates recommendation of Chaplaincy and/or SW referral  Chaplaincy Referral: [ ] Yes [ ] Refused [ ] Following     Caregiver [place]:  [ ] Yes [ ] No    OR    [x ] Unable to determine. Will assess at later time if appropriate.  Social Work Referral [ ]  Patient and Family Centered Care Referral [ ]    Anticipatory Grief Present: [ ] Yes [ ] No    OR     [ x] Unable to determine. Will assess at later time if appropriate.  Social Work Referral [ ]  Patient and Family Centered Care Referral [ ]    Patient discussed with primary medical team MD  Palliative care education provided to patient and/or family   CC: AMS    HPI:  78y F pmh CAD sp CABG, aflutter sp ablation, preDM, PUD, tobacco use disorder, HTN, anxiety pw altered mental status. Pt discharged yesterday from [facility] after being admitted for mechanical fall and rib fractures. Patient has had multiple falls over the last 2 weeks due to weakness with multiple L sided ribs 5-11 fractures and pelvic fracture. During this admission she had episodes of worsening mental status, agitation and respiratory distress requiring BIPAP and HFNC. Patient improved and was dc to [facility] per discharge documentation.Today at NH patient had reported altered mental status, was lethargic, confused and not following commands. On arrival to the ED patient was unresponsive and apneic. Initially placed on NRB and started peripheral Levophed. Patient was intubated for GSC 7 and airway protection in the ED. Stroke code initiated. ROS unable to be obtained at time of exam.     Vitals in the ED  T 95.7  HR max 130   BP 83/35  RR  16 SpO2 100  PEEP 8 on MV     Significant Labs  wbc  13.54 hgb 10.2 plt 352  Na  127 K 4.4 BUN/Cr 18/0.9  Albumin 2.7  Lactate 0.6  ABG 7.32/45/61/23/92     Patient received cefepime, vancomycin, 3L LR, magnesium, levophed  in the ED. Admitted to medicine for management.     (12 Jun 2024 22:04)    PERTINENT PM/SXH:   CAD (coronary artery disease)    Tobacco dependence    HTN (hypertension)    Anxiety    Atrial flutter      S/P CABG x 3      FAMILY HISTORY:  FH: heart disease  son, mother, & father      None pertinent    ITEMS NOT CHECKED ARE NOT PRESENT    SOCIAL HISTORY:   Significant other/partner[ ]  Children[ ]  Mandaen/Spirituality:  Substance hx:  [ ]   Tobacco hx:  [ ]   Alcohol hx: [ ]   Living Situation: [ x]Home  [ ]Long term care  [ ]Rehab [ ]Other  Home Services: [ ] HHA [ ] Visting RN [ ] Hospice  Occupation:  Home Opioid hx:  [ ] Y [ ] N [x ] I-Stop Reference No:    Reference #: [id number]      Patient Demographic Information (PDI)       PDI	First Name	Last Name	Birth Date	Gender	Street Address	[facility]	Zip Code  TIARA Lobato	1945	Female	[address] [facility]	[address]    Prescription Information      PDI Filter:    PDI	Current Rx	Drug Type	Rx Written	Rx Dispensed	Drug	Quantity	Days Supply	Prescriber Name	Prescriber LALITA #	Payment Method	Dispenser  A	N	O	05/16/2024	05/17/2024	acetaminophen-cod #3 tablet	30	15	RandallaznelidaJaylyn	[id number]	Medicare	[id number]  A	N	O	05/08/2024	05/08/2024	tramadol hcl 50 mg tablet	14	7	BakariJaylyn	[id number]	Medicare	[id number]       ADVANCE DIRECTIVES:     [ ] Full Code [x ] DNR  MOLST  [ ]  Living Will  [ ]   DECISION MAKER(s):  [ ] Health Care Proxy(s)  [x ] Surrogate(s)  [ ] Guardian           Name(s): Phone Number(s):  Children      BASELINE (I)ADL(s) (prior to admission):    [place]: [ ]Total  [ ] Moderate [ ]Dependent  Palliative Performance Status Version 2:         %    http://[facility].org/files/news/palliative_performance_scale_ppsv2.pdf    Allergies    No Known Allergies    Intolerances    MEDICATIONS  (STANDING):  atorvastatin 80 milliGRAM(s) Oral at bedtime  cefepime   IVPB 2000 milliGRAM(s) IV Intermittent every 12 hours  cefepime   IVPB      chlorhexidine 0.12% Liquid 15 milliLiter(s) Oral Mucosa two times a day  chlorhexidine 2% Cloths 1 Application(s) Topical <User Schedule>  CRRT Treatment    <Continuous>  fentaNYL   Infusion 0.5 MICROgram(s)/kG/Hr (2.69 mL/Hr) IV Continuous <Continuous>  folic acid 1 milliGRAM(s) Oral daily  heparin  Infusion.  Unit(s)/Hr (10 mL/Hr) IV Continuous <Continuous>  hydrocortisone sodium succinate Injectable 50 milliGRAM(s) IV Push every 6 hours  metroNIDAZOLE    Tablet 500 milliGRAM(s) Oral every 8 hours  norepinephrine Infusion 0.05 MICROgram(s)/kG/Min (2.52 mL/Hr) IV Continuous <Continuous>  pantoprazole   Suspension 40 milliGRAM(s) Oral daily  petrolatum Ophthalmic Ointment 1 Application(s) Both EYES every 6 hours  phenylephrine    Infusion 0.5 MICROgram(s)/kG/Min (5.03 mL/Hr) IV Continuous <Continuous>  polyethylene glycol 3350 17 Gram(s) Oral daily  PureFlow Dialysate RFP-400 (K 2 / Ca 3) 5000 milliLiter(s) (2000 mL/Hr) CRRT <Continuous>  senna 2 Tablet(s) Oral at bedtime  sodium bicarbonate  Infusion 0.419 mEq/kG/Hr (150 mL/Hr) IV Continuous <Continuous>  thiamine 100 milliGRAM(s) Oral daily  vancomycin  IVPB 1000 milliGRAM(s) IV Intermittent every 24 hours  vasopressin Infusion 0.04 Unit(s)/Min (6 mL/Hr) IV Continuous <Continuous>    MEDICATIONS  (PRN):  ondansetron Injectable 4 milliGRAM(s) IV Push every 8 hours PRN Nausea and/or Vomiting    PRESENT SYMPTOMS: [x ]Unable to obtain due to poor mentation   Source if other than patient:  [ ]Family   [ ]Team     Pain: [ ]yes [ ]no  ALL PAIN ASSESSMENT COMPONENTS WERE ASKED ABOUT UNLESS OTHERWISE NOTED  QOL impact -   Location -                    Aggravating factors -  Quality -  Radiation -  Timing-  Severity (0-10 scale):  Minimal acceptable level (0-10 scale):     CPOT:  1  https://www.[facility].org/getattachment/[email or web address]/Critical-Care-Pain-Observation-Tool-(CPOT)    PAIN AD Score:   http://geriatrictoolkit.[place].[facility]/cog/painad.pdf (press ctrl +  left click to view)    Dyspnea:                           [ ]None[ ]Mild [ ]Moderate [ ]Severe     Respiratory Distress Observation Scale (RDOS): 1  A score of 0 to 2 signifies little or no respiratory distress, 3 signifies mild distress, scores 4 to 6 indicate moderate distress, and scores greater than 7 signify severe distress  https://www.[facility].ca/sites/default/files/PDFS/043842-tdmceugzbrc-fjofpkro-jxzqgejmqhm-namzm.pdf    Anxiety:                             [ ]None[ ]Mild [ ]Moderate [ ]Severe   Fatigue:                             [ ]None[ ]Mild [ ]Moderate [ ]Severe   Nausea:                             [ ]None[ ]Mild [ ]Moderate [ ]Severe   Loss of appetite:              [ ]None[ ]Mild [ ]Moderate [ ]Severe   Constipation:                    [ ]None[ ]Mild [ ]Moderate [ ]Severe    Other Symptoms:  [ ]All other review of systems negative     Palliative Performance Status Version 2:         10%    http://npcrc.org/files/news/palliative_performance_scale_ppsv2.pdf    PHYSICAL EXAM:  Vital Signs Last 24 Hrs  T(C): 31.7 (14 Jun 2024 10:00), Max: 36.4 (13 Jun 2024 12:00)  T(F): 89 (14 Jun 2024 10:00), Max: 97.6 (13 Jun 2024 12:00)  HR: 107 (14 Jun 2024 10:00) (80 - 151)  BP: 90/57 (13 Jun 2024 16:30) (90/57 - 144/82)  BP(mean): 68 (13 Jun 2024 16:30) (68 - 105)  RR: 20 (14 Jun 2024 10:00) (3 - 27)  SpO2: 100% (14 Jun 2024 08:57) (86% - 100%)    Parameters below as of 14 Jun 2024 10:00  Patient On (Oxygen Delivery Method): ventilator    O2 Concentration (%): 40 I&O's Summary    13 Jun 2024 07:01  -  14 Jun 2024 07:00  --------------------------------------------------------  IN: 9119.2 mL / OUT: 3932 mL / NET: 5187.2 mL    14 Jun 2024 07:01  -  14 Jun 2024 11:13  --------------------------------------------------------  IN: 1401 mL / OUT: 0 mL / NET: 1401 mL        GENERAL:  [ ] No acute distress [ ]Lethargic  [x ]Unarousable  [ ]Verbal  [ ]Non-Verbal [ ]Cachexia    BEHAVIORAL/PSYCH:  [ ]Alert and Oriented x  [ ] Anxiety [ ] Delirium [ ] Agitation [ ] Calm   EYES: [ ] No scleral icterus [ ] Scleral icterus [ ] Closed  ENMT:  [ ]Dry mouth  [ ]No external oral lesions [ ] No external ear or nose lesions  CARDIOVASCULAR:  [ ]Regular [ ]Irregular [ ]Tachy [ ]Not Tachy  [ ]Cristiano [ ] Edema [ ] No edema  PULMONARY:  [ x]Tachypnea  [ ]Audible excessive secretions [ ] No labored breathing [ ] labored breathing  GASTROINTESTINAL: [ ]Soft  [ ]Distended  [x ]Not distended [ ]Non tender [ ]Tender  MUSCULOSKELETAL: [ ]No clubbing [ ] clubbing  [ ] No cyanosis [ ] cyanosis  NEUROLOGIC: [ ]No focal deficits  [ ]Follows commands  [x ]Does not follow commands  [ ]Cognitive impairment  [ ]Dysphagia  [ ]Dysarthria  [ ]Paresis   SKIN: [ ] Jaundiced [ ] Non-jaundiced [ ]Rash [ ]No Rash [ ] Warm [ ] Dry  MISC/LINES: [x ] ET tube [ ] Trach [ ]NGT/OGT [ ]PEG [ ]Randall    LABS: reviewed by me                        14.8   21.79 )-----------( 244      ( 13 Jun 2024 07:52 )             45.9   06-14    137  |  101  |  10  ----------------------------<  184<H>  4.4   |  15<L>  |  0.6<L>    Ca    6.5<L>      14 Jun 2024 06:26  Phos  4.6     06-14  Mg     1.8     06-14    TPro  3.2<L>  /  Alb  1.6<L>  /  TBili  0.8  /  DBili  0.5<H>  /  AST  >7000<H>  /  ALT  >7000<H>  /  AlkPhos  232<H>  06-14  PT/INR - ( 13 Jun 2024 07:52 )   PT: 12.70 sec;   INR: 1.11 ratio         PTT - ( 13 Jun 2024 07:52 )  PTT:35.5 sec    Urinalysis Basic - ( 14 Jun 2024 06:26 )    Color: x / Appearance: x / SG: x / pH: x  Gluc: 184 mg/dL / Ketone: x  / Bili: x / Urobili: x   Blood: x / Protein: x / Nitrite: x   Leuk Esterase: x / RBC: x / WBC x   Sq Epi: x / Non Sq Epi: x / Bacteria: x      RADIOLOGY & ADDITIONAL STUDIES: reviewed by me    < from: Xray Chest 1 View- PORTABLE-Routine (Xray Chest 1 View- PORTABLE-Routine in AM.) (06.14.24 @ 06:45) >  Impression:    Stable left base opacity    < end of copied text >      EKG: reviewed by me    < from: 12 Lead ECG (06.12.24 @ 19:14) >    Ventricular Rate 91 BPM    Atrial Rate 91 BPM    P-R Interval 142 ms    QRS Duration 90 ms    Q-T Interval 408 ms    QTC Calculation(Bazett) 501 ms    P Axis 67 degrees    R Axis -14 degrees    T Axis 53 degrees    Diagnosis Line Sinus rhythm with Premature atrial complexes  Left ventricular hypertrophy with repolarization abnormality  Prolonged QT  Abnormal ECG    < end of copied text >      PROTEIN CALORIE MALNUTRITION PRESENT: [ ]mild [ ]moderate [ ]severe [ ]underweight [ ]morbid obesity  https://www.andeal.org/vault/2440/web/files/ONC/Table_Clinical%20Characteristics%20to%20Document%20Malnutrition-White%20JV%20et%20al%823583.pdf    Height (cm): 165.1 (06-13-24 @ 00:15), 157.5 (05-30-24 @ 04:53)  Weight (kg): 53.7 (06-13-24 @ 00:15), 59 (05-26-24 @ 09:28), 59 (04-26-24 @ 13:03)  BMI (kg/m2): 19.7 (06-13-24 @ 00:15), 23.8 (05-30-24 @ 04:53)  [ ]PPSV2 < or = to 30% [ ]significant weight loss  [ ]poor nutritional intake  [ ]anasarca      [ ]Artificial Nutrition      Palliative Care Spiritual/Emotional Screening Tool Question  Severity (0-4):                    OR                    [ x] Unable to determine. Will assess at later time if appropriate.  Score of 2 or greater indicates recommendation of Chaplaincy and/or SW referral  Chaplaincy Referral: [ ] Yes [ ] Refused [ ] Following     Caregiver [place]:  [ ] Yes [ ] No    OR    [x ] Unable to determine. Will assess at later time if appropriate.  Social Work Referral [ ]  Patient and Family Centered Care Referral [ ]    Anticipatory Grief Present: [ ] Yes [ ] No    OR     [ x] Unable to determine. Will assess at later time if appropriate.  Social Work Referral [ ]  Patient and Family Centered Care Referral [ ]    Patient discussed with primary medical team MD  Palliative care education provided to patient and/or family

## 2024-06-14 NOTE — PROGRESS NOTE ADULT - SUBJECTIVE AND OBJECTIVE BOX
Over Night Events: events noted, remain critically ill, still intubated, ventilated, afebrile, renal reviewed    PHYSICAL EXAM    ICU Vital Signs Last 24 Hrs  T(C): 32.7 (14 Jun 2024 04:00), Max: 36.4 (13 Jun 2024 08:00)  T(F): 90.9 (14 Jun 2024 04:00), Max: 97.6 (13 Jun 2024 08:00)  HR: 114 (14 Jun 2024 06:00) (80 - 151)  BP: 90/57 (13 Jun 2024 16:30) (80/60 - 144/82)  BP(mean): 68 (13 Jun 2024 16:30) (60 - 105)  ABP: 101/76 (14 Jun 2024 06:00) (9/9 - 188/75)  ABP(mean): 85 (14 Jun 2024 06:00) (9 - 116)  RR: 20 (14 Jun 2024 06:00) (3 - 27)  SpO2: 97% (14 Jun 2024 03:32) (59% - 100%)    O2 Parameters below as of 14 Jun 2024 00:00  Patient On (Oxygen Delivery Method): ventilator            General: ill looking  ETT  Lungs: Bilateral rhonchi  Cardiovascular: HERVE 2.6  Abdomen: Soft, Positive BS  Extremities: No clubbing   Not following commands    06-12-24 @ 07:01  -  06-13-24 @ 07:00  --------------------------------------------------------  IN:    FentaNYL: 112.7 mL    IV PiggyBack: 300 mL    Lactated Ringers Bolus: 2000 mL    Norepinephrine: 113.8 mL    Phenylephrine: 100.7 mL    Vasopressin: 18 mL  Total IN: 2645.2 mL    OUT:    Indwelling Catheter - Urethral (mL): 285 mL  Total OUT: 285 mL    Total NET: 2360.2 mL      06-13-24 @ 07:01  -  06-14-24 @ 06:57  --------------------------------------------------------  IN:    FentaNYL: 280.3 mL    IV PiggyBack: 350 mL    Norepinephrine: 477.9 mL    Norepinephrine: 231 mL    Norepinephrine: 2147 mL    Oral Fluid: 40 mL    Phenylephrine: 402.6 mL    Phenylephrine: 1389.3 mL    Sodium Bicarbonate: 3150 mL    Vasopressin: 18 mL    Vasopressin: 114 mL  Total IN: 8600.1 mL    OUT:    Indwelling Catheter - Urethral (mL): 120 mL    Other (mL): 3812 mL  Total OUT: 3932 mL    Total NET: 4668.1 mL          LABS:                          14.8   21.79 )-----------( 244      ( 13 Jun 2024 07:52 )             45.9                                               06-13    134<L>  |  98  |  20  ----------------------------<  161<H>  4.7   |  15<L>  |  1.2    Ca    7.6<L>      13 Jun 2024 11:38  Mg     2.5     06-13    TPro  3.6<L>  /  Alb  1.8<L>  /  TBili  0.4  /  DBili  x   /  AST  2123<H>  /  ALT  2089<H>  /  AlkPhos  114  06-13      PT/INR - ( 13 Jun 2024 07:52 )   PT: 12.70 sec;   INR: 1.11 ratio         PTT - ( 13 Jun 2024 07:52 )  PTT:35.5 sec                                       Urinalysis Basic - ( 13 Jun 2024 11:38 )    Color: x / Appearance: x / SG: x / pH: x  Gluc: 161 mg/dL / Ketone: x  / Bili: x / Urobili: x   Blood: x / Protein: x / Nitrite: x   Leuk Esterase: x / RBC: x / WBC x   Sq Epi: x / Non Sq Epi: x / Bacteria: x                                                  LIVER FUNCTIONS - ( 13 Jun 2024 11:38 )  Alb: 1.8 g/dL / Pro: 3.6 g/dL / ALK PHOS: 114 U/L / ALT: 2089 U/L / AST: 2123 U/L / GGT: x                                                  Culture - Blood (collected 12 Jun 2024 19:01)  Source: .Blood Blood-Peripheral  Preliminary Report (13 Jun 2024 23:01):    No growth at 24 hours    Culture - Blood (collected 12 Jun 2024 19:01)  Source: .Blood Blood-Peripheral  Preliminary Report (13 Jun 2024 23:01):    No growth at 24 hours    Urinalysis with Rflx Culture (collected 12 Jun 2024 19:01)                                                   Mode: AC/ CMV (Assist Control/ Continuous Mandatory Ventilation)  RR (machine): 20  TV (machine): 400  FiO2: 60  PEEP: 8  ITime: 1  MAP: 13  PIP: 25                                      ABG - ( 14 Jun 2024 03:51 )  pH, Arterial: 7.12  pH, Blood: x     /  pCO2: 39    /  pO2: 168   / HCO3: 13    / Base Excess: -16.0 /  SaO2: 97.8                MEDICATIONS  (STANDING):  atorvastatin 80 milliGRAM(s) Oral at bedtime  cefepime   IVPB 2000 milliGRAM(s) IV Intermittent every 12 hours  cefepime   IVPB      chlorhexidine 0.12% Liquid 15 milliLiter(s) Oral Mucosa two times a day  chlorhexidine 2% Cloths 1 Application(s) Topical <User Schedule>  CRRT Treatment    <Continuous>  enoxaparin Injectable 53 milliGRAM(s) SubCutaneous every 12 hours  fentaNYL   Infusion 0.5 MICROgram(s)/kG/Hr (2.69 mL/Hr) IV Continuous <Continuous>  folic acid 1 milliGRAM(s) Oral daily  hydrocortisone sodium succinate Injectable 50 milliGRAM(s) IV Push every 6 hours  metroNIDAZOLE    Tablet 500 milliGRAM(s) Oral every 8 hours  norepinephrine Infusion 0.05 MICROgram(s)/kG/Min (2.52 mL/Hr) IV Continuous <Continuous>  pantoprazole   Suspension 40 milliGRAM(s) Oral daily  petrolatum Ophthalmic Ointment 1 Application(s) Both EYES every 6 hours  phenylephrine    Infusion 0.5 MICROgram(s)/kG/Min (5.03 mL/Hr) IV Continuous <Continuous>  polyethylene glycol 3350 17 Gram(s) Oral daily  PureFlow Dialysate RFP-400 (K 2 / Ca 3) 5000 milliLiter(s) (2000 mL/Hr) CRRT <Continuous>  senna 2 Tablet(s) Oral at bedtime  sodium bicarbonate  Infusion 0.419 mEq/kG/Hr (150 mL/Hr) IV Continuous <Continuous>  thiamine 100 milliGRAM(s) Oral daily  vancomycin  IVPB 1000 milliGRAM(s) IV Intermittent every 24 hours  vasopressin Infusion 0.04 Unit(s)/Min (6 mL/Hr) IV Continuous <Continuous>    MEDICATIONS  (PRN):  ondansetron Injectable 4 milliGRAM(s) IV Push every 8 hours PRN Nausea and/or Vomiting    cxr noted

## 2024-06-14 NOTE — PROGRESS NOTE ADULT - ASSESSMENT
IMPRESSION:    # Acute hypoxemic/ hypercapnic respiratory failure  #Toxic metabolic/infectious encephalopathy  #Septic shock on admission   #Aspiration pneumonitis   #Hyponatremia  #Hx CAD s/p CABG  #Hx Aflutter s/p ablation   #Hx PUD       PLAN:    CNS:     - CTH and CTA noted - chronic L thalamic infarct and L CCA and L SCL severe stenosis   - Target RASS -2-3, wean sedation when appropriate  - Daily SAT   - continue w/ home antidepressants   - neurology following     HEENT:   - Oral care. Aspiration precautions   - SLP eval when extubated     PULMONARY:   - MV 16, PEEP 8 ,, FIO2 50  - wean FIO2 as tolerated, keep SpO2 >93%  - SBT in AM if tolerating   - HOB @ 45.  - duonebs prn   - abg noted    CARDIOVASCULAR:   - hold home BBs and CCBs  - MAP goal >65  - Daily Weight. Strict I&Os. Keep I < O  - on vaso, levo and noemy    GI:   - GI prophylaxis. OG feeds  - cw home dicyclomine   - cw folate/thiamine     RENAL:   - discontinued flomax  - urine lytes   - Avoid nephrotoxic agents   - s/p CVVH from 2pm - 6 am, stopped due to SBP 60s    INFECTIOUS DISEASE:  - sputum cx, Bcx, UCx  - procal, strep/legionella urine ag   - MRSA nares  - on vanc, cefepime and flagyl    HEMATOLOGICAL:   - heparin gtt for DVT    ENDOCRINE:   - Monitor FS  - cw lipitor     MUSCULOSKELETAL:   - WBAT iso pelvic fx       DISPOSITION: ICU

## 2024-06-14 NOTE — DISCHARGE NOTE FOR THE EXPIRED PATIENT - HOSPITAL COURSE
78y F pmh CAD sp CABG, aflutter sp ablation, preDM, PUD, tobacco use disorder, HTN, anxiety pw altered mental status. Pt discharged yesterday from [facility] after being admitted for mechanical fall and rib fractures. Patient has had multiple falls over the last 2 weeks due to weakness with multiple L sided ribs 5-11 fractures and pelvic fracture. During this admission she had episodes of worsening mental status, agitation and respiratory distress requiring BIPAP and HFNC. Patient improved and was dc to [facility] per discharge documentation.Today at NH patient had reported altered mental status, was lethargic, confused and not following commands. On arrival to the ED patient was unresponsive and apneic. Initially placed on NRB and started peripheral Levophed. Patient was intubated for GSC 7 and airway protection in the ED. Stroke code initiated. Patient received cefepime, vancomycin, 3L LR, magnesium, levophed  in the ED. Admitted to medicine for management.    CTH was negative for stroke, however, patient was deteriorating and needed three pressors for BP. Patient was started on CVVH, but, could not tolerate it due to low BP. Patient was pronounced dead at 2: 43 pm based on cardiopulmonary criteria.    # Acute hypoxemic/ hypercapnic respiratory failure  #Toxic metabolic/infectious encephalopathy  #Septic shock/ multiorgan failure on pressors  # LLL pneumonia  #Hyponatremia  #Hx CAD s/p CABG  #Hx Aflutter s/p ablation   #Hx PUD

## 2024-06-14 NOTE — CHART NOTE - NSCHARTNOTEFT_GEN_A_CORE
Was notified by RN that the patient's BP was 70s/30s on 3 pressors while on CVVHD. CVVHD stopped, BP improved to 100s/60s. Son Romeo was called and notified of this change in status and the deteriorating status. He expressed good understanding, stated that he will come within an hour.
Patient BP noted   Family now has decided they want to proceed with RRT   will attempt CVVHD keep even  discussed with family and MICU
Patient's BP is very labile on norepinephrine, requiring at least 0.5. IVC is 1cm. Patient is also tachycardic to 140s-150s. Will give another 1L bolus of LR.

## 2024-06-14 NOTE — PROGRESS NOTE ADULT - ASSESSMENT
IMPRESSION:    # Acute hypoxemic/ hypercapnic respiratory failure  #Toxic metabolic/infectious encephalopathy  #Septic shock/ multiorgan failure on pressors  # LLL pneumonia  #Hyponatremia  #Hx CAD s/p CABG  #Hx Aflutter s/p ablation   #Hx PUD       PLAN:    CNS:     - CTH and CTA noted - chronic L thalamic infarct and L CCA and L SCL severe stenosis   - Target RASS -2-3, wean sedation when appropriate  - Daily SAT   - neurology following     HEENT:   - Oral care. Aspiration precautions     PULMONARY:   - MV 20, PEEP 8 ,, FIO2 60  - wean FIO2 as tolerated, keep SpO2 92 to 96%   - HOB @ 45.  - duonebs prn   - abg noted    CARDIOVASCULAR:   - hold home BBs and CCBs, wean pressors as tolerated  - MAP goal >65  - Daily Weight. Strict I&Os. Keep I < O  - echo    GI:   - GI prophylaxis. OG feeds  - cw home dicyclomine   - cw folate/thiamine     RENAL:   - cw flomax  - urine lytes   - Avoid nephrotoxic agents   - RRT per renal    INFECTIOUS DISEASE:  - sputum cx, Bcx, UCx  - MRSA nares  - abx    HEMATOLOGICAL:   - DVT prophylaxis  - LE doppler    ENDOCRINE:   - Monitor FS  - cw lipitor     MUSCULOSKELETAL:   - WBAT iso pelvic fx     CODE STATUS: DNR    DISPOSITION: ICU   very poor prognosis  spoke with daughter

## 2024-06-14 NOTE — PROGRESS NOTE ADULT - REASON FOR ADMISSION
Altered Mental Status/Hypoxia

## 2024-06-14 NOTE — PROGRESS NOTE ADULT - ASSESSMENT
IMPRESSION:    # Acute hypoxemic/ hypercapnic respiratory failure  #Toxic metabolic/infectious encephalopathy  #Septic shock/ multiorgan failure on pressors  # LLL pneumonia  #Hyponatremia  #Hx CAD s/p CABG  #Hx Aflutter s/p ablation   #Hx PUD  hx DVT/ CT no PE seen/ no RV strain       PLAN:    CNS:     - CTH and CTA noted - chronic L thalamic infarct and L CCA and L SCL severe stenosis   - Target RASS -2-3, wean sedation when appropriate  - Daily SAT   - neurology following     HEENT:   - Oral care. Aspiration precautions     PULMONARY:   - MV 20, PEEP 8 ,, FIO2 40  - wean FIO2 as tolerated, keep SpO2 92 to 96%   - HOB @ 45.  - duonebs prn   - abg noted  - IV hep    CARDIOVASCULAR:   - hold home BBs and CCBs, wean pressors as tolerated  - MAP goal >65  - Daily Weight. Strict I&Os. Keep I < O  - echo    GI:   - GI prophylaxis. OG feeds  - cw folate/thiamine     RENAL:   - urine lytes   - Avoid nephrotoxic agents   - RRT per renal    INFECTIOUS DISEASE:  - sputum cx, Bcx, UCx  - MRSA nares  - abx    HEMATOLOGICAL:   - DVT prophylaxis  - LE doppler    ENDOCRINE:   - Monitor FS  - cw lipitor     MUSCULOSKELETAL:   - WBAT iso pelvic fx     CODE STATUS: DNR    DISPOSITION: ICU   very poor prognosis  spoke with daughter   IMPRESSION:    # Acute hypoxemic/ hypercapnic respiratory failure  #Toxic metabolic/infectious encephalopathy  #Septic shock/ multiorgan failure on pressors  # LLL pneumonia  #Hyponatremia  #Hx CAD s/p CABG  #Hx Aflutter s/p ablation   #Hx PUD  hx DVT/ CT no PE seen/ no RV strain       PLAN:    CNS:     - CTH and CTA noted - chronic L thalamic infarct and L CCA and L SCL severe stenosis   - Target RASS -2-3, wean sedation when appropriate  - Daily SAT   - neurology following     HEENT:   - Oral care. Aspiration precautions     PULMONARY:   - MV 20, PEEP 8 ,, FIO2 40  - wean FIO2 as tolerated, keep SpO2 92 to 96%   - HOB @ 45.  - duonebs prn   - abg noted  - IV hep  - CE    CARDIOVASCULAR:   - hold home BBs and CCBs, wean pressors as tolerated  - MAP goal >65  - Daily Weight. Strict I&Os. Keep I < O  - echo    GI:   - GI prophylaxis. OG feeds  - cw folate/thiamine     RENAL:   - urine lytes   - Avoid nephrotoxic agents   - RRT per renal    INFECTIOUS DISEASE:  - sputum cx, Bcx, UCx  - MRSA nares  - abx    HEMATOLOGICAL:   - DVT prophylaxis  - LE doppler    ENDOCRINE:   - Monitor FS  - cw lipitor     MUSCULOSKELETAL:   - WBAT iso pelvic fx     CODE STATUS: DNR    DISPOSITION: ICU   very poor prognosis  spoke with daughter

## 2024-06-17 DIAGNOSIS — J96.92 RESPIRATORY FAILURE, UNSPECIFIED WITH HYPERCAPNIA: ICD-10-CM

## 2024-06-17 DIAGNOSIS — E87.6 HYPOKALEMIA: ICD-10-CM

## 2024-06-17 DIAGNOSIS — N39.0 URINARY TRACT INFECTION, SITE NOT SPECIFIED: ICD-10-CM

## 2024-06-17 DIAGNOSIS — Z78.1 PHYSICAL RESTRAINT STATUS: ICD-10-CM

## 2024-06-17 DIAGNOSIS — Q39.9 CONGENITAL MALFORMATION OF ESOPHAGUS, UNSPECIFIED: ICD-10-CM

## 2024-06-17 DIAGNOSIS — J18.9 PNEUMONIA, UNSPECIFIED ORGANISM: ICD-10-CM

## 2024-06-17 DIAGNOSIS — R33.9 RETENTION OF URINE, UNSPECIFIED: ICD-10-CM
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
## 2024-06-17 DIAGNOSIS — I25.10 ATHEROSCLEROTIC HEART DISEASE OF NATIVE CORONARY ARTERY WITHOUT ANGINA PECTORIS: ICD-10-CM

## 2024-06-17 DIAGNOSIS — E83.39 OTHER DISORDERS OF PHOSPHORUS METABOLISM: ICD-10-CM

## 2024-06-17 DIAGNOSIS — G93.41 METABOLIC ENCEPHALOPATHY: ICD-10-CM
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
## 2024-06-17 DIAGNOSIS — S72.111A DISPLACED FRACTURE OF GREATER TROCHANTER OF RIGHT FEMUR, INITIAL ENCOUNTER FOR CLOSED FRACTURE: ICD-10-CM

## 2024-06-17 DIAGNOSIS — E83.42 HYPOMAGNESEMIA: ICD-10-CM

## 2024-06-17 DIAGNOSIS — E87.1 HYPO-OSMOLALITY AND HYPONATREMIA: ICD-10-CM

## 2024-06-17 DIAGNOSIS — L89.156 PRESSURE-INDUCED DEEP TISSUE DAMAGE OF SACRAL REGION: ICD-10-CM

## 2024-06-17 DIAGNOSIS — S22.42XA MULTIPLE FRACTURES OF RIBS, LEFT SIDE, INITIAL ENCOUNTER FOR CLOSED FRACTURE: ICD-10-CM

## 2024-06-17 DIAGNOSIS — K21.9 GASTRO-ESOPHAGEAL REFLUX DISEASE WITHOUT ESOPHAGITIS: ICD-10-CM

## 2024-06-17 DIAGNOSIS — I48.92 UNSPECIFIED ATRIAL FLUTTER: ICD-10-CM

## 2024-06-17 DIAGNOSIS — I10 ESSENTIAL (PRIMARY) HYPERTENSION: ICD-10-CM

## 2024-06-17 DIAGNOSIS — R25.3 FASCICULATION: ICD-10-CM

## 2024-06-17 DIAGNOSIS — Z95.1 PRESENCE OF AORTOCORONARY BYPASS GRAFT: ICD-10-CM

## 2024-06-17 DIAGNOSIS — F41.9 ANXIETY DISORDER, UNSPECIFIED: ICD-10-CM

## 2024-06-17 DIAGNOSIS — L89.316 PRESSURE-INDUCED DEEP TISSUE DAMAGE OF RIGHT BUTTOCK: ICD-10-CM

## 2024-06-17 DIAGNOSIS — J90 PLEURAL EFFUSION, NOT ELSEWHERE CLASSIFIED: ICD-10-CM

## 2024-06-17 DIAGNOSIS — M19.90 UNSPECIFIED OSTEOARTHRITIS, UNSPECIFIED SITE: ICD-10-CM

## 2024-06-17 DIAGNOSIS — F17.210 NICOTINE DEPENDENCE, CIGARETTES, UNCOMPLICATED: ICD-10-CM

## 2024-06-17 DIAGNOSIS — R59.9 ENLARGED LYMPH NODES, UNSPECIFIED: ICD-10-CM

## 2024-06-17 DIAGNOSIS — K83.8 OTHER SPECIFIED DISEASES OF BILIARY TRACT: ICD-10-CM

## 2024-06-17 DIAGNOSIS — S32.511A FRACTURE OF SUPERIOR RIM OF RIGHT PUBIS, INITIAL ENCOUNTER FOR CLOSED FRACTURE: ICD-10-CM

## 2024-06-17 DIAGNOSIS — G47.33 OBSTRUCTIVE SLEEP APNEA (ADULT) (PEDIATRIC): ICD-10-CM

## 2024-06-17 DIAGNOSIS — E78.5 HYPERLIPIDEMIA, UNSPECIFIED: ICD-10-CM

## 2024-06-17 DIAGNOSIS — L89.326 PRESSURE-INDUCED DEEP TISSUE DAMAGE OF LEFT BUTTOCK: ICD-10-CM

## 2024-06-17 DIAGNOSIS — J98.11 ATELECTASIS: ICD-10-CM

## 2024-06-17 DIAGNOSIS — J44.9 CHRONIC OBSTRUCTIVE PULMONARY DISEASE, UNSPECIFIED: ICD-10-CM

## 2024-06-17 LAB
CULTURE RESULTS: SIGNIFICANT CHANGE UP
CULTURE RESULTS: SIGNIFICANT CHANGE UP
SPECIMEN SOURCE: SIGNIFICANT CHANGE UP
SPECIMEN SOURCE: SIGNIFICANT CHANGE UP

## 2024-06-18 DIAGNOSIS — Z66 DO NOT RESUSCITATE: ICD-10-CM

## 2024-06-18 DIAGNOSIS — G92.8 OTHER TOXIC ENCEPHALOPATHY: ICD-10-CM

## 2024-06-18 DIAGNOSIS — R73.03 PREDIABETES: ICD-10-CM

## 2024-06-18 DIAGNOSIS — R65.21 SEVERE SEPSIS WITH SEPTIC SHOCK: ICD-10-CM

## 2024-06-18 DIAGNOSIS — J18.9 PNEUMONIA, UNSPECIFIED ORGANISM: ICD-10-CM

## 2024-06-18 DIAGNOSIS — J69.0 PNEUMONITIS DUE TO INHALATION OF FOOD AND VOMIT: ICD-10-CM

## 2024-06-18 DIAGNOSIS — S32.9XXD FRACTURE OF UNSPECIFIED PARTS OF LUMBOSACRAL SPINE AND PELVIS, SUBSEQUENT ENCOUNTER FOR FRACTURE WITH ROUTINE HEALING: ICD-10-CM

## 2024-06-18 DIAGNOSIS — I10 ESSENTIAL (PRIMARY) HYPERTENSION: ICD-10-CM
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
## 2024-06-18 DIAGNOSIS — A41.9 SEPSIS, UNSPECIFIED ORGANISM: ICD-10-CM

## 2024-06-18 DIAGNOSIS — N17.0 ACUTE KIDNEY FAILURE WITH TUBULAR NECROSIS: ICD-10-CM

## 2024-06-18 DIAGNOSIS — I25.10 ATHEROSCLEROTIC HEART DISEASE OF NATIVE CORONARY ARTERY WITHOUT ANGINA PECTORIS: ICD-10-CM

## 2024-06-18 DIAGNOSIS — J96.02 ACUTE RESPIRATORY FAILURE WITH HYPERCAPNIA: ICD-10-CM

## 2024-06-18 DIAGNOSIS — Z95.1 PRESENCE OF AORTOCORONARY BYPASS GRAFT: ICD-10-CM

## 2024-06-18 DIAGNOSIS — L89.154 PRESSURE ULCER OF SACRAL REGION, STAGE 4: ICD-10-CM

## 2024-06-18 DIAGNOSIS — E87.5 HYPERKALEMIA: ICD-10-CM

## 2024-06-18 DIAGNOSIS — J96.01 ACUTE RESPIRATORY FAILURE WITH HYPOXIA: ICD-10-CM

## 2024-06-18 DIAGNOSIS — E87.20 ACIDOSIS, UNSPECIFIED: ICD-10-CM

## 2024-06-18 DIAGNOSIS — E87.1 HYPO-OSMOLALITY AND HYPONATREMIA: ICD-10-CM

## 2024-06-18 DIAGNOSIS — Z86.73 PERSONAL HISTORY OF TRANSIENT ISCHEMIC ATTACK (TIA), AND CEREBRAL INFARCTION WITHOUT RESIDUAL DEFICITS: ICD-10-CM

## 2024-06-18 DIAGNOSIS — S22.42XD MULTIPLE FRACTURES OF RIBS, LEFT SIDE, SUBSEQUENT ENCOUNTER FOR FRACTURE WITH ROUTINE HEALING: ICD-10-CM

## 2024-06-18 DIAGNOSIS — F17.200 NICOTINE DEPENDENCE, UNSPECIFIED, UNCOMPLICATED: ICD-10-CM

## 2024-06-21 LAB
FENTANYL UR CFM-MCNC: 249 NG/ML — HIGH
NORBUPRENORPHINE UR-MCNC: 111 NG/ML — HIGH
TOXICOLOGIST REVIEW: POSITIVE — SIGNIFICANT CHANGE UP

## 2024-10-21 ENCOUNTER — RX RENEWAL (OUTPATIENT)
Age: 79
End: 2024-10-21

## 2024-12-17 ENCOUNTER — RX RENEWAL (OUTPATIENT)
Age: 79
End: 2024-12-17

## 2025-02-24 NOTE — PATIENT PROFILE ADULT - PACKS YRS CALCULATION
Pt placing on call light again, states \"I don't feel good.\" This RN attempting to get pt to wear neb mask to finish medication, pt continuing to refuse. This RN to give solumedrol to assist with pts respirations. PT on full monitor.    15

## 2025-03-18 NOTE — DIETITIAN INITIAL EVALUATION ADULT - WEIGHT (KG)
69.2 [Time Spent: ___ minutes] : I have spent [unfilled] minutes of time on the encounter which excludes teaching and separately reported services.